# Patient Record
Sex: MALE | Race: WHITE | HISPANIC OR LATINO | Employment: FULL TIME | URBAN - METROPOLITAN AREA
[De-identification: names, ages, dates, MRNs, and addresses within clinical notes are randomized per-mention and may not be internally consistent; named-entity substitution may affect disease eponyms.]

---

## 2017-01-05 ENCOUNTER — GENERIC CONVERSION - ENCOUNTER (OUTPATIENT)
Dept: OTHER | Facility: OTHER | Age: 57
End: 2017-01-05

## 2017-01-05 ENCOUNTER — TRANSCRIBE ORDERS (OUTPATIENT)
Dept: LAB | Facility: CLINIC | Age: 57
End: 2017-01-05

## 2017-01-05 ENCOUNTER — APPOINTMENT (OUTPATIENT)
Dept: LAB | Facility: CLINIC | Age: 57
End: 2017-01-05
Payer: COMMERCIAL

## 2017-01-05 DIAGNOSIS — Z94.4 LIVER REPLACED BY TRANSPLANT (HCC): ICD-10-CM

## 2017-01-05 DIAGNOSIS — B18.2 CHRONIC HEPATITIS C WITH HEPATIC COMA (HCC): ICD-10-CM

## 2017-01-05 DIAGNOSIS — R79.1 ABNORMAL COAGULATION PROFILE: ICD-10-CM

## 2017-01-05 DIAGNOSIS — E11.65 TYPE 2 DIABETES MELLITUS WITH HYPERGLYCEMIA (HCC): ICD-10-CM

## 2017-01-05 DIAGNOSIS — D68.9 BLOOD CLOTTING DISORDER (HCC): ICD-10-CM

## 2017-01-05 DIAGNOSIS — N18.2 CHRONIC KIDNEY DISEASE, STAGE II (MILD): ICD-10-CM

## 2017-01-05 LAB
ALBUMIN SERPL BCP-MCNC: 3.9 G/DL (ref 3.5–5)
ALP SERPL-CCNC: 127 U/L (ref 46–116)
ALT SERPL W P-5'-P-CCNC: 39 U/L (ref 12–78)
ANION GAP SERPL CALCULATED.3IONS-SCNC: 10 MMOL/L (ref 4–13)
AST SERPL W P-5'-P-CCNC: 13 U/L (ref 5–45)
BASOPHILS # BLD AUTO: 0.01 THOUSANDS/ΜL (ref 0–0.1)
BASOPHILS NFR BLD AUTO: 0 % (ref 0–1)
BILIRUB SERPL-MCNC: 0.3 MG/DL (ref 0.2–1)
BUN SERPL-MCNC: 46 MG/DL (ref 5–25)
CALCIUM SERPL-MCNC: 8.6 MG/DL (ref 8.3–10.1)
CHLORIDE SERPL-SCNC: 106 MMOL/L (ref 100–108)
CHOLEST SERPL-MCNC: 221 MG/DL (ref 50–200)
CO2 SERPL-SCNC: 25 MMOL/L (ref 21–32)
CREAT SERPL-MCNC: 1.75 MG/DL (ref 0.6–1.3)
CREAT UR-MCNC: 289 MG/DL
EOSINOPHIL # BLD AUTO: 0.09 THOUSAND/ΜL (ref 0–0.61)
EOSINOPHIL NFR BLD AUTO: 2 % (ref 0–6)
ERYTHROCYTE [DISTWIDTH] IN BLOOD BY AUTOMATED COUNT: 13.8 % (ref 11.6–15.1)
EST. AVERAGE GLUCOSE BLD GHB EST-MCNC: 194 MG/DL
GFR SERPL CREATININE-BSD FRML MDRD: 40.2 ML/MIN/1.73SQ M
GGT SERPL-CCNC: 132 U/L (ref 5–85)
GLUCOSE SERPL-MCNC: 132 MG/DL (ref 65–140)
HBA1C MFR BLD: 8.4 % (ref 4.2–6.3)
HCT VFR BLD AUTO: 33.7 % (ref 36.5–49.3)
HDLC SERPL-MCNC: 34 MG/DL (ref 40–60)
HGB BLD-MCNC: 11 G/DL (ref 12–17)
INR PPP: 1.17 (ref 0.86–1.16)
LDLC SERPL CALC-MCNC: 119 MG/DL (ref 0–100)
LYMPHOCYTES # BLD AUTO: 0.53 THOUSANDS/ΜL (ref 0.6–4.47)
LYMPHOCYTES NFR BLD AUTO: 10 % (ref 14–44)
MAGNESIUM SERPL-MCNC: 1.3 MG/DL (ref 1.6–2.6)
MCH RBC QN AUTO: 28 PG (ref 26.8–34.3)
MCHC RBC AUTO-ENTMCNC: 32.6 G/DL (ref 31.4–37.4)
MCV RBC AUTO: 86 FL (ref 82–98)
MONOCYTES # BLD AUTO: 0.42 THOUSAND/ΜL (ref 0.17–1.22)
MONOCYTES NFR BLD AUTO: 8 % (ref 4–12)
NEUTROPHILS # BLD AUTO: 4.47 THOUSANDS/ΜL (ref 1.85–7.62)
NEUTS SEG NFR BLD AUTO: 80 % (ref 43–75)
PLATELET # BLD AUTO: 159 THOUSANDS/UL (ref 149–390)
PMV BLD AUTO: 11.9 FL (ref 8.9–12.7)
POTASSIUM SERPL-SCNC: 4.8 MMOL/L (ref 3.5–5.3)
PROT SERPL-MCNC: 7.5 G/DL (ref 6.4–8.2)
PROT UR-MCNC: 183 MG/DL
PROT/CREAT UR: 0.63 MG/G{CREAT} (ref 0–0.1)
PROTHROMBIN TIME: 14.6 SECONDS (ref 12–14.3)
RBC # BLD AUTO: 3.93 MILLION/UL (ref 3.88–5.62)
SODIUM SERPL-SCNC: 141 MMOL/L (ref 136–145)
TRIGL SERPL-MCNC: 338 MG/DL
WBC # BLD AUTO: 5.52 THOUSAND/UL (ref 4.31–10.16)

## 2017-01-05 PROCEDURE — 85025 COMPLETE CBC W/AUTO DIFF WBC: CPT

## 2017-01-05 PROCEDURE — 80053 COMPREHEN METABOLIC PANEL: CPT

## 2017-01-05 PROCEDURE — 83036 HEMOGLOBIN GLYCOSYLATED A1C: CPT

## 2017-01-05 PROCEDURE — 80197 ASSAY OF TACROLIMUS: CPT

## 2017-01-05 PROCEDURE — 82570 ASSAY OF URINE CREATININE: CPT

## 2017-01-05 PROCEDURE — 83735 ASSAY OF MAGNESIUM: CPT

## 2017-01-05 PROCEDURE — 85610 PROTHROMBIN TIME: CPT

## 2017-01-05 PROCEDURE — 36415 COLL VENOUS BLD VENIPUNCTURE: CPT

## 2017-01-05 PROCEDURE — 82977 ASSAY OF GGT: CPT

## 2017-01-05 PROCEDURE — 80061 LIPID PANEL: CPT

## 2017-01-05 PROCEDURE — 84156 ASSAY OF PROTEIN URINE: CPT

## 2017-01-09 LAB — TACROLIMUS BLD LC/MS/MS-MCNC: 4.9 NG/ML (ref 2–20)

## 2017-01-11 ENCOUNTER — ALLSCRIPTS OFFICE VISIT (OUTPATIENT)
Dept: OTHER | Facility: OTHER | Age: 57
End: 2017-01-11

## 2017-02-08 ENCOUNTER — ALLSCRIPTS OFFICE VISIT (OUTPATIENT)
Dept: OTHER | Facility: OTHER | Age: 57
End: 2017-02-08

## 2017-02-13 ENCOUNTER — TRANSCRIBE ORDERS (OUTPATIENT)
Dept: LAB | Facility: CLINIC | Age: 57
End: 2017-02-13

## 2017-02-13 ENCOUNTER — APPOINTMENT (OUTPATIENT)
Dept: LAB | Facility: CLINIC | Age: 57
End: 2017-02-13
Payer: COMMERCIAL

## 2017-02-13 DIAGNOSIS — R79.1 ABNORMAL COAGULATION PROFILE: ICD-10-CM

## 2017-02-13 DIAGNOSIS — Z94.4 LIVER REPLACED BY TRANSPLANT (HCC): ICD-10-CM

## 2017-02-13 DIAGNOSIS — Z94.4 LIVER REPLACED BY TRANSPLANT (HCC): Primary | ICD-10-CM

## 2017-02-13 DIAGNOSIS — D68.9 OTHER AND UNSPECIFIED COAGULATION DEFECTS: ICD-10-CM

## 2017-02-13 LAB
ALBUMIN SERPL BCP-MCNC: 4.2 G/DL (ref 3.5–5)
ALP SERPL-CCNC: 104 U/L (ref 46–116)
ALT SERPL W P-5'-P-CCNC: 42 U/L (ref 12–78)
ANION GAP SERPL CALCULATED.3IONS-SCNC: 10 MMOL/L (ref 4–13)
AST SERPL W P-5'-P-CCNC: 13 U/L (ref 5–45)
BASOPHILS # BLD AUTO: 0.02 THOUSANDS/ΜL (ref 0–0.1)
BASOPHILS NFR BLD AUTO: 0 % (ref 0–1)
BILIRUB SERPL-MCNC: 0.3 MG/DL (ref 0.2–1)
BUN SERPL-MCNC: 40 MG/DL (ref 5–25)
CALCIUM SERPL-MCNC: 8.6 MG/DL (ref 8.3–10.1)
CHLORIDE SERPL-SCNC: 106 MMOL/L (ref 100–108)
CO2 SERPL-SCNC: 25 MMOL/L (ref 21–32)
CREAT SERPL-MCNC: 1.92 MG/DL (ref 0.6–1.3)
EOSINOPHIL # BLD AUTO: 0.09 THOUSAND/ΜL (ref 0–0.61)
EOSINOPHIL NFR BLD AUTO: 2 % (ref 0–6)
ERYTHROCYTE [DISTWIDTH] IN BLOOD BY AUTOMATED COUNT: 14.7 % (ref 11.6–15.1)
GFR SERPL CREATININE-BSD FRML MDRD: 36.2 ML/MIN/1.73SQ M
GGT SERPL-CCNC: 69 U/L (ref 5–85)
GLUCOSE SERPL-MCNC: 153 MG/DL (ref 65–140)
HCT VFR BLD AUTO: 35.8 % (ref 36.5–49.3)
HGB BLD-MCNC: 11.4 G/DL (ref 12–17)
INR PPP: 1.2 (ref 0.86–1.16)
LYMPHOCYTES # BLD AUTO: 0.46 THOUSANDS/ΜL (ref 0.6–4.47)
LYMPHOCYTES NFR BLD AUTO: 10 % (ref 14–44)
MAGNESIUM SERPL-MCNC: 1.6 MG/DL (ref 1.6–2.6)
MCH RBC QN AUTO: 28.1 PG (ref 26.8–34.3)
MCHC RBC AUTO-ENTMCNC: 31.8 G/DL (ref 31.4–37.4)
MCV RBC AUTO: 88 FL (ref 82–98)
MONOCYTES # BLD AUTO: 0.46 THOUSAND/ΜL (ref 0.17–1.22)
MONOCYTES NFR BLD AUTO: 10 % (ref 4–12)
NEUTROPHILS # BLD AUTO: 3.44 THOUSANDS/ΜL (ref 1.85–7.62)
NEUTS SEG NFR BLD AUTO: 78 % (ref 43–75)
PLATELET # BLD AUTO: 117 THOUSANDS/UL (ref 149–390)
PMV BLD AUTO: 12 FL (ref 8.9–12.7)
POTASSIUM SERPL-SCNC: 5 MMOL/L (ref 3.5–5.3)
PROT SERPL-MCNC: 7.4 G/DL (ref 6.4–8.2)
PROTHROMBIN TIME: 14.9 SECONDS (ref 12–14.3)
RBC # BLD AUTO: 4.05 MILLION/UL (ref 3.88–5.62)
SODIUM SERPL-SCNC: 141 MMOL/L (ref 136–145)
WBC # BLD AUTO: 4.47 THOUSAND/UL (ref 4.31–10.16)

## 2017-02-13 PROCEDURE — 85610 PROTHROMBIN TIME: CPT

## 2017-02-13 PROCEDURE — 82977 ASSAY OF GGT: CPT

## 2017-02-13 PROCEDURE — 36415 COLL VENOUS BLD VENIPUNCTURE: CPT

## 2017-02-13 PROCEDURE — 80053 COMPREHEN METABOLIC PANEL: CPT

## 2017-02-13 PROCEDURE — 80197 ASSAY OF TACROLIMUS: CPT

## 2017-02-13 PROCEDURE — 85025 COMPLETE CBC W/AUTO DIFF WBC: CPT

## 2017-02-13 PROCEDURE — 83735 ASSAY OF MAGNESIUM: CPT

## 2017-02-15 LAB — TACROLIMUS BLD LC/MS/MS-MCNC: 6.2 NG/ML (ref 2–20)

## 2017-02-16 ENCOUNTER — ANESTHESIA (OUTPATIENT)
Dept: GASTROENTEROLOGY | Facility: HOSPITAL | Age: 57
End: 2017-02-16
Payer: COMMERCIAL

## 2017-02-16 ENCOUNTER — ANESTHESIA EVENT (OUTPATIENT)
Dept: GASTROENTEROLOGY | Facility: HOSPITAL | Age: 57
End: 2017-02-16
Payer: COMMERCIAL

## 2017-02-16 ENCOUNTER — HOSPITAL ENCOUNTER (OUTPATIENT)
Facility: HOSPITAL | Age: 57
Setting detail: OUTPATIENT SURGERY
Discharge: HOME | End: 2017-02-16
Attending: INTERNAL MEDICINE | Admitting: INTERNAL MEDICINE
Payer: COMMERCIAL

## 2017-02-16 VITALS
DIASTOLIC BLOOD PRESSURE: 60 MMHG | HEIGHT: 64 IN | BODY MASS INDEX: 33.46 KG/M2 | HEART RATE: 82 BPM | RESPIRATION RATE: 16 BRPM | WEIGHT: 196 LBS | SYSTOLIC BLOOD PRESSURE: 107 MMHG | TEMPERATURE: 97.3 F | OXYGEN SATURATION: 95 %

## 2017-02-16 DIAGNOSIS — Z12.11 ENCOUNTER FOR SCREENING FOR MALIGNANT NEOPLASM OF COLON: ICD-10-CM

## 2017-02-16 LAB — GLUCOSE SERPL-MCNC: 121 MG/DL (ref 65–140)

## 2017-02-16 PROCEDURE — 82948 REAGENT STRIP/BLOOD GLUCOSE: CPT

## 2017-02-16 PROCEDURE — 88305 TISSUE EXAM BY PATHOLOGIST: CPT | Performed by: INTERNAL MEDICINE

## 2017-02-16 RX ORDER — PROPOFOL 10 MG/ML
INJECTION, EMULSION INTRAVENOUS AS NEEDED
Status: DISCONTINUED | OUTPATIENT
Start: 2017-02-16 | End: 2017-02-16 | Stop reason: SURG

## 2017-02-16 RX ORDER — SODIUM CHLORIDE 9 MG/ML
125 INJECTION, SOLUTION INTRAVENOUS CONTINUOUS
Status: DISCONTINUED | OUTPATIENT
Start: 2017-02-16 | End: 2017-02-16 | Stop reason: HOSPADM

## 2017-02-16 RX ORDER — PROPOFOL 10 MG/ML
INJECTION, EMULSION INTRAVENOUS CONTINUOUS PRN
Status: DISCONTINUED | OUTPATIENT
Start: 2017-02-16 | End: 2017-02-16 | Stop reason: SURG

## 2017-02-16 RX ADMIN — PROPOFOL 120 MCG/KG/MIN: 10 INJECTION, EMULSION INTRAVENOUS at 08:47

## 2017-02-16 RX ADMIN — PROPOFOL 60 MG: 10 INJECTION, EMULSION INTRAVENOUS at 08:46

## 2017-02-16 RX ADMIN — SODIUM CHLORIDE: 0.9 INJECTION, SOLUTION INTRAVENOUS at 08:45

## 2017-02-16 RX ADMIN — SODIUM CHLORIDE 125 ML/HR: 0.9 INJECTION, SOLUTION INTRAVENOUS at 07:44

## 2017-02-27 ENCOUNTER — ALLSCRIPTS OFFICE VISIT (OUTPATIENT)
Dept: OTHER | Facility: OTHER | Age: 57
End: 2017-02-27

## 2017-02-27 DIAGNOSIS — E11.9 TYPE 2 DIABETES MELLITUS WITHOUT COMPLICATIONS (HCC): ICD-10-CM

## 2017-02-27 DIAGNOSIS — M11.9 CRYSTAL ARTHROPATHY: ICD-10-CM

## 2017-04-24 ENCOUNTER — ALLSCRIPTS OFFICE VISIT (OUTPATIENT)
Dept: OTHER | Facility: OTHER | Age: 57
End: 2017-04-24

## 2017-05-02 ENCOUNTER — TRANSCRIBE ORDERS (OUTPATIENT)
Dept: LAB | Facility: CLINIC | Age: 57
End: 2017-05-02

## 2017-05-02 ENCOUNTER — APPOINTMENT (OUTPATIENT)
Dept: LAB | Facility: CLINIC | Age: 57
End: 2017-05-02
Payer: COMMERCIAL

## 2017-05-02 DIAGNOSIS — M11.9 CRYSTAL ARTHROPATHY: ICD-10-CM

## 2017-05-02 DIAGNOSIS — E11.9 DIABETES MELLITUS WITHOUT COMPLICATION (HCC): ICD-10-CM

## 2017-05-02 DIAGNOSIS — M11.9: Primary | ICD-10-CM

## 2017-05-02 DIAGNOSIS — R79.1 ABNORMAL COAGULATION PROFILE: ICD-10-CM

## 2017-05-02 DIAGNOSIS — D68.9 OTHER AND UNSPECIFIED COAGULATION DEFECTS: ICD-10-CM

## 2017-05-02 DIAGNOSIS — Z94.4 LIVER REPLACED BY TRANSPLANT (HCC): ICD-10-CM

## 2017-05-02 LAB
ALBUMIN SERPL BCP-MCNC: 4.2 G/DL (ref 3.5–5)
ALP SERPL-CCNC: 88 U/L (ref 46–116)
ALT SERPL W P-5'-P-CCNC: 33 U/L (ref 12–78)
ANION GAP SERPL CALCULATED.3IONS-SCNC: 11 MMOL/L (ref 4–13)
AST SERPL W P-5'-P-CCNC: 21 U/L (ref 5–45)
BASOPHILS # BLD AUTO: 0.01 THOUSANDS/ΜL (ref 0–0.1)
BASOPHILS NFR BLD AUTO: 0 % (ref 0–1)
BILIRUB SERPL-MCNC: 0.3 MG/DL (ref 0.2–1)
BUN SERPL-MCNC: 53 MG/DL (ref 5–25)
CALCIUM SERPL-MCNC: 9 MG/DL (ref 8.3–10.1)
CHLORIDE SERPL-SCNC: 106 MMOL/L (ref 100–108)
CO2 SERPL-SCNC: 24 MMOL/L (ref 21–32)
CREAT SERPL-MCNC: 1.94 MG/DL (ref 0.6–1.3)
EOSINOPHIL # BLD AUTO: 0.08 THOUSAND/ΜL (ref 0–0.61)
EOSINOPHIL NFR BLD AUTO: 2 % (ref 0–6)
ERYTHROCYTE [DISTWIDTH] IN BLOOD BY AUTOMATED COUNT: 14.4 % (ref 11.6–15.1)
GFR SERPL CREATININE-BSD FRML MDRD: 35.7 ML/MIN/1.73SQ M
GGT SERPL-CCNC: 51 U/L (ref 5–85)
GLUCOSE P FAST SERPL-MCNC: 118 MG/DL (ref 65–99)
HCT VFR BLD AUTO: 34.7 % (ref 36.5–49.3)
HGB BLD-MCNC: 11.5 G/DL (ref 12–17)
INR PPP: 1.07 (ref 0.86–1.16)
LYMPHOCYTES # BLD AUTO: 0.53 THOUSANDS/ΜL (ref 0.6–4.47)
LYMPHOCYTES NFR BLD AUTO: 10 % (ref 14–44)
MAGNESIUM SERPL-MCNC: 1.4 MG/DL (ref 1.6–2.6)
MCH RBC QN AUTO: 28.8 PG (ref 26.8–34.3)
MCHC RBC AUTO-ENTMCNC: 33.1 G/DL (ref 31.4–37.4)
MCV RBC AUTO: 87 FL (ref 82–98)
MONOCYTES # BLD AUTO: 0.42 THOUSAND/ΜL (ref 0.17–1.22)
MONOCYTES NFR BLD AUTO: 8 % (ref 4–12)
NEUTROPHILS # BLD AUTO: 4.05 THOUSANDS/ΜL (ref 1.85–7.62)
NEUTS SEG NFR BLD AUTO: 80 % (ref 43–75)
PLATELET # BLD AUTO: 131 THOUSANDS/UL (ref 149–390)
PMV BLD AUTO: 11.6 FL (ref 8.9–12.7)
POTASSIUM SERPL-SCNC: 5.7 MMOL/L (ref 3.5–5.3)
PROT SERPL-MCNC: 7.4 G/DL (ref 6.4–8.2)
PROTHROMBIN TIME: 14.2 SECONDS (ref 12.1–14.4)
RBC # BLD AUTO: 4 MILLION/UL (ref 3.88–5.62)
SODIUM SERPL-SCNC: 141 MMOL/L (ref 136–145)
TSH SERPL DL<=0.05 MIU/L-ACNC: 1.6 UIU/ML (ref 0.36–3.74)
URATE SERPL-MCNC: 9.4 MG/DL (ref 4.2–8)
WBC # BLD AUTO: 5.09 THOUSAND/UL (ref 4.31–10.16)

## 2017-05-02 PROCEDURE — 80197 ASSAY OF TACROLIMUS: CPT

## 2017-05-02 PROCEDURE — 85025 COMPLETE CBC W/AUTO DIFF WBC: CPT

## 2017-05-02 PROCEDURE — 84443 ASSAY THYROID STIM HORMONE: CPT | Performed by: SURGERY

## 2017-05-02 PROCEDURE — 80053 COMPREHEN METABOLIC PANEL: CPT

## 2017-05-02 PROCEDURE — 83735 ASSAY OF MAGNESIUM: CPT

## 2017-05-02 PROCEDURE — 82977 ASSAY OF GGT: CPT

## 2017-05-02 PROCEDURE — 85610 PROTHROMBIN TIME: CPT

## 2017-05-02 PROCEDURE — 36415 COLL VENOUS BLD VENIPUNCTURE: CPT

## 2017-05-02 PROCEDURE — 84550 ASSAY OF BLOOD/URIC ACID: CPT

## 2017-05-04 LAB — TACROLIMUS BLD LC/MS/MS-MCNC: 6.2 NG/ML (ref 2–20)

## 2017-05-22 ENCOUNTER — APPOINTMENT (OUTPATIENT)
Dept: LAB | Facility: CLINIC | Age: 57
End: 2017-05-22
Payer: COMMERCIAL

## 2017-05-22 DIAGNOSIS — Z94.4 LIVER REPLACED BY TRANSPLANT (HCC): ICD-10-CM

## 2017-05-22 DIAGNOSIS — D68.9 OTHER AND UNSPECIFIED COAGULATION DEFECTS: ICD-10-CM

## 2017-05-22 DIAGNOSIS — R79.1 ABNORMAL COAGULATION PROFILE: ICD-10-CM

## 2017-05-22 LAB
ALBUMIN SERPL BCP-MCNC: 4.1 G/DL (ref 3.5–5)
ALP SERPL-CCNC: 80 U/L (ref 46–116)
ALT SERPL W P-5'-P-CCNC: 35 U/L (ref 12–78)
ANION GAP SERPL CALCULATED.3IONS-SCNC: 11 MMOL/L (ref 4–13)
AST SERPL W P-5'-P-CCNC: 17 U/L (ref 5–45)
BASOPHILS # BLD AUTO: 0.03 THOUSANDS/ΜL (ref 0–0.1)
BASOPHILS NFR BLD AUTO: 1 % (ref 0–1)
BILIRUB SERPL-MCNC: 0.3 MG/DL (ref 0.2–1)
BUN SERPL-MCNC: 40 MG/DL (ref 5–25)
CALCIUM SERPL-MCNC: 8.9 MG/DL (ref 8.3–10.1)
CHLORIDE SERPL-SCNC: 106 MMOL/L (ref 100–108)
CO2 SERPL-SCNC: 25 MMOL/L (ref 21–32)
CREAT SERPL-MCNC: 1.82 MG/DL (ref 0.6–1.3)
EOSINOPHIL # BLD AUTO: 0.11 THOUSAND/ΜL (ref 0–0.61)
EOSINOPHIL NFR BLD AUTO: 2 % (ref 0–6)
ERYTHROCYTE [DISTWIDTH] IN BLOOD BY AUTOMATED COUNT: 14.6 % (ref 11.6–15.1)
GFR SERPL CREATININE-BSD FRML MDRD: 38.5 ML/MIN/1.73SQ M
GGT SERPL-CCNC: 30 U/L (ref 5–85)
GLUCOSE P FAST SERPL-MCNC: 134 MG/DL (ref 65–99)
HCT VFR BLD AUTO: 35.7 % (ref 36.5–49.3)
HGB BLD-MCNC: 11.8 G/DL (ref 12–17)
INR PPP: 1.04 (ref 0.86–1.16)
LYMPHOCYTES # BLD AUTO: 0.53 THOUSANDS/ΜL (ref 0.6–4.47)
LYMPHOCYTES NFR BLD AUTO: 10 % (ref 14–44)
MAGNESIUM SERPL-MCNC: 1.4 MG/DL (ref 1.6–2.6)
MCH RBC QN AUTO: 28.7 PG (ref 26.8–34.3)
MCHC RBC AUTO-ENTMCNC: 33.1 G/DL (ref 31.4–37.4)
MCV RBC AUTO: 87 FL (ref 82–98)
MONOCYTES # BLD AUTO: 0.44 THOUSAND/ΜL (ref 0.17–1.22)
MONOCYTES NFR BLD AUTO: 8 % (ref 4–12)
NEUTROPHILS # BLD AUTO: 4.26 THOUSANDS/ΜL (ref 1.85–7.62)
NEUTS SEG NFR BLD AUTO: 79 % (ref 43–75)
PLATELET # BLD AUTO: 116 THOUSANDS/UL (ref 149–390)
PMV BLD AUTO: 11.9 FL (ref 8.9–12.7)
POTASSIUM SERPL-SCNC: 4.5 MMOL/L (ref 3.5–5.3)
PROT SERPL-MCNC: 7.2 G/DL (ref 6.4–8.2)
PROTHROMBIN TIME: 13.9 SECONDS (ref 12.1–14.4)
RBC # BLD AUTO: 4.11 MILLION/UL (ref 3.88–5.62)
SODIUM SERPL-SCNC: 142 MMOL/L (ref 136–145)
WBC # BLD AUTO: 5.37 THOUSAND/UL (ref 4.31–10.16)

## 2017-05-22 PROCEDURE — 82977 ASSAY OF GGT: CPT

## 2017-05-22 PROCEDURE — 83735 ASSAY OF MAGNESIUM: CPT

## 2017-05-22 PROCEDURE — 85025 COMPLETE CBC W/AUTO DIFF WBC: CPT

## 2017-05-22 PROCEDURE — 80197 ASSAY OF TACROLIMUS: CPT

## 2017-05-22 PROCEDURE — 85610 PROTHROMBIN TIME: CPT

## 2017-05-22 PROCEDURE — 36415 COLL VENOUS BLD VENIPUNCTURE: CPT

## 2017-05-22 PROCEDURE — 80053 COMPREHEN METABOLIC PANEL: CPT

## 2017-05-24 LAB — TACROLIMUS BLD LC/MS/MS-MCNC: 6.6 NG/ML (ref 2–20)

## 2017-05-30 ENCOUNTER — ALLSCRIPTS OFFICE VISIT (OUTPATIENT)
Dept: OTHER | Facility: OTHER | Age: 57
End: 2017-05-30

## 2017-07-03 DIAGNOSIS — N18.2 CHRONIC KIDNEY DISEASE, STAGE II (MILD): ICD-10-CM

## 2017-07-11 ENCOUNTER — ALLSCRIPTS OFFICE VISIT (OUTPATIENT)
Dept: OTHER | Facility: OTHER | Age: 57
End: 2017-07-11

## 2017-08-03 ENCOUNTER — TRANSCRIBE ORDERS (OUTPATIENT)
Dept: LAB | Facility: CLINIC | Age: 57
End: 2017-08-03

## 2017-08-03 ENCOUNTER — APPOINTMENT (OUTPATIENT)
Dept: LAB | Facility: CLINIC | Age: 57
End: 2017-08-03
Payer: COMMERCIAL

## 2017-08-03 DIAGNOSIS — E11.65 UNCONTROLLED TYPE 2 DIABETES MELLITUS WITH COMPLICATION, UNSPECIFIED LONG TERM INSULIN USE STATUS: ICD-10-CM

## 2017-08-03 DIAGNOSIS — E11.8 UNCONTROLLED TYPE 2 DIABETES MELLITUS WITH COMPLICATION, UNSPECIFIED LONG TERM INSULIN USE STATUS: ICD-10-CM

## 2017-08-03 DIAGNOSIS — E11.65 UNCONTROLLED TYPE 2 DIABETES MELLITUS WITH COMPLICATION, UNSPECIFIED LONG TERM INSULIN USE STATUS: Primary | ICD-10-CM

## 2017-08-03 DIAGNOSIS — N18.2 CHRONIC KIDNEY DISEASE, STAGE II (MILD): ICD-10-CM

## 2017-08-03 DIAGNOSIS — R79.1 ABNORMAL COAGULATION PROFILE: ICD-10-CM

## 2017-08-03 DIAGNOSIS — Z94.4 LIVER REPLACED BY TRANSPLANT (HCC): ICD-10-CM

## 2017-08-03 DIAGNOSIS — D68.9 OTHER AND UNSPECIFIED COAGULATION DEFECTS: ICD-10-CM

## 2017-08-03 DIAGNOSIS — E11.8 UNCONTROLLED TYPE 2 DIABETES MELLITUS WITH COMPLICATION, UNSPECIFIED LONG TERM INSULIN USE STATUS: Primary | ICD-10-CM

## 2017-08-03 LAB
ALBUMIN SERPL BCP-MCNC: 4.1 G/DL (ref 3.5–5)
ALP SERPL-CCNC: 91 U/L (ref 46–116)
ALT SERPL W P-5'-P-CCNC: 40 U/L (ref 12–78)
ANION GAP SERPL CALCULATED.3IONS-SCNC: 8 MMOL/L (ref 4–13)
AST SERPL W P-5'-P-CCNC: 19 U/L (ref 5–45)
BASOPHILS # BLD AUTO: 0.02 THOUSANDS/ΜL (ref 0–0.1)
BASOPHILS NFR BLD AUTO: 0 % (ref 0–1)
BILIRUB SERPL-MCNC: 0.3 MG/DL (ref 0.2–1)
BUN SERPL-MCNC: 52 MG/DL (ref 5–25)
CALCIUM SERPL-MCNC: 9.3 MG/DL (ref 8.3–10.1)
CHLORIDE SERPL-SCNC: 106 MMOL/L (ref 100–108)
CO2 SERPL-SCNC: 24 MMOL/L (ref 21–32)
CREAT SERPL-MCNC: 1.99 MG/DL (ref 0.6–1.3)
CREAT UR-MCNC: 279 MG/DL
CREAT UR-MCNC: 289 MG/DL
EOSINOPHIL # BLD AUTO: 0.09 THOUSAND/ΜL (ref 0–0.61)
EOSINOPHIL NFR BLD AUTO: 2 % (ref 0–6)
ERYTHROCYTE [DISTWIDTH] IN BLOOD BY AUTOMATED COUNT: 14.3 % (ref 11.6–15.1)
EST. AVERAGE GLUCOSE BLD GHB EST-MCNC: 154 MG/DL
GFR SERPL CREATININE-BSD FRML MDRD: 36 ML/MIN/1.73SQ M
GGT SERPL-CCNC: 47 U/L (ref 5–85)
GLUCOSE SERPL-MCNC: 150 MG/DL (ref 65–140)
HBA1C MFR BLD: 7 % (ref 4.2–6.3)
HCT VFR BLD AUTO: 35.4 % (ref 36.5–49.3)
HGB BLD-MCNC: 11.7 G/DL (ref 12–17)
INR PPP: 0.97 (ref 0.86–1.16)
LYMPHOCYTES # BLD AUTO: 0.55 THOUSANDS/ΜL (ref 0.6–4.47)
LYMPHOCYTES NFR BLD AUTO: 11 % (ref 14–44)
MAGNESIUM SERPL-MCNC: 1.5 MG/DL (ref 1.6–2.6)
MCH RBC QN AUTO: 28.9 PG (ref 26.8–34.3)
MCHC RBC AUTO-ENTMCNC: 33.1 G/DL (ref 31.4–37.4)
MCV RBC AUTO: 87 FL (ref 82–98)
MICROALBUMIN UR-MCNC: 1330 MG/L (ref 0–20)
MICROALBUMIN/CREAT 24H UR: 460 MG/G CREATININE (ref 0–30)
MONOCYTES # BLD AUTO: 0.4 THOUSAND/ΜL (ref 0.17–1.22)
MONOCYTES NFR BLD AUTO: 8 % (ref 4–12)
NEUTROPHILS # BLD AUTO: 3.92 THOUSANDS/ΜL (ref 1.85–7.62)
NEUTS SEG NFR BLD AUTO: 79 % (ref 43–75)
PLATELET # BLD AUTO: 128 THOUSANDS/UL (ref 149–390)
PMV BLD AUTO: 11.7 FL (ref 8.9–12.7)
POTASSIUM SERPL-SCNC: 5.7 MMOL/L (ref 3.5–5.3)
PROT SERPL-MCNC: 7.5 G/DL (ref 6.4–8.2)
PROT UR-MCNC: 174 MG/DL
PROT/CREAT UR: 0.62 MG/G{CREAT} (ref 0–0.1)
PROTHROMBIN TIME: 13.2 SECONDS (ref 12.1–14.4)
RBC # BLD AUTO: 4.05 MILLION/UL (ref 3.88–5.62)
SODIUM SERPL-SCNC: 138 MMOL/L (ref 136–145)
WBC # BLD AUTO: 4.98 THOUSAND/UL (ref 4.31–10.16)

## 2017-08-03 PROCEDURE — 85610 PROTHROMBIN TIME: CPT

## 2017-08-03 PROCEDURE — 85025 COMPLETE CBC W/AUTO DIFF WBC: CPT

## 2017-08-03 PROCEDURE — 82977 ASSAY OF GGT: CPT

## 2017-08-03 PROCEDURE — 80053 COMPREHEN METABOLIC PANEL: CPT

## 2017-08-03 PROCEDURE — 83735 ASSAY OF MAGNESIUM: CPT

## 2017-08-03 PROCEDURE — 82570 ASSAY OF URINE CREATININE: CPT

## 2017-08-03 PROCEDURE — 82043 UR ALBUMIN QUANTITATIVE: CPT | Performed by: FAMILY MEDICINE

## 2017-08-03 PROCEDURE — 36415 COLL VENOUS BLD VENIPUNCTURE: CPT

## 2017-08-03 PROCEDURE — 82570 ASSAY OF URINE CREATININE: CPT | Performed by: FAMILY MEDICINE

## 2017-08-03 PROCEDURE — 84156 ASSAY OF PROTEIN URINE: CPT

## 2017-08-03 PROCEDURE — 80197 ASSAY OF TACROLIMUS: CPT

## 2017-08-03 PROCEDURE — 83036 HEMOGLOBIN GLYCOSYLATED A1C: CPT

## 2017-08-04 LAB — TACROLIMUS BLD LC/MS/MS-MCNC: 6.3 NG/ML (ref 2–20)

## 2017-08-14 ENCOUNTER — APPOINTMENT (OUTPATIENT)
Dept: LAB | Facility: CLINIC | Age: 57
End: 2017-08-14
Payer: COMMERCIAL

## 2017-08-14 DIAGNOSIS — Z94.4 LIVER REPLACED BY TRANSPLANT (HCC): ICD-10-CM

## 2017-08-14 DIAGNOSIS — R79.1 ABNORMAL COAGULATION PROFILE: ICD-10-CM

## 2017-08-14 DIAGNOSIS — D68.9 OTHER AND UNSPECIFIED COAGULATION DEFECTS: ICD-10-CM

## 2017-08-14 LAB
ALBUMIN SERPL BCP-MCNC: 4.1 G/DL (ref 3.5–5)
ALP SERPL-CCNC: 90 U/L (ref 46–116)
ALT SERPL W P-5'-P-CCNC: 42 U/L (ref 12–78)
ANION GAP SERPL CALCULATED.3IONS-SCNC: 11 MMOL/L (ref 4–13)
AST SERPL W P-5'-P-CCNC: 19 U/L (ref 5–45)
BASOPHILS # BLD AUTO: 0.02 THOUSANDS/ΜL (ref 0–0.1)
BASOPHILS NFR BLD AUTO: 0 % (ref 0–1)
BILIRUB SERPL-MCNC: 0.4 MG/DL (ref 0.2–1)
BUN SERPL-MCNC: 38 MG/DL (ref 5–25)
CALCIUM SERPL-MCNC: 8.7 MG/DL (ref 8.3–10.1)
CHLORIDE SERPL-SCNC: 106 MMOL/L (ref 100–108)
CO2 SERPL-SCNC: 25 MMOL/L (ref 21–32)
CREAT SERPL-MCNC: 1.78 MG/DL (ref 0.6–1.3)
EOSINOPHIL # BLD AUTO: 0.13 THOUSAND/ΜL (ref 0–0.61)
EOSINOPHIL NFR BLD AUTO: 3 % (ref 0–6)
ERYTHROCYTE [DISTWIDTH] IN BLOOD BY AUTOMATED COUNT: 14.5 % (ref 11.6–15.1)
GFR SERPL CREATININE-BSD FRML MDRD: 41 ML/MIN/1.73SQ M
GGT SERPL-CCNC: 77 U/L (ref 5–85)
GLUCOSE SERPL-MCNC: 144 MG/DL (ref 65–140)
HCT VFR BLD AUTO: 35.2 % (ref 36.5–49.3)
HGB BLD-MCNC: 11.5 G/DL (ref 12–17)
INR PPP: 1.01 (ref 0.86–1.16)
LYMPHOCYTES # BLD AUTO: 0.55 THOUSANDS/ΜL (ref 0.6–4.47)
LYMPHOCYTES NFR BLD AUTO: 12 % (ref 14–44)
MAGNESIUM SERPL-MCNC: 1.4 MG/DL (ref 1.6–2.6)
MCH RBC QN AUTO: 28.6 PG (ref 26.8–34.3)
MCHC RBC AUTO-ENTMCNC: 32.7 G/DL (ref 31.4–37.4)
MCV RBC AUTO: 88 FL (ref 82–98)
MONOCYTES # BLD AUTO: 0.44 THOUSAND/ΜL (ref 0.17–1.22)
MONOCYTES NFR BLD AUTO: 9 % (ref 4–12)
NEUTROPHILS # BLD AUTO: 3.52 THOUSANDS/ΜL (ref 1.85–7.62)
NEUTS SEG NFR BLD AUTO: 76 % (ref 43–75)
PLATELET # BLD AUTO: 118 THOUSANDS/UL (ref 149–390)
PMV BLD AUTO: 11 FL (ref 8.9–12.7)
POTASSIUM SERPL-SCNC: 4.6 MMOL/L (ref 3.5–5.3)
PROT SERPL-MCNC: 7.4 G/DL (ref 6.4–8.2)
PROTHROMBIN TIME: 13.6 SECONDS (ref 12.1–14.4)
RBC # BLD AUTO: 4.02 MILLION/UL (ref 3.88–5.62)
SODIUM SERPL-SCNC: 142 MMOL/L (ref 136–145)
WBC # BLD AUTO: 4.66 THOUSAND/UL (ref 4.31–10.16)

## 2017-08-14 PROCEDURE — 83735 ASSAY OF MAGNESIUM: CPT

## 2017-08-14 PROCEDURE — 80197 ASSAY OF TACROLIMUS: CPT

## 2017-08-14 PROCEDURE — 85610 PROTHROMBIN TIME: CPT

## 2017-08-14 PROCEDURE — 36415 COLL VENOUS BLD VENIPUNCTURE: CPT

## 2017-08-14 PROCEDURE — 85025 COMPLETE CBC W/AUTO DIFF WBC: CPT

## 2017-08-14 PROCEDURE — 80053 COMPREHEN METABOLIC PANEL: CPT

## 2017-08-14 PROCEDURE — 82977 ASSAY OF GGT: CPT

## 2017-08-16 LAB — TACROLIMUS BLD LC/MS/MS-MCNC: 4.8 NG/ML (ref 2–20)

## 2017-08-25 ENCOUNTER — GENERIC CONVERSION - ENCOUNTER (OUTPATIENT)
Dept: OTHER | Facility: OTHER | Age: 57
End: 2017-08-25

## 2017-09-01 DIAGNOSIS — N18.2 CHRONIC KIDNEY DISEASE, STAGE II (MILD): ICD-10-CM

## 2017-09-28 ENCOUNTER — ALLSCRIPTS OFFICE VISIT (OUTPATIENT)
Dept: OTHER | Facility: OTHER | Age: 57
End: 2017-09-28

## 2017-10-03 ENCOUNTER — TRANSCRIBE ORDERS (OUTPATIENT)
Dept: LAB | Facility: CLINIC | Age: 57
End: 2017-10-03

## 2017-10-03 ENCOUNTER — APPOINTMENT (OUTPATIENT)
Dept: LAB | Facility: CLINIC | Age: 57
End: 2017-10-03
Payer: COMMERCIAL

## 2017-10-03 ENCOUNTER — ALLSCRIPTS OFFICE VISIT (OUTPATIENT)
Dept: OTHER | Facility: OTHER | Age: 57
End: 2017-10-03

## 2017-10-03 DIAGNOSIS — Z94.4 LIVER REPLACED BY TRANSPLANT (HCC): ICD-10-CM

## 2017-10-03 DIAGNOSIS — C22.1 MALIGNANT NEOPLASM OF INTRAHEPATIC BILE DUCTS (HCC): ICD-10-CM

## 2017-10-03 DIAGNOSIS — R79.1 ABNORMAL COAGULATION PROFILE: ICD-10-CM

## 2017-10-03 DIAGNOSIS — Z94.4 LIVER REPLACED BY TRANSPLANT (HCC): Primary | ICD-10-CM

## 2017-10-03 DIAGNOSIS — D68.9 BLOOD CLOTTING DISORDER (HCC): ICD-10-CM

## 2017-10-03 DIAGNOSIS — N18.2 CHRONIC KIDNEY DISEASE, STAGE II (MILD): ICD-10-CM

## 2017-10-03 LAB
ALBUMIN SERPL BCP-MCNC: 4.1 G/DL (ref 3.5–5)
ALP SERPL-CCNC: 91 U/L (ref 46–116)
ALT SERPL W P-5'-P-CCNC: 34 U/L (ref 12–78)
ANION GAP SERPL CALCULATED.3IONS-SCNC: 9 MMOL/L (ref 4–13)
AST SERPL W P-5'-P-CCNC: 16 U/L (ref 5–45)
BASOPHILS # BLD AUTO: 0.01 THOUSANDS/ΜL (ref 0–0.1)
BASOPHILS NFR BLD AUTO: 0 % (ref 0–1)
BILIRUB SERPL-MCNC: 0.3 MG/DL (ref 0.2–1)
BUN SERPL-MCNC: 43 MG/DL (ref 5–25)
CALCIUM SERPL-MCNC: 9.2 MG/DL (ref 8.3–10.1)
CHLORIDE SERPL-SCNC: 105 MMOL/L (ref 100–108)
CLARITY UR: NORMAL
CO2 SERPL-SCNC: 24 MMOL/L (ref 21–32)
COLOR UR: YELLOW
CREAT SERPL-MCNC: 1.79 MG/DL (ref 0.6–1.3)
EOSINOPHIL # BLD AUTO: 0.13 THOUSAND/ΜL (ref 0–0.61)
EOSINOPHIL NFR BLD AUTO: 2 % (ref 0–6)
ERYTHROCYTE [DISTWIDTH] IN BLOOD BY AUTOMATED COUNT: 14.2 % (ref 11.6–15.1)
GFR SERPL CREATININE-BSD FRML MDRD: 41 ML/MIN/1.73SQ M
GGT SERPL-CCNC: 44 U/L (ref 5–85)
GLUCOSE (HISTORICAL): NORMAL
GLUCOSE P FAST SERPL-MCNC: 144 MG/DL (ref 65–99)
HCT VFR BLD AUTO: 36.6 % (ref 36.5–49.3)
HGB BLD-MCNC: 12.1 G/DL (ref 12–17)
HGB UR QL STRIP.AUTO: NORMAL
INR PPP: 1.01 (ref 0.86–1.16)
KETONES UR STRIP-MCNC: NORMAL MG/DL
LYMPHOCYTES # BLD AUTO: 0.51 THOUSANDS/ΜL (ref 0.6–4.47)
LYMPHOCYTES NFR BLD AUTO: 9 % (ref 14–44)
MAGNESIUM SERPL-MCNC: 1.6 MG/DL (ref 1.6–2.6)
MCH RBC QN AUTO: 28.7 PG (ref 26.8–34.3)
MCHC RBC AUTO-ENTMCNC: 33.1 G/DL (ref 31.4–37.4)
MCV RBC AUTO: 87 FL (ref 82–98)
MONOCYTES # BLD AUTO: 0.37 THOUSAND/ΜL (ref 0.17–1.22)
MONOCYTES NFR BLD AUTO: 7 % (ref 4–12)
NEUTROPHILS # BLD AUTO: 4.48 THOUSANDS/ΜL (ref 1.85–7.62)
NEUTS SEG NFR BLD AUTO: 82 % (ref 43–75)
PH UR STRIP.AUTO: 5 [PH]
PLATELET # BLD AUTO: 126 THOUSANDS/UL (ref 149–390)
PMV BLD AUTO: 11.7 FL (ref 8.9–12.7)
POTASSIUM SERPL-SCNC: 4.8 MMOL/L (ref 3.5–5.3)
PROT SERPL-MCNC: 7.4 G/DL (ref 6.4–8.2)
PROT UR STRIP-MCNC: 2000 MG/DL
PROTHROMBIN TIME: 13.6 SECONDS (ref 12.1–14.4)
RBC # BLD AUTO: 4.21 MILLION/UL (ref 3.88–5.62)
SODIUM SERPL-SCNC: 138 MMOL/L (ref 136–145)
SP GR UR STRIP.AUTO: 1.02
WBC # BLD AUTO: 5.5 THOUSAND/UL (ref 4.31–10.16)

## 2017-10-03 PROCEDURE — 36415 COLL VENOUS BLD VENIPUNCTURE: CPT

## 2017-10-03 PROCEDURE — 85025 COMPLETE CBC W/AUTO DIFF WBC: CPT

## 2017-10-03 PROCEDURE — 80053 COMPREHEN METABOLIC PANEL: CPT

## 2017-10-03 PROCEDURE — 83735 ASSAY OF MAGNESIUM: CPT

## 2017-10-03 PROCEDURE — 82977 ASSAY OF GGT: CPT

## 2017-10-03 PROCEDURE — 80197 ASSAY OF TACROLIMUS: CPT

## 2017-10-03 PROCEDURE — 85610 PROTHROMBIN TIME: CPT

## 2017-10-04 NOTE — CONSULTS
Assessment  1  Erectile dysfunction (958 22) (N52 9)    Discussion/Summary  Discussion Summary:   57-year-old male with erectile dysfunction  had a lengthy discussion with the patient regarding treatment options  He has tried numerous oral medications without any help  We discussed that we could try Tri Mix injections at a higher concentration and see if this works  We also discussed inflatable penile prosthesis placement  We discussed the risks and benefits of this procedure  We discussed that he would be at a higher risk for infection given his previous liver transplant  After lengthy discussion the patient wishes to proceed with higher concentration Tri Mix injection  He will follow up for Tri Mix teaching  Chief Complaint  Chief Complaint Free Text Note Form: Patient presents for Erectile Dysfuntion      History of Present Illness  HPI: 57-year-old male presents for evaluation of erectile dysfunction  The patient states that he has been having difficulties with erections ever since his liver transplant 3 years ago  The patient also has a history of diabetes as well as hypertension for which he is on numerous blood pressure medications  The patient has tried numerous treatment options including Viagra, Cialis, and Levitra  He also did injections at the [facility] and these did not work either  He does not get any morning erections  He has difficulty with both obtaining and maintaining erections  He denies any trauma  He has no other complaints  Review of Systems  Complete-Male Urology:   Constitutional: No fever or chills, feels well, no tiredness, no recent weight gain or weight loss  Respiratory: No complaints of shortness of breath, no wheezing, no cough, no SOB on exertion, no orthopnea or PND  Cardiovascular: No complaints of slow heart rate, no fast heart rate, no chest pain, no palpitations, no leg claudication, no lower extremity     Gastrointestinal: No complaints of abdominal pain, no constipation, no nausea or vomiting, no diarrhea or bloody stools  Genitourinary: Empty sensation,-feelings of urinary urgency-and-stream quality good, but-no dysuria,-no urinary hesitancy,-no hematuria-and-no incontinence-   The patient presents with complaints of nocturia (2-3x)  Musculoskeletal: No complaints of arthralgia, no myalgias, no joint swelling or stiffness, no limb pain or swelling  Integumentary: No complaints of skin rash or skin lesions, no itching, no skin wound, no dry skin  Hematologic/Lymphatic: No complaints of swollen glands, no swollen glands in the neck, does not bleed easily, no easy bruising  Neurological: No compliants of headache, no confusion, no convulsions, no numbness or tingling, no dizziness or fainting, no limb weakness, no difficulty walking  ROS Reviewed:   ROS reviewed  Active Problems  1  Chronic gout due to renal impairment of multiple sites without tophus (274 02)   (M1A 39X0)   2  Chronic kidney disease (CKD), stage II (mild) (585 2) (N18 2)   3  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)   4  Diabetes type 2, uncontrolled (250 02) (E11 65)   5  Diabetes With Mild Nonproliferative Diabetic Retinopathy (250 50)   6  Diabetic retinopathy (250 50,362 01) (E11 319)   7  Encounter for 's license history and physical (V70 3) (Z02 4)   8  Erectile dysfunction (607 84) (N52 9)   9  Hepatitis, C Virus (070 70)   10  Hyperkalemia (276 7) (E87 5)   11  Hyperlipidemia (272 4) (E78 5)   12  Hyperphosphatemia (275 3) (E83 39)   13  Hypertension (401 9) (I10)   14  Immunosuppression (279 9) (D89 9)   15  Malnutrition (263 9) (E46)   16  Nocturia (788 43) (R35 1)   17  Osteoporosis (733 00) (M81 0)   18  Other cirrhosis of liver (571 5) (K74 69)   19  Pancytopenia (284 19) (D61 818)   20  Proteinuria (791 0) (R80 9)   21  Scar (709 2) (L90 5)   22  Tenosynovitis of thumb (727 05) (M65 9)   23  Transplanted liver (V42 7) (Z94 4)   24   Trigger finger of left thumb (727 03) (M65 312)   25  Type 2 diabetes mellitus (250 00) (E11 9)   26  Umbilical hernia (675 0) (K42 9)   27  Vitamin D deficiency (268 9) (E55 9)    Past Medical History  1  History of Acute upper respiratory infection (465 9) (J06 9)   2  History of Cirrhosis, hepatitis C (070 54,571 5) (B18 2)   3  History of CKD (chronic kidney disease), stage III (585 3) (N18 3)   4  History of Diabetic retinopathy screening (V80 2) (Z13 5)   5  History of Hand pain, left (729 5) (M79 642)   6  History of Hepatic Encephalopathy (572 2)   7  History of acute renal failure (V13 09) (Z87 448)   8  History of impacted cerumen (V12 49) (Z86 69)   9  History of sinusitis (V12 69) (Z87 09)   10  History of Hospital discharge follow-up (V67 59) (Z09)   11  History of Impacted cerumen of right ear (380 4) (H61 21)   12  History of Need for hepatitis A immunization (V05 3) (Z23)   13  History of Wrist pain, left (719 43) (M25 532)   14  History of Wrist swelling, left (719 03) (M25 432)  Active Problems And Past Medical History Reviewed: The active problems and past medical history were reviewed and updated today  Surgical History  1  History of Ankle Surgery   2  History of Cholecystectomy   3  History of Liver Transplant   4  History of Tonsillectomy  Surgical History Reviewed: The surgical history was reviewed and updated today  Family History  Mother    1  Family history of Diabetes (250 00) (E11 9)   2  Family history of Hypertension (401 9) (I10)  Brother    3  Family history of Hepatitis-C   4  Family history of Liver cirrhosis  Family History    5  Family history of Cancer (199 1) (C80 1)   6  Denied: Family history of Crohn's disease   7  Denied: Family history of gout   8  Denied: Family history of psoriasis   9  Denied: Family history of rheumatoid arthritis   10  Denied: Family history of systemic lupus erythematosus   11  Denied: Family history of ulcerative colitis   12   Family history of Stroke Syndrome (V17 1)   13  Family history of Type 2 Diabetes Mellitus  Family History Reviewed: The family history was reviewed and updated today  Social History   · Denied: Alcohol   · Employed   · Never A Smoker   · No alcohol use   · No drug use   · Uses Safety Equipment - Seatbelts  Social History Reviewed: The social history was reviewed and updated today  Current Meds   1  AmLODIPine Besylate 10 MG Oral Tablet; Take 1 tablet daily; Therapy: 82WAK3100 to (Evaluate:77Otq8528)  Requested for: 55BUR8611; Last   Rx:16Mar2017 Ordered   2  AzaTHIOprine 50 MG Oral Tablet; Take 1 tablet daily; Therapy: (Recorded:34Poi0389) to Recorded   3  Fludrocortisone Acetate 0 1 MG Oral Tablet; Take 1 tablet daily; Therapy: 65WMW9469 to (Evaluate:11Dec2017)  Requested for: 20WOZ0540; Last   Rx:16Mar2017 Ordered   4  Glimepiride 1 MG Oral Tablet; TAKE 1 TABLET DAILY WITH SUPPER *needs apppt*;   Therapy: 42Yka5323 to (Last Rx:21Jun2017)  Requested for: 20Sep2017; Status:   ACTIVE - Renewal Denied Ordered   5  HydroCHLOROthiazide 12 5 MG Oral Capsule; take 1 capsule daily; Therapy: 24Joc4957 to (Evaluate:11Dec2017)  Requested for: 05YOM6141; Last   Rx:16Mar2017 Ordered   6  Labetalol HCl - 100 MG Oral Tablet; Take 1 tablet every 12 hours; Therapy: 43LDN8057 to (Last RD:95XXR9822)  Requested for: 27Jun2017 Ordered   7  Multivitamins TABS; TAKE 1 TABLET DAILY; Therapy: (2470 9230) to Recorded   8  OneTouch Verio In [employer]; Check once a day; Therapy: 56Vuh4156 to (Evaluate:21Mar2017)  Requested for: 59Xta3418; Last   Rx:86Jvg4869 Ordered   9  PriLOSEC 20 MG CPDR; take 1 capsule by mouth daily; Therapy: (1926 6547) to Recorded   10  Tacrolimus 1 MG Oral Capsule; takes 4 pills in the pm;    Therapy: 31DWU1149 to Recorded   11  Tacrolimus 5 MG Oral Capsule; takes one tablet in the a m  daily; Therapy: 54EUL1980 to Recorded   12  Tradjenta 5 MG Oral Tablet; Take 1 tablet daily;     Therapy: 66HWP3273 to (Last Rx:47Pat2184)  Requested for: 78Jcp5489 Ordered   13  Vitamin D3 1000 UNIT Oral Capsule; take 1 capsule daily; Therapy: (Recorded:81Vhl2774) to Recorded  Medication List Reviewed: The medication list was reviewed and updated today  Allergies  1  No Known Drug Allergies  2  No Known Food Allergies    Vitals  Vital Signs    Recorded: 46GYG4834 01:47PM   Heart Rate 64   Systolic 695   Diastolic 68   Height 5 ft 3 in   Weight 201 lb 8 oz   BMI Calculated 35 69   BSA Calculated 1 94     Physical Exam    Constitutional   General appearance: No acute distress, well appearing and well nourished  Pulmonary   Respiratory effort: No increased work of breathing or signs of respiratory distress  Cardiovascular   Examination of extremities for edema and/or varicosities: Normal     Abdomen   Abdomen: Non-tender, no masses  Liver and spleen: No hepatomegaly or splenomegaly  Genitourinary   Scrotum contents: Normal size, no masses  Epididymis: Normal, no masses  Testes: Normal testes, no masses  Urethral meatus: Normal, no lesions  Penis: Normal, no lesions  Musculoskeletal   Gait and station: Normal     Skin   Skin and subcutaneous tissue: Normal without rashes or lesions      Lymphatic   Palpation of lymph nodes in groin: Normal     Additional Exam:  Neuro exam nonfocal       Results/Data  Urine Dip Non-Automated- POC 71HJI2512 01:48PM [facility]     Test Name Result Flag Reference   Color Yellow     Clarity Transparent     Blood hemolyzed     Protein 2000     Ph 5 0     Specific Gravity 1 025     Ketone -     Glucose -         Future Appointments    Date/Time Provider Specialty Site   10/31/2017 10:00 AM [facility]  [facility] FOR UROLOG[facility]   11/30/2017 01:30 PM Dante Covarrubias [facility] Rheumatology [address] [facility]     Signatures   Electronically signed by : JORDY Agustin ; Oct  3 2017  4:58PM EST                       (Author)

## 2017-10-06 LAB — TACROLIMUS BLD LC/MS/MS-MCNC: 6 NG/ML (ref 2–20)

## 2017-10-31 ENCOUNTER — ALLSCRIPTS OFFICE VISIT (OUTPATIENT)
Dept: OTHER | Facility: OTHER | Age: 57
End: 2017-10-31

## 2017-11-02 NOTE — PROGRESS NOTES
Assessment  1  Erectile dysfunction (607 84) (N52 9)   2  Encounter for prostate cancer screening (V76 44) (Z12 5)    Plan   Encounter for prostate cancer screening    · (1) PSA (SCREEN) (Dx V76 44 Screen for Prostate Cancer); Status:Active; Requested  for:31Oct2018; Perform:[facility]; Due:31Oct2019;Ordered;For:Encounter for prostate cancer screening; Ordered By:Kade Merritt; Follow-up visit in 1 year Evaluation and Treatment  Follow-up  Status: Hold For - Scheduling  Requested for: 08TAZ3789  Ordered; For: Erectile dysfunction;  Ordered By: Ortiz Robledo  Performed:   Due: 49FUL5523     Discussion/Summary  Discussion Summary:   ED, prostate cancer screening  is a 59 y/o male being managed by Dr Lucas Trevizo  He was provided verbal and physical demonstration of Trimix use  He was instructed on storage, disposal, and titration of the medication  He was instructed on hospital precautions for a priapism  He was able to demonstrate understanding of injection use  All questions were answered  He will return in 1 year for follow up with a PSA  He was instructed to call with any issues  Chief Complaint  Chief Complaint Free Text Note Form: Patient presents for TriMix teaching; ED      History of Present Illness  HPI: 59 y/o male with history of diabetes and liver transplant with ED presents today for intracavernosal injection teaching  Review of Systems  Complete-Male Urology:   Constitutional: No fever or chills, feels well, no tiredness, no recent weight gain or weight loss  Respiratory: No complaints of shortness of breath, no wheezing, no cough, no SOB on exertion, no orthopnea or PND  Cardiovascular: No complaints of slow heart rate, no fast heart rate, no chest pain, no palpitations, no leg claudication, no lower extremity  Gastrointestinal: No complaints of abdominal pain, no constipation, no nausea or vomiting, no diarrhea or bloody stools  Genitourinary: as noted in HPI  Musculoskeletal: No complaints of arthralgia, no myalgias, no joint swelling or stiffness, no limb pain or swelling  Integumentary: No complaints of skin rash or skin lesions, no itching, no skin wound, no dry skin  Hematologic/Lymphatic: No complaints of swollen glands, no swollen glands in the neck, does not bleed easily, no easy bruising  Neurological: No compliants of headache, no confusion, no convulsions, no numbness or tingling, no dizziness or fainting, no limb weakness, no difficulty walking  Active Problems  1  Chronic gout due to renal impairment of multiple sites without tophus (274 02)   (M1A 39X0)   2  Chronic kidney disease (CKD), stage II (mild) (585 2) (N18 2)   3  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)   4  Diabetes type 2, uncontrolled (250 02) (E11 65)   5  Diabetes With Mild Nonproliferative Diabetic Retinopathy (250 50)   6  Diabetic retinopathy (250 50,362 01) (E11 319)   7  Encounter for 's license history and physical (V70 3) (Z02 4)   8  Erectile dysfunction (607 84) (N52 9)   9  Hepatitis, C Virus (070 70)   10  Hyperkalemia (276 7) (E87 5)   11  Hyperlipidemia (272 4) (E78 5)   12  Hyperphosphatemia (275 3) (E83 39)   13  Hypertension (401 9) (I10)   14  Immunosuppression (279 9) (D89 9)   15  Malnutrition (263 9) (E46)   16  Nocturia (788 43) (R35 1)   17  Osteoporosis (733 00) (M81 0)   18  Other cirrhosis of liver (571 5) (K74 69)   19  Pancytopenia (284 19) (D61 818)   20  Proteinuria (791 0) (R80 9)   21  Scar (709 2) (L90 5)   22  Tenosynovitis of thumb (727 05) (M65 9)   23  Transplanted liver (V42 7) (Z94 4)   24  Trigger finger of left thumb (727 03) (M65 312)   25  Type 2 diabetes mellitus (250 00) (E11 9)   26  Umbilical hernia (299 8) (K42 9)   27  Vitamin D deficiency (268 9) (E55 9)    Past Medical History  1  History of Acute upper respiratory infection (465 9) (J06 9)   2  History of Cirrhosis, hepatitis C (070 54,571 5) (B18 2)   3   History of CKD (chronic kidney disease), stage III (585 3) (N18 3)   4  History of Diabetic retinopathy screening (V80 2) (Z13 5)   5  History of Hand pain, left (729 5) (M79 642)   6  History of Hepatic Encephalopathy (572 2)   7  History of acute renal failure (V13 09) (Z87 448)   8  History of impacted cerumen (V12 49) (Z86 69)   9  History of sinusitis (V12 69) (Z87 09)   10  History of Hospital discharge follow-up (V67 59) (Z09)   11  History of Impacted cerumen of right ear (380 4) (H61 21)   12  History of Need for hepatitis A immunization (V05 3) (Z23)   13  History of Wrist pain, left (719 43) (M25 532)   14  History of Wrist swelling, left (719 03) (M25 432)  Active Problems And Past Medical History Reviewed: The active problems and past medical history were reviewed and updated today  Surgical History  1  History of Ankle Surgery   2  History of Cholecystectomy   3  History of Liver Transplant   4  History of Tonsillectomy  Surgical History Reviewed: The surgical history was reviewed and updated today  Family History  Mother    1  Family history of Diabetes (250 00) (E11 9)   2  Family history of Hypertension (401 9) (I10)  Brother    3  Family history of Hepatitis-C   4  Family history of Liver cirrhosis  Family History    5  Family history of Cancer (199 1) (C80 1)   6  Denied: Family history of Crohn's disease   7  Denied: Family history of gout   8  Denied: Family history of psoriasis   9  Denied: Family history of rheumatoid arthritis   10  Denied: Family history of systemic lupus erythematosus   11  Denied: Family history of ulcerative colitis   12  Family history of Stroke Syndrome (V17 1)   13  Family history of Type 2 Diabetes Mellitus  Family History Reviewed: The family history was reviewed and updated today  Social History   · Denied: Alcohol   · Employed   · Never A Smoker   · No alcohol use   · No drug use   · Uses Safety Equipment - Seatbelts  Social History Reviewed:  The social history was reviewed and updated today  The social history was reviewed and is unchanged  Current Meds   1  AmLODIPine Besylate 10 MG Oral Tablet; Take 1 tablet daily; Therapy: 37XZR9947 to (Evaluate:78Com9688)  Requested for: 45ZEZ2230; Last   Rx:16Mar2017 Ordered   2  AzaTHIOprine 50 MG Oral Tablet; Take 1 tablet daily; Therapy: (Recorded:06Zyd6345) to Recorded   3  Fludrocortisone Acetate 0 1 MG Oral Tablet; Take 1 tablet daily; Therapy: 65CET7866 to (Evaluate:11Dec2017)  Requested for: 83JKT0939; Last   Rx:16Mar2017 Ordered   4  Glimepiride 1 MG Oral Tablet; TAKE 1 TABLET DAILY WITH SUPPER *needs apppt*;   Therapy: 89Vdf2828 to (Last Rx:21Jun2017)  Requested for: 20Sep2017; Status:   ACTIVE - Renewal Denied Ordered   5  HydroCHLOROthiazide 12 5 MG Oral Capsule; take 1 capsule daily; Therapy: 93Ply2692 to (Evaluate:11Dec2017)  Requested for: 51EMW5952; Last   Rx:16Mar2017 Ordered   6  Labetalol HCl - 100 MG Oral Tablet; Take 1 tablet every 12 hours; Therapy: 57UPU9833 to (Last EB:81KYX2970)  Requested for: 27Jun2017 Ordered   7  Multivitamins TABS; TAKE 1 TABLET DAILY; Therapy: (99 502134) to Recorded   8  OneTouch Verio In [employer]; Check once a day; Therapy: 73Uwu5002 to (Evaluate:21Mar2017)  Requested for: 19Ycs0517; Last   Rx:81Rrq5439 Ordered   9  PriLOSEC 20 MG CPDR; take 1 capsule by mouth daily; Therapy: (99 086235) to Recorded   10  Tacrolimus 1 MG Oral Capsule; takes 4 pills in the pm;    Therapy: 34CEQ2849 to Recorded   11  Tacrolimus 5 MG Oral Capsule; takes one tablet in the a m  daily; Therapy: 98POL7342 to Recorded   12  Tradjenta 5 MG Oral Tablet; Take 1 tablet daily; Therapy: 89Mts4373 to (Last Rx:03Sep2017)  Requested for: 79Ucu2812 Ordered   13  Vitamin D3 1000 UNIT Oral Capsule; take 1 capsule daily; Therapy: (Recorded:39Euv1454) to Recorded  Medication List Reviewed: The medication list was reviewed and updated today  Allergies  1   No Known Drug Allergies  2  No Known Food Allergies    Vitals  Vital Signs    Recorded: 44ZQI1994 10:48AM   Heart Rate 66   Systolic 426   Diastolic 84   Height 5 ft 4 in   Weight 203 lb 8 oz   BMI Calculated 34 93   BSA Calculated 1 97     Physical Exam    Constitutional   General appearance: No acute distress, well appearing and well nourished  Pulmonary   Respiratory effort: No increased work of breathing or signs of respiratory distress  Cardiovascular   Palpation of heart: Normal PMI, no thrills  Examination of extremities for edema and/or varicosities: Normal     Abdomen   Abdomen: Non-tender, no masses  Musculoskeletal   Gait and station: Normal     Skin   Skin and subcutaneous tissue: Normal without rashes or lesions         Future Appointments    Date/Time Provider Specialty Site   10/31/2018 11:00 AM Alison Kayr, 10 [place]  Urology [facility] FOR UROLOGY [facility]   11/30/2017 01:30 PM Aquiles Guerin, [facility] Rheumatology ST [address] [facility]     Signatures   Electronically signed by : Farzad Dinh, 10 José Rivas; Oct 31 2017 12:07PM EST                       (Author)    Electronically signed by : Reymundo Clements MD; Nov 1 2017  7:21AM EST

## 2017-11-09 DIAGNOSIS — E78.5 HYPERLIPIDEMIA: ICD-10-CM

## 2017-11-09 DIAGNOSIS — I10 ESSENTIAL (PRIMARY) HYPERTENSION: ICD-10-CM

## 2017-11-09 DIAGNOSIS — M1A.39X0 CHRONIC GOUT DUE TO RENAL IMPAIRMENT OF MULTIPLE SITES WITHOUT TOPHUS: ICD-10-CM

## 2017-11-09 DIAGNOSIS — E11.9 TYPE 2 DIABETES MELLITUS WITHOUT COMPLICATIONS (HCC): ICD-10-CM

## 2017-11-10 ENCOUNTER — ALLSCRIPTS OFFICE VISIT (OUTPATIENT)
Dept: OTHER | Facility: OTHER | Age: 57
End: 2017-11-10

## 2017-11-14 ENCOUNTER — GENERIC CONVERSION - ENCOUNTER (OUTPATIENT)
Dept: OTHER | Facility: OTHER | Age: 57
End: 2017-11-14

## 2017-11-14 LAB
LEFT EYE DIABETIC RETINOPATHY: NORMAL
RIGHT EYE DIABETIC RETINOPATHY: NORMAL

## 2017-11-30 ENCOUNTER — ALLSCRIPTS OFFICE VISIT (OUTPATIENT)
Dept: OTHER | Facility: OTHER | Age: 57
End: 2017-11-30

## 2018-01-06 ENCOUNTER — APPOINTMENT (OUTPATIENT)
Dept: LAB | Facility: CLINIC | Age: 58
End: 2018-01-06
Payer: COMMERCIAL

## 2018-01-06 ENCOUNTER — TRANSCRIBE ORDERS (OUTPATIENT)
Dept: LAB | Facility: CLINIC | Age: 58
End: 2018-01-06

## 2018-01-06 DIAGNOSIS — E78.5 HYPERLIPIDEMIA: ICD-10-CM

## 2018-01-06 DIAGNOSIS — Z94.4 LIVER REPLACED BY TRANSPLANT (HCC): Primary | ICD-10-CM

## 2018-01-06 DIAGNOSIS — R79.1 ABNORMAL COAGULATION PROFILE: ICD-10-CM

## 2018-01-06 DIAGNOSIS — N18.2 CHRONIC KIDNEY DISEASE, STAGE II (MILD): ICD-10-CM

## 2018-01-06 DIAGNOSIS — C22.1 MALIGNANT NEOPLASM OF INTRAHEPATIC BILE DUCTS (HCC): ICD-10-CM

## 2018-01-06 DIAGNOSIS — D68.9 BLOOD CLOTTING DISORDER (HCC): ICD-10-CM

## 2018-01-06 DIAGNOSIS — I10 ESSENTIAL (PRIMARY) HYPERTENSION: ICD-10-CM

## 2018-01-06 DIAGNOSIS — Z94.4 LIVER REPLACED BY TRANSPLANT (HCC): ICD-10-CM

## 2018-01-06 DIAGNOSIS — E11.9 TYPE 2 DIABETES MELLITUS WITHOUT COMPLICATIONS (HCC): ICD-10-CM

## 2018-01-06 DIAGNOSIS — M1A.39X0 CHRONIC GOUT DUE TO RENAL IMPAIRMENT OF MULTIPLE SITES WITHOUT TOPHUS: ICD-10-CM

## 2018-01-06 LAB
ALBUMIN SERPL BCP-MCNC: 3.9 G/DL (ref 3.5–5)
ALP SERPL-CCNC: 107 U/L (ref 46–116)
ALT SERPL W P-5'-P-CCNC: 30 U/L (ref 12–78)
ANION GAP SERPL CALCULATED.3IONS-SCNC: 8 MMOL/L (ref 4–13)
AST SERPL W P-5'-P-CCNC: 7 U/L (ref 5–45)
BASOPHILS # BLD AUTO: 0.01 THOUSANDS/ΜL (ref 0–0.1)
BASOPHILS NFR BLD AUTO: 0 % (ref 0–1)
BILIRUB SERPL-MCNC: 0.2 MG/DL (ref 0.2–1)
BUN SERPL-MCNC: 56 MG/DL (ref 5–25)
CALCIUM SERPL-MCNC: 9 MG/DL (ref 8.3–10.1)
CHLORIDE SERPL-SCNC: 106 MMOL/L (ref 100–108)
CHOLEST SERPL-MCNC: 239 MG/DL (ref 50–200)
CO2 SERPL-SCNC: 25 MMOL/L (ref 21–32)
CREAT SERPL-MCNC: 1.76 MG/DL (ref 0.6–1.3)
CREAT UR-MCNC: 125 MG/DL
EOSINOPHIL # BLD AUTO: 0.11 THOUSAND/ΜL (ref 0–0.61)
EOSINOPHIL NFR BLD AUTO: 1 % (ref 0–6)
ERYTHROCYTE [DISTWIDTH] IN BLOOD BY AUTOMATED COUNT: 14.5 % (ref 11.6–15.1)
EST. AVERAGE GLUCOSE BLD GHB EST-MCNC: 154 MG/DL
GFR SERPL CREATININE-BSD FRML MDRD: 41 ML/MIN/1.73SQ M
GGT SERPL-CCNC: 73 U/L (ref 5–85)
GLUCOSE P FAST SERPL-MCNC: 194 MG/DL (ref 65–99)
HBA1C MFR BLD: 7 % (ref 4.2–6.3)
HCT VFR BLD AUTO: 37.9 % (ref 36.5–49.3)
HDLC SERPL-MCNC: 37 MG/DL (ref 40–60)
HGB BLD-MCNC: 12.4 G/DL (ref 12–17)
INR PPP: 0.99 (ref 0.86–1.16)
LDLC SERPL CALC-MCNC: 137 MG/DL (ref 0–100)
LYMPHOCYTES # BLD AUTO: 0.77 THOUSANDS/ΜL (ref 0.6–4.47)
LYMPHOCYTES NFR BLD AUTO: 9 % (ref 14–44)
MAGNESIUM SERPL-MCNC: 1.5 MG/DL (ref 1.6–2.6)
MCH RBC QN AUTO: 28.2 PG (ref 26.8–34.3)
MCHC RBC AUTO-ENTMCNC: 32.7 G/DL (ref 31.4–37.4)
MCV RBC AUTO: 86 FL (ref 82–98)
MONOCYTES # BLD AUTO: 0.58 THOUSAND/ΜL (ref 0.17–1.22)
MONOCYTES NFR BLD AUTO: 7 % (ref 4–12)
NEUTROPHILS # BLD AUTO: 7.03 THOUSANDS/ΜL (ref 1.85–7.62)
NEUTS SEG NFR BLD AUTO: 83 % (ref 43–75)
PLATELET # BLD AUTO: 170 THOUSANDS/UL (ref 149–390)
PMV BLD AUTO: 11.3 FL (ref 8.9–12.7)
POTASSIUM SERPL-SCNC: 4.5 MMOL/L (ref 3.5–5.3)
PROT SERPL-MCNC: 7.3 G/DL (ref 6.4–8.2)
PROT UR-MCNC: 194 MG/DL
PROT/CREAT UR: 1.55 MG/G{CREAT} (ref 0–0.1)
PROTHROMBIN TIME: 13.4 SECONDS (ref 12.1–14.4)
RBC # BLD AUTO: 4.4 MILLION/UL (ref 3.88–5.62)
SODIUM SERPL-SCNC: 139 MMOL/L (ref 136–145)
TRIGL SERPL-MCNC: 325 MG/DL
URATE SERPL-MCNC: 7.6 MG/DL (ref 4.2–8)
WBC # BLD AUTO: 8.5 THOUSAND/UL (ref 4.31–10.16)

## 2018-01-06 PROCEDURE — 80197 ASSAY OF TACROLIMUS: CPT

## 2018-01-06 PROCEDURE — 82570 ASSAY OF URINE CREATININE: CPT

## 2018-01-06 PROCEDURE — 83735 ASSAY OF MAGNESIUM: CPT

## 2018-01-06 PROCEDURE — 36415 COLL VENOUS BLD VENIPUNCTURE: CPT

## 2018-01-06 PROCEDURE — 85610 PROTHROMBIN TIME: CPT

## 2018-01-06 PROCEDURE — 82977 ASSAY OF GGT: CPT

## 2018-01-06 PROCEDURE — 85025 COMPLETE CBC W/AUTO DIFF WBC: CPT

## 2018-01-06 PROCEDURE — 83036 HEMOGLOBIN GLYCOSYLATED A1C: CPT

## 2018-01-06 PROCEDURE — 84550 ASSAY OF BLOOD/URIC ACID: CPT

## 2018-01-06 PROCEDURE — 80053 COMPREHEN METABOLIC PANEL: CPT

## 2018-01-06 PROCEDURE — 84156 ASSAY OF PROTEIN URINE: CPT

## 2018-01-06 PROCEDURE — 80061 LIPID PANEL: CPT

## 2018-01-07 LAB — TACROLIMUS BLD-MCNC: 4.6 NG/ML (ref 2–20)

## 2018-01-08 ENCOUNTER — GENERIC CONVERSION - ENCOUNTER (OUTPATIENT)
Dept: OTHER | Facility: OTHER | Age: 58
End: 2018-01-08

## 2018-01-10 NOTE — RESULT NOTES
Verified Results  Hemoglobin A1c- POC 62Cxm6114 11:20AM Chuyita Menendez     Test Name Result Flag Reference   HEMOGLOBIN A1C 8 0 A    EST  AVG  GLUCOSE 183 A    HEMOGLOBIN A1C 8 0 A    EST  AVG   GLUCOSE 183 A

## 2018-01-10 NOTE — PROCEDURES
Chief Complaint  Clogged R ear  Current Meds   1  Afrin Sinus 0 05 % Nasal Solution; USE 2 SPRAYS IN EACH NOSTRIL TWICE DAILY; Therapy: 47Tmk6837 to (Last Rx:36Mdy8258)  Requested for: 99Hzf6912 Ordered   2  AmLODIPine Besylate 10 MG Oral Tablet; Take 1 tablet daily; Therapy: 12PSD8932 to (Evaluate:31Mar2017)  Requested for: 36YIO3268; Last   Rx:42Nre7350 Ordered   3  AzaTHIOprine 50 MG Oral Tablet; TAKE 2 TABLETS ORALLY EVERY DAY; Therapy: (99 502134) to Recorded   4  Debrox 6 5 % Otic Solution; INSTILL 2 DROP Daily; Therapy: 46Qoc2364 to (Evaluate:26Jan2017)  Requested for: 48Svg8794; Last   Rx:47Emg2312 Ordered   5  Fludrocortisone Acetate 0 1 MG Oral Tablet; Take 1 tablet daily; Therapy: 33KBT2285 to (Rachna Finch)  Requested for: 35KMQ6705; Last   Rx:05Apr2016 Ordered   6  Glimepiride 1 MG Oral Tablet; 1 tab daily with supper; Therapy: 66Wpf9038 to (Evaluate:92Hkm9146)  Requested for: 76TKS5741; Last   Rx:10Jan2017 Ordered   7  HydroCHLOROthiazide 12 5 MG Oral Capsule; take 1 capsule daily; Therapy: 41Tjt0743 to (Evaluate:31Mar2017)  Requested for: 05OMM5294; Last   Rx:05Apr2016 Ordered   8  Labetalol HCl - 100 MG Oral Tablet; Take 1 tablet every 12 hours; Therapy: 69QFX1482 to (Last Rx:06Jan2017)  Requested for: 71HCV2371 Ordered   9  Multivitamins TABS; TAKE 1 TABLET DAILY; Therapy: (99 502134) to Recorded   10  OneTouch Verio In [employer]; Check once a day; Therapy: 58Wtk5992 to (Evaluate:21Mar2017)  Requested for: 12Uad4908; Last    Rx:10Qxa8182 Ordered   11  PriLOSEC 20 MG CPDR; take 1 capsule by mouth daily; Therapy: (99 714402) to Recorded   12  Tacrolimus 1 MG Oral Capsule; takes 4 pills in the pm;    Therapy: 23IKJ3861 to Recorded   13  Tacrolimus 5 MG Oral Capsule; takes one tablet in the a m  daily; Therapy: 66PDX0528 to Recorded   14  Tradjenta 5 MG Oral Tablet; TAKE 1 TABLET DAILY;     Therapy: 08Hzb7405 to (Evaluate:25Mar2017) Requested for: 13XMM0379; Last    Rx:29Tqu1788 Ordered   15  Vitamin D3 1000 UNIT Oral Capsule; Therapy: (Recorded:20Apr2016) to Recorded    Allergies    1  No Known Drug Allergies    2  No Known Food Allergies    Vitals  Signs    Temperature: 98 3 F  Heart Rate: 80  Respiration: 16  Systolic: 156  Diastolic: 60  Height: 5 ft 3 in  Weight: 203 lb 2 oz  BMI Calculated: 35 98  BSA Calculated: 1 95  Pain Scale: 0    Procedure    Procedure: cerumen removal    Indication: tympanic membrane(s) could not be visualized and cerumen impaction in the right ear  Prep: hydrogen peroxide was placed in the canal prior to the procedure  Procedure Note: The procedure was performed by the Provider   The procedure required significant time and effort to remove the cerumen  A otoscope was placed in the ear canal(s) to visualize the ear canal debris  The ear was cleaned by using warm water irrigation and a curette  The procedure was successful  Post-Procedure:   Patient Status: the patient tolerated the procedure well  Complications: there were no complications  Patient instructions: dry ear precautions  Follow-up in 1 month(s)  Assessment    1  Impacted cerumen of right ear (380 4) (H61 21)    Discussion/Summary    61 yo M s/p R ear cerumen removal via H2O2 flush with curette under direct visualization with otoscope  Large cerumen plug removed  Pt noted immediate improvement in hearing  Has debrox at home which he should use q daily x 1 week as ear canal tissue appeared moderately friable  May RTC in 1 month to see PCP  Patient is able to Self-Care  Self Referrals: No      Future Appointments    Date/Time Provider Specialty Site   02/10/2017 03:40 PM Sera Dewitt, [address] [place] [address]   03/29/2017 09:20 AM JORDY Johnson   Endocrinology [facility] ENDOCRINOLOGY   02/08/2017 08:30 AM Estefani Wolfe RD Diabetes Educator [facility] ENDOCRINOLOGY [facility]   02/20/2017 08:00 AM JORDY Guardado  Nephrology [facility] NEPHROLOGY [facility] Jodi Gibson   02/27/2017 01:40 PM Francis Renee DO [facility] [address] [facility]     Attending Note  Level of Participation: I was present in clinic, but did not examine the patient  Diagnosis and Plan: Cerumen disimpaction as above  I agree with the Resident's note  Signatures   Electronically signed by : JORDY Herzog ; Jan 11 2017  4:26PM EST                       (Author)    Electronically signed by :  True Nicholas MD; Jan 11 2017  4:36PM EST                       (Author)

## 2018-01-11 NOTE — PROGRESS NOTES
Plan    1  DSMT/MNT Time Record; Status:Complete;   Done: [id number] 09:43AM    Discussion/Summary    PATIENT EDUCATION RECORD   Indication for Services: hypertension, type 2 Diabetes Mellitus, Kidney problems, hyperlipidemia and obesity  He is ready to learn  He has no barriers to learning  Diabetes Disease Process:   He understands the pathophysiology of diabetes: Method: Instruction  Response: Verbalizes Understanding   Discussed benefits of control: Method: Instruction  Response: Verbalizes Understanding   Discussed treatment options: Method: Instruction  Response: Verbalizes Understanding   Healthy Eating:   Discussed importance of meal timing/consistency: Method: Instruction and Handout  Response: Verbalizes Understanding   Discussed nutrient types ( Cho/Fat/Protein): Method: Instruction and Handout  Response: Verbalizes Understanding   Discussed portion sizes: Method: Instruction, Handout and Demonstration  Response: Verbalizes Understanding   Discussed Eating Out: Method: Instruction  Response: Verbalizes Understanding   Discussed food label reading: Method: Instruction, Handout and Demonstration  Response: Verbalizes Understanding  Provided food diary and instructions on use: Method: InstructionResponse: Verbalizes Understanding   Provided meal planning: Method: Instruction, Handout and Demonstration  Response: His current weight is 202 4  His keal needs are 1462  His CHO's per meal are 110 g/day (30% carb) 2,2,3  He/She was provided a meal plan for: weight loss  Discussed weight management/weight loss: Method: Instruction  Response: Verbalizes Understanding   His current BMI 36  Discussed fat intake and choices: Method: Instruction  Response: Verbalizes Understanding   Discussed basic carbohydrate counting: Method: Instruction, Handout and Demonstration  Response: Verbalizes Understanding   His blood glucose targets are: Pre-meal target <110, Post-meal target <140 and HS target    Monitoring: Discussed target blood glucose ranges: Method: Instruction and Handout  Response: Verbalizes Understanding  Discussed testing times: Method: Instruction and Handout  Response: Verbalizes Understanding  Discussed options for record keeping: Method: Instruction and Handout  Response: Verbalizes Understanding  Discussed reporting of readings to M D : Method: Instruction  Response: Verbalizes Understanding  Taking Medication:   Discussed medication safety  Method: Instruction  Response: [facility]  He is taking  DP-4 Inhibitors and sulfonylureas  Reducing Risk:   Discussed long term complications- prevention, assessment, and monitoring  Method: Instruction  Response: [facility]  Healthy Coping Class:   Identified lifestyle behaviors that need to change: Method: Instruction  Response: Verbalizes Understanding   Discussed motivation to change: Method: Instruction  Response: Verbalizes Understanding   Identified goals for behavior change: Method: Instruction  Response: Verbalizes Understanding   Discussed strategies for change: Method: Instruction  Response: Verbalizes Understanding   He participated in goal setting  Will be available for follow up as needed He was given the following educational materials: portion book, Personal Meal Plan 1462 calories, Planning Healthy Meals and Calorie and Carbohydrate Tracking Books/Websites/Phone Apps   Chief Complaint  Patient is here today for Medical Nutrition Therapy for T2DM      History of Present Illness  Patient is a 61 y/o male with uncontrolled T2DM, HLP, HTN, CKD III, Liver transplant, Obesity  A1c 8 0, BMI 36  He is not consistent with SMBG  Report last fasting BG in a m  140's  We discussed using paired testing to find patterns of hyperglycemia to better facilitate treatment changes  He was given targets and testing times  He exercises 1-2 hours at the gym on his days off   He works 12 hour days and often is not hungry in the morning  His food history shows medium carb intake, limited vegetable, fruit and calcium intake  He is willing to eat salad every day and eat fruits like apples  He drink 3-4 regular juice drinks per day  He was advised to drink non-carb drinks which he stated he can do  He was given a low carb personal meal plan (30%) and educated on carb counting meal planning  This is his initial assessment    Present at session: patient    He has no special learning needs  His caloric needs are 1462  Recent weight change: -2    Patient  and spouse  shops for food  Patient  and spouse  cooks the food  Exercise routine:  1-2 hours at gym on days off  He eats breakfast at  9 AM Ham and cheese sandwich, Maryuri Sun    He eats lunch at  12 PM Ham and cheese sandwich, Maryuri Sun   He snacks at 2:30-3 PM Ham and cheese sandwich, Maryuri Sun   He eats dinner at  6-8 PM Chicken, french fries, Grape or orange juice      [facility] and nutrition related knowledge deficit related to  lack of prior exposure to accurate nutrition related information  As evidenced by  no prior knowledge of need for food and nutrition related recommendations  Medical Nutrition Therapy Intervention: Carbohydrate counting, Meal planning, Individualized meal plan, Strategies to increase the intake of plant based foods, Strategies to monitor portion control, Label reading, Meal timing and Behavior modification strategies  His comprehension was very good    His motivation was good   His compliance was good   Goals:  1  Follow meal plan/count carbs; eliminate high carb drinks  2  SMBG using paired testing to facilitate treatment changes  3  Exercise 150 minutes per week      Active Problems    1  Acute renal failure (584 9) (N17 9)   2  Chronic kidney disease (CKD), stage II (mild) (585 2) (N18 2)   3  CKD (chronic kidney disease), stage III (585 3) (N18 3)   4   Diabetes type 2, uncontrolled (250 02) (E11 65)   5  Diabetes With Mild Nonproliferative Diabetic Retinopathy (250 50)   6  Diabetic retinopathy (250 50,362 01) (E11 319)   7  Diabetic retinopathy screening (V80 2) (Z13 5)   8  Hand pain, left (729 5) (M79 642)   9  Hepatitis, C Virus (070 70)   10  Hospital discharge follow-up (V67 59) (Z09)   11  Hyperkalemia (276 7) (E87 5)   12  Hyperlipidemia (272 4) (E78 5)   13  Hyperphosphatemia (275 3) (E83 39)   14  Hypertension (401 9) (I10)   15  Immunosuppression (279 9) (D89 9)   16  Impacted cerumen of right ear (380 4) (H61 21)   17  Impacted ear wax (380 4) (H61 20)   18  Malnutrition (263 9) (E46)   19  Need for hepatitis A immunization (V05 3) (Z23)   20  Nocturia (788 43) (R35 1)   21  Osteoporosis (733 00) (M81 0)   22  Other cirrhosis of liver (571 5) (K74 69)   23  Pancytopenia (284 19) (D61 818)   24  Scar (709 2) (L90 5)   25  Sinus infection (473 9) (J32 9)   26  Tenosynovitis of thumb (727 05) (M65 9)   27  Transplanted liver (V42 7) (Z94 4)   28  Trigger finger of left thumb (727 03) (M65 312)   29  Type 2 diabetes mellitus (250 00) (E11 9)   30  Umbilical hernia (323 6) (K42 9)   31  Vitamin D deficiency (268 9) (E55 9)   32  Wrist pain, left (719 43) (M25 532)   33  Wrist swelling, left (719 03) (M25 432)    Past Medical History    1  History of Acute upper respiratory infection (465 9) (J06 9)   2  History of Cirrhosis, hepatitis C (070 54,571 5) (B18 2)   3  History of Hepatic Encephalopathy (572 2)    Surgical History    1  History of Ankle Surgery   2  History of Cholecystectomy   3  History of Liver Transplant    Family History  Mother    1  Family history of Diabetes (250 00) (E11 9)   2  Family history of Hypertension (401 9) (I10)  Brother    3  Family history of Hepatitis-C   4  Family history of Liver cirrhosis  Family History    5  Family history of Cancer (199 1) (C80 1)   6  Family history of Stroke Syndrome (V17 1)   7   Family history of Type 2 Diabetes Mellitus    Social History    · Denied: Alcohol   · Never A Smoker   · No drug use   · Uses Safety Equipment - Seatbelts    Current Meds   1  Afrin Sinus 0 05 % Nasal Solution; USE 2 SPRAYS IN EACH NOSTRIL TWICE DAILY; Therapy: 41Vca8119 to (Last Rx:67Ojs9369)  Requested for: 95Atw6500 Ordered   2  Alendronate Sodium 70 MG Oral Tablet; TAKE 1 TABLET ONCE A WEEK ON THE SAME   DAY  DO NOT TAKE OTHER MEDICINES WITHOUT CHECKING WITH YOUR DOCTOR   OR PHARMACIST; Therapy: 85KIR8979 to [phone])  Requested for: 84XSL3514; Last   Rx:17Jan2017 Ordered   3  AmLODIPine Besylate 10 MG Oral Tablet; Take 1 tablet daily; Therapy: 75VQG7292 to (Evaluate:31Mar2017)  Requested for: 17ZCC7548; Last   Rx:05Apr2016 Ordered   4  AzaTHIOprine 50 MG Oral Tablet; TAKE 2 TABLETS ORALLY EVERY DAY; Therapy: [phone]) to Recorded   5  Debrox 6 5 % Otic Solution; INSTILL 2 DROP Daily; Therapy: 44Gvk3710 to (Evaluate:26Jan2017)  Requested for: 75Grm2934; Last   Rx:97Xoi9494 Ordered   6  Fludrocortisone Acetate 0 1 MG Oral Tablet; Take 1 tablet daily; Therapy: 05RHH3420 to (John Diaz)  Requested for: 76OMF2161; Last   Rx:05Apr2016 Ordered   7  Glimepiride 1 MG Oral Tablet; 1 tab daily with supper; Therapy: 67Jin4669 to (Evaluate:79Qlw1126)  Requested for: 34LCH8198; Last   Rx:10Jan2017 Ordered   8  HydroCHLOROthiazide 12 5 MG Oral Capsule; take 1 capsule daily; Therapy: 92Dwi8397 to (Evaluate:31Mar2017)  Requested for: 53QNN8934; Last   Rx:05Apr2016 Ordered   9  Labetalol HCl - 100 MG Oral Tablet; Take 1 tablet every 12 hours; Therapy: 21ICW4564 to (Last Rx:06Jan2017)  Requested for: 46HXZ5062 Ordered   10  Multivitamins TABS; TAKE 1 TABLET DAILY; Therapy: [phone]) to Recorded   11  OneTouch Verio In [employer]; Check once a day; Therapy: 22Zvo1950 to (Evaluate:21Mar2017)  Requested for: 23Avs2792; Last    Rx:94Ebr6331 Ordered   12   PriLOSEC 20 MG CPDR (Omeprazole); take 1 capsule by mouth daily; Therapy: ([phone]) to Recorded   13  Tacrolimus 1 MG Oral Capsule; takes 4 pills in the pm;    Therapy: 53BWG7677 to Recorded   14  Tacrolimus 5 MG Oral Capsule; takes one tablet in the a m  daily; Therapy: 41NRU9923 to Recorded   15  Tradjenta 5 MG Oral Tablet; TAKE 1 TABLET DAILY; Therapy: 96Qgr0294 to (Evaluate:25Mar2017)  Requested for: 46AWR4901; Last    Rx:38Fkb1099 Ordered   16  Vitamin D3 1000 UNIT Oral Capsule; Therapy: (Recorded:20Apr2016) to Recorded    Allergies    1  No Known Drug Allergies    2  No Known Food Allergies    Vitals  Signs   Recorded: 85XHF4790 09:44AM   Weight: 202 lb 4 oz  BMI Calculated: 35 83  BSA Calculated: 1 94    Future Appointments    Date/Time Provider Specialty Site   03/29/2017 09:20 JORDY Singh  Endocrinology [facility] ENDOCRINOLOGY   02/20/2017 08:00 AM JORDY Turner   Nephrology [address]   02/27/2017 01:40 PM Antonia Lazar DO Rheumatology  [address] [facility]     Signatures   Electronically signed by : Carolynn Shelton RD; Feb 8 2017  9:59AM EST                       (Author)    Electronically signed by : JORDY Montiel ; Feb 10 2017  9:24AM EST

## 2018-01-12 VITALS
DIASTOLIC BLOOD PRESSURE: 68 MMHG | WEIGHT: 201.5 LBS | HEIGHT: 63 IN | SYSTOLIC BLOOD PRESSURE: 130 MMHG | BODY MASS INDEX: 35.7 KG/M2 | HEART RATE: 64 BPM

## 2018-01-12 VITALS
SYSTOLIC BLOOD PRESSURE: 112 MMHG | TEMPERATURE: 98.4 F | WEIGHT: 199.5 LBS | BODY MASS INDEX: 34.24 KG/M2 | HEART RATE: 88 BPM | DIASTOLIC BLOOD PRESSURE: 70 MMHG | RESPIRATION RATE: 16 BRPM

## 2018-01-12 NOTE — PROGRESS NOTES
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)   2  Chronic kidney disease (CKD), stage II (mild) (585 2) (N18 2)   3  Transplanted liver (V42 7) (Z94 4)   4  Type 2 diabetes mellitus (250 00) (E11 9)   5  Immunosuppression (279 9) (D89 9)   6  Hypertension (401 9) (I10)    Plan  Crystal arthropathy    · (1) CBC/PLT/DIFF; Status:Active; Requested for:26Itr0215;    · (1) COMPREHENSIVE METABOLIC PANEL; Status:Active; Requested for:84Jbk5409;    · (1) URIC ACID; Status:Active; Requested for:68Ieq1528;    · Call [phone] if: The pain seems worse ; Status:Complete;   Done: 70TFT6903   · Call [phone] if: The symptoms seem worse ; Status:Complete;   Done:  05AQL2585   · Call [phone] if: You have questions or concerns about your problem ;  Status:Complete;   Done: 73SSQ3546   · Follow-up visit in 3 months Evaluation and Treatment  Follow-up  Status: Complete   Done: 05RVQ8374  Crystal arthropathy, Type 2 diabetes mellitus    · (1) TSH WITH FT4 REFLEX; Status:Active; Requested for:69Xaw4522;     Discussion/Summary    Mr Mary Shoemaker is a 68-year-old  male who presents the office with a history of several arthralgias which developed in October 2016  He states that he initially developed right foot pain which was quite severe  He was seen at an urgent care facility and was told that he had tendinitis  He did treat himself with several over-the-counter pain relievers, but despite this his pain persisted  It finally resolved on its own  He then had a similar episode in the left foot several weeks later  This again resolved spontaneously  Approximately one month after that, he had severe left hand pain and wrist pain  He was initially admitted to [address] [place] [address] for 3 days for his symptoms, but no clear etiology was determined  He was ultimately treated with steroids, which resulted in complete resolution of his symptoms   He does state that he had a liver transplant 3 years ago for hepatitis C, and he was admitted because of concerns for a possible septic arthritis which was ruled out  He has never had any prior episodes of similar symptoms  He does report that he had some aching in his left hand several weeks ago which has since resolved  He denies any significant joint pain or swelling at this time  He does report some mild morning stiffness mainly in his lower back which lasts several minutes before improvement  He denies any difficulty sleeping due to pain  He also denies nonrestorative sleep and fatigue  On exam, there is no active synovitis  He does have mild crepitus of the bilateral knees  He has full range of motion of essentially all of his joints  Review of laboratory studies from October 2016 revealed a negative Lyme antibody, rheumatoid factor, and GINNA  An ESR that time was also mildly elevated  At this time, his history and exam are not fully consistent with an underlying connective tissue disease or autoimmune arthropathy, and this will be exceptionally rare given his immunosuppression from his liver transplantation  I do suspect that his symptoms were due to a crystal arthropathy, and I am unclear if this is gout or pseudogout, as no crystals were obtained for analysis, as there was not enough synovial fluid available  He had a normal serum uric acid at that time, but it may be falsely normal during gout flares  Since he has not had any further recurrence of his symptoms, I will not make any changes to his medication at this time  I would like to obtain an updated CBC, CMP, and uric acid  If he does have hyperuricemia and recurrent episodes of similar complaints, we may consider urate lowering therapy with Krystexxa  I advised him to contact my office immediately if he develops a similar episode so we may evaluate him promptly and treat him accordingly  I will reevaluate him in 3 months  He will call in the interim if there are any questions or concerns  Patient is able to Self-Care  Counseling  Rheumatology Counseling Documentation: The patient was counseled regarding diagnostic results, instructions for management and impressions  Chief Complaint  Hand pain and swelling      History of Present Illness  Pt  is a 59 yo  male who presents with history of R foot pain several months ago  Tried OTC meds, but it persisted then finally resolved on its own  Seen in Urgent Care initially and told it was tendinitis  Then had similar episode in L foot several weeks later  About 1 month later, he had several L hand and wrist pain  Admitted to T x 3 days  Treated with steroids  Had a liver transplant 3 years ago  Septic arthritis was ruled  No prior episodes  Had some aching in L hand several weeks ago which has resolved  No clear diagnosis was made  No significant joint pain or swelling at this time  + mild AM stiffness in lower back x several minutes  No difficulty sleeping due to pain  No non-restorative sleep  No fatigue  RAPID3: not completed      Review of Systems    Constitutional: no fever, no recent weight gain, fatigue, no chills, no recent weight loss and no anorexia  HEENT: no blurred vision, no double vision, no amaurosis fugax, no dryness of the eyes, no eye pain, no erythema eye(s), no dryness mouth, no mouth sores, not feeling congested and no sore throat  Cardiovascular: no chest pain or pressure, no dyspnea on exertion and no swelling in the arms or legs  Respiratory: no unusual or persistent cough, no shortness of breath and no pleurisy  Gastrointestinal: no abdominal pain, no nausea, no vomiting, no heartburn, no diarrhea, no constipation, no melena and no BRBPR  Genitourinary: + bubbly urine, but no dysuria and no hematuria  Musculoskeletal: as noted in HPI  Integumentary no rash, no Raynaud's, no alopecia, no nail changes and no photosensitivity  Endocrine no polyuria and no polydipsia     Hematologic/Lymphatic: no unusual bleeding    The patient presents with complaints of a tendency for easy bruising (2/2 medications)  Neurological: no headache, no vertigo or dizziness, no tingling and no weakness  Psychiatric: no insomnia, no non-restorative sleep, no depression and no anxiety  Active Problems    1  Chronic kidney disease (CKD), stage II (mild) (585 2) (N18 2)   2  Diabetes type 2, uncontrolled (250 02) (E11 65)   3  Diabetes With Mild Nonproliferative Diabetic Retinopathy (250 50)   4  Diabetic retinopathy (250 50,362 01) (E11 319)   5  Hepatitis, C Virus (070 70)   6  Hyperkalemia (276 7) (E87 5)   7  Hyperlipidemia (272 4) (E78 5)   8  Hyperphosphatemia (275 3) (E83 39)   9  Hypertension (401 9) (I10)   10  Immunosuppression (279 9) (D89 9)   11  Malnutrition (263 9) (E46)   12  Nocturia (788 43) (R35 1)   13  Osteoporosis (733 00) (M81 0)   14  Other cirrhosis of liver (571 5) (K74 69)   15  Pancytopenia (284 19) (D61 818)   16  Scar (709 2) (L90 5)   17  Tenosynovitis of thumb (727 05) (M65 9)   18  Transplanted liver (V42 7) (Z94 4)   19  Trigger finger of left thumb (727 03) (M65 312)   20  Type 2 diabetes mellitus (250 00) (E11 9)   21  Umbilical hernia (076 5) (K42 9)   22  Vitamin D deficiency (268 9) (E55 9)    Past Medical History    1  History of Acute upper respiratory infection (465 9) (J06 9)   2  History of Cirrhosis, hepatitis C (070 54,571 5) (B18 2)   3  History of CKD (chronic kidney disease), stage III (585 3) (N18 3)   4  History of Diabetic retinopathy screening (V80 2) (Z13 5)   5  History of Hand pain, left (729 5) (M79 642)   6  History of Hepatic Encephalopathy (572 2)   7  History of acute renal failure (V13 09) (Z87 448)   8  History of impacted cerumen (V12 49) (Z86 69)   9  History of sinusitis (V12 69) (Z87 09)   10  History of Hospital discharge follow-up (V67 59) (Z09)   11  History of Impacted cerumen of right ear (380 4) (H61 21)   12  History of Need for hepatitis A immunization (V05 3) (Z23)   13   History of Wrist pain, left (719 43) (M25 532)   14  History of Wrist swelling, left (719 03) (M25 432)    Surgical History    1  History of Ankle Surgery   2  History of Cholecystectomy   3  History of Liver Transplant   4  History of Tonsillectomy    Family History  Mother    1  Family history of Diabetes (250 00) (E11 9)   2  Family history of Hypertension (401 9) (I10)  Brother    3  Family history of Hepatitis-C   4  Family history of Liver cirrhosis  Family History    5  Family history of Cancer (199 1) (C80 1)   6  Denied: Family history of Crohn's disease   7  Denied: Family history of gout   8  Denied: Family history of psoriasis   9  Denied: Family history of rheumatoid arthritis   10  Denied: Family history of systemic lupus erythematosus   11  Denied: Family history of ulcerative colitis   12  Family history of Stroke Syndrome (V17 1)   13  Family history of Type 2 Diabetes Mellitus    Social History    · Denied: Alcohol   · Employed   · Never A Smoker   · No alcohol use   · No drug use   · Uses Safety Equipment - Seatbelts    Current Meds   1  Afrin Sinus 0 05 % Nasal Solution; USE 2 SPRAYS IN EACH NOSTRIL TWICE DAILY; Therapy: 65Gbt2618 to (Last Rx:78Psc8344)  Requested for: 72Dhj8304 Ordered   2  AmLODIPine Besylate 10 MG Oral Tablet; Take 1 tablet daily; Therapy: 96UVG0123 to (Evaluate:31Mar2017)  Requested for: 52KHI5619; Last   Rx:05Apr2016 Ordered   3  AzaTHIOprine 50 MG Oral Tablet; Take 1 tablet daily; Therapy: (Recorded:99Vkq4857) to Recorded   4  Fludrocortisone Acetate 0 1 MG Oral Tablet; Take 1 tablet daily; Therapy: 99RQR8363 to (Opal Brown)  Requested for: 36OOM7888; Last   Rx:05Apr2016 Ordered   5  Glimepiride 1 MG Oral Tablet; 1 tab daily with supper; Therapy: 86Zsh9601 to (Evaluate:59Hzt7071)  Requested for: 75EDF2519; Last   Rx:10Jan2017 Ordered   6  HydroCHLOROthiazide 12 5 MG Oral Capsule; take 1 capsule daily;    Therapy: 27Vge9958 to (Evaluate:31Mar2017)  Requested for: 15CXW1176; Last   Rx:05Apr2016 Ordered   7  Labetalol HCl - 100 MG Oral Tablet; Take 1 tablet every 12 hours; Therapy: 40LFP4184 to (Last Rx:06Jan2017)  Requested for: 64DFJ2876 Ordered   8  Multivitamins TABS; TAKE 1 TABLET DAILY; Therapy: (99 502134) to Recorded   9  OneTouch Verio In [employer]; Check once a day; Therapy: 18Upj5242 to (Evaluate:21Mar2017)  Requested for: 92Rnj6133; Last   Rx:24Gad5547 Ordered   10  PriLOSEC 20 MG CPDR; take 1 capsule by mouth daily; Therapy: (99 502134) to Recorded   11  Tacrolimus 1 MG Oral Capsule; takes 4 pills in the pm;    Therapy: 27MIA4928 to Recorded   12  Tacrolimus 5 MG Oral Capsule; takes one tablet in the a m  daily; Therapy: 56VOF9208 to Recorded   13  Tradjenta 5 MG Oral Tablet; TAKE 1 TABLET DAILY; Therapy: 53Grt4037 to (Evaluate:25Mar2017)  Requested for: 47BXV5489; Last    Rx:91Jsy8719 Ordered   14  Vitamin D3 1000 UNIT Oral Capsule; take 1 capsule daily; Therapy: (Recorded:27Feb2017) to Recorded    Allergies    1  No Known Drug Allergies    2  No Known Food Allergies    Vitals  Signs   Recorded: 27Feb2017 01:16PM   Heart Rate: 82  Systolic: 150  Diastolic: 86  Height: 5 ft 4 in  Weight: 198 lb   BMI Calculated: 33 99  BSA Calculated: 1 95    Physical Exam    Constitutional   General appearance: Abnormal   obese  Eyes   Conjunctiva and lids: No swelling, erythema or discharge  Pupils and irises: Equal, round and reactive to light  Ears, Nose, Mouth, and Throat   External inspection of ears and nose: Normal     Oropharynx: Normal with no erythema, edema, exudate lesions, or ulcers  Pulmonary   Respiratory effort: No increased work of breathing or signs of respiratory distress  Auscultation of lungs: Clear to auscultation  Cardiovascular   Auscultation of heart: Normal rate and rhythm, normal S1 and S2, without murmurs      Examination of extremities for edema and/or varicosities: Normal     Lymphatic   Palpation of lymph nodes in neck: No lymphadenopathy  Psychiatric   Orientation to person, place, and time: Normal     Mood and affect: Normal         Right Upper Extremity: All normal    Left Upper Extremity: All normal    Musculoskeletal - Joints, bones, and muscles: Abnormal  Palpation - bilateral knee crepitus  Muscle strength/tone: Normal  Motor Strength Findings: right hand dominance, but normal upper extremity strength and normal lower extremity strength  Right upper extremity: shoulder flexion 5/5, shoulder extension 5/5, biceps 5/5, triceps 5/5, but normal hand   Left upper extremity shoulder flexion 5/5, shoulder extension 5/5, biceps 5/5, triceps 5/5, but normal hand   Right lower extremity strength: hip flexion 5/5, hip abduction 5/5, hip adduction 5/5, knee flexion 5/5, knee extension 5/5, ankle dorsiflexion 5/5, ankle plantar flexion 5/5  Left lower extremity strength: hip flexion 5/5, hip abduction 5/5, hip adduction 5/5, knee flexion 5/5, knee extension 5/5, ankle dorsiflexion 5/5, ankle plantar flexion 5/5  Skin - Skin and subcutaneous tissue: Normal    Neurologic - Reflexes: Normal  Deep tendon reflexes: 1+ right biceps, 1+ left biceps, 1+ right triceps, 1+ left triceps, 1+ right brachioradialis, 1+ left brachioradialis, 1+ right patella, 1+ left patella, 1+ right ankle jerk and 1+ left ankle jerk   Sensation: Normal       Results/Data  (1) CBC/PLT/DIFF 58ZIC1821 09:18AM [facility], Provider   Test ordered by: [address] [place] [address] Street Name Result Flag Reference   WBC COUNT 4 47 Thousand/uL  4 31-10 16   RBC COUNT 4 05 Million/uL  3 88-5 62   HEMOGLOBIN 11 4 g/dL L 12 0-17 0   HEMATOCRIT 35 8 % L 36 5-49 3   MCV 88 fL  82-98   MCH 28 1 pg  26 8-34 3   MCHC 31 8 g/dL  31 4-37 4   RDW 14 7 %  11 6-15 1   MPV 12 0 fL  8 9-12 7   PLATELET COUNT 367 Thousands/uL L 149-390   NEUTROPHILS RELATIVE PERCENT 78 % H 43-75   LYMPHOCYTES RELATIVE PERCENT 10 % L 14-44   MONOCYTES RELATIVE PERCENT 10 %  4-12 EOSINOPHILS RELATIVE PERCENT 2 %  0-6   BASOPHILS RELATIVE PERCENT 0 %  0-1   NEUTROPHILS ABSOLUTE COUNT 3 44 Thousands/?L  1 85-7 62   LYMPHOCYTES ABSOLUTE COUNT 0 46 Thousands/?L L 0 60-4 47   MONOCYTES ABSOLUTE COUNT 0 46 Thousand/?L  0 17-1 22   EOSINOPHILS ABSOLUTE COUNT 0 09 Thousand/?L  0 00-0 61   BASOPHILS ABSOLUTE COUNT 0 02 Thousands/?L  0 00-0 10     (1) COMPREHENSIVE METABOLIC PANEL 51LAE6534 86:32HM [facility], Provider   Test ordered by: Burton Read     Test Name Result Flag Reference   GLUCOSE,RANDM 153 mg/dL H    If the patient is fasting, the ADA then defines impaired fasting glucose as > 100 mg/dL and diabetes as > or equal to 123 mg/dL  SODIUM 141 mmol/L  136-145   POTASSIUM 5 0 mmol/L  3 5-5 3   CHLORIDE 106 mmol/L  100-108   CARBON DIOXIDE 25 mmol/L  21-32   ANION GAP (CALC) 10 mmol/L  4-13   BLOOD UREA NITROGEN 40 mg/dL H 5-25   CREATININE 1 92 mg/dL H 0 60-1 30   Standardized to IDMS reference method   CALCIUM 8 6 mg/dL  8 3-10 1   BILI, TOTAL 0 30 mg/dL  0 20-1 00   ALK PHOSPHATAS 104 U/L     ALT (SGPT) 42 U/L  12-78   AST(SGOT) 13 U/L  5-45   ALBUMIN 4 2 g/dL  3 5-5 0   TOTAL PROTEIN 7 4 g/dL  6 4-8 2   eGFR Non-African American 36 2 ml/min/1 73sq m     [facility] Disease Education Program recommendations are as follows:  GFR calculation is accurate only with a steady state creatinine  Chronic Kidney disease less than 60 ml/min/1 73 sq  meters  Kidney failure less than 15 ml/min/1 73 sq  meters  (1) SED RATE 14Oct2016 12:17PM Reesa Showers     Test Name Result Flag Reference   SED RATE 35 mm/hour H 0-10     (1) RHEUMATOID FACTOR SCREEN 14Oct2016 12:17PM Reesa Showers     Test Name Result Flag Reference   RHEUMATOID FACTOR Negative  Negative     (1) GINNA SCREEN W/REFLEX TO TITER/PATTERN 11QYD8320 12:17PM Reesa Showers     Test Name Result Flag Reference   GINNA SCREEN   Negative  Negative     (1) LYME ANTIBODY PROFILE W/REFLEX TO WESTERN BLOT 14Oct2016 12:17PM Jay Cope     Test Name Result Flag Reference   LYME IGG 0 13  0 00-0 79   NEGATIVE(0 00-0 79)-Absence of detectable Borrelia IgG Antibodies  A negative result does not exclude the possibility of Borrelia infection  If early Lyme disease is suspected,a second sample should be collected & tested 4 weeks after initial testing  LYME IGM 0 60  0 00-0 79   NEGATIVE (0 00-0 79)-Absence of detectable Borrelia IgM antibodies  A negative result does not exclude the possibility of Borrelia infection  If early lyme disease is suspected, a second sample should be collected & tested 4 weeks after initial testing  * XR WRIST 3+ VIEW LEFT 00TQT2863 10:40AM Shannan Tucker    Order Number: [id number]     Test Name Result Flag Reference   XR WRIST 3+ VW LEFT (Report)     LEFT WRIST     INDICATION: Pain throughout the left wrist  Numbness in the fingers for 3 days  COMPARISON: Hand x-ray from July 29, 2015     VIEWS: 3; 3 images     FINDINGS:     There is no acute fracture or dislocation  No degenerative changes  No lytic or blastic lesions are seen  There is prominent atherosclerotic calcification of the radial and ulnar arteries as well as smaller arteries in the hand  There is minimal dystrophic calcification along the medial aspect of the wrist projecting near the triquetrum  IMPRESSION:     Atherosclerosis  No acute findings       Workstation performed: [id number]     Signed by:   Jael Morin MD   10/1/16       Future Appointments    Date/Time Provider Specialty Site   03/29/2017 09:20 JORYD Islas  Endocrinology [facility] ENDOCRINOLOGY   04/24/2017 09:00 AM JORDY Rign   Nephrology [address]   05/30/2017 01:00 PM Astrid Schofield DO Rheumatology [address]     Signatures   Electronically signed by : Chioma Cantu DO; Feb 27 2017  2:37PM EST                       (Author)

## 2018-01-13 VITALS
WEIGHT: 203.5 LBS | DIASTOLIC BLOOD PRESSURE: 84 MMHG | BODY MASS INDEX: 34.74 KG/M2 | HEART RATE: 66 BPM | HEIGHT: 64 IN | SYSTOLIC BLOOD PRESSURE: 132 MMHG

## 2018-01-13 VITALS
BODY MASS INDEX: 35.99 KG/M2 | HEIGHT: 63 IN | HEART RATE: 80 BPM | WEIGHT: 203.13 LBS | SYSTOLIC BLOOD PRESSURE: 102 MMHG | RESPIRATION RATE: 16 BRPM | TEMPERATURE: 98.3 F | DIASTOLIC BLOOD PRESSURE: 60 MMHG

## 2018-01-13 VITALS
SYSTOLIC BLOOD PRESSURE: 160 MMHG | DIASTOLIC BLOOD PRESSURE: 86 MMHG | BODY MASS INDEX: 33.8 KG/M2 | WEIGHT: 198 LBS | HEART RATE: 82 BPM | HEIGHT: 64 IN

## 2018-01-13 NOTE — RESULT NOTES
Message   a1c 7 5 , higher , watch diet , cut back on carbohydrates , does he want to see nutritionist ,   vitamin slightly low - how much is he on ? Verified Results  (1) VITAMIN D 25-HYDROXY 14Apr2016 08:46AM Jose Rocheco Order Number: [id number]     Order Number: [id number]     Test Name Result Flag Reference   VIT D 25-HYDROX 27 0 ng/mL L 30 0-100 0     (1) HEMOGLOBIN A1C 14Apr2016 08:46AM Jose Devlin Order Number: [id number]      5 7-6 4% impaired fasting glucose  >=6 5% diagnosis of diabetes    Falsely low levels are seen in conditions linked to short RBC life span-  hemolytic anemia, and splenomegaly  Falsely elevated levels are seen in situations where there is an increased production of RBC- receipt of erythropoietin or blood transfusions  Adopted from ADA-Clinical Practice Recommendations     Test Name Result Flag Reference   HEMOGLOBIN A1C 7 5 % H 4 0-5 6   EST  AVG   GLUCOSE 169 mg/dl

## 2018-01-14 VITALS
HEIGHT: 64 IN | SYSTOLIC BLOOD PRESSURE: 142 MMHG | RESPIRATION RATE: 18 BRPM | HEART RATE: 72 BPM | DIASTOLIC BLOOD PRESSURE: 80 MMHG | BODY MASS INDEX: 34.66 KG/M2 | WEIGHT: 203 LBS

## 2018-01-14 VITALS
SYSTOLIC BLOOD PRESSURE: 130 MMHG | DIASTOLIC BLOOD PRESSURE: 72 MMHG | RESPIRATION RATE: 16 BRPM | HEIGHT: 64 IN | WEIGHT: 203 LBS | BODY MASS INDEX: 34.66 KG/M2 | HEART RATE: 72 BPM

## 2018-01-14 VITALS
SYSTOLIC BLOOD PRESSURE: 142 MMHG | DIASTOLIC BLOOD PRESSURE: 62 MMHG | HEART RATE: 82 BPM | WEIGHT: 204.5 LBS | BODY MASS INDEX: 34.91 KG/M2 | HEIGHT: 64 IN

## 2018-01-14 VITALS — WEIGHT: 202.25 LBS | BODY MASS INDEX: 35.83 KG/M2

## 2018-01-15 NOTE — MISCELLANEOUS
Provider Comments  Provider Comments:   PATIENT NO SHOWED FOR 11- APPT  Signatures   Electronically signed by :  Tommy Daily; Nov 29 2016 11:22AM EST                       (Author)

## 2018-01-15 NOTE — MISCELLANEOUS
Assessment    1  Wrist swelling, left (719 03) (M26 432)   2  Hospital discharge follow-up (V67 59) (Z09)   3  CKD (chronic kidney disease), stage III (585 3) (N18 3)    Plan  Wrist swelling, left    · (1) GINNA SCREEN W/REFLEX TO TITER/PATTERN; Status:Active; Requested  for:14Oct2016;    Perform:[facility]; Due:14Oct2017; Last Updated Jennifer Guard; 10/14/2016 11:17:27 AM;Ordered; For:Wrist swelling, left; Ordered By:Marly Garza;   · (1) LYME ANTIBODY PROFILE W/REFLEX TO WESTERN BLOT; Status:Active; Requested  for:14Oct2016;    Perform:[facility]; Due:14Oct2017; Last Updated [place] Guard; 10/14/2016 11:17:27 AM;Ordered; For:Wrist swelling, left; Ordered By:Marly Garza;   · (1) RHEUMATOID FACTOR SCREEN; Status:Active; Requested for:14Oct2016;    Perform:[facility]; Due:14Oct2017; Last Updated Jennifer Guard; 10/14/2016 11:17:27 AM;Ordered; For:Wrist swelling, left; Ordered By:Marly Garza;   · (1) SED RATE; Status:Active; Requested for:14Oct2016;    Perform:[facility]; Due:14Oct2017; Last Updated Jennifer Guard; 10/14/2016 11:17:27 AM;Ordered; For:Wrist swelling, left; Ordered By:Marly Garza;    Discussion/Summary  Discussion Summary:   1  Hospital follow up for left wrist swelling  -resolved  -ddx  includes inflammatory arthropathy, pseudogout, Lyme disease  -GINNA, RF, sed rate, Lyme panel ordered to today for further evaluation  -follow up with Rheumatology 11/22/16, appreciate their recs  -follow up in 6 months or sooner if needed    2  Chronic kidney disease  -stable, Cr returned to baseline 0 69 s/p BERTIN 2/2 steroid and NSAID use  -follows with Nephrologist Dr Luis Grimaldo, continue to appreciate their recs  -avoid NSAID use     Counseling Documentation With Imm: The patient was counseled regarding instructions for management, risk factor reductions, patient and family education, impressions, risks and benefits of treatment options, importance of compliance with treatment  Medication SE Review and Pt Understands Tx: Possible side effects of new medications were reviewed with the patient/guardian today  The treatment plan was reviewed with the patient/guardian  The patient/guardian understands and agrees with the treatment plan      Chief Complaint  Chief Complaint Free Text Note Form: hospital follow up      History of Present Illness  TCM Communication [facility]: The patient is being contacted for follow-up after hospitalization  He was hospitalized at [facility]  The date of admission: 10/01/2016, date of discharge: 10/04/2016  Diagnosis: Left wrist pain likely inflammatory athropath  He was discharged to home  Medications reviewed and updated today  The patient is currently asymptomatic  Counseling was provided to the patient  PT TO HAVE A BMP WILL GO ON SATURDAY CONFIRMED THAT THE LAB HAS THE ORDER  Topics counseled included instructions for management and importance of compliance with treatment  Communication performed and completed by [facility] CHAMP Healy RN   HPI: 10/6/16- Tried calling patient, left message to call back  10/6/630p called no answer   10/14/16:  Patient is a pleasant 59 yo M presenting for hospital follow up  Patient was admitted 10/1/16 to 10/4/16 for left wrist pain  His wrist was tapped in the ED - negative gram stain and cultures  Patient was seen by orthopedics, who felt it was likely inflammatory arthropathy  Uric acid level was normal  Started on steroid and NSAIDs  Patient had a bump in Cr to 2 03 that trended down on d/c after NSAID use  Outpt repeat BMP 10/8/16 Cr 1 69 which is at patient's baseline  Off steroids, not sent home on antibiotics  Rheumatology appt scheduled 11/22/16  Swelling completely resolved  Hasn't happened before  Overall, has joint pain, finger tips locking up  Currently denies fevers, chills, SOB, CP, n/v/c/d        Review of Systems  Complete-Male: Constitutional: no fever and no chills  Cardiovascular: no chest pain and no palpitations  Respiratory: no shortness of breath, no cough, no wheezing and no shortness of breath during exertion  Gastrointestinal: no abdominal pain, no nausea, no constipation and no diarrhea  Musculoskeletal: arthralgias and joint swelling, but as noted in HPI  Neurological: no headache, no numbness and no fainting  ROS Reviewed:   ROS reviewed  Active Problems    1  Acute renal failure (584 9) (N17 9)   2  Chronic kidney disease (CKD), stage II (mild) (585 2) (N18 2)   3  CKD (chronic kidney disease), stage III (585 3) (N18 3)   4  Diabetes type 2, uncontrolled (250 02) (E11 65)   5  Diabetes With Mild Nonproliferative Diabetic Retinopathy (250 50)   6  Diabetic retinopathy (250 50,362 01) (E11 319)   7  Diabetic retinopathy screening (V80 2) (Z13 5)   8  Hand pain, left (729 5) (M79 642)   9  Hepatitis, C Virus (070 70)   10  Hyperkalemia (276 7) (E87 5)   11  Hyperlipidemia (272 4) (E78 5)   12  Hyperphosphatemia (275 3) (E83 39)   13  Hypertension (401 9) (I10)   14  Immunosuppression (279 9) (D89 9)   15  Malnutrition (263 9) (E46)   16  Need for hepatitis A immunization (V05 3) (Z23)   17  Nocturia (788 43) (R35 1)   18  Osteoporosis (733 00) (M81 0)   19  Other cirrhosis of liver (571 5) (K74 69)   20  Pancytopenia (284 19) (D61 818)   21  Scar (709 2) (L90 5)   22  Tenosynovitis of thumb (727 05) (M65 9)   23  Transplanted liver (V42 7) (Z94 4)   24  Trigger finger of left thumb (727 03) (M65 312)   25  Type 2 diabetes mellitus (250 00) (E11 9)   26  Umbilical hernia (363 6) (K42 9)   27  Vitamin D deficiency (268 9) (E55 9)   28  Wrist pain, left (719 43) (M25 532)    Past Medical History    1  History of Acute upper respiratory infection (465 9) (J06 9)   2  History of Cirrhosis, hepatitis C (070 54,571 5) (B18 2)   3  History of Hepatic Encephalopathy (572 2)    Surgical History    1   History of Ankle Surgery   2  History of Cholecystectomy   3  History of Liver Transplant  Surgical History Reviewed: The surgical history was reviewed and updated today  Family History  Mother    1  Family history of Diabetes (250 00) (E11 9)   2  Family history of Hypertension (401 9) (I10)  Brother    3  Family history of Hepatitis-C   4  Family history of Liver cirrhosis  Family History    5  Family history of Cancer (199 1) (C80 1)   6  Family history of Stroke Syndrome (V17 1)   7  Family history of Type 2 Diabetes Mellitus  Family History Reviewed: The family history was reviewed and updated today  Social History    · Denied: Alcohol   · Never A Smoker   · No drug use   · Uses Safety Equipment - Seatbelts  Social History Reviewed: The social history was reviewed and updated today  Current Meds   1  Alendronate Sodium 70 MG Oral Tablet; TAKE 1 TABLET BY MOUTH ONCE WEEKLY ON   THE SAME DAY -DO NOT TAKE OTHERMEDICINES WITHOUT CHECKING WITH YOUR   DOCTOR OR PHARMACIST; Therapy: 59VSO8640 to (Lupe Ortiz)  Requested for: 24BSS5946; Last   Rx:10Mar2016 Ordered   2  AmLODIPine Besylate 10 MG Oral Tablet; Take 1 tablet daily; Therapy: 73ESM4692 to (Evaluate:31Mar2017)  Requested for: 76TIQ5527; Last   Rx:05Apr2016 Ordered   3  AzaTHIOprine 50 MG Oral Tablet; TAKE 2 TABLETS ORALLY EVERY DAY; Therapy: (99 463804) to Recorded   4  Fludrocortisone Acetate 0 1 MG Oral Tablet; Take 1 tablet daily; Therapy: 17TOA4457 to (95 931848)  Requested for: 77GSG2152; Last   Rx:05Apr2016 Ordered   5  HydroCHLOROthiazide 12 5 MG Oral Capsule; take 1 capsule daily; Therapy: 48Psb6769 to (Evaluate:31Mar2017)  Requested for: 29IRA0422; Last   Rx:05Apr2016 Ordered   6  Labetalol HCl - 100 MG Oral Tablet; TAKE ONE TABLET BY MOUTH EVERY 12 HOURS; Therapy: 44CWO8602 to (Evaluate:89Jum4672)  Requested for: 04Apr2016; Last   Rx:04Apr2016 Ordered   7  Multivitamins TABS; TAKE 1 TABLET DAILY;    Therapy: (99 661781) to Recorded   8  OneTouch Verio In [employer]; Check once a day; Therapy: 13Tbm4180 to (Evaluate:21Mar2017)  Requested for: 42Tpc4291; Last   Rx:44Orn8722 Ordered   9  PriLOSEC 20 MG Oral Capsule Delayed Release; take 1 capsule by mouth daily; Therapy: (99 502134) to Recorded   10  Tacrolimus 1 MG Oral Capsule; takes 4 pills in the pm;    Therapy: 63KBH0431 to Recorded   11  Tacrolimus 5 MG Oral Capsule; takes one tablet in the a m  daily; Therapy: 17KGU1604 to Recorded   12  Tamsulosin HCl - 0 4 MG Oral Capsule; Therapy: (Recorded:01Oct2016) to Recorded   13  Tradjenta 5 MG Oral Tablet; TAKE 1 TABLET DAILY; Therapy: 79Cew3271 to (Evaluate:25Mar2017)  Requested for: 90AMC5209; Last    Rx:63Ofm1286 Ordered   14  Vitamin D3 1000 UNIT Oral Capsule; Therapy: (Recorded:20Apr2016) to Recorded  Medication List Reviewed: The medication list was reviewed and updated today  Allergies    1  No Known Drug Allergies    2  No Known Food Allergies    Physical Exam    Constitutional   General appearance: No acute distress, well appearing and well nourished  Eyes   Conjunctiva and lids: No swelling, erythema, or discharge  Ears, Nose, Mouth, and Throat   External inspection of ears and nose: Normal     Pulmonary   Respiratory effort: No increased work of breathing or signs of respiratory distress  Auscultation of lungs: Clear to auscultation, equal breath sounds bilaterally, no wheezes, no rales, no rhonci  Cardiovascular   Auscultation of heart: Normal rate and rhythm, normal S1 and S2, without murmurs  Examination of extremities for edema and/or varicosities: Normal     Abdomen   Abdomen: Non-tender, no masses  Musculoskeletal   Gait and station: Normal     Inspection/palpation of joints, bones, and muscles: Normal   no wrist swelling b/l  Skin   Skin and subcutaneous tissue: Normal without rashes or lesions      Psychiatric   Orientation to person, place and time: Normal     Mood and affect: Normal          Health Management  Diabetic retinopathy screening   *VB - Eye Exam; every 2 years; Last 20Aug2015; Next Due: 37DKV8592; Active    Attending Note  Attending Note: Level of Participation: I was present in clinic, but did not examine the patient  Diagnosis and Plan: NURYS as above  I agree with the Resident's note  Message   Recorded as Task   Date: 10/04/2016 03:22 PM, Created By: System   Task Name: [facility] NURYS   Assigned To: [facility] triage,Team   Regarding Patient: NEREIDA WILKERSON, Status: In Progress   Comment:    System - 04 Oct 2016 3:22 PM     Patient discharged from hospital   Patient Name: Colton Mora  Patient YOB: 1960  Discharge Date: 10/4/2016  Facility: [address] [place] [address] Oct 2016 7:43 AM     Mehnaz Jacques - 05 Oct 2016 7:43 AM     TASK REASSIGNED: Previously Assigned To Samantha Manrique - 05 Oct 2016 8:59 AM     TASK REASSIGNED: Previously Assigned To [facility] triage,Team  An appt was already set for MAHOGANY LUTZ 10/14/16 at 10:30am w/Dr David Cruz  Patient will expect a call back from nurse to [phone]  [facility] - 06 Oct 2016 4:00 PM     TASK IN PROGRESS     Future Appointments    Date/Time Provider Specialty Site   11/22/2016 03:00 PM Juan David Dockery DO Rheumatology [facility]   11/29/2016 11:00 AM Bruce Gonzalez, [facility] Endocrinology [facility]     Signatures   Electronically signed by : Ashtyn Crawford DO; Oct 14 2016 12:53PM EST                       (Author)    Electronically signed by :  Tyron Fernandez MD; Oct 17 2016  2:29PM EST                       (Author)

## 2018-01-15 NOTE — MISCELLANEOUS
Provider Comments  Provider Comments:   Patient no showed for his rheumatology appointment today        Signatures   Electronically signed by : Dirk Cruz, [facility]; Nov 30 2017  1:43PM EST                       (Author)

## 2018-01-16 ENCOUNTER — GENERIC CONVERSION - ENCOUNTER (OUTPATIENT)
Dept: OTHER | Facility: OTHER | Age: 58
End: 2018-01-16

## 2018-01-16 DIAGNOSIS — E55.9 VITAMIN D DEFICIENCY: ICD-10-CM

## 2018-01-16 DIAGNOSIS — M81.0 AGE-RELATED OSTEOPOROSIS WITHOUT CURRENT PATHOLOGICAL FRACTURE: ICD-10-CM

## 2018-01-16 NOTE — PROGRESS NOTES
Assessment    1  Chronic gout due to renal impairment of multiple sites without tophus (274 02)   (M1A 39X0)   2  Chronic kidney disease (CKD), stage II (mild) (585 2) (N18 2)   3  Hypertension (401 9) (I10)   4  Transplanted liver (V42 7) (Z94 4)   5  Type 2 diabetes mellitus (250 00) (E11 9)   6  Immunosuppression (279 9) (D89 9)    Plan    1  (1) CBC/PLT/DIFF; Status:Active; Requested for:09Nov2017;    2  (1) COMPREHENSIVE METABOLIC PANEL; Status:Active; Requested for:09Nov2017;    3  (1) URIC ACID; Status:Active; Requested NIF:92RYQ5013;    4  Call [phone] if: The pain seems worse ; Status:Complete;   Done: 69BEF4839   5  Call [phone] if: The symptoms seem worse ; Status:Complete;   Done:   90TMX7410   6  Call [phone] if: You have questions or concerns about your problem ;   Status:Complete;   Done: 61DGL9645   7  Follow-up visit in 6 months Evaluation and Treatment  Follow-up  Status: Complete    Done: 09PXX2024    Discussion/Summary    Mr Marion Parker has been feeling well since his last evaluation  He did have an episode of pain in his left foot several days ago which resolved overnight after taking a single dose of aspirin  He denies any swelling associated with the foot pain  He denies any other significant joint pain or any other obvious joint swelling  He denies any morning stiffness or difficulty sleeping due to pain  He does report occasional nonrestorative sleep, but he denies any fatigue  He has not had any other gout flares since his last evaluation  On exam, there is no active synovitis  He does have mild crepitus of the bilateral knees  There is full passive range of motion of all the joints  Review of laboratory studies from May 2017 revealed a CMP with a decreased GFR, but LFTs were within normal limits  CBC was essentially normal  Uric acid was elevated at 9 4 mg/dL   At this time, his history, exam, and laboratory studies do appear most consistent with chronic gout without tophi  He does have a markedly elevated uric acid, which will be difficult to control, as allopurinol and Uloric are contraindicated because of his use of azathioprine for his liver transplant  Therefore, the only urate lowering therapy available to us would be Krystexxa infusions  He is not interested in pursuing these at this time  I also offered a prophylactic dose of colchicine, but he declined this as well  I would like to recheck a CBC, CMP, and uric acid before his follow-up  I will reevaluate him in 6 months  He will call in the interim if there are any questions or concerns  Patient is able to Self-Care  Counseling  Rheumatology Counseling Documentation: The patient was counseled regarding diagnostic results, instructions for management, impressions and risks and benefits of treatment options  The following educational handouts were provided: Mod[place] Dias  Chief Complaint  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   Patient is here today for follow up of chronic conditions described in HPI  History of Present Illness  Pt  returns for F/U for 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  Feeling well since last visit  Had some pain in L foot several days ago  Resolved overnight after taking 1 dose of ASA  No swelling in foot  No other significant joint  No obvious joint swelling  No significant AM stiffness  No difficulty sleeping due to pain  + occasional non-restorative sleep  No fatigue  RAPID3: 0 5/30      Review of Systems    Constitutional: no fever, no recent weight gain, fatigue, no chills, no recent weight loss and no anorexia  HEENT: no blurred vision, no double vision, no amaurosis fugax, not feeling congested and no sore throat  Cardiovascular: no chest pain or pressure, no dyspnea on exertion and no swelling in the arms or legs  Respiratory: no unusual or persistent cough, no shortness of breath and no pleurisy     Gastrointestinal: no abdominal pain, no heartburn, no diarrhea, no constipation, no melena and no BRBPR    The patient presents with complaints of occasional episodes of nausea (2/2 meds)  The patient presents with complaints of occasional episodes of vomiting (2/2 meds)   Genitourinary: + foamy urine, but no dysuria and no hematuria  Musculoskeletal: as noted in HPI  Integumentary no rash  Endocrine no polyuria and no polydipsia  Hematologic/Lymphatic: a tendency for easy bruising, but no unusual bleeding  Neurological: headache, but no vertigo or dizziness, no tingling and no weakness  Psychiatric: non-restorative sleep, but no insomnia  Active Problems    1  Chronic gout due to renal impairment of multiple sites without tophus (274 02)   (M1A 39X0)   2  Chronic kidney disease (CKD), stage II (mild) (585 2) (N18 2)   3  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)   4  Diabetes type 2, uncontrolled (250 02) (E11 65)   5  Diabetes With Mild Nonproliferative Diabetic Retinopathy (250 50)   6  Diabetic retinopathy (250 50,362 01) (E11 319)   7  Hepatitis, C Virus (070 70)   8  Hyperkalemia (276 7) (E87 5)   9  Hyperlipidemia (272 4) (E78 5)   10  Hyperphosphatemia (275 3) (E83 39)   11  Hypertension (401 9) (I10)   12  Immunosuppression (279 9) (D89 9)   13  Malnutrition (263 9) (E46)   14  Nocturia (788 43) (R35 1)   15  Osteoporosis (733 00) (M81 0)   16  Other cirrhosis of liver (571 5) (K74 69)   17  Pancytopenia (284 19) (D61 818)   18  Proteinuria (791 0) (R80 9)   19  Scar (709 2) (L90 5)   20  Tenosynovitis of thumb (727 05) (M65 9)   21  Transplanted liver (V42 7) (Z94 4)   22  Trigger finger of left thumb (727 03) (M65 312)   23  Type 2 diabetes mellitus (250 00) (E11 9)   24  Umbilical hernia (230 6) (K42 9)   25  Vitamin D deficiency (268 9) (E55 9)    Past Medical History    1  History of Acute upper respiratory infection (465 9) (J06 9)   2  History of Cirrhosis, hepatitis C (070 54,571 5) (B18 2)   3   History of CKD (chronic kidney disease), stage III (585 3) (N18 3) 4  History of Diabetic retinopathy screening (V80 2) (Z13 5)   5  History of Hand pain, left (729 5) (M79 642)   6  History of Hepatic Encephalopathy (572 2)   7  History of acute renal failure (V13 09) (Z87 448)   8  History of impacted cerumen (V12 49) (Z86 69)   9  History of sinusitis (V12 69) (Z87 09)   10  History of Hospital discharge follow-up (V67 59) (Z09)   11  History of Impacted cerumen of right ear (380 4) (H61 21)   12  History of Need for hepatitis A immunization (V05 3) (Z23)   13  History of Wrist pain, left (719 43) (M25 532)   14  History of Wrist swelling, left (719 03) (M25 432)    The active problems and past medical history were reviewed and updated today  Surgical History    1  History of Ankle Surgery   2  History of Cholecystectomy   3  History of Liver Transplant   4  History of Tonsillectomy    The surgical history was reviewed and updated today  Family History  Mother    1  Family history of Diabetes (250 00) (E11 9)   2  Family history of Hypertension (401 9) (I10)  Brother    3  Family history of Hepatitis-C   4  Family history of Liver cirrhosis  Family History    5  Family history of Cancer (199 1) (C80 1)   6  Denied: Family history of Crohn's disease   7  Denied: Family history of gout   8  Denied: Family history of psoriasis   9  Denied: Family history of rheumatoid arthritis   10  Denied: Family history of systemic lupus erythematosus   11  Denied: Family history of ulcerative colitis   12  Family history of Stroke Syndrome (V17 1)   13  Family history of Type 2 Diabetes Mellitus    The family history was reviewed and updated today  Social History    · Denied: Alcohol   · Employed   · Never A Smoker   · No alcohol use   · No drug use   · Uses Safety Equipment - Seatbelts  The social history was reviewed and updated today  The social history was reviewed and is unchanged  Current Meds   1   Afrin Sinus 0 05 % Nasal Solution; USE 2 SPRAYS IN EACH NOSTRIL TWICE DAILY; Therapy: 30Mhi3822 to (Last Rx:27Dec2016)  Requested for: 07Yhc0653 Ordered   2  AmLODIPine Besylate 10 MG Oral Tablet; Take 1 tablet daily; Therapy: 11UYM2404 to (Evaluate:11Dec2017)  Requested for: 55QZT6435; Last   Rx:16Mar2017 Ordered   3  AzaTHIOprine 50 MG Oral Tablet; Take 1 tablet daily; Therapy: (Recorded:51Cuz5500) to Recorded   4  Fludrocortisone Acetate 0 1 MG Oral Tablet; Take 1 tablet daily; Therapy: 49IWM3919 to (Evaluate:11Dec2017)  Requested for: 57OPU9400; Last   Rx:16Mar2017 Ordered   5  Glimepiride 1 MG Oral Tablet; 1 tab daily with supper; Therapy: 68Lmo7100 to (Evaluate:41Fxv1539)  Requested for: 10BJQ4765; Last   Rx:10Jan2017 Ordered   6  HydroCHLOROthiazide 12 5 MG Oral Capsule; take 1 capsule daily; Therapy: 94Fun5008 to (Evaluate:11Dec2017)  Requested for: 40NWJ6568; Last   Rx:16Mar2017 Ordered   7  Labetalol HCl - 100 MG Oral Tablet; Take 1 tablet every 12 hours; Therapy: 66FMW0066 to (Last Rx:06Jan2017)  Requested for: 63AKW3873 Ordered   8  Multivitamins TABS; TAKE 1 TABLET DAILY; Therapy: (99 502134) to Recorded   9  OneTouch Verio In [employer]; Check once a day; Therapy: 49Ahg8819 to (Evaluate:21Mar2017)  Requested for: 56Dlh2100; Last   Rx:65Bwf7874 Ordered   10  PriLOSEC 20 MG CPDR; take 1 capsule by mouth daily; Therapy: (99 502134) to Recorded   11  Tacrolimus 1 MG Oral Capsule; takes 4 pills in the pm;    Therapy: 93IAD3721 to Recorded   12  Tacrolimus 5 MG Oral Capsule; takes one tablet in the a m  daily; Therapy: 22ECG2895 to Recorded   13  Tradjenta 5 MG Oral Tablet; Take 1 tablet daily; Therapy: 05Bxn9523 to (Last Rx:07Mar2017)  Requested for: 61XKJ8115 Ordered   14  Vitamin D3 1000 UNIT Oral Capsule; take 1 capsule daily; Therapy: (Recorded:83Rvb8673) to Recorded    The medication list was reviewed and updated today  Immunizations  Hepatitis A --- Series1: 17-Jun-2014     Allergies    1   No Known Drug Allergies 2  No Known Food Allergies    Vitals  Signs   Recorded: 38CZJ3368 01:10PM   Heart Rate: 92  Systolic: 535  Diastolic: 84  Weight: 943 lb   BMI Calculated: 34 33  BSA Calculated: 1 96    Physical Exam    Constitutional   General appearance: Abnormal   obese  Eyes   Conjunctiva and lids: No swelling, erythema or discharge  Pupils and irises: Equal, round and reactive to light  Ears, Nose, Mouth, and Throat   External inspection of ears and nose: Normal     Oropharynx: Normal with no erythema, edema, exudate lesions, or ulcers  Pulmonary   Respiratory effort: No increased work of breathing or signs of respiratory distress  Auscultation of lungs: Clear to auscultation  Cardiovascular   Auscultation of heart: Normal rate and rhythm, normal S1 and S2, without murmurs  Examination of extremities for edema and/or varicosities: Normal     Lymphatic   Palpation of lymph nodes in neck: No lymphadenopathy  Psychiatric   Orientation to person, place, and time: Normal     Mood and affect: Normal         Right Upper Extremity: All normal    Left Upper Extremity: All normal    Right Lower Extremity: Right Foot:   Great toe deformities include a bunion  Right Ankle: Right Knee: Right Hip:   Musculoskeletal - Joints, bones, and muscles: Abnormal  Palpation - bilateral knee crepitus     Skin - Skin and subcutaneous tissue: Normal    Neurologic - Sensation: Normal       Results/Data  (1) CBC/PLT/DIFF 48FKG8881 09:13AM [facility], Provider   Test ordered by: [address] [place] [address] Name Result Flag Reference   WBC COUNT 5 37 Thousand/uL  4 31-10 16   RBC COUNT 4 11 Million/uL  3 88-5 62   HEMOGLOBIN 11 8 g/dL L 12 0-17 0   HEMATOCRIT 35 7 % L 36 5-49 3   MCV 87 fL  82-98   MCH 28 7 pg  26 8-34 3   MCHC 33 1 g/dL  31 4-37 4   RDW 14 6 %  11 6-15 1   MPV 11 9 fL  8 9-12 7   PLATELET COUNT 544 Thousands/uL L 149-390   NEUTROPHILS RELATIVE PERCENT 79 % H 43-75   LYMPHOCYTES RELATIVE PERCENT 10 % L 14-44   MONOCYTES RELATIVE PERCENT 8 %  4-12   EOSINOPHILS RELATIVE PERCENT 2 %  0-6   BASOPHILS RELATIVE PERCENT 1 %  0-1   NEUTROPHILS ABSOLUTE COUNT 4 26 Thousands/? ??L  1 85-7 62   LYMPHOCYTES ABSOLUTE COUNT 0 53 Thousands/? ??L L 0 60-4 47   MONOCYTES ABSOLUTE COUNT 0 44 Thousand/? ??L  0 17-1 22   EOSINOPHILS ABSOLUTE COUNT 0 11 Thousand/? ??L  0 00-0 61   BASOPHILS ABSOLUTE COUNT 0 03 Thousands/? ??L  0 00-0 10     (1) COMPREHENSIVE METABOLIC PANEL 15FBB6745 90:89BH [facility], Provider   Test ordered by: Faheem Addis     Test Name Result Flag Reference   SODIUM 142 mmol/L  136-145   POTASSIUM 4 5 mmol/L  3 5-5 3   CHLORIDE 106 mmol/L  100-108   CARBON DIOXIDE 25 mmol/L  21-32   ANION GAP (CALC) 11 mmol/L  4-13   BLOOD UREA NITROGEN 40 mg/dL H 5-25   CREATININE 1 82 mg/dL H 0 60-1 30   Standardized to IDMS reference method   CALCIUM 8 9 mg/dL  8 3-10 1   BILI, TOTAL 0 30 mg/dL  0 20-1 00   ALK PHOSPHATAS 80 U/L     ALT (SGPT) 35 U/L  12-78   AST(SGOT) 17 U/L  5-45   ALBUMIN 4 1 g/dL  3 5-5 0   TOTAL PROTEIN 7 2 g/dL  6 4-8 2   eGFR Non-African American 38 5 ml/min/1 73sq [facility] Disease Education Program recommendations are as follows:  GFR calculation is accurate only with a steady state creatinine  Chronic Kidney disease less than 60 ml/min/1 73 sq  meters  Kidney failure less than 15 ml/min/1 73 sq  meters  GLUCOSE FASTING 134 mg/dL H 65-99     (1) URIC ACID 85DNT4443 09:56AM Marni Kingsley     Test Name Result Flag Reference   URIC ACID 9 4 mg/dL H 4 2-8 0   Specimen collection should occur prior to Metamizole administration due to the potential for falsely depressed results  Health Management  History of Diabetic retinopathy screening   *VB - Eye Exam; every 2 years; Last 20Aug2015; Next Due: 94NXM0671;  Active    Future Appointments    Date/Time Provider Specialty Site   11/30/2017 01:00 PM Marni Kingsley,  Rheumatology [address] [facility]     Signatures   Electronically signed by : Donn Justice DO; May 30 2017  4:41PM EST                       (Author)

## 2018-01-17 NOTE — RESULT NOTES
Message   A1C 8 4-- Diabetes poorly controlled  Send BG log afte med change at recent visit  Cholesterol and Triglycerides are high-- Encouage him to schedule with dietiician  He should check with his transplant physician at next appt if cholesterol meds can be used  Verified Results  (1) HEMOGLOBIN A1C 59AAA1578 11:22AM Tayla Holloway Order Number: [id number]     Test Name Result Flag Reference   HEMOGLOBIN A1C 8 4 % H 4 2-6 3   EST  AVG  GLUCOSE 194 mg/dl       (1) LIPID PANEL, FASTING 39AHI4914 11:22AM Tayla Holloway Order Number: [id number]     Test Name Result Flag Reference   CHOLESTEROL 221 mg/dL H    HDL,DIRECT 34 mg/dL L 40-60   Specimen collection should occur prior to Metamizole administration due to the potential for falsely depressed results  LDL CHOLESTEROL CALCULATED 119 mg/dL H 0-100   - Patient Instructions: This is a fasting blood test  Water,black tea or black  coffee only after 9:00pm the night before test   Drink 2 glasses of water the morning of test     - Patient Instructions: This is a fasting blood test  Water,black tea or black  coffee only after 9:00pm the night before test Drink 2 glasses of water the morning of test   Triglyceride:         Normal              <150 mg/dl       Borderline High    150-199 mg/dl       High               200-499 mg/dl       Very High          >499 mg/dl  Cholesterol:         Desirable        <200 mg/dl      Borderline High  200-239 mg/dl      High             >239 mg/dl  HDL Cholesterol:        High    >59 mg/dL      Low     <41 mg/dL  LDL CALCULATED:    This screening LDL is a calculated result  It does not have the accuracy of the Direct Measured LDL in the monitoring of patients with hyperlipidemia and/or statin therapy  Direct Measure LDL (QXQ344) must be ordered separately in these patients     TRIGLYCERIDES 338 mg/dL H <=150   Specimen collection should occur prior to N-Acetylcysteine or Metamizole administration due to the potential for falsely depressed results

## 2018-01-22 VITALS
SYSTOLIC BLOOD PRESSURE: 150 MMHG | DIASTOLIC BLOOD PRESSURE: 84 MMHG | WEIGHT: 200 LBS | BODY MASS INDEX: 34.33 KG/M2 | HEART RATE: 92 BPM

## 2018-01-23 NOTE — RESULT NOTES
Discussion/Summary   Hemoglobin A1c is stable at 7 0  Lipid panel remains elevated but stable  Follow up scheduled with Dr Zach Allen in 2/2018     Verified Results  (1) HEMOGLOBIN A1C 74EYV0182 09:25AM Simone Armendariz     Test Name Result Flag Reference   HEMOGLOBIN A1C 7 0 % H 4 2-6 3   EST  AVG  GLUCOSE 154 mg/dl       (1) LIPID PANEL, FASTING 62VXW8541 09:25AM Simone Armendariz     Test Name Result Flag Reference   CHOLESTEROL 239 mg/dL H    HDL,DIRECT 37 mg/dL L 40-60   Specimen collection should occur prior to Metamizole administration due to the potential for falsley depressed results  LDL CHOLESTEROL CALCULATED 137 mg/dL H 0-100   Triglyceride:        Normal <150 mg/dl   Borderline High 150-199 mg/dl   High 200-499 mg/dl   Very High >499 mg/dl      Cholesterol:       Desirable <200 mg/dl    Borderline High 200-239 mg/dl    High >239 mg/dl      HDL Cholesterol:       High>59 mg/dL    Low <41 mg/dL      This screening LDL is a calculated result  It does not have the accuracy of the Direct Measured LDL in the monitoring of patients with hyperlipidemia and/or statin therapy  Direct Measure LDL (PMY864) must be ordered separately in these patients  TRIGLYCERIDES 325 mg/dL H <=150   Specimen collection should occur prior to N-Acetylcysteine or Metamizole administration due to the potential for falsely depressed results

## 2018-01-24 VITALS
HEART RATE: 80 BPM | SYSTOLIC BLOOD PRESSURE: 126 MMHG | TEMPERATURE: 98.8 F | HEIGHT: 64 IN | RESPIRATION RATE: 16 BRPM | DIASTOLIC BLOOD PRESSURE: 80 MMHG | WEIGHT: 198.5 LBS | BODY MASS INDEX: 33.89 KG/M2

## 2018-02-22 ENCOUNTER — HOSPITAL ENCOUNTER (OUTPATIENT)
Dept: RADIOLOGY | Age: 58
Discharge: HOME | End: 2018-02-22
Payer: COMMERCIAL

## 2018-02-22 DIAGNOSIS — M81.0 AGE-RELATED OSTEOPOROSIS WITHOUT CURRENT PATHOLOGICAL FRACTURE: ICD-10-CM

## 2018-02-22 PROCEDURE — 77080 DXA BONE DENSITY AXIAL: CPT

## 2018-03-01 DIAGNOSIS — N18.2 CHRONIC KIDNEY DISEASE, STAGE II (MILD): ICD-10-CM

## 2018-04-06 ENCOUNTER — TRANSCRIBE ORDERS (OUTPATIENT)
Dept: LAB | Facility: CLINIC | Age: 58
End: 2018-04-06

## 2018-04-06 ENCOUNTER — APPOINTMENT (OUTPATIENT)
Dept: LAB | Facility: CLINIC | Age: 58
End: 2018-04-06
Payer: COMMERCIAL

## 2018-04-06 DIAGNOSIS — Z94.4 LIVER REPLACED BY TRANSPLANT (HCC): Primary | ICD-10-CM

## 2018-04-06 DIAGNOSIS — C22.1 MALIGNANT NEOPLASM OF INTRAHEPATIC BILE DUCTS (HCC): ICD-10-CM

## 2018-04-06 DIAGNOSIS — Z94.4 LIVER REPLACED BY TRANSPLANT (HCC): ICD-10-CM

## 2018-04-06 DIAGNOSIS — E55.9 VITAMIN D DEFICIENCY: ICD-10-CM

## 2018-04-06 DIAGNOSIS — R79.1 ABNORMAL COAGULATION PROFILE: ICD-10-CM

## 2018-04-06 DIAGNOSIS — D68.9 BLOOD CLOTTING DISORDER (HCC): ICD-10-CM

## 2018-04-06 DIAGNOSIS — N18.2 CHRONIC KIDNEY DISEASE, STAGE II (MILD): ICD-10-CM

## 2018-04-06 LAB
25(OH)D3 SERPL-MCNC: 31.7 NG/ML (ref 30–100)
ALBUMIN SERPL BCP-MCNC: 4.3 G/DL (ref 3.5–5)
ALP SERPL-CCNC: 98 U/L (ref 46–116)
ALT SERPL W P-5'-P-CCNC: 35 U/L (ref 12–78)
ANION GAP SERPL CALCULATED.3IONS-SCNC: 10 MMOL/L (ref 4–13)
AST SERPL W P-5'-P-CCNC: 13 U/L (ref 5–45)
BASOPHILS # BLD AUTO: 0.02 THOUSANDS/ΜL (ref 0–0.1)
BASOPHILS NFR BLD AUTO: 0 % (ref 0–1)
BILIRUB SERPL-MCNC: 0.3 MG/DL (ref 0.2–1)
BUN SERPL-MCNC: 43 MG/DL (ref 5–25)
CALCIUM SERPL-MCNC: 9.2 MG/DL (ref 8.3–10.1)
CHLORIDE SERPL-SCNC: 109 MMOL/L (ref 100–108)
CO2 SERPL-SCNC: 24 MMOL/L (ref 21–32)
CREAT SERPL-MCNC: 1.69 MG/DL (ref 0.6–1.3)
CREAT UR-MCNC: 228 MG/DL
EOSINOPHIL # BLD AUTO: 0.12 THOUSAND/ΜL (ref 0–0.61)
EOSINOPHIL NFR BLD AUTO: 3 % (ref 0–6)
ERYTHROCYTE [DISTWIDTH] IN BLOOD BY AUTOMATED COUNT: 14.6 % (ref 11.6–15.1)
GFR SERPL CREATININE-BSD FRML MDRD: 43 ML/MIN/1.73SQ M
GGT SERPL-CCNC: 30 U/L (ref 5–85)
GLUCOSE P FAST SERPL-MCNC: 152 MG/DL (ref 65–99)
HCT VFR BLD AUTO: 35 % (ref 36.5–49.3)
HGB BLD-MCNC: 11.5 G/DL (ref 12–17)
INR PPP: 1.02 (ref 0.86–1.16)
LYMPHOCYTES # BLD AUTO: 0.58 THOUSANDS/ΜL (ref 0.6–4.47)
LYMPHOCYTES NFR BLD AUTO: 12 % (ref 14–44)
MAGNESIUM SERPL-MCNC: 1.4 MG/DL (ref 1.6–2.6)
MCH RBC QN AUTO: 28.5 PG (ref 26.8–34.3)
MCHC RBC AUTO-ENTMCNC: 32.9 G/DL (ref 31.4–37.4)
MCV RBC AUTO: 87 FL (ref 82–98)
MONOCYTES # BLD AUTO: 0.41 THOUSAND/ΜL (ref 0.17–1.22)
MONOCYTES NFR BLD AUTO: 9 % (ref 4–12)
NEUTROPHILS # BLD AUTO: 3.59 THOUSANDS/ΜL (ref 1.85–7.62)
NEUTS SEG NFR BLD AUTO: 76 % (ref 43–75)
PLATELET # BLD AUTO: 134 THOUSANDS/UL (ref 149–390)
PMV BLD AUTO: 11.7 FL (ref 8.9–12.7)
POTASSIUM SERPL-SCNC: 5.2 MMOL/L (ref 3.5–5.3)
PROT SERPL-MCNC: 7.5 G/DL (ref 6.4–8.2)
PROT UR-MCNC: 172 MG/DL
PROT/CREAT UR: 0.75 MG/G{CREAT} (ref 0–0.1)
PROTHROMBIN TIME: 13.7 SECONDS (ref 12.1–14.4)
RBC # BLD AUTO: 4.03 MILLION/UL (ref 3.88–5.62)
SODIUM SERPL-SCNC: 143 MMOL/L (ref 136–145)
TACROLIMUS BLD-MCNC: 7.5 NG/ML (ref 2–20)
WBC # BLD AUTO: 4.72 THOUSAND/UL (ref 4.31–10.16)

## 2018-04-06 PROCEDURE — 36415 COLL VENOUS BLD VENIPUNCTURE: CPT

## 2018-04-06 PROCEDURE — 82977 ASSAY OF GGT: CPT

## 2018-04-06 PROCEDURE — 80053 COMPREHEN METABOLIC PANEL: CPT

## 2018-04-06 PROCEDURE — 85025 COMPLETE CBC W/AUTO DIFF WBC: CPT

## 2018-04-06 PROCEDURE — 84156 ASSAY OF PROTEIN URINE: CPT

## 2018-04-06 PROCEDURE — 80197 ASSAY OF TACROLIMUS: CPT

## 2018-04-06 PROCEDURE — 85610 PROTHROMBIN TIME: CPT

## 2018-04-06 PROCEDURE — 82570 ASSAY OF URINE CREATININE: CPT

## 2018-04-06 PROCEDURE — 82306 VITAMIN D 25 HYDROXY: CPT

## 2018-04-06 PROCEDURE — 83735 ASSAY OF MAGNESIUM: CPT

## 2018-04-17 ENCOUNTER — OFFICE VISIT (OUTPATIENT)
Dept: ENDOCRINOLOGY | Facility: CLINIC | Age: 58
End: 2018-04-17
Payer: COMMERCIAL

## 2018-04-17 VITALS
BODY MASS INDEX: 33.53 KG/M2 | WEIGHT: 196.4 LBS | HEART RATE: 82 BPM | HEIGHT: 64 IN | SYSTOLIC BLOOD PRESSURE: 140 MMHG | DIASTOLIC BLOOD PRESSURE: 80 MMHG

## 2018-04-17 DIAGNOSIS — E78.5 HYPERLIPIDEMIA, UNSPECIFIED HYPERLIPIDEMIA TYPE: ICD-10-CM

## 2018-04-17 DIAGNOSIS — Z94.4 STATUS POST LIVER TRANSPLANT (HCC): ICD-10-CM

## 2018-04-17 DIAGNOSIS — N18.30 CHRONIC KIDNEY DISEASE, STAGE III (MODERATE) (HCC): ICD-10-CM

## 2018-04-17 DIAGNOSIS — I10 HYPERTENSION, UNSPECIFIED TYPE: ICD-10-CM

## 2018-04-17 DIAGNOSIS — E11.65 TYPE 2 DIABETES MELLITUS WITH HYPERGLYCEMIA, WITHOUT LONG-TERM CURRENT USE OF INSULIN (HCC): Primary | ICD-10-CM

## 2018-04-17 PROCEDURE — 99214 OFFICE O/P EST MOD 30 MIN: CPT | Performed by: INTERNAL MEDICINE

## 2018-04-17 NOTE — ASSESSMENT & PLAN NOTE
Who A1c almost at goal, continue current regimen  Advised to check blood sugars a few times a week and send over log in 2 weeks    Focus on dietary and lifestyle modifications and weight loss

## 2018-04-17 NOTE — PROGRESS NOTES
Vickie Jasso 59 y o  male MRN: [id number]    Encounter: [id number]      Assessment/Plan     Problem List Items Addressed This Visit     Hypertension     Blood pressure almost at goal, continue current regimen         Type 2 diabetes mellitus with hyperglycemia, without long-term current use of insulin (HCC) - Primary     Who A1c almost at goal, continue current regimen  Advised to check blood sugars a few times a week and send over log in 2 weeks  Focus on dietary and lifestyle modifications and weight loss         Relevant Orders    HEMOGLOBIN A1C W/ EAG ESTIMATION Lab Collect    Chronic kidney disease, stage III (moderate)     Continue follow-up with nephrologist         Hyperlipidemia     On statins         Status post liver transplant     Follows up with transplant team in [place]             CC: Diabetes    History of Present Illness     HPI:  76-year-old gentleman with history of type 2 diabetes on oral hypoglycemics, history of liver transplant, chronic kidney disease, hypertension and hyperlipidemia here for follow-up  Haven't been checking f s   No symptoms of hypoglycemia , no polyuria , polydypsia, no blurry vision   No numbness and tingling in feet /  Weight fairly stable      Review of Systems   Constitutional: Negative for fatigue and unexpected weight change  Eyes: Negative for visual disturbance  Respiratory: Negative for cough and shortness of breath  Cardiovascular: Negative for chest pain, palpitations and leg swelling  Gastrointestinal: Negative for constipation, diarrhea, nausea and vomiting  Endocrine: Negative for polydipsia and polyuria  Psychiatric/Behavioral: Positive for sleep disturbance  All other systems reviewed and are negative        Historical Information   Past Medical History:   Diagnosis Date    Chronic kidney disease, stage III (moderate)     RESOLVED: [id number]    [facility]     Diabetes [facility]     Hepatic encephalopathy [facility]     Hepatitis C     Hyperkalemia     Hyperlipidemia     Hypertension     [facility]     Status post [facility] transplant [facility]     Umbilical hernia      Past Surgical History:   Procedure Laterality Date    ANKLE SURGERY      CHOLECYSTECTOMY      GALLBLADDER SURGERY      LIVER TRANSPLANTATION      LAST ASSESSED: [id number]    MI COLONOSCOPY FLX DX W/COLLJ SPEC WHEN PFRMD N/A 2/16/2017    Procedure: COLONOSCOPY;  Surgeon: Romi Strauss MD;  Location: [facility];   Service: Gastroenterology    TONSILLECTOMY       Social History   History   Alcohol Use No     History   Drug Use No     History   Smoking Status    Never Smoker   Smokeless Tobacco    Never Used     Family History:   Family History   Problem Relation Age of Onset    Diabetes Mother      TYPE 2    Hypertension Mother     Stroke Mother 68     SYNDROME     Hepatitis Brother      C    Cirrhosis Brother      LIVER     Cancer Family        Meds/Allergies   Current Outpatient Prescriptions   Medication Sig Dispense Refill    alendronate (FOSAMAX) 70 mg tablet Take 70 mg by mouth every 7 days Indications: on Wednesday      amLODIPine (NORVASC) 5 mg tablet Take 5 mg by mouth daily      atorvastatin (LIPITOR) 40 mg tablet Take 1 tablet by mouth      azaTHIOprine (IMURAN) 50 mg tablet Take 50 mg by mouth daily      Cholecalciferol (VITAMIN D3) 1000 units CAPS Take 1 capsule by mouth daily      fludrocortisone (FLORINEF) 0 1 mg tablet Take 0 1 mg by mouth daily      glimepiride (AMARYL) 1 mg tablet Take by mouth      glucose blood (ONETOUCH VERIO) test strip by In Vitro route      hydrochlorothiazide (MICROZIDE) 12 5 mg capsule Take 1 capsule by mouth daily      labetalol (NORMODYNE) 100 mg tablet Take 100 mg by mouth 2 (two) times a day      Linagliptin (TRADJENTA) 5 MG TABS Take 5 mg by mouth daily      Multiple Vitamin (MULTIVITAMINS PO) Take 1 tablet by mouth daily      tacrolimus (PROGRAF) 1 mg capsule Take by mouth      tacrolimus (PROGRAF) 5 mg capsule Take by mouth      tamsulosin (FLOMAX) 0 4 mg Take 0 4 mg by mouth daily at bedtime      Multiple Vitamins-Minerals (MULTIVITAMIN WITH MINERALS) tablet Take 1 tablet by mouth daily      OMEPRAZOLE PO Take by mouth       No current facility-administered medications for this visit  No Known Allergies    Objective   Vitals: Blood pressure 140/80, pulse 82, height 5' 4" (1 626 m), weight 89 1 kg (196 lb 6 4 oz)  Physical Exam   Constitutional: He is oriented to person, place, and time  He appears well-developed and well-nourished  No distress  HENT:   Head: Normocephalic and atraumatic  Mouth/Throat: No oropharyngeal exudate  Eyes: Conjunctivae and EOM are normal  No scleral icterus  Neck: Normal range of motion  Neck supple  No thyromegaly present  Cardiovascular: Normal rate, regular rhythm and normal heart sounds  No murmur heard  Pulses:       Dorsalis pedis pulses are 2+ on the right side, and 2+ on the left side  Pulmonary/Chest: Effort normal and breath sounds normal  No respiratory distress  He has no wheezes  He has no rales  Abdominal: Soft  Bowel sounds are normal  He exhibits no distension  There is no tenderness  There is no rebound  Musculoskeletal: Normal range of motion  He exhibits no edema or deformity  Feet:   Right Foot:   Skin Integrity: Positive for callus  Negative for ulcer, skin breakdown, erythema, warmth or dry skin  Left Foot:   Skin Integrity: Positive for callus  Negative for ulcer, skin breakdown, erythema, warmth or dry skin  Lymphadenopathy:     He has no cervical adenopathy  Neurological: He is alert and oriented to person, place, and time  Skin: Skin is warm and dry  No rash noted  No erythema  No pallor  Psychiatric: He has a normal mood and affect  His behavior is normal  Judgment and thought content normal      Patient's shoes and socks removed  Right Foot/Ankle   Right Foot Inspection  Skin Exam: skin intact, callus and callus no dry skin, no warmth, no erythema, no maceration, no abnormal color, no pre-ulcer and no ulcer                          Toe Exam: ROM and strength within normal limits  Sensory   Vibration: diminished  Proprioception: intact   Monofilament testing: intact  Vascular    The right DP pulse is 2+  Left Foot/Ankle  Left Foot Inspection  Skin Exam: skin intact and callusno dry skin, no warmth, no erythema, no maceration, normal color, no pre-ulcer and no ulcer                         Toe Exam: ROM and strength within normal limits                   Sensory   Vibration: diminished  Proprioception: intact  Monofilament: intact  Vascular    The left DP pulse is 2+  Assign Risk Category:  Deformity present; ;            The history was obtained from the review of the chart, patient      Lab Results:   Lab Results   Component Value Date/Time    Hemoglobin A1C 7 0 (H) 01/06/2018 09:25 AM    Hemoglobin A1C 7 0 (H) 08/03/2017 09:19 AM    WBC 4 72 04/06/2018 10:03 AM    WBC 8 50 01/06/2018 09:25 AM    WBC 5 50 10/03/2017 10:08 AM    Hemoglobin 11 5 (L) 04/06/2018 10:03 AM    Hemoglobin 12 4 01/06/2018 09:25 AM    Hemoglobin 12 1 10/03/2017 10:08 AM    Hematocrit 35 0 (L) 04/06/2018 10:03 AM    Hematocrit 37 9 01/06/2018 09:25 AM    Hematocrit 36 6 10/03/2017 10:08 AM    MCV 87 04/06/2018 10:03 AM    MCV 86 01/06/2018 09:25 AM    MCV 87 10/03/2017 10:08 AM    Platelets 660 (L) 70/01/6398 10:03 AM    Platelets 956 10/16/6902 09:25 AM    Platelets 223 (L) 94/69/8299 10:08 AM    BUN 43 (H) 04/06/2018 10:03 AM    BUN 56 (H) 01/06/2018 09:25 AM    BUN 43 (H) 10/03/2017 10:08 AM    Sodium 143 04/06/2018 10:03 AM    Sodium 139 01/06/2018 09:25 AM    Sodium 138 10/03/2017 10:08 AM    Potassium 5 2 04/06/2018 10:03 AM    Potassium 4 5 01/06/2018 09:25 AM    Potassium 4 8 10/03/2017 10:08 AM    Chloride 109 (H) 04/06/2018 10:03 AM    Chloride 106 01/06/2018 09:25 AM    Chloride 105 10/03/2017 10:08 AM    CO2 24 04/06/2018 10:03 AM    CO2 25 01/06/2018 09:25 AM    CO2 24 10/03/2017 10:08 AM    Creatinine 1 69 (H) 04/06/2018 10:03 AM    Creatinine 1 76 (H) 01/06/2018 09:25 AM    Creatinine 1 79 (H) 10/03/2017 10:08 AM    AST 13 04/06/2018 10:03 AM    AST 7 01/06/2018 09:25 AM    AST 16 10/03/2017 10:08 AM    ALT 35 04/06/2018 10:03 AM    ALT 30 01/06/2018 09:25 AM    ALT 34 10/03/2017 10:08 AM    Albumin 4 3 04/06/2018 10:03 AM    Albumin 3 9 01/06/2018 09:25 AM    Albumin 4 1 10/03/2017 10:08 AM    Cholesterol 239 (H) 01/06/2018 09:25 AM    HDL, Direct 37 (L) 01/06/2018 09:25 AM    Triglycerides 325 (H) 01/06/2018 09:25 AM             Portions of the record may have been created with voice recognition software  Occasional wrong word or "sound a like" substitutions may have occurred due to the inherent limitations of voice recognition software  Read the chart carefully and recognize, using context, where substitutions have occurred

## 2018-05-02 ENCOUNTER — LAB (OUTPATIENT)
Dept: LAB | Facility: CLINIC | Age: 58
End: 2018-05-02
Payer: COMMERCIAL

## 2018-05-02 ENCOUNTER — TRANSCRIBE ORDERS (OUTPATIENT)
Dept: LAB | Facility: CLINIC | Age: 58
End: 2018-05-02

## 2018-05-02 DIAGNOSIS — E11.65 TYPE 2 DIABETES MELLITUS WITH HYPERGLYCEMIA, WITHOUT LONG-TERM CURRENT USE OF INSULIN (HCC): ICD-10-CM

## 2018-05-02 LAB
EST. AVERAGE GLUCOSE BLD GHB EST-MCNC: 137 MG/DL
HBA1C MFR BLD: 6.4 % (ref 4.2–6.3)

## 2018-05-02 PROCEDURE — 83036 HEMOGLOBIN GLYCOSYLATED A1C: CPT

## 2018-05-09 DIAGNOSIS — E11.65 TYPE 2 DIABETES MELLITUS WITH HYPERGLYCEMIA, WITHOUT LONG-TERM CURRENT USE OF INSULIN (HCC): Primary | ICD-10-CM

## 2018-05-09 RX ORDER — GLIMEPIRIDE 1 MG/1
TABLET ORAL
Qty: 90 TABLET | Refills: 1 | Status: SHIPPED | OUTPATIENT
Start: 2018-05-09 | End: 2018-09-18 | Stop reason: SDUPTHER

## 2018-05-09 RX ORDER — LINAGLIPTIN 5 MG/1
TABLET, FILM COATED ORAL
Qty: 90 TABLET | Refills: 1 | Status: SHIPPED | OUTPATIENT
Start: 2018-05-09 | End: 2018-09-18 | Stop reason: SDUPTHER

## 2018-05-10 ENCOUNTER — TELEPHONE (OUTPATIENT)
Dept: ENDOCRINOLOGY | Facility: CLINIC | Age: 58
End: 2018-05-10

## 2018-05-10 NOTE — TELEPHONE ENCOUNTER
----- Message from Salina Montano MD sent at 5/5/2018  4:19 PM EDT -----  Please call the patient regarding his  result   A1C 6 4 at goal

## 2018-05-17 DIAGNOSIS — E27.40 HYPOALDOSTERONISM (HCC): Primary | ICD-10-CM

## 2018-05-17 RX ORDER — FLUDROCORTISONE ACETATE 0.1 MG/1
TABLET ORAL
Qty: 90 TABLET | Refills: 2 | Status: SHIPPED | OUTPATIENT
Start: 2018-05-17 | End: 2019-02-11 | Stop reason: SDUPTHER

## 2018-05-18 ENCOUNTER — APPOINTMENT (OUTPATIENT)
Dept: LAB | Facility: CLINIC | Age: 58
End: 2018-05-18
Payer: COMMERCIAL

## 2018-05-18 ENCOUNTER — TRANSCRIBE ORDERS (OUTPATIENT)
Dept: LAB | Facility: CLINIC | Age: 58
End: 2018-05-18

## 2018-05-18 DIAGNOSIS — D68.9 BLOOD CLOTTING DISORDER (HCC): ICD-10-CM

## 2018-05-18 DIAGNOSIS — R79.1 ABNORMAL COAGULATION PROFILE: ICD-10-CM

## 2018-05-18 DIAGNOSIS — Z94.4 LIVER REPLACED BY TRANSPLANT (HCC): ICD-10-CM

## 2018-05-18 DIAGNOSIS — C22.1 MALIGNANT NEOPLASM OF INTRAHEPATIC BILE DUCTS (HCC): ICD-10-CM

## 2018-05-18 LAB
ALBUMIN SERPL BCP-MCNC: 4.2 G/DL (ref 3.5–5)
ALP SERPL-CCNC: 103 U/L (ref 46–116)
ALT SERPL W P-5'-P-CCNC: 36 U/L (ref 12–78)
ANION GAP SERPL CALCULATED.3IONS-SCNC: 10 MMOL/L (ref 4–13)
AST SERPL W P-5'-P-CCNC: 19 U/L (ref 5–45)
BASOPHILS # BLD AUTO: 0.02 THOUSANDS/ΜL (ref 0–0.1)
BASOPHILS NFR BLD AUTO: 0 % (ref 0–1)
BILIRUB SERPL-MCNC: 0.3 MG/DL (ref 0.2–1)
BUN SERPL-MCNC: 36 MG/DL (ref 5–25)
CALCIUM SERPL-MCNC: 9.4 MG/DL (ref 8.3–10.1)
CHLORIDE SERPL-SCNC: 107 MMOL/L (ref 100–108)
CO2 SERPL-SCNC: 27 MMOL/L (ref 21–32)
CREAT SERPL-MCNC: 1.6 MG/DL (ref 0.6–1.3)
EOSINOPHIL # BLD AUTO: 0.12 THOUSAND/ΜL (ref 0–0.61)
EOSINOPHIL NFR BLD AUTO: 3 % (ref 0–6)
ERYTHROCYTE [DISTWIDTH] IN BLOOD BY AUTOMATED COUNT: 13.9 % (ref 11.6–15.1)
GFR SERPL CREATININE-BSD FRML MDRD: 46 ML/MIN/1.73SQ M
GGT SERPL-CCNC: 32 U/L (ref 5–85)
GLUCOSE P FAST SERPL-MCNC: 113 MG/DL (ref 65–99)
HCT VFR BLD AUTO: 34.8 % (ref 36.5–49.3)
HGB BLD-MCNC: 11.6 G/DL (ref 12–17)
INR PPP: 1 (ref 0.86–1.17)
LYMPHOCYTES # BLD AUTO: 0.63 THOUSANDS/ΜL (ref 0.6–4.47)
LYMPHOCYTES NFR BLD AUTO: 13 % (ref 14–44)
MAGNESIUM SERPL-MCNC: 1.4 MG/DL (ref 1.6–2.6)
MCH RBC QN AUTO: 29.2 PG (ref 26.8–34.3)
MCHC RBC AUTO-ENTMCNC: 33.3 G/DL (ref 31.4–37.4)
MCV RBC AUTO: 88 FL (ref 82–98)
MONOCYTES # BLD AUTO: 0.38 THOUSAND/ΜL (ref 0.17–1.22)
MONOCYTES NFR BLD AUTO: 8 % (ref 4–12)
NEUTROPHILS # BLD AUTO: 3.74 THOUSANDS/ΜL (ref 1.85–7.62)
NEUTS SEG NFR BLD AUTO: 76 % (ref 43–75)
PLATELET # BLD AUTO: 134 THOUSANDS/UL (ref 149–390)
PMV BLD AUTO: 11.6 FL (ref 8.9–12.7)
POTASSIUM SERPL-SCNC: 4.6 MMOL/L (ref 3.5–5.3)
PROT SERPL-MCNC: 7.5 G/DL (ref 6.4–8.2)
PROTHROMBIN TIME: 12.9 SECONDS (ref 11.8–14.2)
RBC # BLD AUTO: 3.97 MILLION/UL (ref 3.88–5.62)
SODIUM SERPL-SCNC: 144 MMOL/L (ref 136–145)
WBC # BLD AUTO: 4.89 THOUSAND/UL (ref 4.31–10.16)

## 2018-05-18 PROCEDURE — 80197 ASSAY OF TACROLIMUS: CPT

## 2018-05-18 PROCEDURE — 85025 COMPLETE CBC W/AUTO DIFF WBC: CPT

## 2018-05-18 PROCEDURE — 83735 ASSAY OF MAGNESIUM: CPT

## 2018-05-18 PROCEDURE — 85610 PROTHROMBIN TIME: CPT

## 2018-05-18 PROCEDURE — 36415 COLL VENOUS BLD VENIPUNCTURE: CPT

## 2018-05-18 PROCEDURE — 80053 COMPREHEN METABOLIC PANEL: CPT

## 2018-05-18 PROCEDURE — 82977 ASSAY OF GGT: CPT

## 2018-05-19 LAB — TACROLIMUS BLD-MCNC: 9.9 NG/ML (ref 2–20)

## 2018-06-16 ENCOUNTER — APPOINTMENT (OUTPATIENT)
Dept: LAB | Facility: CLINIC | Age: 58
End: 2018-06-16
Payer: COMMERCIAL

## 2018-06-16 DIAGNOSIS — Z94.4 LIVER REPLACED BY TRANSPLANT (HCC): ICD-10-CM

## 2018-06-16 DIAGNOSIS — R79.1 ABNORMAL COAGULATION PROFILE: ICD-10-CM

## 2018-06-16 DIAGNOSIS — C22.1 MALIGNANT NEOPLASM OF INTRAHEPATIC BILE DUCTS (HCC): ICD-10-CM

## 2018-06-16 DIAGNOSIS — D68.9 BLOOD CLOTTING DISORDER (HCC): ICD-10-CM

## 2018-06-16 LAB
ALBUMIN SERPL BCP-MCNC: 4.3 G/DL (ref 3.5–5)
ALP SERPL-CCNC: 100 U/L (ref 46–116)
ALT SERPL W P-5'-P-CCNC: 39 U/L (ref 12–78)
ANION GAP SERPL CALCULATED.3IONS-SCNC: 9 MMOL/L (ref 4–13)
AST SERPL W P-5'-P-CCNC: 19 U/L (ref 5–45)
BASOPHILS # BLD AUTO: 0.01 THOUSANDS/ΜL (ref 0–0.1)
BASOPHILS NFR BLD AUTO: 0 % (ref 0–1)
BILIRUB SERPL-MCNC: 0.3 MG/DL (ref 0.2–1)
BUN SERPL-MCNC: 34 MG/DL (ref 5–25)
CALCIUM SERPL-MCNC: 9.2 MG/DL (ref 8.3–10.1)
CHLORIDE SERPL-SCNC: 105 MMOL/L (ref 100–108)
CO2 SERPL-SCNC: 28 MMOL/L (ref 21–32)
CREAT SERPL-MCNC: 1.7 MG/DL (ref 0.6–1.3)
EOSINOPHIL # BLD AUTO: 0.13 THOUSAND/ΜL (ref 0–0.61)
EOSINOPHIL NFR BLD AUTO: 3 % (ref 0–6)
ERYTHROCYTE [DISTWIDTH] IN BLOOD BY AUTOMATED COUNT: 14.1 % (ref 11.6–15.1)
GFR SERPL CREATININE-BSD FRML MDRD: 43 ML/MIN/1.73SQ M
GGT SERPL-CCNC: 29 U/L (ref 5–85)
GLUCOSE P FAST SERPL-MCNC: 126 MG/DL (ref 65–99)
HCT VFR BLD AUTO: 35.7 % (ref 36.5–49.3)
HGB BLD-MCNC: 11.6 G/DL (ref 12–17)
INR PPP: 1.02 (ref 0.86–1.17)
LYMPHOCYTES # BLD AUTO: 0.59 THOUSANDS/ΜL (ref 0.6–4.47)
LYMPHOCYTES NFR BLD AUTO: 12 % (ref 14–44)
MAGNESIUM SERPL-MCNC: 1.3 MG/DL (ref 1.6–2.6)
MCH RBC QN AUTO: 28.7 PG (ref 26.8–34.3)
MCHC RBC AUTO-ENTMCNC: 32.5 G/DL (ref 31.4–37.4)
MCV RBC AUTO: 88 FL (ref 82–98)
MONOCYTES # BLD AUTO: 0.32 THOUSAND/ΜL (ref 0.17–1.22)
MONOCYTES NFR BLD AUTO: 7 % (ref 4–12)
NEUTROPHILS # BLD AUTO: 3.72 THOUSANDS/ΜL (ref 1.85–7.62)
NEUTS SEG NFR BLD AUTO: 78 % (ref 43–75)
PLATELET # BLD AUTO: 135 THOUSANDS/UL (ref 149–390)
PMV BLD AUTO: 11.4 FL (ref 8.9–12.7)
POTASSIUM SERPL-SCNC: 4.8 MMOL/L (ref 3.5–5.3)
PROT SERPL-MCNC: 7.5 G/DL (ref 6.4–8.2)
PROTHROMBIN TIME: 13.1 SECONDS (ref 11.8–14.2)
RBC # BLD AUTO: 4.04 MILLION/UL (ref 3.88–5.62)
SODIUM SERPL-SCNC: 142 MMOL/L (ref 136–145)
WBC # BLD AUTO: 4.77 THOUSAND/UL (ref 4.31–10.16)

## 2018-06-16 PROCEDURE — 80197 ASSAY OF TACROLIMUS: CPT

## 2018-06-16 PROCEDURE — 85610 PROTHROMBIN TIME: CPT

## 2018-06-16 PROCEDURE — 80053 COMPREHEN METABOLIC PANEL: CPT

## 2018-06-16 PROCEDURE — 85025 COMPLETE CBC W/AUTO DIFF WBC: CPT

## 2018-06-16 PROCEDURE — 82977 ASSAY OF GGT: CPT

## 2018-06-16 PROCEDURE — 36415 COLL VENOUS BLD VENIPUNCTURE: CPT

## 2018-06-16 PROCEDURE — 83735 ASSAY OF MAGNESIUM: CPT

## 2018-06-18 LAB — TACROLIMUS BLD-MCNC: 4.5 NG/ML (ref 2–20)

## 2018-06-28 ENCOUNTER — OFFICE VISIT (OUTPATIENT)
Dept: FAMILY MEDICINE CLINIC | Facility: CLINIC | Age: 58
End: 2018-06-28
Payer: COMMERCIAL

## 2018-06-28 VITALS
SYSTOLIC BLOOD PRESSURE: 130 MMHG | BODY MASS INDEX: 33.63 KG/M2 | HEIGHT: 64 IN | RESPIRATION RATE: 16 BRPM | DIASTOLIC BLOOD PRESSURE: 84 MMHG | WEIGHT: 197 LBS | TEMPERATURE: 96.9 F | HEART RATE: 80 BPM

## 2018-06-28 DIAGNOSIS — N18.30 CHRONIC KIDNEY DISEASE, STAGE III (MODERATE) (HCC): ICD-10-CM

## 2018-06-28 DIAGNOSIS — M85.80 OSTEOPENIA, UNSPECIFIED LOCATION: ICD-10-CM

## 2018-06-28 DIAGNOSIS — Z94.4 STATUS POST LIVER TRANSPLANT (HCC): ICD-10-CM

## 2018-06-28 DIAGNOSIS — E11.65 TYPE 2 DIABETES MELLITUS WITH HYPERGLYCEMIA, WITHOUT LONG-TERM CURRENT USE OF INSULIN (HCC): ICD-10-CM

## 2018-06-28 DIAGNOSIS — I10 ESSENTIAL HYPERTENSION: ICD-10-CM

## 2018-06-28 DIAGNOSIS — Z00.00 HEALTH MAINTENANCE EXAMINATION: ICD-10-CM

## 2018-06-28 DIAGNOSIS — Z23 NEED FOR TDAP VACCINATION: ICD-10-CM

## 2018-06-28 DIAGNOSIS — E78.5 HYPERLIPIDEMIA, UNSPECIFIED HYPERLIPIDEMIA TYPE: ICD-10-CM

## 2018-06-28 PROCEDURE — 99396 PREV VISIT EST AGE 40-64: CPT | Performed by: NURSE PRACTITIONER

## 2018-06-28 PROCEDURE — 90471 IMMUNIZATION ADMIN: CPT

## 2018-06-28 PROCEDURE — 90715 TDAP VACCINE 7 YRS/> IM: CPT

## 2018-06-28 NOTE — ASSESSMENT & PLAN NOTE
Reviewed results of DXA  Recheck in 2 years  TdaP today  Declines HIV testing as it has been done in past and low risk    -Nutrition: Stressed importance of moderation in sodium/caffeine intake, saturated fat and cholesterol, caloric balance, sufficient intake of fresh fruits, vegetables, fiber, calcium, iron  --Exercise: Stressed the importance of regular exercise  --Substance Abuse: Discussed primary prevention of tobacco, alcohol, or other drug use; driving or other dangerous activities under the influence  --Injury prevention: Discussed safety belts, safety helmets, smoke detecto  --Dental health: Discussed importance of regular tooth brushing, flossing, and dental visits  --Immunizations reviewed    --Discussed benefits of screening colonoscopy UTD

## 2018-06-28 NOTE — ASSESSMENT & PLAN NOTE
Stable, weaning tacrolimus  Followed by Hepatology [facility]  Followed by Transplant- [facility]- annually and monitored frequently with labs and phone calls

## 2018-06-28 NOTE — ASSESSMENT & PLAN NOTE
Recent DXA with improved bone mineral density, continue fosamax, weight bearing activity and calcium/vit d supplementation     Currently no fall risk    Recheck in 2 years

## 2018-06-28 NOTE — PATIENT INSTRUCTIONS

## 2018-06-28 NOTE — PROGRESS NOTES
Assessment/Plan   Problem List Items Addressed This Visit     Hypertension     Continue current regimen         Type 2 diabetes mellitus with hyperglycemia, without long-term current use of insulin (HCC)     Lab Results   Component Value Date    HGBA1C 6 4 (H) 05/02/2018       No results for input(s): POCGLU in the last 72 hours  Blood Sugar Average: Last 72 hrs  Followed by endocrine at goal!:           Chronic kidney disease, stage III (moderate)     Stable and followed by nephrololgy         Hyperlipidemia     Continue statin therapy         Status post liver transplant     Stable, weaning tacrolimus  Followed by Hepatology [facility]  Followed by Transplant- [facility]- annually and monitored frequently with labs and phone calls         Relevant Orders    DXA bone density spine hip and pelvis    Health maintenance examination     Reviewed results of DXA  Recheck in 2 years  TdaP today  Declines HIV testing as it has been done in past and low risk    -Nutrition: Stressed importance of moderation in sodium/caffeine intake, saturated fat and cholesterol, caloric balance, sufficient intake of fresh fruits, vegetables, fiber, calcium, iron  --Exercise: Stressed the importance of regular exercise  --Substance Abuse: Discussed primary prevention of tobacco, alcohol, or other drug use; driving or other dangerous activities under the influence  --Injury prevention: Discussed safety belts, safety helmets, smoke detecto  --Dental health: Discussed importance of regular tooth brushing, flossing, and dental visits  --Immunizations reviewed    --Discussed benefits of screening colonoscopy UTD           Osteopenia     Recent DXA with improved bone mineral density, continue fosamax, weight bearing activity and calcium/vit d supplementation     Currently no fall risk    Recheck in 2 years           Other Visit Diagnoses     Need for Tdap vaccination        Relevant Orders    Tdap vaccine greater than or equal to 6yo IM (Completed) Ricky Treadwell is a 59 y o  male and is here for a comprehensive physical exam  The patient reports no problems  Significant problem list and PMH/PSH-- see list below  Do you take any herbs or supplements that were not prescribed by a doctor? no  Are you taking calcium supplements?  Takes a multivitamin with calcium  Are you taking aspirin daily? not on med list, info not obtained at this visit      The following portions of the patient's history were reviewed and updated as appropriate: allergies, current medications, past family history, past medical history, past social history, past surgical history and problem list    Patient Active Problem List   Diagnosis    Hypertension    Type 2 diabetes mellitus with hyperglycemia, without long-term current use of insulin ([facility] )    Cirrhosis    Chronic kidney disease, stage III (moderate)    Hyperlipidemia    Status post liver transplant    Hyperglycemia    Health maintenance examination    Osteopenia     Past Medical History:   Diagnosis Date    Chronic kidney disease, stage III (moderate)     RESOLVED: [id number]    [facility]     Diabetes mellitus ([place] [address] )     Hepatic encephalopathy ([place] [address] )     Hepatitis C     Hyperkalemia     Hyperlipidemia     Hypertension     Pancytopenia ([place] [address] )     Status post liver transplant ([place] [address] )     Umbilical hernia        Current Outpatient Prescriptions:     alendronate (FOSAMAX) 70 mg tablet, Take 70 mg by mouth every 7 days Indications: on Wednesday, Disp: , Rfl:     amLODIPine (NORVASC) 5 mg tablet, Take 5 mg by mouth daily, Disp: , Rfl:     atorvastatin (LIPITOR) 40 mg tablet, Take 1 tablet by mouth, Disp: , Rfl:     azaTHIOprine (IMURAN) 50 mg tablet, Take 50 mg by mouth daily, Disp: , Rfl:     Cholecalciferol (VITAMIN D3) 1000 units CAPS, Take 1 capsule by mouth daily, Disp: , Rfl:     fludrocortisone (FLORINEF) 0 1 mg tablet, TAKE 1 TABLET DAILY, Disp: 90 tablet, Rfl: 2    glimepiride (AMARYL) 1 mg tablet, TAKE 1 TABLET DAILY WITH SUPPER (NEED APPOINTMENT), Disp: 90 tablet, Rfl: 1    glucose blood (ONETOUCH VERIO) test strip, by In Vitro route, Disp: , Rfl:     hydrochlorothiazide (MICROZIDE) 12 5 mg capsule, Take 1 capsule by mouth daily, Disp: , Rfl:     labetalol (NORMODYNE) 100 mg tablet, Take 100 mg by mouth 2 (two) times a day, Disp: , Rfl:     Multiple Vitamins-Minerals (MULTIVITAMIN WITH MINERALS) tablet, Take 1 tablet by mouth daily, Disp: , Rfl:     OMEPRAZOLE PO, Take by mouth, Disp: , Rfl:     tacrolimus (PROGRAF) 1 mg capsule, Take by mouth Takes 4 tabs in am, 3 tabs in pm , Disp: , Rfl:     tamsulosin (FLOMAX) 0 4 mg, Take 0 4 mg by mouth daily at bedtime, Disp: , Rfl:     TRADJENTA 5 MG TABS, TAKE 1 TABLET DAILY, Disp: 90 tablet, Rfl: 1  Past Surgical History:   Procedure Laterality Date    ANKLE SURGERY      CHOLECYSTECTOMY      GALLBLADDER SURGERY      LIVER TRANSPLANTATION      LAST ASSESSED: [id number]    GA COLONOSCOPY FLX DX W/COLLJ SPEC WHEN PFRMD N/A 2/16/2017    Procedure: COLONOSCOPY;  Surgeon: Sha Merino MD;  Location: [facility]; Service: Gastroenterology    TONSILLECTOMY       Social History   Family History   Problem Relation Age of Onset    Diabetes Mother         TYPE 2    Hypertension Mother     Stroke Mother 68        SYNDROME     Hepatitis Brother         C    Cirrhosis Brother         LIVER     Cancer Family        Review of Systems  Do you have pain that bothers you in your daily life? no  A comprehensive review of systems was negative except for: Musculoskeletal: positive for  intermittent hand/finger stiffness, relates to overuse/job repetitive motion, not impacting QOL, function       Objective     /84   Pulse 80   Temp (!) 96 9 °F (36 1 °C)   Resp 16   Ht 5' 3 5" (1 613 m)   Wt 89 4 kg (197 lb)   BMI 34 35 kg/m²   General appearance: alert and oriented, in no acute distress  Head: Normocephalic, without obvious abnormality, atraumatic  Eyes: conjunctivae/corneas clear  PERRL, EOM's intact  Fundi benign  Ears: normal TM's and external ear canals both ears  Nose: Nares normal  Septum midline  Mucosa normal  No drainage or sinus tenderness  Throat: lips, mucosa, and tongue normal; teeth and gums normal  Neck: no adenopathy, no carotid bruit, no JVD, supple, symmetrical, trachea midline and thyroid not enlarged, symmetric, no tenderness/mass/nodules  Back: symmetric, no curvature  ROM normal  No CVA tenderness  Lungs: clear to auscultation bilaterally  Heart: regular rate and rhythm, S1, S2 normal, no murmur, click, rub or gallop  Abdomen: soft, non-tender; bowel sounds normal; no masses,  no organomegaly and surgical scar- classic liver transpant   Extremities: extremities normal, warm and well-perfused; no cyanosis, clubbing, or edema  Pulses: 2+ and symmetric

## 2018-06-28 NOTE — ASSESSMENT & PLAN NOTE
Lab Results   Component Value Date    HGBA1C 6 4 (H) 05/02/2018       No results for input(s): POCGLU in the last 72 hours      Blood Sugar Average: Last 72 hrs  Followed by endocrine at goal!:

## 2018-06-30 ENCOUNTER — APPOINTMENT (OUTPATIENT)
Dept: LAB | Facility: CLINIC | Age: 58
End: 2018-06-30
Payer: COMMERCIAL

## 2018-06-30 ENCOUNTER — TRANSCRIBE ORDERS (OUTPATIENT)
Dept: LAB | Facility: CLINIC | Age: 58
End: 2018-06-30

## 2018-06-30 DIAGNOSIS — D68.9 BLOOD CLOTTING DISORDER (HCC): ICD-10-CM

## 2018-06-30 DIAGNOSIS — C22.1 MALIGNANT NEOPLASM OF INTRAHEPATIC BILE DUCTS (HCC): ICD-10-CM

## 2018-06-30 DIAGNOSIS — R79.1 ABNORMAL COAGULATION PROFILE: ICD-10-CM

## 2018-06-30 DIAGNOSIS — Z94.4 LIVER REPLACED BY TRANSPLANT (HCC): Primary | ICD-10-CM

## 2018-06-30 DIAGNOSIS — Z94.4 LIVER REPLACED BY TRANSPLANT (HCC): ICD-10-CM

## 2018-06-30 LAB
ALBUMIN SERPL BCP-MCNC: 4.2 G/DL (ref 3.5–5)
ALP SERPL-CCNC: 106 U/L (ref 46–116)
ALT SERPL W P-5'-P-CCNC: 36 U/L (ref 12–78)
ANION GAP SERPL CALCULATED.3IONS-SCNC: 10 MMOL/L (ref 4–13)
AST SERPL W P-5'-P-CCNC: 20 U/L (ref 5–45)
BASOPHILS # BLD AUTO: 0.01 THOUSANDS/ΜL (ref 0–0.1)
BASOPHILS NFR BLD AUTO: 0 % (ref 0–1)
BILIRUB SERPL-MCNC: 0.4 MG/DL (ref 0.2–1)
BUN SERPL-MCNC: 32 MG/DL (ref 5–25)
CALCIUM SERPL-MCNC: 9.2 MG/DL (ref 8.3–10.1)
CHLORIDE SERPL-SCNC: 104 MMOL/L (ref 100–108)
CO2 SERPL-SCNC: 26 MMOL/L (ref 21–32)
CREAT SERPL-MCNC: 1.54 MG/DL (ref 0.6–1.3)
EOSINOPHIL # BLD AUTO: 0.09 THOUSAND/ΜL (ref 0–0.61)
EOSINOPHIL NFR BLD AUTO: 2 % (ref 0–6)
ERYTHROCYTE [DISTWIDTH] IN BLOOD BY AUTOMATED COUNT: 14 % (ref 11.6–15.1)
GFR SERPL CREATININE-BSD FRML MDRD: 49 ML/MIN/1.73SQ M
GGT SERPL-CCNC: 27 U/L (ref 5–85)
GLUCOSE P FAST SERPL-MCNC: 134 MG/DL (ref 65–99)
HCT VFR BLD AUTO: 34.9 % (ref 36.5–49.3)
HGB BLD-MCNC: 11.5 G/DL (ref 12–17)
INR PPP: 1.03 (ref 0.86–1.17)
LYMPHOCYTES # BLD AUTO: 0.49 THOUSANDS/ΜL (ref 0.6–4.47)
LYMPHOCYTES NFR BLD AUTO: 11 % (ref 14–44)
MAGNESIUM SERPL-MCNC: 1.4 MG/DL (ref 1.6–2.6)
MCH RBC QN AUTO: 29 PG (ref 26.8–34.3)
MCHC RBC AUTO-ENTMCNC: 33 G/DL (ref 31.4–37.4)
MCV RBC AUTO: 88 FL (ref 82–98)
MONOCYTES # BLD AUTO: 0.37 THOUSAND/ΜL (ref 0.17–1.22)
MONOCYTES NFR BLD AUTO: 8 % (ref 4–12)
NEUTROPHILS # BLD AUTO: 3.55 THOUSANDS/ΜL (ref 1.85–7.62)
NEUTS SEG NFR BLD AUTO: 79 % (ref 43–75)
PLATELET # BLD AUTO: 127 THOUSANDS/UL (ref 149–390)
PMV BLD AUTO: 11.9 FL (ref 8.9–12.7)
POTASSIUM SERPL-SCNC: 4.3 MMOL/L (ref 3.5–5.3)
PROT SERPL-MCNC: 7.7 G/DL (ref 6.4–8.2)
PROTHROMBIN TIME: 13.2 SECONDS (ref 11.8–14.2)
RBC # BLD AUTO: 3.96 MILLION/UL (ref 3.88–5.62)
SODIUM SERPL-SCNC: 140 MMOL/L (ref 136–145)
TACROLIMUS BLD-MCNC: 4.6 NG/ML (ref 2–20)
WBC # BLD AUTO: 4.51 THOUSAND/UL (ref 4.31–10.16)

## 2018-06-30 PROCEDURE — 85025 COMPLETE CBC W/AUTO DIFF WBC: CPT

## 2018-06-30 PROCEDURE — 36415 COLL VENOUS BLD VENIPUNCTURE: CPT

## 2018-06-30 PROCEDURE — 85610 PROTHROMBIN TIME: CPT

## 2018-06-30 PROCEDURE — 80197 ASSAY OF TACROLIMUS: CPT

## 2018-06-30 PROCEDURE — 83735 ASSAY OF MAGNESIUM: CPT

## 2018-06-30 PROCEDURE — 82977 ASSAY OF GGT: CPT

## 2018-06-30 PROCEDURE — 80053 COMPREHEN METABOLIC PANEL: CPT

## 2018-07-27 ENCOUNTER — APPOINTMENT (OUTPATIENT)
Dept: LAB | Facility: CLINIC | Age: 58
End: 2018-07-27
Payer: COMMERCIAL

## 2018-08-18 ENCOUNTER — APPOINTMENT (OUTPATIENT)
Dept: LAB | Facility: CLINIC | Age: 58
End: 2018-08-18
Payer: COMMERCIAL

## 2018-09-18 DIAGNOSIS — E11.65 TYPE 2 DIABETES MELLITUS WITH HYPERGLYCEMIA, WITHOUT LONG-TERM CURRENT USE OF INSULIN (HCC): ICD-10-CM

## 2018-09-18 DIAGNOSIS — Z94.4 LIVER TRANSPLANT STATUS (HCC): Primary | ICD-10-CM

## 2018-09-18 RX ORDER — AZATHIOPRINE 50 MG/1
50 TABLET ORAL DAILY
Qty: 90 TABLET | Refills: 3 | Status: SHIPPED | OUTPATIENT
Start: 2018-09-18

## 2018-09-18 RX ORDER — GLIMEPIRIDE 1 MG/1
1 TABLET ORAL
Qty: 90 TABLET | Refills: 0 | Status: SHIPPED | OUTPATIENT
Start: 2018-09-18 | End: 2018-10-23 | Stop reason: SDUPTHER

## 2018-09-25 DIAGNOSIS — I10 HYPERTENSION, UNSPECIFIED TYPE: Primary | ICD-10-CM

## 2018-09-25 DIAGNOSIS — I10 ESSENTIAL HYPERTENSION: Primary | ICD-10-CM

## 2018-09-25 RX ORDER — HYDROCHLOROTHIAZIDE 12.5 MG/1
CAPSULE, GELATIN COATED ORAL
Qty: 90 CAPSULE | Refills: 2 | Status: SHIPPED | OUTPATIENT
Start: 2018-09-25 | End: 2019-06-24 | Stop reason: SDUPTHER

## 2018-09-25 RX ORDER — AMLODIPINE BESYLATE 10 MG/1
TABLET ORAL
Qty: 90 TABLET | Refills: 2 | Status: SHIPPED | OUTPATIENT
Start: 2018-09-25 | End: 2018-12-14 | Stop reason: SDUPTHER

## 2018-09-25 RX ORDER — LABETALOL 100 MG/1
TABLET, FILM COATED ORAL
Qty: 180 TABLET | Refills: 2 | Status: SHIPPED | OUTPATIENT
Start: 2018-09-25 | End: 2019-06-24 | Stop reason: SDUPTHER

## 2018-10-06 ENCOUNTER — APPOINTMENT (OUTPATIENT)
Dept: LAB | Facility: CLINIC | Age: 58
End: 2018-10-06
Payer: COMMERCIAL

## 2018-10-06 ENCOUNTER — TRANSCRIBE ORDERS (OUTPATIENT)
Dept: LAB | Facility: CLINIC | Age: 58
End: 2018-10-06

## 2018-10-06 DIAGNOSIS — D68.9 BLOOD CLOTTING DISORDER (HCC): ICD-10-CM

## 2018-10-06 DIAGNOSIS — C22.1 MALIGNANT NEOPLASM OF INTRAHEPATIC BILE DUCTS (HCC): ICD-10-CM

## 2018-10-06 DIAGNOSIS — R79.1 ABNORMAL COAGULATION PROFILE: ICD-10-CM

## 2018-10-06 DIAGNOSIS — Z94.4 LIVER REPLACED BY TRANSPLANT (HCC): Primary | ICD-10-CM

## 2018-10-06 DIAGNOSIS — Z94.4 LIVER REPLACED BY TRANSPLANT (HCC): ICD-10-CM

## 2018-10-06 LAB
ALBUMIN SERPL BCP-MCNC: 3.9 G/DL (ref 3.5–5)
ALP SERPL-CCNC: 115 U/L (ref 46–116)
ALT SERPL W P-5'-P-CCNC: 40 U/L (ref 12–78)
ANION GAP SERPL CALCULATED.3IONS-SCNC: 9 MMOL/L (ref 4–13)
AST SERPL W P-5'-P-CCNC: 17 U/L (ref 5–45)
BASOPHILS # BLD AUTO: 0.01 THOUSANDS/ΜL (ref 0–0.1)
BASOPHILS NFR BLD AUTO: 0 % (ref 0–1)
BILIRUB SERPL-MCNC: 0.4 MG/DL (ref 0.2–1)
BUN SERPL-MCNC: 33 MG/DL (ref 5–25)
CALCIUM SERPL-MCNC: 8.9 MG/DL (ref 8.3–10.1)
CHLORIDE SERPL-SCNC: 104 MMOL/L (ref 100–108)
CO2 SERPL-SCNC: 28 MMOL/L (ref 21–32)
CREAT SERPL-MCNC: 1.69 MG/DL (ref 0.6–1.3)
EOSINOPHIL # BLD AUTO: 0.17 THOUSAND/ΜL (ref 0–0.61)
EOSINOPHIL NFR BLD AUTO: 4 % (ref 0–6)
ERYTHROCYTE [DISTWIDTH] IN BLOOD BY AUTOMATED COUNT: 14.6 % (ref 11.6–15.1)
GFR SERPL CREATININE-BSD FRML MDRD: 43 ML/MIN/1.73SQ M
GGT SERPL-CCNC: 40 U/L (ref 5–85)
GLUCOSE P FAST SERPL-MCNC: 146 MG/DL (ref 65–99)
HCT VFR BLD AUTO: 34.8 % (ref 36.5–49.3)
HGB BLD-MCNC: 11.4 G/DL (ref 12–17)
IMM GRANULOCYTES # BLD AUTO: 0.03 THOUSAND/UL (ref 0–0.2)
IMM GRANULOCYTES NFR BLD AUTO: 1 % (ref 0–2)
INR PPP: 1.02 (ref 0.86–1.17)
LYMPHOCYTES # BLD AUTO: 0.51 THOUSANDS/ΜL (ref 0.6–4.47)
LYMPHOCYTES NFR BLD AUTO: 11 % (ref 14–44)
MAGNESIUM SERPL-MCNC: 1.2 MG/DL (ref 1.6–2.6)
MCH RBC QN AUTO: 28.9 PG (ref 26.8–34.3)
MCHC RBC AUTO-ENTMCNC: 32.8 G/DL (ref 31.4–37.4)
MCV RBC AUTO: 88 FL (ref 82–98)
MONOCYTES # BLD AUTO: 0.44 THOUSAND/ΜL (ref 0.17–1.22)
MONOCYTES NFR BLD AUTO: 9 % (ref 4–12)
NEUTROPHILS # BLD AUTO: 3.62 THOUSANDS/ΜL (ref 1.85–7.62)
NEUTS SEG NFR BLD AUTO: 75 % (ref 43–75)
NRBC BLD AUTO-RTO: 0 /100 WBCS
PLATELET # BLD AUTO: 118 THOUSANDS/UL (ref 149–390)
PMV BLD AUTO: 11.3 FL (ref 8.9–12.7)
POTASSIUM SERPL-SCNC: 4.9 MMOL/L (ref 3.5–5.3)
PROT SERPL-MCNC: 7.2 G/DL (ref 6.4–8.2)
PROTHROMBIN TIME: 13.1 SECONDS (ref 11.8–14.2)
RBC # BLD AUTO: 3.94 MILLION/UL (ref 3.88–5.62)
SODIUM SERPL-SCNC: 141 MMOL/L (ref 136–145)
TACROLIMUS BLD-MCNC: 6.4 NG/ML (ref 2–20)
WBC # BLD AUTO: 4.78 THOUSAND/UL (ref 4.31–10.16)

## 2018-10-06 PROCEDURE — 85025 COMPLETE CBC W/AUTO DIFF WBC: CPT

## 2018-10-06 PROCEDURE — 80053 COMPREHEN METABOLIC PANEL: CPT

## 2018-10-06 PROCEDURE — 83735 ASSAY OF MAGNESIUM: CPT

## 2018-10-06 PROCEDURE — 82977 ASSAY OF GGT: CPT

## 2018-10-06 PROCEDURE — 85610 PROTHROMBIN TIME: CPT

## 2018-10-06 PROCEDURE — 36415 COLL VENOUS BLD VENIPUNCTURE: CPT

## 2018-10-06 PROCEDURE — 80197 ASSAY OF TACROLIMUS: CPT

## 2018-10-18 ENCOUNTER — OFFICE VISIT (OUTPATIENT)
Dept: NEPHROLOGY | Facility: CLINIC | Age: 58
End: 2018-10-18
Payer: COMMERCIAL

## 2018-10-18 VITALS
HEART RATE: 80 BPM | DIASTOLIC BLOOD PRESSURE: 78 MMHG | WEIGHT: 207 LBS | HEIGHT: 63 IN | SYSTOLIC BLOOD PRESSURE: 136 MMHG | BODY MASS INDEX: 36.68 KG/M2

## 2018-10-18 DIAGNOSIS — I10 ESSENTIAL HYPERTENSION: Primary | ICD-10-CM

## 2018-10-18 DIAGNOSIS — N18.2 CKD (CHRONIC KIDNEY DISEASE) STAGE 2, GFR 60-89 ML/MIN: ICD-10-CM

## 2018-10-18 PROCEDURE — 99213 OFFICE O/P EST LOW 20 MIN: CPT | Performed by: INTERNAL MEDICINE

## 2018-10-18 NOTE — LETTER
October 18, 2018     Heather Glover MD  [facility]    Patient: Pushpa Kaur   YOB: 1960   Date of Visit: 10/18/2018       Dear Dr Maribel Oliva: Thank you for referring Pushpa Kaur to me for evaluation  Below are my notes for this consultation  If you have questions, please do not hesitate to call me  I look forward to following your patient along with you  Sincerely,        Ramon Hallman MD        CC: No Recipients  Ramon Hallman MD  10/18/2018  8:07 AM  Sign at close encounter  [address] 59 y o  male MRN: [id number]  Unit/Bed#:  Encounter: [id number]  Reason for Consult:   Chronic kidney disease    The patient is here for his yearly follow-up  He has been doing very well as had no intercurrent illnesses no changes in medications no hospitalizations  He is enjoying himself today is watching his granddaughter  No complaints for me  ASSESSMENT/PLAN:  1  Renal  Patient is chronic kidney disease and it is either related to chronic interstitial process related to his use of calcium urine inhibitor for his liver transplant verses mild diabetic nephropathy given low levels of proteinuria in the past and long history of diabetes %period% creatinine is 1 6 which is better than it was last year so there has been no progression  Blood pressure is under good control  There was no protein quantitation but we will check that next visit  Continue current medications with glycemic control, blood pressure control and monitor protein excretion concentrations  The patient actually does labs every month or 2 with his liver transplant team so would he is monitoring kidney function so will continue to follow up as scheduled  SUBJECTIVE:  Review of Systems   Constitution: Negative for chills and fever  HENT: Negative  Eyes: Negative  Cardiovascular: Negative  Negative for chest pain, dyspnea on exertion, leg swelling and orthopnea  Respiratory: Negative  Negative for cough and shortness of breath  Gastrointestinal: Negative  Negative for bloating, abdominal pain, diarrhea, nausea and vomiting  Genitourinary: Negative  Neurological: Negative  OBJECTIVE:  Current Weight: Weight - Scale: 93 9 kg (207 lb)  [email or web address]:     Blood pressure 136/78, pulse 80, height 5' 3" (1 6 m), weight 93 9 kg (207 lb)  , Body mass index is 36 67 kg/m²  [unfilled]    Physical Exam: /78 (BP Location: Right arm, Patient Position: Sitting, Cuff Size: Standard)   Pulse 80   Ht 5' 3" (1 6 m)   Wt 93 9 kg (207 lb)   BMI 36 67 kg/m²    Physical Exam   Constitutional: He is oriented to person, place, and time  He appears well-nourished  No distress  HENT:   Head: Atraumatic  Mouth/Throat: No oropharyngeal exudate  Eyes: Conjunctivae are normal  No scleral icterus  Neck: Neck supple  No JVD present  Cardiovascular: Normal rate and regular rhythm  Exam reveals no friction rub  No edema  Pulmonary/Chest: Breath sounds normal  No respiratory distress  He has no wheezes  He has no rales  Abdominal: Soft  Bowel sounds are normal  He exhibits no distension  There is no tenderness  There is no rebound  Neurological: He is alert and oriented to person, place, and time         Medications:    Current Outpatient Prescriptions:     alendronate (FOSAMAX) 70 mg tablet, Take 70 mg by mouth every 7 days Indications: on Wednesday, Disp: , Rfl:     amLODIPine (NORVASC) 10 mg tablet, TAKE 1 TABLET DAILY, Disp: 90 tablet, Rfl: 2    atorvastatin (LIPITOR) 40 mg tablet, Take 1 tablet by mouth, Disp: , Rfl:     azaTHIOprine (IMURAN) 50 mg tablet, Take 1 tablet (50 mg total) by mouth daily, Disp: 90 tablet, Rfl: 3    Cholecalciferol (VITAMIN D3) 1000 units CAPS, Take 1 capsule by mouth daily, Disp: , Rfl:     fludrocortisone (FLORINEF) 0 1 mg tablet, TAKE 1 TABLET DAILY, Disp: 90 tablet, Rfl: 2    glimepiride (AMARYL) 1 mg tablet, Take 1 tablet (1 mg total) by mouth daily with dinner for 90 days, Disp: 90 tablet, Rfl: 0    glucose blood (ONETOUCH VERIO) test strip, 1 each by Other route 2 (two) times a day for 90 days, Disp: 180 each, Rfl: 0    hydrochlorothiazide (MICROZIDE) 12 5 mg capsule, TAKE 1 CAPSULE DAILY, Disp: 90 capsule, Rfl: 2    labetalol (NORMODYNE) 100 mg tablet, TAKE 1 TABLET EVERY 12 HOURS, Disp: 180 tablet, Rfl: 2    Linagliptin (TRADJENTA) 5 MG TABS, Take 5 mg by mouth daily for 90 days, Disp: 90 tablet, Rfl: 0    Multiple Vitamins-Minerals (MULTIVITAMIN WITH MINERALS) tablet, Take 1 tablet by mouth daily, Disp: , Rfl:     OMEPRAZOLE PO, Take by mouth, Disp: , Rfl:     tacrolimus (PROGRAF) 1 mg capsule, Take by mouth Takes 4 tabs in am, 3 tabs in pm , Disp: , Rfl:     tamsulosin (FLOMAX) 0 4 mg, Take 0 4 mg by mouth daily at bedtime, Disp: , Rfl:     amLODIPine (NORVASC) 5 mg tablet, Take 5 mg by mouth daily, Disp: , Rfl:     Laboratory Results:  Lab Results   Component Value Date    WBC 4 78 10/06/2018    HGB 11 4 (L) 10/06/2018    HCT 34 8 (L) 10/06/2018    MCV 88 10/06/2018     (L) 10/06/2018     Lab Results   Component Value Date    GLUCOSE 146 (H) 01/08/2016    CALCIUM 8 9 10/06/2018     10/06/2018    K 4 9 10/06/2018    CO2 28 10/06/2018     10/06/2018    BUN 33 (H) 10/06/2018    CREATININE 1 69 (H) 10/06/2018     Lab Results   Component Value Date    CALCIUM 8 9 10/06/2018    PHOS 4 0 10/24/2014     No results found for: LABPROT

## 2018-10-18 NOTE — PROGRESS NOTES
NEPHROLOGY PROGRESS NOTE    Stephon Willoughby 59 y o  male MRN: [id number]  Unit/Bed#:  Encounter: [id number]  Reason for Consult:   Chronic kidney disease    The patient is here for his yearly follow-up  He has been doing very well as had no intercurrent illnesses no changes in medications no hospitalizations  He is enjoying himself today is watching his granddaughter  No complaints for me  ASSESSMENT/PLAN:  1  Renal  Patient is chronic kidney disease and it is either related to chronic interstitial process related to his use of calcium urine inhibitor for his liver transplant verses mild diabetic nephropathy given low levels of proteinuria in the past and long history of diabetes %period% creatinine is 1 6 which is better than it was last year so there has been no progression  Blood pressure is under good control  There was no protein quantitation but we will check that next visit  Continue current medications with glycemic control, blood pressure control and monitor protein excretion concentrations  The patient actually does labs every month or 2 with his liver transplant team so would he is monitoring kidney function so will continue to follow up as scheduled  SUBJECTIVE:  Review of Systems   Constitution: Negative for chills and fever  HENT: Negative  Eyes: Negative  Cardiovascular: Negative  Negative for chest pain, dyspnea on exertion, leg swelling and orthopnea  Respiratory: Negative  Negative for cough and shortness of breath  Gastrointestinal: Negative  Negative for bloating, abdominal pain, diarrhea, nausea and vomiting  Genitourinary: Negative  Neurological: Negative  OBJECTIVE:  Current Weight: Weight - Scale: 93 9 kg (207 lb)  [email or web address]     Blood pressure 136/78, pulse 80, height 5' 3" (1 6 m), weight 93 9 kg (207 lb)  , Body mass index is 36 67 kg/m²      [unfilled]    Physical Exam: /78 (BP Location: Right arm, Patient Position: Sitting, Cuff Size: Standard)   Pulse 80   Ht 5' 3" (1 6 m)   Wt 93 9 kg (207 lb)   BMI 36 67 kg/m²   Physical Exam   Constitutional: He is oriented to person, place, and time  He appears well-nourished  No distress  HENT:   Head: Atraumatic  Mouth/Throat: No oropharyngeal exudate  Eyes: Conjunctivae are normal  No scleral icterus  Neck: Neck supple  No JVD present  Cardiovascular: Normal rate and regular rhythm  Exam reveals no friction rub  No edema  Pulmonary/Chest: Breath sounds normal  No respiratory distress  He has no wheezes  He has no rales  Abdominal: Soft  Bowel sounds are normal  He exhibits no distension  There is no tenderness  There is no rebound  Neurological: He is alert and oriented to person, place, and time         Medications:    Current Outpatient Prescriptions:     alendronate (FOSAMAX) 70 mg tablet, Take 70 mg by mouth every 7 days Indications: on Wednesday, Disp: , Rfl:     amLODIPine (NORVASC) 10 mg tablet, TAKE 1 TABLET DAILY, Disp: 90 tablet, Rfl: 2    atorvastatin (LIPITOR) 40 mg tablet, Take 1 tablet by mouth, Disp: , Rfl:     azaTHIOprine (IMURAN) 50 mg tablet, Take 1 tablet (50 mg total) by mouth daily, Disp: 90 tablet, Rfl: 3    Cholecalciferol (VITAMIN D3) 1000 units CAPS, Take 1 capsule by mouth daily, Disp: , Rfl:     fludrocortisone (FLORINEF) 0 1 mg tablet, TAKE 1 TABLET DAILY, Disp: 90 tablet, Rfl: 2    glimepiride (AMARYL) 1 mg tablet, Take 1 tablet (1 mg total) by mouth daily with dinner for 90 days, Disp: 90 tablet, Rfl: 0    glucose blood (ONETOUCH VERIO) test strip, 1 each by Other route 2 (two) times a day for 90 days, Disp: 180 each, Rfl: 0    hydrochlorothiazide (MICROZIDE) 12 5 mg capsule, TAKE 1 CAPSULE DAILY, Disp: 90 capsule, Rfl: 2    labetalol (NORMODYNE) 100 mg tablet, TAKE 1 TABLET EVERY 12 HOURS, Disp: 180 tablet, Rfl: 2    Linagliptin (TRADJENTA) 5 MG TABS, Take 5 mg by mouth daily for 90 days, Disp: 90 tablet, Rfl: 0    Multiple Vitamins-Minerals (MULTIVITAMIN WITH MINERALS) tablet, Take 1 tablet by mouth daily, Disp: , Rfl:     OMEPRAZOLE PO, Take by mouth, Disp: , Rfl:     tacrolimus (PROGRAF) 1 mg capsule, Take by mouth Takes 4 tabs in am, 3 tabs in pm , Disp: , Rfl:     tamsulosin (FLOMAX) 0 4 mg, Take 0 4 mg by mouth daily at bedtime, Disp: , Rfl:     amLODIPine (NORVASC) 5 mg tablet, Take 5 mg by mouth daily, Disp: , Rfl:     Laboratory Results:  Lab Results   Component Value Date    WBC 4 78 10/06/2018    HGB 11 4 (L) 10/06/2018    HCT 34 8 (L) 10/06/2018    MCV 88 10/06/2018     (L) 10/06/2018     Lab Results   Component Value Date    GLUCOSE 146 (H) 01/08/2016    CALCIUM 8 9 10/06/2018     10/06/2018    K 4 9 10/06/2018    CO2 28 10/06/2018     10/06/2018    BUN 33 (H) 10/06/2018    CREATININE 1 69 (H) 10/06/2018     Lab Results   Component Value Date    CALCIUM 8 9 10/06/2018    PHOS 4 0 10/24/2014     No results found for: LABPROT

## 2018-10-18 NOTE — PATIENT INSTRUCTIONS
You are here for follow-up to monitor your kidney function  Your creatinine is 1 6 which is the measure of the kidney function it is a little better than last year so there has been no worsening  We will continue to monitor with blood pressure control and sugar control  In my opinion it is more likely related to the medication effect but there is always a possibility of diabetes affecting the kidney a little so again focus on sugar control in those measures  We will continue to follow but overall your doing fabulous

## 2018-10-23 ENCOUNTER — OFFICE VISIT (OUTPATIENT)
Dept: ENDOCRINOLOGY | Facility: CLINIC | Age: 58
End: 2018-10-23
Payer: COMMERCIAL

## 2018-10-23 VITALS
SYSTOLIC BLOOD PRESSURE: 144 MMHG | HEIGHT: 63 IN | HEART RATE: 82 BPM | BODY MASS INDEX: 36.64 KG/M2 | DIASTOLIC BLOOD PRESSURE: 84 MMHG | WEIGHT: 206.8 LBS

## 2018-10-23 DIAGNOSIS — E78.5 HYPERLIPIDEMIA, UNSPECIFIED HYPERLIPIDEMIA TYPE: ICD-10-CM

## 2018-10-23 DIAGNOSIS — I10 ESSENTIAL HYPERTENSION: ICD-10-CM

## 2018-10-23 DIAGNOSIS — E11.65 TYPE 2 DIABETES MELLITUS WITH HYPERGLYCEMIA, WITHOUT LONG-TERM CURRENT USE OF INSULIN (HCC): Primary | ICD-10-CM

## 2018-10-23 LAB — SL AMB POCT HEMOGLOBIN AIC: 6.4

## 2018-10-23 PROCEDURE — 99214 OFFICE O/P EST MOD 30 MIN: CPT | Performed by: PHYSICIAN ASSISTANT

## 2018-10-23 PROCEDURE — 83036 HEMOGLOBIN GLYCOSYLATED A1C: CPT | Performed by: PHYSICIAN ASSISTANT

## 2018-10-23 RX ORDER — GLIMEPIRIDE 1 MG/1
1 TABLET ORAL
Qty: 90 TABLET | Refills: 0 | Status: SHIPPED | OUTPATIENT
Start: 2018-10-23 | End: 2018-12-21 | Stop reason: DRUGHIGH

## 2018-10-23 NOTE — ASSESSMENT & PLAN NOTE
BP slightly high today though he has not taken meds yet today  BP was lower at nephrology visit last week  Continue current regimen

## 2018-10-23 NOTE — PROGRESS NOTES
Established Patient Progress Note      Chief Complaint   Patient presents with    Diabetes Type 2          History of Present Illness:   Armando Funk is a 59 y o  male with a history of type 2 diabetes without long term use of insulin for about 15-20 years  He was previously treated with lantus  Denies recent illness or hospitalizations  Denies recent severe hypoglycemic or severe hyperglycemic episodes  Denies any issues with his current regimen  home glucose monitoring: are performed sporadically  This morning 145  When checks, usually  Usually less than 140s  Current regimen: Tradjenta 5mg, Glimepiride 1mg with dinner    Last Eye Exam: UTD with dr Cindy Goezt 11/2017- Mild NPDR  Last Foot Exam: today at visit 10/23/2018    Has hypertension: Taking multiple meds-- Labetalol, HCTZ, amlodipine  Has hyperlipidemia: Taking atorvastatin      He has liver transplant and CKD and follow with transplant center and nephrology  Nephrology note reports CKD more likely due to transplant meds than diabetes  Osteopenia treated with fosamax through PCP office       Patient Active Problem List   Diagnosis    Hypertension    Type 2 diabetes mellitus with hyperglycemia, without long-term current use of insulin (HCC)    Cirrhosis    Chronic kidney disease, stage III (moderate)    Hyperlipidemia    Status post liver transplant    Hyperglycemia    Health maintenance examination    Osteopenia    CKD (chronic kidney disease) stage 2, GFR 60-89 ml/min      Past Medical History:   Diagnosis Date    Chronic kidney disease, stage III (moderate) (HCC)     RESOLVED: [id number]    [facility] [address] [facility]     Diabetes mellitus [facility] [address] [facility]     Hepatic encephalopathy (HCC)     Hepatitis C     Hyperkalemia     Hyperlipidemia     Hypertension     Pancytopenia ([facility] [address] [facility]     Status post liver transplant ([facility] [address] [facility]     Umbilical hernia       Past Surgical History:   Procedure Laterality Date    ANKLE SURGERY      CHOLECYSTECTOMY      GALLBLADDER SURGERY      LIVER TRANSPLANTATION      LAST ASSESSED: [id number]    SC COLONOSCOPY FLX DX W/COLLJ SPEC WHEN PFRMD N/A 2/16/2017    Procedure: COLONOSCOPY;  Surgeon: Shanae Olsen MD;  Location: [facility];   Service: Gastroenterology    TONSILLECTOMY        Family History   Problem Relation Age of Onset    Diabetes Mother         TYPE 2    Hypertension Mother     Stroke Mother 68        SYNDROME     Hepatitis Brother         C    Cirrhosis Brother         LIVER     Cancer Family      Social History   Substance Use Topics    Smoking status: Never Smoker    Smokeless tobacco: Never Used    Alcohol use Yes      Comment: occassionally     No Known Allergies      Current Outpatient Prescriptions:     alendronate (FOSAMAX) 70 mg tablet, Take 70 mg by mouth every 7 days Indications: on Wednesday, Disp: , Rfl:     atorvastatin (LIPITOR) 40 mg tablet, Take 1 tablet by mouth, Disp: , Rfl:     azaTHIOprine (IMURAN) 50 mg tablet, Take 1 tablet (50 mg total) by mouth daily, Disp: 90 tablet, Rfl: 3    Cholecalciferol (VITAMIN D3) 1000 units CAPS, Take 1 capsule by mouth daily, Disp: , Rfl:     fludrocortisone (FLORINEF) 0 1 mg tablet, TAKE 1 TABLET DAILY, Disp: 90 tablet, Rfl: 2    glimepiride (AMARYL) 1 mg tablet, Take 1 tablet (1 mg total) by mouth daily with dinner for 90 days, Disp: 90 tablet, Rfl: 0    glucose blood (ONETOUCH VERIO) test strip, 1 each by Other route 2 (two) times a day for 90 days, Disp: 180 each, Rfl: 0    hydrochlorothiazide (MICROZIDE) 12 5 mg capsule, TAKE 1 CAPSULE DAILY, Disp: 90 capsule, Rfl: 2    labetalol (NORMODYNE) 100 mg tablet, TAKE 1 TABLET EVERY 12 HOURS, Disp: 180 tablet, Rfl: 2    Linagliptin (TRADJENTA) 5 MG TABS, Take 5 mg by mouth daily for 90 days, Disp: 90 tablet, Rfl: 1    Multiple Vitamins-Minerals (MULTIVITAMIN WITH MINERALS) tablet, Take 1 tablet by mouth daily, Disp: , Rfl:     OMEPRAZOLE PO, Take by mouth, Disp: , Rfl:     tacrolimus (PROGRAF) 1 mg capsule, Take by mouth Takes 4 tabs in am, 3 tabs in pm , Disp: , Rfl:     tamsulosin (FLOMAX) 0 4 mg, Take 0 4 mg by mouth daily at bedtime, Disp: , Rfl:     amLODIPine (NORVASC) 10 mg tablet, TAKE 1 TABLET DAILY, Disp: 90 tablet, Rfl: 2    amLODIPine (NORVASC) 5 mg tablet, Take 5 mg by mouth daily, Disp: , Rfl:     Review of Systems   Constitutional: Negative for activity change, appetite change and fatigue  HENT: Negative for sore throat, trouble swallowing and voice change  Eyes: Negative for visual disturbance  Respiratory: Negative for choking, chest tightness and shortness of breath  Cardiovascular: Negative for chest pain, palpitations and leg swelling  Gastrointestinal: Negative for abdominal pain, constipation and diarrhea  Endocrine: Negative for cold intolerance, heat intolerance, polydipsia, polyphagia and polyuria  Genitourinary: Negative for frequency  Musculoskeletal: Negative for arthralgias and myalgias  Skin: Negative for rash  Neurological: Negative for dizziness and syncope  Hematological: Negative for adenopathy  Psychiatric/Behavioral: Negative for sleep disturbance  All other systems reviewed and are negative  Physical Exam:  Body mass index is 36 63 kg/m²  /84   Pulse 82   Ht 5' 3" (1 6 m)   Wt 93 8 kg (206 lb 12 8 oz)   BMI 36 63 kg/m²    Wt Readings from Last 3 Encounters:   10/23/18 93 8 kg (206 lb 12 8 oz)   10/18/18 93 9 kg (207 lb)   06/28/18 89 4 kg (197 lb)       Physical Exam   Constitutional: He is oriented to person, place, and time  He appears well-developed and well-nourished  No distress  HENT:   Head: Normocephalic and atraumatic  Mouth/Throat: Oropharynx is clear and moist    Eyes: Pupils are equal, round, and reactive to light  Conjunctivae and EOM are normal    Neck: Normal range of motion  Neck supple  No thyromegaly present  Cardiovascular: Normal rate, regular rhythm and normal heart sounds    Pulses are no weak pulses  No murmur heard  Pulses:       Dorsalis pedis pulses are 2+ on the right side, and 2+ on the left side  Posterior tibial pulses are 2+ on the right side, and 2+ on the left side  Pulmonary/Chest: Effort normal and breath sounds normal  No respiratory distress  He has no wheezes  He has no rales  Abdominal: Soft  Bowel sounds are normal  He exhibits no distension  There is no tenderness  Musculoskeletal: Normal range of motion  He exhibits no edema  Feet:   Right Foot:   Skin Integrity: Negative for ulcer, skin breakdown, erythema, warmth, callus or dry skin  Left Foot:   Skin Integrity: Negative for ulcer, skin breakdown, erythema, warmth, callus or dry skin  Lymphadenopathy:     He has no cervical adenopathy  Neurological: He is alert and oriented to person, place, and time  Skin: Skin is warm and dry  Psychiatric: He has a normal mood and affect  Vitals reviewed  Patient's shoes and socks removed  Right Foot/Ankle   Right Foot Inspection  Skin Exam: skin normal and skin intact no dry skin, no warmth, no callus, no erythema, no maceration, no abnormal color, no pre-ulcer, no ulcer and no callus                          Toe Exam: ROM and strength within normal limits and right toe deformity (bunion)no swelling, no tenderness and erythema  Sensory       Monofilament testing: intact  Vascular  Capillary refills: < 3 seconds  The right DP pulse is 2+  The right PT pulse is 2+  Left Foot/Ankle  Left Foot Inspection  Skin Exam: skin normal and skin intactno dry skin, no warmth, no erythema, no maceration, normal color, no pre-ulcer, no ulcer and no callus                         Toe Exam: ROM and strength within normal limits and left toe deformity (bunion)no swelling, no tenderness and no erythema                   Sensory       Monofilament: intact  Vascular  Capillary refills: < 3 seconds  The left DP pulse is 2+  The left PT pulse is 2+     Assign Risk Category:  Deformity present; No loss of protective sensation;  No weak pulses       Risk: 1      Labs:   Component      Latest Ref Rng & Units 5/2/2017 8/3/2017 1/6/2018 4/6/2018   Sodium      136 - 145 mmol/L       Potassium      3 5 - 5 3 mmol/L       Chloride      100 - 108 mmol/L       CO2      21 - 32 mmol/L       Anion Gap      4 - 13 mmol/L       BUN      5 - 25 mg/dL       Creatinine      0 60 - 1 30 mg/dL       GLUCOSE FASTING      65 - 99 mg/dL       Calcium      8 3 - 10 1 mg/dL       AST (SGOT)      5 - 45 U/L       ALT      12 - 78 U/L       ALK PHOS      46 - 116 U/L       Total Protein      6 4 - 8 2 g/dL       Albumin      3 5 - 5 0 g/dL       TOTAL BILIRUBIN      0 20 - 1 00 mg/dL       eGFR      ml/min/1 73sq m       Cholesterol      50 - 200 mg/dL   239 (H)    Triglycerides      <=150 mg/dL   325 (H)    HDL      40 - 60 mg/dL   37 (L)    LDL Direct      0 - 100 mg/dL   137 (H)    EXT Creatinine Urine      mg/dL  289 0 125 0    MICROALBUM ,U,RANDOM      0 0 - 20 0 mg/L  1,330 0 (H)     MICROALBUMIN/CREATININE RATIO      0 - 30 mg/g creatinine  460 (H)     Protein Urine Random      mg/dL   194    Prot/Creat Ratio, Ur      0 00 - 0 10   1 55 (H)    Hemoglobin A1C      4 2 - 6 3 %   7 0 (H)    EAG      mg/dl   154    TSH 3RD GENERATON      0 358 - 3 740 uIU/mL 1 603      Vit D, 25-Hydroxy      30 0 - 100 0 ng/mL    31 7     Component      Latest Ref Rng & Units 5/2/2018 10/6/2018   Sodium      136 - 145 mmol/L  141   Potassium      3 5 - 5 3 mmol/L  4 9   Chloride      100 - 108 mmol/L  104   CO2      21 - 32 mmol/L  28   Anion Gap      4 - 13 mmol/L  9   BUN      5 - 25 mg/dL  33 (H)   Creatinine      0 60 - 1 30 mg/dL  1 69 (H)   GLUCOSE FASTING      65 - 99 mg/dL  146 (H)   Calcium      8 3 - 10 1 mg/dL  8 9   AST (SGOT)      5 - 45 U/L  17   ALT      12 - 78 U/L  40   ALK PHOS      46 - 116 U/L  115   Total Protein      6 4 - 8 2 g/dL  7 2   Albumin      3 5 - 5 0 g/dL  3 9   TOTAL BILIRUBIN 0 20 - 1 00 mg/dL  0 40   eGFR      ml/min/1 73sq m  43   Cholesterol      50 - 200 mg/dL     Triglycerides      <=150 mg/dL     HDL      40 - 60 mg/dL     LDL Direct      0 - 100 mg/dL     EXT Creatinine Urine      mg/dL     MICROALBUM ,U,RANDOM      0 0 - 20 0 mg/L     MICROALBUMIN/CREATININE RATIO      0 - 30 mg/g creatinine     Protein Urine Random      mg/dL     Prot/Creat Ratio, Ur      0 00 - 0 10     Hemoglobin A1C      4 2 - 6 3 % 6 4 (H)    EAG      mg/dl 137    TSH 3RD GENERATON      0 358 - 3 740 uIU/mL     Vit D, 25-Hydroxy      30 0 - 100 0 ng/mL         Impression & Plan:    Problem List Items Addressed This Visit     Hypertension     BP slightly high today though he has not taken meds yet today  BP was lower at nephrology visit last week  Continue current regimen  Type 2 diabetes mellitus with hyperglycemia, without long-term current use of insulin ([facility] [address] ) - Primary     Well controlled based on Last A1C of 6 4 from May 2018  Check A1C now  Check          Relevant Medications    glimepiride (AMARYL) 1 mg tablet    Linagliptin (TRADJENTA) 5 MG TABS    Other Relevant Orders    POCT hemoglobin A1c (Completed)    Hemoglobin A1C    Hyperlipidemia     Continue Statin  Check Fasting Lipid Panel with next A1C  Relevant Orders    Lipid Panel with Direct LDL reflex          Orders Placed This Encounter   Procedures    Hemoglobin A1C     Standing Status:   Future     Standing Expiration Date:   10/23/2019    Lipid Panel with Direct LDL reflex     This is a patient instruction: This test requires patient fasting for 10-12 hours or longer  Drinking of black coffee or black tea is acceptable  Standing Status:   Future     Standing Expiration Date:   10/23/2019    POCT hemoglobin A1c       Patient Instructions   Check Blood sugars 1x per day at various times per day and send log in two weeks for review and med adjustment if needed    You have gained weight since last visit- try to work on weight loss at home  If you change your mind about seeing dietician, let us know  Please review medication list   If your medication list is not correct, please let us know ASAP so we can update your medication list       We will call you with  results of any testing with 1 week  If you have an appointment coming up after your tests, results will be reviewed at your next appointment  If you have any testing done at a [place] [address] facility and do not hear from our office, please give us a call so we can obtain results since they do not always get to us in a timely manner  Discussed with the patient and all questioned fully answered  He will call me if any problems arise  Follow-up appointment in 6 months       Counseled patient on diagnostic results, prognosis, risk and benefit of treatment options, instruction for management, importance of treatment compliance, Risk  factor reduction and impressions      Nancy Zepeda PA-C

## 2018-10-23 NOTE — PATIENT INSTRUCTIONS
Check Blood sugars 1x per day at various times per day and send log in two weeks for review and med adjustment if needed  You have gained weight since last visit- try to work on weight loss at home  If you change your mind about seeing dietician, let us know  Please review medication list   If your medication list is not correct, please let us know ASAP so we can update your medication list       We will call you with  results of any testing with 1 week  If you have an appointment coming up after your tests, results will be reviewed at your next appointment  If you have any testing done at a [place] [address] facility and do not hear from our office, please give us a call so we can obtain results since they do not always get to us in a timely manner

## 2018-10-29 PROBLEM — N52.9 MALE ERECTILE DISORDER: Status: ACTIVE | Noted: 2018-10-29

## 2018-10-29 PROBLEM — Z12.5 PROSTATE CANCER SCREENING: Status: ACTIVE | Noted: 2018-06-28

## 2018-10-29 NOTE — PROGRESS NOTES
10/31/2018      Chief Complaint   Patient presents with    Erectile Dysfunction    Elevated PSA       Assessment and Plan    59 y o  male managed by Dr Jm Quispe    1  ED  - increase Trimix dose to 10mcg/mL alprostadil/1 0mg/mL phentolamine/20mg/mL papaverine  - patient educated to start titration over  - if he reaches 70 mL again with no success can increase the dosage again versus consider vacuum assist device, discussed with the patient today    2  BPH  - continue Flomax 0 4mg nightly      3  Prostate cancer screening  - PSA not obtained  - VENUS deferred as patient has recent colonoscopy (gets every 3 years)  - obtain PSA now, if within normal limits will obtain yearly     FU 1 year with PSA prior and VENUS at visit      History of Present Illness  Marjan Reynolds is a 59 y o  male with diabetes and a liver transplant here for follow up evaluation of erectile dysfunction and prostate cancer screening  He continues on Tri Mix  Unfortunately, he had no success with the lowest dosage and he titrated up to 0 75 mL  He is on Flomax from his primary care physician  His lower urinary tract symptoms are listed as below  He is happy with his current urinary pattern  He did not obtain a PSA prior to his visit as recommended  He states that he had a colonoscopy within the year and no abnormalities of his prostate were seen  He gets he is every 3 years  Review of Systems   Constitutional: Negative for activity change, chills and fever  Gastrointestinal: Negative for abdominal distention and abdominal pain  Musculoskeletal: Negative for back pain and gait problem  Psychiatric/Behavioral: Negative for behavioral problems and confusion  Urinary Incontinence Screening      Most Recent Value   Urinary Incontinence   Urinary Incontinence? No   Incomplete emptying? No   Urinary frequency? No   Urinary urgency? No   Urinary hesitancy? No   Dysuria (painful difficult urination)?   No   Nocturia (waking up to use the bathroom)? Yes [1 time]   Straining (having to push to go)? No   Weak stream?  No   Intermittent stream?  No   Post void dribbling? No        AUA SYMPTOM SCORE      Most Recent Value   AUA SYMPTOM SCORE   How often have you had a sensation of not emptying your bladder completely after you finished urinating? 0   How often have you had to urinate again less than two hours after you finished urinating? 0   How often have you found you stopped and started again several times when you urinate?  0   How often have you found it difficult to postpone urination? 0   How often have you had a weak urinary stream?  0   How often have you had to push or strain to begin urination? 0   How many times did you most typically get up to urinate from the time you went to bed at night until the time you got up in the morning? 1   Quality of Life: If you were to spend the rest of your life with your urinary condition just the way it is now, how would you feel about that?  0   AUA SYMPTOM SCORE  1          Past Medical History  Past Medical History:   Diagnosis Date    [facility]     RESOLVED: [id number]    Cirrhosis [facility]     Diabetes mellitus ([facility] )     Hepatic encephalopathy ([facility] )     Hepatitis C     Hyperkalemia     Hyperlipidemia     Hypertension     Pancytopenia ([facility] )     Status post liver transplant ([place] [address] )     Umbilical hernia        Past Social History  Past Surgical History:   Procedure Laterality Date    ANKLE SURGERY      CHOLECYSTECTOMY      GALLBLADDER SURGERY      LIVER TRANSPLANTATION      LAST ASSESSED: [id number]    FL COLONOSCOPY FLX DX W/COLLJ SPEC WHEN PFRMD N/A 2/16/2017    Procedure: COLONOSCOPY;  Surgeon: Vincent Salcedo MD;  Location: [facility];   Service: Gastroenterology    TONSILLECTOMY       History   Smoking Status    Never Smoker   Smokeless Tobacco    Never Used       Past Family History  Family History   Problem Relation Age of Onset    Diabetes Mother         TYPE 2    Hypertension Mother     Stroke Mother 68        SYNDROME     Hepatitis Brother         C    Cirrhosis Brother         LIVER     Cancer Family        Past Social history  Social History     Social History    Marital status: /Civil Union     Spouse name: N/A    Number of children: N/A    Years of education: N/A     Occupational History    WORKS AT A [employer] DEPARTMENT      Social History Main Topics    Smoking status: Never Smoker    Smokeless tobacco: Never Used    Alcohol use Yes      Comment: occassionally    Drug use: No    Sexual activity: Not on file     Other Topics Concern    Not on file     Social History Narrative    Uses safety equipment- seatbelts        Current Medications  Current Outpatient Prescriptions   Medication Sig Dispense Refill    alendronate (FOSAMAX) 70 mg tablet Take 70 mg by mouth every 7 days Indications: on Wednesday      amLODIPine (NORVASC) 10 mg tablet TAKE 1 TABLET DAILY 90 tablet 2    amLODIPine (NORVASC) 5 mg tablet Take 5 mg by mouth daily      atorvastatin (LIPITOR) 40 mg tablet Take 1 tablet by mouth      azaTHIOprine (IMURAN) 50 mg tablet Take 1 tablet (50 mg total) by mouth daily 90 tablet 3    Cholecalciferol (VITAMIN D3) 1000 units CAPS Take 1 capsule by mouth daily      fludrocortisone (FLORINEF) 0 1 mg tablet TAKE 1 TABLET DAILY 90 tablet 2    glimepiride (AMARYL) 1 mg tablet Take 1 tablet (1 mg total) by mouth daily with dinner for 90 days 90 tablet 0    glucose blood (ONETOUCH VERIO) test strip 1 each by Other route 2 (two) times a day for 90 days 180 each 0    hydrochlorothiazide (MICROZIDE) 12 5 mg capsule TAKE 1 CAPSULE DAILY 90 capsule 2    labetalol (NORMODYNE) 100 mg tablet TAKE 1 TABLET EVERY 12 HOURS 180 tablet 2    Linagliptin (TRADJENTA) 5 MG TABS Take 5 mg by mouth daily for 90 days 90 tablet 1    Multiple Vitamins-Minerals (MULTIVITAMIN WITH MINERALS) tablet Take 1 tablet by mouth daily      OMEPRAZOLE PO Take by mouth      tacrolimus (PROGRAF) 1 mg capsule Take by mouth Takes 4 tabs in am, 3 tabs in pm       tamsulosin (FLOMAX) 0 4 mg Take 0 4 mg by mouth daily at bedtime       No current facility-administered medications for this visit  Allergies  No Known Allergies      The following portions of the patient's history were reviewed and updated as appropriate: allergies, current medications, past medical history, past social history, past surgical history and problem list       Vitals  Vitals:    10/31/18 1100   Weight: 91 8 kg (202 lb 6 4 oz)   Height: 5' 4" (1 626 m)       Physical Exam  Constitutional   General appearance: Patient is seated and in no acute distress, well appearing and well nourished  Head and Face   Head and face: Normal     Eyes   Conjunctiva and lids: No erythema, swelling or discharge  Ears, Nose, Mouth, and Throat   Hearing: Normal     Pulmonary   Respiratory effort: No increased work of breathing or signs of respiratory distress  Cardiovascular   Examination of extremities for edema and/or varicosities: Normal     Abdomen   Abdomen: Non-tender, no masses  Genitourinary   Digital rectal exam of prostate: Deferred  Musculoskeletal   Gait and station: Normal      Skin   Skin and subcutaneous tissue: Warm, dry, and intact  No visible lesions or rashes  Psychiatric   Judgment and insight: Normal  Recent and remote memory:  Normal  Mood and affect: Normal      Results  No results found for this or any previous visit (from the past 1 hour(s))  ]  No results found for: PSA  Lab Results   Component Value Date    GLUCOSE 146 (H) 01/08/2016    CALCIUM 8 9 10/06/2018     10/06/2018    K 4 9 10/06/2018    CO2 28 10/06/2018     10/06/2018    BUN 33 (H) 10/06/2018    CREATININE 1 69 (H) 10/06/2018     Lab Results   Component Value Date    WBC 4 78 10/06/2018    HGB 11 4 (L) 10/06/2018    HCT 34 8 (L) 10/06/2018    MCV 88 10/06/2018     (L) 10/06/2018       Orders  Orders Placed This Encounter   Procedures    PSA, Total Screen     This is a patient instruction: This test is non-fasting  Please drink two glasses of water morning of bloodwork  Standing Status:   Future     Standing Expiration Date:   4/30/2020    PSA, Total Screen     This is a patient instruction: This test is non-fasting  Please drink two glasses of water morning of bloodwork          Standing Status:   Future     Standing Expiration Date:   10/31/2019

## 2018-10-31 ENCOUNTER — OFFICE VISIT (OUTPATIENT)
Dept: UROLOGY | Facility: AMBULATORY SURGERY CENTER | Age: 58
End: 2018-10-31
Payer: COMMERCIAL

## 2018-10-31 VITALS — WEIGHT: 202.4 LBS | BODY MASS INDEX: 34.56 KG/M2 | HEIGHT: 64 IN

## 2018-10-31 DIAGNOSIS — Z12.5 PROSTATE CANCER SCREENING: ICD-10-CM

## 2018-10-31 DIAGNOSIS — Z12.5 ENCOUNTER FOR SCREENING FOR MALIGNANT NEOPLASM OF PROSTATE: ICD-10-CM

## 2018-10-31 DIAGNOSIS — N52.9 MALE ERECTILE DISORDER: Primary | ICD-10-CM

## 2018-10-31 PROCEDURE — 99213 OFFICE O/P EST LOW 20 MIN: CPT | Performed by: PHYSICIAN ASSISTANT

## 2018-11-21 RX ORDER — ALENDRONATE SODIUM 70 MG/1
TABLET ORAL
Qty: 12 TABLET | Refills: 3 | OUTPATIENT
Start: 2018-11-21

## 2018-11-30 DIAGNOSIS — E11.65 TYPE 2 DIABETES MELLITUS WITH HYPERGLYCEMIA, WITHOUT LONG-TERM CURRENT USE OF INSULIN (HCC): ICD-10-CM

## 2018-11-30 RX ORDER — BLOOD SUGAR DIAGNOSTIC
STRIP MISCELLANEOUS
Qty: 200 EACH | Refills: 0 | Status: SHIPPED | OUTPATIENT
Start: 2018-11-30 | End: 2020-01-30 | Stop reason: CLARIF

## 2018-12-03 DIAGNOSIS — M81.0 OSTEOPOROSIS, UNSPECIFIED OSTEOPOROSIS TYPE, UNSPECIFIED PATHOLOGICAL FRACTURE PRESENCE: Primary | ICD-10-CM

## 2018-12-03 RX ORDER — ALENDRONATE SODIUM 70 MG/1
70 TABLET ORAL
Qty: 8 TABLET | Refills: 2 | Status: SHIPPED | OUTPATIENT
Start: 2018-12-03 | End: 2018-12-04 | Stop reason: SDUPTHER

## 2018-12-03 NOTE — TELEPHONE ENCOUNTER
Called pt scheduled him with Dr Andrew Love for 12/14th, pt was last seen for  appt  On 6/28 with Swapna Mejias

## 2018-12-04 DIAGNOSIS — M81.0 OSTEOPOROSIS, UNSPECIFIED OSTEOPOROSIS TYPE, UNSPECIFIED PATHOLOGICAL FRACTURE PRESENCE: ICD-10-CM

## 2018-12-04 RX ORDER — ALENDRONATE SODIUM 70 MG/1
70 TABLET ORAL
Qty: 12 TABLET | Refills: 1 | Status: SHIPPED | OUTPATIENT
Start: 2018-12-04 | End: 2019-04-04 | Stop reason: SDUPTHER

## 2018-12-10 ENCOUNTER — TRANSCRIBE ORDERS (OUTPATIENT)
Dept: LAB | Facility: CLINIC | Age: 58
End: 2018-12-10

## 2018-12-10 ENCOUNTER — APPOINTMENT (OUTPATIENT)
Dept: LAB | Facility: CLINIC | Age: 58
End: 2018-12-10
Payer: COMMERCIAL

## 2018-12-10 DIAGNOSIS — Z12.5 ENCOUNTER FOR SCREENING FOR MALIGNANT NEOPLASM OF PROSTATE: ICD-10-CM

## 2018-12-10 DIAGNOSIS — R79.1 ABNORMAL COAGULATION PROFILE: ICD-10-CM

## 2018-12-10 DIAGNOSIS — Z94.4 LIVER REPLACED BY TRANSPLANT (HCC): Primary | ICD-10-CM

## 2018-12-10 DIAGNOSIS — Z94.4 LIVER REPLACED BY TRANSPLANT (HCC): ICD-10-CM

## 2018-12-10 DIAGNOSIS — Z12.5 PROSTATE CANCER SCREENING: ICD-10-CM

## 2018-12-10 DIAGNOSIS — E78.5 HYPERLIPIDEMIA, UNSPECIFIED HYPERLIPIDEMIA TYPE: ICD-10-CM

## 2018-12-10 DIAGNOSIS — D68.9 BLOOD CLOTTING DISORDER (HCC): ICD-10-CM

## 2018-12-10 DIAGNOSIS — E11.65 TYPE 2 DIABETES MELLITUS WITH HYPERGLYCEMIA, WITHOUT LONG-TERM CURRENT USE OF INSULIN (HCC): ICD-10-CM

## 2018-12-10 LAB
ALBUMIN SERPL BCP-MCNC: 4.2 G/DL (ref 3.5–5)
ALP SERPL-CCNC: 114 U/L (ref 46–116)
ALT SERPL W P-5'-P-CCNC: 49 U/L (ref 12–78)
ANION GAP SERPL CALCULATED.3IONS-SCNC: 8 MMOL/L (ref 4–13)
AST SERPL W P-5'-P-CCNC: 16 U/L (ref 5–45)
BASOPHILS # BLD AUTO: 0.03 THOUSANDS/ΜL (ref 0–0.1)
BASOPHILS NFR BLD AUTO: 1 % (ref 0–1)
BILIRUB SERPL-MCNC: 0.4 MG/DL (ref 0.2–1)
BUN SERPL-MCNC: 38 MG/DL (ref 5–25)
CALCIUM SERPL-MCNC: 9 MG/DL (ref 8.3–10.1)
CHLORIDE SERPL-SCNC: 105 MMOL/L (ref 100–108)
CHOLEST SERPL-MCNC: 146 MG/DL (ref 50–200)
CO2 SERPL-SCNC: 28 MMOL/L (ref 21–32)
CREAT SERPL-MCNC: 1.61 MG/DL (ref 0.6–1.3)
EOSINOPHIL # BLD AUTO: 0.13 THOUSAND/ΜL (ref 0–0.61)
EOSINOPHIL NFR BLD AUTO: 3 % (ref 0–6)
ERYTHROCYTE [DISTWIDTH] IN BLOOD BY AUTOMATED COUNT: 14.3 % (ref 11.6–15.1)
EST. AVERAGE GLUCOSE BLD GHB EST-MCNC: 169 MG/DL
GFR SERPL CREATININE-BSD FRML MDRD: 46 ML/MIN/1.73SQ M
GGT SERPL-CCNC: 48 U/L (ref 5–85)
GLUCOSE P FAST SERPL-MCNC: 155 MG/DL (ref 65–99)
HBA1C MFR BLD: 7.5 % (ref 4.2–6.3)
HCT VFR BLD AUTO: 37.5 % (ref 36.5–49.3)
HDLC SERPL-MCNC: 36 MG/DL (ref 40–60)
HGB BLD-MCNC: 12.5 G/DL (ref 12–17)
IMM GRANULOCYTES # BLD AUTO: 0.01 THOUSAND/UL (ref 0–0.2)
IMM GRANULOCYTES NFR BLD AUTO: 0 % (ref 0–2)
INR PPP: 1.08 (ref 0.86–1.17)
LDLC SERPL CALC-MCNC: 47 MG/DL (ref 0–100)
LYMPHOCYTES # BLD AUTO: 0.53 THOUSANDS/ΜL (ref 0.6–4.47)
LYMPHOCYTES NFR BLD AUTO: 11 % (ref 14–44)
MAGNESIUM SERPL-MCNC: 1.4 MG/DL (ref 1.6–2.6)
MCH RBC QN AUTO: 29.6 PG (ref 26.8–34.3)
MCHC RBC AUTO-ENTMCNC: 33.3 G/DL (ref 31.4–37.4)
MCV RBC AUTO: 89 FL (ref 82–98)
MONOCYTES # BLD AUTO: 0.47 THOUSAND/ΜL (ref 0.17–1.22)
MONOCYTES NFR BLD AUTO: 10 % (ref 4–12)
NEUTROPHILS # BLD AUTO: 3.54 THOUSANDS/ΜL (ref 1.85–7.62)
NEUTS SEG NFR BLD AUTO: 75 % (ref 43–75)
NRBC BLD AUTO-RTO: 0 /100 WBCS
PLATELET # BLD AUTO: 122 THOUSANDS/UL (ref 149–390)
PMV BLD AUTO: 11.7 FL (ref 8.9–12.7)
POTASSIUM SERPL-SCNC: 5 MMOL/L (ref 3.5–5.3)
PROT SERPL-MCNC: 7.4 G/DL (ref 6.4–8.2)
PROTHROMBIN TIME: 13.7 SECONDS (ref 11.8–14.2)
PSA SERPL-MCNC: 0.4 NG/ML (ref 0–4)
RBC # BLD AUTO: 4.22 MILLION/UL (ref 3.88–5.62)
SODIUM SERPL-SCNC: 141 MMOL/L (ref 136–145)
TACROLIMUS BLD-MCNC: 2.7 NG/ML (ref 2–20)
TRIGL SERPL-MCNC: 314 MG/DL
WBC # BLD AUTO: 4.71 THOUSAND/UL (ref 4.31–10.16)

## 2018-12-10 PROCEDURE — 83735 ASSAY OF MAGNESIUM: CPT

## 2018-12-10 PROCEDURE — 85025 COMPLETE CBC W/AUTO DIFF WBC: CPT

## 2018-12-10 PROCEDURE — 80061 LIPID PANEL: CPT

## 2018-12-10 PROCEDURE — 80053 COMPREHEN METABOLIC PANEL: CPT

## 2018-12-10 PROCEDURE — 83036 HEMOGLOBIN GLYCOSYLATED A1C: CPT

## 2018-12-10 PROCEDURE — G0103 PSA SCREENING: HCPCS

## 2018-12-10 PROCEDURE — 36415 COLL VENOUS BLD VENIPUNCTURE: CPT

## 2018-12-10 PROCEDURE — 82977 ASSAY OF GGT: CPT

## 2018-12-10 PROCEDURE — 80197 ASSAY OF TACROLIMUS: CPT

## 2018-12-10 PROCEDURE — 85610 PROTHROMBIN TIME: CPT

## 2018-12-14 ENCOUNTER — TELEPHONE (OUTPATIENT)
Dept: ENDOCRINOLOGY | Facility: CLINIC | Age: 58
End: 2018-12-14

## 2018-12-14 ENCOUNTER — OFFICE VISIT (OUTPATIENT)
Dept: FAMILY MEDICINE CLINIC | Facility: CLINIC | Age: 58
End: 2018-12-14
Payer: COMMERCIAL

## 2018-12-14 VITALS
SYSTOLIC BLOOD PRESSURE: 148 MMHG | WEIGHT: 207.4 LBS | DIASTOLIC BLOOD PRESSURE: 80 MMHG | RESPIRATION RATE: 14 BRPM | BODY MASS INDEX: 35.41 KG/M2 | HEIGHT: 64 IN | TEMPERATURE: 97.3 F | HEART RATE: 78 BPM

## 2018-12-14 DIAGNOSIS — I10 HYPERTENSION, UNSPECIFIED TYPE: ICD-10-CM

## 2018-12-14 DIAGNOSIS — E11.65 TYPE 2 DIABETES MELLITUS WITH HYPERGLYCEMIA, WITHOUT LONG-TERM CURRENT USE OF INSULIN (HCC): Primary | ICD-10-CM

## 2018-12-14 DIAGNOSIS — I10 ESSENTIAL HYPERTENSION: ICD-10-CM

## 2018-12-14 DIAGNOSIS — Z23 ENCOUNTER FOR IMMUNIZATION: ICD-10-CM

## 2018-12-14 PROCEDURE — 99213 OFFICE O/P EST LOW 20 MIN: CPT | Performed by: FAMILY MEDICINE

## 2018-12-14 PROCEDURE — 90686 IIV4 VACC NO PRSV 0.5 ML IM: CPT | Performed by: FAMILY MEDICINE

## 2018-12-14 PROCEDURE — 1036F TOBACCO NON-USER: CPT | Performed by: FAMILY MEDICINE

## 2018-12-14 PROCEDURE — 90472 IMMUNIZATION ADMIN EACH ADD: CPT | Performed by: FAMILY MEDICINE

## 2018-12-14 PROCEDURE — 90471 IMMUNIZATION ADMIN: CPT | Performed by: FAMILY MEDICINE

## 2018-12-14 PROCEDURE — 90670 PCV13 VACCINE IM: CPT | Performed by: FAMILY MEDICINE

## 2018-12-14 PROCEDURE — 3008F BODY MASS INDEX DOCD: CPT | Performed by: FAMILY MEDICINE

## 2018-12-14 RX ORDER — AMLODIPINE BESYLATE 5 MG/1
10 TABLET ORAL DAILY
Start: 2018-12-14 | End: 2019-08-27 | Stop reason: SDUPTHER

## 2018-12-14 NOTE — ASSESSMENT & PLAN NOTE
Fasting lipid panel reviewed, total cholesterol 146, LDL 47, triglyceride 316  Continue atorvastatin 40 mg daily, will repeat lipid panel in 6 months given high triglyceride levels  Lifestyle modification with diet and exercise in the meantime    Discussed option of fenofibrate at this time, patient wants to wait for the next lipid panel

## 2018-12-14 NOTE — PROGRESS NOTES
Assessment/Plan     Type 2 diabetes mellitus with hyperglycemia, without long-term current use of insulin (HCC)  Lab Results   Component Value Date    HGBA1C 7 5 (H) 12/10/2018   A1c in October 6 4    No results for input(s): POCGLU in the last 72 hours  Blood Sugar Average: Last 72 hrs:  Fasting blood sugar 130-140  Currently on glimepiride 1 mg daily, Tradjenta 5 mg daily  Advised patient to continue lifestyle modification with diabetic diet and exercise, will recheck A1c in 3 months if continues to increase, might have to make adjustments to his medication  Also advised to schedule an eye exam with his ophthalmologist, last eye exam done 1 year ago  Will do foot exam in the next visit  Patient has history of liver transplant, is on immunosuppressive medication and has diabetes, after discussion of benefits and risk of vaccination against pneumococcal infection, will give Prevnar today is   and administer Pneumovax after 8 weeks  Hypertension  / 80 today, given diabetes the goal for him would be less than 140/90, will continue current medication regimen of labetalol 100 mg b i d , hydrochlorothiazide 12 5 mg daily, amlodipine 5 mg daily  Stressed on importance of the DASH diet and exercise, will have patient follow-up in 3 months    Hyperlipidemia  Fasting lipid panel reviewed, total cholesterol 146, LDL 47, triglyceride 316  Continue atorvastatin 40 mg daily, will repeat lipid panel in 6 months given high triglyceride levels  Lifestyle modification with diet and exercise in the meantime  Discussed option of fenofibrate at this time, patient wants to wait for the next lipid panel    Encounter for immunization  [id number] administered today, given history of liver transplant currently on immunosuppressive medication with diabetes mellitus  Will administer Pneumovax in 8 weeks    Risk and benefits of the vaccination discussed with    Diagnoses and all orders for this visit:    Type 2 diabetes mellitus with hyperglycemia, without long-term current use of insulin (HCC)    Essential hypertension    Hypertension, unspecified type  -     amLODIPine (NORVASC) 5 mg tablet; Take 2 tablets (10 mg total) by mouth daily    Encounter for immunization  -     SYRINGE/SINGLE-DOSE VIAL: influenza vaccine, [id number], quadrivalent, 0 5 mL, preservative-free (AFLURIA, FLUARIX, FLULAVAL, FLUZONE)  -     PNEUMOCOCCAL CONJUGATE VACCINE 13-VALENT GREATER THAN 6 MONTHS         Subjective     Chief Complaint   Patient presents with    Follow-up       77-year-old male with history of cirrhosis status post liver transplant presented to office for follow-up visit  He [facility] hepatology, nephrology, endocrinology, Urology  Patient has no acute concerns today  Had lab work done in December wants to review them  The following portions of the patient's history were reviewed and updated as appropriate: allergies, current medications, past family history, past medical history, past social history, past surgical history and problem list     Review of Systems   Constitutional: Negative for activity change, appetite change, chills, fever and unexpected weight change  HENT: Negative for congestion, facial swelling and trouble swallowing  Respiratory: Negative for cough, chest tightness, shortness of breath and wheezing  Cardiovascular: Negative for chest pain and palpitations  Gastrointestinal: Negative for abdominal distention, diarrhea, nausea and vomiting  Genitourinary: Negative for difficulty urinating  Musculoskeletal: Negative for arthralgias and back pain  Allergic/Immunologic: Negative for environmental allergies  Neurological: Negative for headaches  Objective     Vitals:Blood pressure 148/80, pulse 78, temperature (!) 97 3 °F (36 3 °C), temperature source Tympanic, resp  rate 14, height 5' 4" (1 626 m), weight 94 1 kg (207 lb 6 4 oz)    Physical Exam:  Physical Exam Constitutional: He is oriented to person, place, and time  He appears well-developed and well-nourished  No distress  HENT:   Head: Normocephalic and atraumatic  Mouth/Throat: Oropharynx is clear and moist    Eyes: Conjunctivae and EOM are normal    Neck: Normal range of motion  Neck supple  Cardiovascular: Normal rate, regular rhythm and normal heart sounds  Exam reveals no friction rub  No murmur heard  Pulmonary/Chest: Effort normal and breath sounds normal  No respiratory distress  He has no wheezes  He has no rales  Abdominal: Soft  Bowel sounds are normal  He exhibits no distension  There is no tenderness  Musculoskeletal: Normal range of motion  Neurological: He is alert and oriented to person, place, and time  Skin: Skin is warm and dry  Vitals reviewed

## 2018-12-14 NOTE — ASSESSMENT & PLAN NOTE
/ 80 today, given diabetes the goal for him would be less than 140/90, will continue current medication regimen of labetalol 100 mg b i d , hydrochlorothiazide 12 5 mg daily, amlodipine 5 mg daily    Stressed on importance of the DASH diet and exercise, will have patient follow-up in 3 months

## 2018-12-14 NOTE — ASSESSMENT & PLAN NOTE
[id number] administered today, given history of liver transplant currently on immunosuppressive medication with diabetes mellitus  Will administer Pneumovax in 8 weeks    Risk and benefits of the vaccination discussed with

## 2018-12-14 NOTE — ASSESSMENT & PLAN NOTE
Lab Results   Component Value Date    HGBA1C 7 5 (H) 12/10/2018   A1c in October 6 4    No results for input(s): POCGLU in the last 72 hours  Blood Sugar Average: Last 72 hrs:  Fasting blood sugar 130-140  Currently on glimepiride 1 mg daily, Tradjenta 5 mg daily  Advised patient to continue lifestyle modification with diabetic diet and exercise, will recheck A1c in 3 months if continues to increase, might have to make adjustments to his medication  Also advised to schedule an eye exam with his ophthalmologist, last eye exam done 1 year ago  Will do foot exam in the next visit  Patient has history of liver transplant, is on immunosuppressive medication and has diabetes, after discussion of benefits and risk of vaccination against pneumococcal infection, will give Prevnar today is   and administer Pneumovax after 8 weeks

## 2018-12-14 NOTE — TELEPHONE ENCOUNTER
----- Message from Go Dennis PA-C sent at 12/14/2018  7:34 AM EST -----  A1C higher at 7 5- send log for review  LDL cholesterol has improved but triglycerides remain high- continue atorvastatin work on diet, exercise, weight loss, and improving control of Diabetes

## 2018-12-21 ENCOUNTER — TELEPHONE (OUTPATIENT)
Dept: ENDOCRINOLOGY | Facility: CLINIC | Age: 58
End: 2018-12-21

## 2018-12-21 DIAGNOSIS — E11.65 TYPE 2 DIABETES MELLITUS WITH HYPERGLYCEMIA, WITHOUT LONG-TERM CURRENT USE OF INSULIN (HCC): ICD-10-CM

## 2018-12-21 RX ORDER — GLIMEPIRIDE 1 MG/1
TABLET ORAL
COMMUNITY
End: 2019-02-27 | Stop reason: SDUPTHER

## 2018-12-21 NOTE — TELEPHONE ENCOUNTER
Left message BS log reviewed  A little high  Increase  glimepiride to 1 mg at breakfast and 1 mg at dinner  Send BS log in 2 weeks for review    If low BS 80 or under call the office asap

## 2018-12-24 DIAGNOSIS — E78.5 DYSLIPIDEMIA: Primary | ICD-10-CM

## 2018-12-26 RX ORDER — ATORVASTATIN CALCIUM 40 MG/1
TABLET, FILM COATED ORAL
Qty: 90 TABLET | Refills: 3 | Status: SHIPPED | OUTPATIENT
Start: 2018-12-26 | End: 2019-12-15 | Stop reason: SDUPTHER

## 2019-01-21 ENCOUNTER — APPOINTMENT (OUTPATIENT)
Dept: LAB | Facility: CLINIC | Age: 59
End: 2019-01-21
Payer: COMMERCIAL

## 2019-01-21 ENCOUNTER — TRANSCRIBE ORDERS (OUTPATIENT)
Dept: LAB | Facility: CLINIC | Age: 59
End: 2019-01-21

## 2019-01-21 DIAGNOSIS — Z12.5 PROSTATE CANCER SCREENING: ICD-10-CM

## 2019-01-21 LAB — PSA SERPL-MCNC: 1 NG/ML (ref 0–4)

## 2019-01-21 PROCEDURE — G0103 PSA SCREENING: HCPCS

## 2019-02-11 DIAGNOSIS — E27.40 HYPOALDOSTERONISM (HCC): ICD-10-CM

## 2019-02-12 RX ORDER — FLUDROCORTISONE ACETATE 0.1 MG/1
TABLET ORAL
Qty: 90 TABLET | Refills: 2 | Status: SHIPPED | OUTPATIENT
Start: 2019-02-12 | End: 2019-10-22 | Stop reason: CLARIF

## 2019-02-22 DIAGNOSIS — E11.65 TYPE 2 DIABETES MELLITUS WITH HYPERGLYCEMIA, WITHOUT LONG-TERM CURRENT USE OF INSULIN (HCC): ICD-10-CM

## 2019-02-22 RX ORDER — LINAGLIPTIN 5 MG/1
TABLET, FILM COATED ORAL
Qty: 90 TABLET | Refills: 0 | Status: SHIPPED | OUTPATIENT
Start: 2019-02-22 | End: 2019-04-23 | Stop reason: SDUPTHER

## 2019-02-27 ENCOUNTER — TELEPHONE (OUTPATIENT)
Dept: DERMATOLOGY | Facility: CLINIC | Age: 59
End: 2019-02-27

## 2019-02-27 DIAGNOSIS — E11.39 TYPE 2 DIABETES MELLITUS WITH OTHER OPHTHALMIC COMPLICATION, WITHOUT LONG-TERM CURRENT USE OF INSULIN (HCC): Primary | ICD-10-CM

## 2019-02-27 RX ORDER — GLIMEPIRIDE 1 MG/1
1 TABLET ORAL DAILY
Qty: 90 TABLET | Refills: 1 | Status: SHIPPED | OUTPATIENT
Start: 2019-02-27 | End: 2019-04-23 | Stop reason: SDUPTHER

## 2019-03-02 ENCOUNTER — TRANSCRIBE ORDERS (OUTPATIENT)
Dept: LAB | Facility: CLINIC | Age: 59
End: 2019-03-02

## 2019-03-02 ENCOUNTER — APPOINTMENT (OUTPATIENT)
Dept: LAB | Facility: CLINIC | Age: 59
End: 2019-03-02
Payer: COMMERCIAL

## 2019-03-06 DIAGNOSIS — E11.65 TYPE 2 DIABETES MELLITUS WITH HYPERGLYCEMIA, WITHOUT LONG-TERM CURRENT USE OF INSULIN (HCC): ICD-10-CM

## 2019-03-06 RX ORDER — GLIMEPIRIDE 1 MG/1
TABLET ORAL
Qty: 90 TABLET | Refills: 0 | Status: SHIPPED | OUTPATIENT
Start: 2019-03-06 | End: 2019-04-23 | Stop reason: SDUPTHER

## 2019-03-15 ENCOUNTER — OFFICE VISIT (OUTPATIENT)
Dept: FAMILY MEDICINE CLINIC | Facility: CLINIC | Age: 59
End: 2019-03-15

## 2019-03-15 VITALS
HEIGHT: 64 IN | WEIGHT: 209.6 LBS | DIASTOLIC BLOOD PRESSURE: 72 MMHG | RESPIRATION RATE: 16 BRPM | TEMPERATURE: 97.1 F | HEART RATE: 64 BPM | BODY MASS INDEX: 35.78 KG/M2 | SYSTOLIC BLOOD PRESSURE: 130 MMHG

## 2019-03-15 DIAGNOSIS — E78.5 HYPERLIPIDEMIA, UNSPECIFIED HYPERLIPIDEMIA TYPE: ICD-10-CM

## 2019-03-15 DIAGNOSIS — I10 ESSENTIAL HYPERTENSION: ICD-10-CM

## 2019-03-15 DIAGNOSIS — E11.65 TYPE 2 DIABETES MELLITUS WITH HYPERGLYCEMIA, WITHOUT LONG-TERM CURRENT USE OF INSULIN (HCC): Primary | ICD-10-CM

## 2019-03-15 DIAGNOSIS — Z23 ENCOUNTER FOR IMMUNIZATION: ICD-10-CM

## 2019-03-15 DIAGNOSIS — Z94.4 STATUS POST LIVER TRANSPLANT (HCC): ICD-10-CM

## 2019-03-15 LAB — SL AMB POCT HEMOGLOBIN AIC: 7.9 (ref ?–6.5)

## 2019-03-15 PROCEDURE — 3078F DIAST BP <80 MM HG: CPT | Performed by: FAMILY MEDICINE

## 2019-03-15 PROCEDURE — 3075F SYST BP GE 130 - 139MM HG: CPT | Performed by: FAMILY MEDICINE

## 2019-03-15 PROCEDURE — 83036 HEMOGLOBIN GLYCOSYLATED A1C: CPT | Performed by: FAMILY MEDICINE

## 2019-03-15 PROCEDURE — 90471 IMMUNIZATION ADMIN: CPT | Performed by: FAMILY MEDICINE

## 2019-03-15 PROCEDURE — 99213 OFFICE O/P EST LOW 20 MIN: CPT | Performed by: FAMILY MEDICINE

## 2019-03-15 PROCEDURE — 90732 PPSV23 VACC 2 YRS+ SUBQ/IM: CPT | Performed by: FAMILY MEDICINE

## 2019-03-15 NOTE — ASSESSMENT & PLAN NOTE
Lab Results   Component Value Date    HGBA1C 7 9 (A) 03/15/2019     Blood Sugar Average: Last 72 hrs:  CMP from 3/2 shows blood sugar of 188  Currently on glimepiride 1 mg daily, Tradjenta 5 mg daily  Patient states that he has not been watching his diet lately, advised him lifestyle modification with diet and exercise  Will continue the current medication at this time and repeat A1c in 3 months    Also advised him to schedule eye exam with the ophthalmologist  Patient has history of liver transplant, is on immunosuppressive medication and has diabetes, was given Prevnar in his last visit, Pneumovax administered today

## 2019-03-15 NOTE — ASSESSMENT & PLAN NOTE
Well controlled with hydrochlorothiazide 12 5 mg daily, amlodipine 5 mg daily, labetalol 100 mg b i d    Continue DASH diet and exercise

## 2019-03-15 NOTE — ASSESSMENT & PLAN NOTE
Fasting lipid panel from December reviewed, total cholesterol 146, LDL 47, triglycerides 316  Patient currently on atorvastatin 40 mg daily, advised lifestyle modification and diet and exercise, will repeat lipid panel in 3 months, if triglyceride continues to be elevated will add fenofibrate

## 2019-03-15 NOTE — PROGRESS NOTES
Assessment/Plan     Type 2 diabetes mellitus with hyperglycemia, without long-term current use of insulin ([facility])  Lab Results   Component Value Date    HGBA1C 7 9 (A) 03/15/2019     Blood Sugar Average: Last 72 hrs:  CMP from 3/2 shows blood sugar of 188  Currently on glimepiride 1 mg daily, Tradjenta 5 mg daily  Patient states that he has not been watching his diet lately, advised him lifestyle modification with diet and exercise  Will continue the current medication at this time and repeat A1c in 3 months  Also advised him to schedule eye exam with the ophthalmologist  Patient has history of liver transplant, is on immunosuppressive medication and has diabetes, was given Prevnar in his last visit, Pneumovax administered today          Hypertension  Well controlled with hydrochlorothiazide 12 5 mg daily, amlodipine 5 mg daily, labetalol 100 mg b i d  Continue DASH diet and exercise    Hyperlipidemia  Fasting lipid panel from December reviewed, total cholesterol 146, LDL 47, triglycerides 316  Patient currently on atorvastatin 40 mg daily, advised lifestyle modification and diet and exercise, will repeat lipid panel in 3 months, if triglyceride continues to be elevated will add fenofibrate    Status post liver transplant  Stable, continue follow-up with hepatology  Continue tacrolimus azathioprine as advised    Diagnoses and all orders for this visit:    Type 2 diabetes mellitus with hyperglycemia, without long-term current use of insulin ([facility] )  -     POCT hemoglobin A1c  -     Lipid panel; Future  -     Comprehensive metabolic panel; Future  -     HEMOGLOBIN A1C W/ EAG ESTIMATION;  Future    Encounter for immunization  -     PNEUMOCOCCAL POLYSACCHARIDE VACCINE 23-VALENT =>1YO SQ IM    Essential hypertension    Hyperlipidemia, unspecified hyperlipidemia type    Status post liver transplant         Subjective     Chief Complaint   Patient presents with    Follow-up       29-year-old male with history of cirrhosis status post liver transplant presented to office for follow-up visit  He follows up with [facility] hepatology, nephrology, endocrinology, Urology  Patient has no acute concerns today Taking medications regularly  Patient states that he has not following up with his diabetic diet recently, and blood sugars fluctuate from 120-130 fasting , he has not been exercising as much either, but states that since the weather is getting better he would be exercising more        The following portions of the patient's history were reviewed and updated as appropriate: allergies, current medications, past family history, past medical history, past social history, past surgical history and problem list     Review of Systems   Constitutional: Negative for activity change, chills and fever  HENT: Negative for congestion  Respiratory: Negative for cough, shortness of breath and wheezing  Cardiovascular: Negative for chest pain  Gastrointestinal: Negative for abdominal distention, diarrhea, nausea and vomiting  Genitourinary: Negative for difficulty urinating  Musculoskeletal: Negative for arthralgias  Neurological: Negative for dizziness and headaches  Objective     Vitals:Blood pressure 130/72, pulse 64, temperature (!) 97 1 °F (36 2 °C), resp  rate 16, height 5' 4" (1 626 m), weight 95 1 kg (209 lb 9 6 oz)  Physical Exam:  Physical Exam   Constitutional: He is oriented to person, place, and time  He appears well-developed and well-nourished  HENT:   Head: Normocephalic and atraumatic  Mouth/Throat: Oropharynx is clear and moist    Eyes: Conjunctivae and EOM are normal    Neck: Normal range of motion  Neck supple  Cardiovascular: Normal rate, regular rhythm and normal heart sounds  Exam reveals no friction rub  No murmur heard  Pulmonary/Chest: Effort normal and breath sounds normal  No respiratory distress  He has no wheezes  He has no rales  Abdominal: Soft   Bowel sounds are normal  He exhibits no distension  There is no tenderness  Musculoskeletal: Normal range of motion  Neurological: He is alert and oriented to person, place, and time  Skin: Skin is warm and dry  Vitals reviewed

## 2019-04-04 DIAGNOSIS — M81.0 OSTEOPOROSIS, UNSPECIFIED OSTEOPOROSIS TYPE, UNSPECIFIED PATHOLOGICAL FRACTURE PRESENCE: ICD-10-CM

## 2019-04-04 RX ORDER — ALENDRONATE SODIUM 70 MG/1
70 TABLET ORAL
Qty: 12 TABLET | Refills: 2 | Status: SHIPPED | OUTPATIENT
Start: 2019-04-04 | End: 2019-11-27 | Stop reason: SDUPTHER

## 2019-04-23 ENCOUNTER — OFFICE VISIT (OUTPATIENT)
Dept: ENDOCRINOLOGY | Facility: CLINIC | Age: 59
End: 2019-04-23
Payer: COMMERCIAL

## 2019-04-23 VITALS
BODY MASS INDEX: 36.26 KG/M2 | SYSTOLIC BLOOD PRESSURE: 128 MMHG | WEIGHT: 212.4 LBS | HEART RATE: 89 BPM | HEIGHT: 64 IN | DIASTOLIC BLOOD PRESSURE: 82 MMHG

## 2019-04-23 DIAGNOSIS — I10 ESSENTIAL HYPERTENSION: ICD-10-CM

## 2019-04-23 DIAGNOSIS — E11.65 TYPE 2 DIABETES MELLITUS WITH HYPERGLYCEMIA, WITHOUT LONG-TERM CURRENT USE OF INSULIN (HCC): Primary | ICD-10-CM

## 2019-04-23 DIAGNOSIS — E78.5 HYPERLIPIDEMIA, UNSPECIFIED HYPERLIPIDEMIA TYPE: ICD-10-CM

## 2019-04-23 DIAGNOSIS — E11.39 TYPE 2 DIABETES MELLITUS WITH OTHER OPHTHALMIC COMPLICATION, WITHOUT LONG-TERM CURRENT USE OF INSULIN (HCC): ICD-10-CM

## 2019-04-23 PROBLEM — E11.9 DIABETES MELLITUS (HCC): Status: ACTIVE | Noted: 2019-04-23

## 2019-04-23 PROCEDURE — 99214 OFFICE O/P EST MOD 30 MIN: CPT | Performed by: INTERNAL MEDICINE

## 2019-04-23 RX ORDER — GLIMEPIRIDE 1 MG/1
1 TABLET ORAL 2 TIMES DAILY
Qty: 180 TABLET | Refills: 3 | Status: SHIPPED | OUTPATIENT
Start: 2019-04-23 | End: 2019-09-12

## 2019-05-01 LAB
LEFT EYE DIABETIC RETINOPATHY: NORMAL
RIGHT EYE DIABETIC RETINOPATHY: NORMAL

## 2019-05-01 PROCEDURE — 2022F DILAT RTA XM EVC RTNOPTHY: CPT | Performed by: FAMILY MEDICINE

## 2019-05-21 LAB
LEFT EYE DIABETIC RETINOPATHY: NORMAL
RIGHT EYE DIABETIC RETINOPATHY: NORMAL

## 2019-05-21 PROCEDURE — 2022F DILAT RTA XM EVC RTNOPTHY: CPT | Performed by: FAMILY MEDICINE

## 2019-05-24 ENCOUNTER — OFFICE VISIT (OUTPATIENT)
Dept: FAMILY MEDICINE CLINIC | Facility: CLINIC | Age: 59
End: 2019-05-24

## 2019-05-24 VITALS
BODY MASS INDEX: 35.65 KG/M2 | HEART RATE: 76 BPM | TEMPERATURE: 98.2 F | HEIGHT: 64 IN | SYSTOLIC BLOOD PRESSURE: 140 MMHG | DIASTOLIC BLOOD PRESSURE: 80 MMHG | RESPIRATION RATE: 16 BRPM | WEIGHT: 208.8 LBS

## 2019-05-24 DIAGNOSIS — E78.5 HYPERLIPIDEMIA, UNSPECIFIED HYPERLIPIDEMIA TYPE: ICD-10-CM

## 2019-05-24 DIAGNOSIS — Z94.4 STATUS POST LIVER TRANSPLANT (HCC): ICD-10-CM

## 2019-05-24 DIAGNOSIS — I10 ESSENTIAL HYPERTENSION: ICD-10-CM

## 2019-05-24 DIAGNOSIS — E11.39 TYPE 2 DIABETES MELLITUS WITH OTHER OPHTHALMIC COMPLICATION, WITHOUT LONG-TERM CURRENT USE OF INSULIN (HCC): Primary | ICD-10-CM

## 2019-05-24 PROBLEM — M81.8 OTHER OSTEOPOROSIS WITHOUT CURRENT PATHOLOGICAL FRACTURE: Status: ACTIVE | Noted: 2019-05-24

## 2019-05-24 PROCEDURE — 99213 OFFICE O/P EST LOW 20 MIN: CPT | Performed by: FAMILY MEDICINE

## 2019-05-28 ENCOUNTER — TELEPHONE (OUTPATIENT)
Dept: ENDOCRINOLOGY | Facility: CLINIC | Age: 59
End: 2019-05-28

## 2019-06-01 ENCOUNTER — APPOINTMENT (OUTPATIENT)
Dept: LAB | Facility: CLINIC | Age: 59
End: 2019-06-01
Payer: COMMERCIAL

## 2019-06-01 ENCOUNTER — TRANSCRIBE ORDERS (OUTPATIENT)
Dept: LAB | Facility: CLINIC | Age: 59
End: 2019-06-01

## 2019-06-01 DIAGNOSIS — E11.39 TYPE 2 DIABETES MELLITUS WITH OTHER OPHTHALMIC COMPLICATION, WITHOUT LONG-TERM CURRENT USE OF INSULIN (HCC): ICD-10-CM

## 2019-06-01 DIAGNOSIS — D68.9 BLOOD CLOTTING DISORDER (HCC): Primary | ICD-10-CM

## 2019-06-01 DIAGNOSIS — E11.65 TYPE 2 DIABETES MELLITUS WITH HYPERGLYCEMIA, WITHOUT LONG-TERM CURRENT USE OF INSULIN (HCC): ICD-10-CM

## 2019-06-01 DIAGNOSIS — R79.1 ABNORMAL COAGULATION PROFILE: ICD-10-CM

## 2019-06-01 DIAGNOSIS — C22.1 MALIGNANT NEOPLASM OF INTRAHEPATIC BILE DUCTS (HCC): ICD-10-CM

## 2019-06-01 DIAGNOSIS — Z94.4 LIVER REPLACED BY TRANSPLANT (HCC): ICD-10-CM

## 2019-06-01 DIAGNOSIS — I10 ESSENTIAL HYPERTENSION: ICD-10-CM

## 2019-06-01 DIAGNOSIS — E78.5 HYPERLIPIDEMIA, UNSPECIFIED HYPERLIPIDEMIA TYPE: ICD-10-CM

## 2019-06-01 DIAGNOSIS — D68.9 BLOOD CLOTTING DISORDER (HCC): ICD-10-CM

## 2019-06-01 LAB
ALBUMIN SERPL BCP-MCNC: 3.8 G/DL (ref 3.5–5)
ALP SERPL-CCNC: 104 U/L (ref 46–116)
ALT SERPL W P-5'-P-CCNC: 42 U/L (ref 12–78)
ANION GAP SERPL CALCULATED.3IONS-SCNC: 10 MMOL/L (ref 4–13)
AST SERPL W P-5'-P-CCNC: 17 U/L (ref 5–45)
BASOPHILS # BLD AUTO: 0.02 THOUSANDS/ΜL (ref 0–0.1)
BASOPHILS NFR BLD AUTO: 0 % (ref 0–1)
BILIRUB SERPL-MCNC: 0.3 MG/DL (ref 0.2–1)
BUN SERPL-MCNC: 38 MG/DL (ref 5–25)
CALCIUM SERPL-MCNC: 9.3 MG/DL (ref 8.3–10.1)
CHLORIDE SERPL-SCNC: 105 MMOL/L (ref 100–108)
CHOLEST SERPL-MCNC: 147 MG/DL (ref 50–200)
CO2 SERPL-SCNC: 26 MMOL/L (ref 21–32)
CREAT SERPL-MCNC: 1.71 MG/DL (ref 0.6–1.3)
CREAT UR-MCNC: 151 MG/DL
EOSINOPHIL # BLD AUTO: 0.11 THOUSAND/ΜL (ref 0–0.61)
EOSINOPHIL NFR BLD AUTO: 2 % (ref 0–6)
ERYTHROCYTE [DISTWIDTH] IN BLOOD BY AUTOMATED COUNT: 14 % (ref 11.6–15.1)
EST. AVERAGE GLUCOSE BLD GHB EST-MCNC: 171 MG/DL
GFR SERPL CREATININE-BSD FRML MDRD: 43 ML/MIN/1.73SQ M
GGT SERPL-CCNC: 39 U/L (ref 5–85)
GLUCOSE P FAST SERPL-MCNC: 161 MG/DL (ref 65–99)
HBA1C MFR BLD: 7.6 % (ref 4.2–6.3)
HCT VFR BLD AUTO: 36.3 % (ref 36.5–49.3)
HDLC SERPL-MCNC: 32 MG/DL (ref 40–60)
HGB BLD-MCNC: 12 G/DL (ref 12–17)
IMM GRANULOCYTES # BLD AUTO: 0.02 THOUSAND/UL (ref 0–0.2)
IMM GRANULOCYTES NFR BLD AUTO: 0 % (ref 0–2)
INR PPP: 0.98 (ref 0.86–1.17)
LDLC SERPL CALC-MCNC: 44 MG/DL (ref 0–100)
LYMPHOCYTES # BLD AUTO: 0.6 THOUSANDS/ΜL (ref 0.6–4.47)
LYMPHOCYTES NFR BLD AUTO: 13 % (ref 14–44)
MAGNESIUM SERPL-MCNC: 1.2 MG/DL (ref 1.6–2.6)
MCH RBC QN AUTO: 29.1 PG (ref 26.8–34.3)
MCHC RBC AUTO-ENTMCNC: 33.1 G/DL (ref 31.4–37.4)
MCV RBC AUTO: 88 FL (ref 82–98)
MICROALBUMIN UR-MCNC: 2000 MG/L (ref 0–20)
MICROALBUMIN/CREAT 24H UR: 1325 MG/G CREATININE (ref 0–30)
MONOCYTES # BLD AUTO: 0.43 THOUSAND/ΜL (ref 0.17–1.22)
MONOCYTES NFR BLD AUTO: 9 % (ref 4–12)
NEUTROPHILS # BLD AUTO: 3.4 THOUSANDS/ΜL (ref 1.85–7.62)
NEUTS SEG NFR BLD AUTO: 76 % (ref 43–75)
NONHDLC SERPL-MCNC: 115 MG/DL
NRBC BLD AUTO-RTO: 0 /100 WBCS
PLATELET # BLD AUTO: 128 THOUSANDS/UL (ref 149–390)
PMV BLD AUTO: 12 FL (ref 8.9–12.7)
POTASSIUM SERPL-SCNC: 4.4 MMOL/L (ref 3.5–5.3)
PROT SERPL-MCNC: 7 G/DL (ref 6.4–8.2)
PROTHROMBIN TIME: 12.7 SECONDS (ref 11.8–14.2)
RBC # BLD AUTO: 4.12 MILLION/UL (ref 3.88–5.62)
SODIUM SERPL-SCNC: 141 MMOL/L (ref 136–145)
T4 FREE SERPL-MCNC: 0.96 NG/DL (ref 0.76–1.46)
TACROLIMUS BLD-MCNC: 4.7 NG/ML (ref 2–20)
TRIGL SERPL-MCNC: 354 MG/DL
TSH SERPL DL<=0.05 MIU/L-ACNC: 1.43 UIU/ML (ref 0.36–3.74)
WBC # BLD AUTO: 4.58 THOUSAND/UL (ref 4.31–10.16)

## 2019-06-01 PROCEDURE — 36415 COLL VENOUS BLD VENIPUNCTURE: CPT

## 2019-06-01 PROCEDURE — 84439 ASSAY OF FREE THYROXINE: CPT

## 2019-06-01 PROCEDURE — 83735 ASSAY OF MAGNESIUM: CPT

## 2019-06-01 PROCEDURE — 85610 PROTHROMBIN TIME: CPT

## 2019-06-01 PROCEDURE — 3062F POS MACROALBUMINURIA REV: CPT | Performed by: FAMILY MEDICINE

## 2019-06-01 PROCEDURE — 82977 ASSAY OF GGT: CPT

## 2019-06-01 PROCEDURE — 83036 HEMOGLOBIN GLYCOSYLATED A1C: CPT

## 2019-06-01 PROCEDURE — 82043 UR ALBUMIN QUANTITATIVE: CPT

## 2019-06-01 PROCEDURE — 82570 ASSAY OF URINE CREATININE: CPT

## 2019-06-01 PROCEDURE — 80197 ASSAY OF TACROLIMUS: CPT

## 2019-06-01 PROCEDURE — 80061 LIPID PANEL: CPT

## 2019-06-01 PROCEDURE — 84443 ASSAY THYROID STIM HORMONE: CPT

## 2019-06-01 PROCEDURE — 80053 COMPREHEN METABOLIC PANEL: CPT

## 2019-06-01 PROCEDURE — 85025 COMPLETE CBC W/AUTO DIFF WBC: CPT

## 2019-06-24 DIAGNOSIS — I10 HYPERTENSION, UNSPECIFIED TYPE: ICD-10-CM

## 2019-06-24 DIAGNOSIS — I10 ESSENTIAL HYPERTENSION: ICD-10-CM

## 2019-06-24 RX ORDER — HYDROCHLOROTHIAZIDE 12.5 MG/1
CAPSULE, GELATIN COATED ORAL
Qty: 90 CAPSULE | Refills: 2 | Status: SHIPPED | OUTPATIENT
Start: 2019-06-24 | End: 2020-03-25

## 2019-06-24 RX ORDER — AMLODIPINE BESYLATE 10 MG/1
TABLET ORAL
Qty: 90 TABLET | Refills: 2 | Status: SHIPPED | OUTPATIENT
Start: 2019-06-24 | End: 2020-03-25

## 2019-06-24 RX ORDER — LABETALOL 100 MG/1
TABLET, FILM COATED ORAL
Qty: 180 TABLET | Refills: 2 | Status: SHIPPED | OUTPATIENT
Start: 2019-06-24 | End: 2020-03-03

## 2019-07-26 ENCOUNTER — TELEPHONE (OUTPATIENT)
Dept: NEPHROLOGY | Facility: CLINIC | Age: 59
End: 2019-07-26

## 2019-08-01 NOTE — TELEPHONE ENCOUNTER
L/m for pt to call the office to schedule an Oct follow up with Dr Anahi Quan  This is the 3rd phone call a letter will be mailed

## 2019-08-07 ENCOUNTER — APPOINTMENT (OUTPATIENT)
Dept: LAB | Facility: CLINIC | Age: 59
End: 2019-08-07
Payer: COMMERCIAL

## 2019-08-07 ENCOUNTER — TRANSCRIBE ORDERS (OUTPATIENT)
Dept: LAB | Facility: CLINIC | Age: 59
End: 2019-08-07

## 2019-08-07 DIAGNOSIS — Z94.4 LIVER REPLACED BY TRANSPLANT (HCC): ICD-10-CM

## 2019-08-07 DIAGNOSIS — E11.8 DIABETIC COMPLICATION (HCC): ICD-10-CM

## 2019-08-07 DIAGNOSIS — Z94.4 LIVER REPLACED BY TRANSPLANT (HCC): Primary | ICD-10-CM

## 2019-08-07 LAB
ALBUMIN SERPL BCP-MCNC: 4.1 G/DL (ref 3.5–5)
ALP SERPL-CCNC: 126 U/L (ref 46–116)
ALT SERPL W P-5'-P-CCNC: 50 U/L (ref 12–78)
ANION GAP SERPL CALCULATED.3IONS-SCNC: 9 MMOL/L (ref 4–13)
AST SERPL W P-5'-P-CCNC: 17 U/L (ref 5–45)
BASOPHILS # BLD AUTO: 0.03 THOUSANDS/ΜL (ref 0–0.1)
BASOPHILS NFR BLD AUTO: 1 % (ref 0–1)
BILIRUB SERPL-MCNC: 0.4 MG/DL (ref 0.2–1)
BUN SERPL-MCNC: 55 MG/DL (ref 5–25)
CALCIUM SERPL-MCNC: 9.5 MG/DL (ref 8.3–10.1)
CHLORIDE SERPL-SCNC: 102 MMOL/L (ref 100–108)
CO2 SERPL-SCNC: 27 MMOL/L (ref 21–32)
CREAT SERPL-MCNC: 2.08 MG/DL (ref 0.6–1.3)
EOSINOPHIL # BLD AUTO: 0.14 THOUSAND/ΜL (ref 0–0.61)
EOSINOPHIL NFR BLD AUTO: 3 % (ref 0–6)
ERYTHROCYTE [DISTWIDTH] IN BLOOD BY AUTOMATED COUNT: 14.2 % (ref 11.6–15.1)
EST. AVERAGE GLUCOSE BLD GHB EST-MCNC: 186 MG/DL
GFR SERPL CREATININE-BSD FRML MDRD: 34 ML/MIN/1.73SQ M
GLUCOSE P FAST SERPL-MCNC: 223 MG/DL (ref 65–99)
HBA1C MFR BLD: 8.1 % (ref 4.2–6.3)
HCT VFR BLD AUTO: 38.6 % (ref 36.5–49.3)
HGB BLD-MCNC: 12.3 G/DL (ref 12–17)
IMM GRANULOCYTES # BLD AUTO: 0.02 THOUSAND/UL (ref 0–0.2)
IMM GRANULOCYTES NFR BLD AUTO: 0 % (ref 0–2)
INR PPP: 1.08 (ref 0.84–1.19)
LYMPHOCYTES # BLD AUTO: 0.47 THOUSANDS/ΜL (ref 0.6–4.47)
LYMPHOCYTES NFR BLD AUTO: 10 % (ref 14–44)
MCH RBC QN AUTO: 28.7 PG (ref 26.8–34.3)
MCHC RBC AUTO-ENTMCNC: 31.9 G/DL (ref 31.4–37.4)
MCV RBC AUTO: 90 FL (ref 82–98)
MONOCYTES # BLD AUTO: 0.38 THOUSAND/ΜL (ref 0.17–1.22)
MONOCYTES NFR BLD AUTO: 8 % (ref 4–12)
NEUTROPHILS # BLD AUTO: 3.62 THOUSANDS/ΜL (ref 1.85–7.62)
NEUTS SEG NFR BLD AUTO: 78 % (ref 43–75)
NRBC BLD AUTO-RTO: 0 /100 WBCS
PLATELET # BLD AUTO: 141 THOUSANDS/UL (ref 149–390)
PMV BLD AUTO: 11.5 FL (ref 8.9–12.7)
POTASSIUM SERPL-SCNC: 5.3 MMOL/L (ref 3.5–5.3)
PROT SERPL-MCNC: 7.6 G/DL (ref 6.4–8.2)
PROTHROMBIN TIME: 13.4 SECONDS (ref 11.6–14.5)
RBC # BLD AUTO: 4.28 MILLION/UL (ref 3.88–5.62)
SODIUM SERPL-SCNC: 138 MMOL/L (ref 136–145)
WBC # BLD AUTO: 4.66 THOUSAND/UL (ref 4.31–10.16)

## 2019-08-07 PROCEDURE — 85025 COMPLETE CBC W/AUTO DIFF WBC: CPT

## 2019-08-07 PROCEDURE — 80053 COMPREHEN METABOLIC PANEL: CPT

## 2019-08-07 PROCEDURE — 80197 ASSAY OF TACROLIMUS: CPT

## 2019-08-07 PROCEDURE — 85610 PROTHROMBIN TIME: CPT

## 2019-08-07 PROCEDURE — 83036 HEMOGLOBIN GLYCOSYLATED A1C: CPT

## 2019-08-07 PROCEDURE — 36415 COLL VENOUS BLD VENIPUNCTURE: CPT

## 2019-08-08 LAB — TACROLIMUS BLD-MCNC: 4.5 NG/ML (ref 2–20)

## 2019-08-27 ENCOUNTER — OFFICE VISIT (OUTPATIENT)
Dept: FAMILY MEDICINE CLINIC | Facility: CLINIC | Age: 59
End: 2019-08-27

## 2019-08-27 ENCOUNTER — DOCUMENTATION (OUTPATIENT)
Dept: FAMILY MEDICINE CLINIC | Facility: CLINIC | Age: 59
End: 2019-08-27

## 2019-08-27 VITALS
HEIGHT: 63 IN | TEMPERATURE: 98.2 F | HEART RATE: 80 BPM | DIASTOLIC BLOOD PRESSURE: 90 MMHG | WEIGHT: 207.6 LBS | BODY MASS INDEX: 36.79 KG/M2 | RESPIRATION RATE: 16 BRPM | SYSTOLIC BLOOD PRESSURE: 140 MMHG

## 2019-08-27 DIAGNOSIS — Z94.4 STATUS POST LIVER TRANSPLANT (HCC): ICD-10-CM

## 2019-08-27 DIAGNOSIS — E78.5 HYPERLIPIDEMIA, UNSPECIFIED HYPERLIPIDEMIA TYPE: ICD-10-CM

## 2019-08-27 DIAGNOSIS — N18.2 CKD (CHRONIC KIDNEY DISEASE) STAGE 2, GFR 60-89 ML/MIN: ICD-10-CM

## 2019-08-27 DIAGNOSIS — M81.8 OTHER OSTEOPOROSIS WITHOUT CURRENT PATHOLOGICAL FRACTURE: ICD-10-CM

## 2019-08-27 DIAGNOSIS — I10 ESSENTIAL HYPERTENSION: Primary | ICD-10-CM

## 2019-08-27 DIAGNOSIS — E11.39 TYPE 2 DIABETES MELLITUS WITH OTHER OPHTHALMIC COMPLICATION, WITHOUT LONG-TERM CURRENT USE OF INSULIN (HCC): ICD-10-CM

## 2019-08-27 PROBLEM — M10.9 ACUTE GOUT OF RIGHT FOOT: Status: ACTIVE | Noted: 2019-08-27

## 2019-08-27 PROCEDURE — 99213 OFFICE O/P EST LOW 20 MIN: CPT | Performed by: FAMILY MEDICINE

## 2019-08-27 PROCEDURE — 3066F NEPHROPATHY DOC TX: CPT | Performed by: FAMILY MEDICINE

## 2019-08-27 NOTE — ASSESSMENT & PLAN NOTE
Lab Results   Component Value Date    HGBA1C 8 1 (H) 08/07/2019       No results for input(s): POCGLU in the last 72 hours  None     patient currently taking glimepiride 1 mg b i d , Tradjenta 5 mg, he also follows up with Endocrinology, who advised to continue current regimen    Will repeat A1c in 3 months  Advised lifestyle modification with diet and exercise  Found to have ophthalmological complication, follows up with Ophthalmology, will try to get records  Foot exam done today  Patient up-to-date on vaccines  Non smoker

## 2019-08-27 NOTE — ASSESSMENT & PLAN NOTE
The patient was found to have osteoporosis in 2015 and started on Fosamax  Likely secondary to steroids for immunosuppression after transplant  Repeat DEXA scan in 2018 showed much improvement    Patient will complete 5 years of therapy in March 2020, will get DEXA scan prior to that and is consistent with improvement will stop Fosamax

## 2019-08-27 NOTE — PROGRESS NOTES
This note was discussed with Dr Mariela Titus of todays visit with the clinical pharmacist: medication reconciliation, medication education,polypharmacy consult  Mr  Lauri Choudhary has an appointment with Dr Dayanara Henriquez after meeting with the pharmacist for review of medications  Patient reports taking tacrolimus 4mg in the morning and 3 mg in the evening  Does not take omeprazole and does not experience signs and symptoms of gerd  Patient also does not take tamsulosin  Patient has also recently experienced a gout attack in which he is taking a 7 day course or prednisone  Patient started nuretin for retinopathy  Patient also started magnesium oxide 400 mg PO BID for hypomagnesemia  Recent ED visit(s): none  Recent Hospital visit (s): none  Social Hx: Exercise: is active at work; Diet: breakfast- toast and eggs; dinner- rice and beans   Adherence to medication regimen: 100% adherence per patient    Objective  Labs:  19  BP:140/90; HR: 80    19  Tacrolimus: 4 5; SCr: 2 08 (high); eGFR: 34; A1C: 8 1% (high); B (high); AST: 17; ALT: 50    19  TC: 147 T (high) HDL: 32 (low) LDL: 44    Assessment/Plan  T2DM- slightly uncontrolled, goal of A1C < 8%: continue glimepiride 1mg PO BID  Continue linagliptin 5 mg PO once daily    HTN- controlled per goal of <140/90; continue Amlodipine 10 mg PO daily, HCTZ 12 5 mg PO daily, and labetalol 100 mg PO Q12H    Osteoporosis- controlled per patient; continue alendronate 70 mg PO once weekly    HLD- uncontrolled, elevated triglycerides; consider starting omega 3, 1000 mg PO daily    1  Medication reconciliation was performed utilizing hospital discharge medication list, and patients medication bottles brought in with patient  Medication list was updated and discussed with patient    The following medication related items were identified:     New medications: magnesium oxide 400mg, 7 day course of prednisone, Neuretin     Discontinued medications: omeprazole, tamsulosin 0 4 mg, amlodipine 5 mg (patient no longer taking these)     Clinically significant drug interactions identified:  (D) multivitamin-alendronate (decreased concentration of alendronate)     Side effects from medication(s) reported: none    2   Counseling was provided to the patient on the following:  Alendronate- take on an empty stomach and remain upright for 30 minutes  Hypoglycemia   Check blood sugars once weekly

## 2019-08-27 NOTE — ASSESSMENT & PLAN NOTE
Recent BMP showed creatinine 2 01 up from baseline of 1 6-1 7  Will have repeat CMP, and encouraged to follow up with Nephrology

## 2019-08-27 NOTE — ASSESSMENT & PLAN NOTE
BP on arrival 140/90, patient currently on labetalol 100 mg b i d , hydrochlorothiazide 12 5 mg daily, amlodipine 10 mg daily  Goal blood pressure for him less than 140/90  Patient recently also had a gout episode, hydrochlorothiazide is known to cause hyperuricemia  Patient follows up with Nephrology as well, will consider switching to clonidine and discontinuing hydrochlorothiazide if blood pressure continues to remain high  Recent CMP showed creatinine of 2 08 up from baseline of 1 6-1 7  Will repeat CMP, and encouraged patient to follow up with Nephrology

## 2019-08-27 NOTE — ASSESSMENT & PLAN NOTE
Patient has elevated triglyceride levels, but total cholesterol of 147  Currently on atorvastatin 40 mg daily  Advised lifestyle modification with diet and exercise, was recently started on fish oil capsules by Ophthalmology    Will repeat lipid panel in 6 months to follow-up on triglyceride level

## 2019-08-27 NOTE — PROGRESS NOTES
Assessment/Plan     Status post liver transplant  Stable, continue follow-up with hepatology, continue tacrolimus, azathioprine as advised    Type 2 diabetes mellitus with ophthalmic complication, without long-term current use of insulin (HCC)  Lab Results   Component Value Date    HGBA1C 8 1 (H) 08/07/2019       No results for input(s): POCGLU in the last 72 hours  None     patient currently taking glimepiride 1 mg b i d , Tradjenta 5 mg, he also follows up with Endocrinology, who advised to continue current regimen  Will repeat A1c in 3 months  Advised lifestyle modification with diet and exercise  Found to have ophthalmological complication, follows up with Ophthalmology, will try to get records  Foot exam done today  Patient up-to-date on vaccines  Non smoker    Hypertension  BP on arrival 140/90, patient currently on labetalol 100 mg b i d , hydrochlorothiazide 12 5 mg daily, amlodipine 10 mg daily  Goal blood pressure for him less than 140/90  Patient recently also had a gout episode, hydrochlorothiazide is known to cause hyperuricemia  Patient follows up with Nephrology as well, will consider switching to clonidine and discontinuing hydrochlorothiazide if blood pressure continues to remain high  Recent CMP showed creatinine of 2 08 up from baseline of 1 6-1 7  Will repeat CMP, and encouraged patient to follow up with Nephrology    Other osteoporosis without current pathological fracture  The patient was found to have osteoporosis in 2015 and started on Fosamax  Likely secondary to steroids for immunosuppression after transplant  Repeat DEXA scan in 2018 showed much improvement    Patient will complete 5 years of therapy in March 2020, will get DEXA scan prior to that and is consistent with improvement will stop Fosamax    CKD (chronic kidney disease) stage 2, GFR 60-89 ml/min  Recent BMP showed creatinine 2 01 up from baseline of 1 6-1 7  Will have repeat CMP, and encouraged to follow up with Nephrology    Hyperlipidemia  Patient has elevated triglyceride levels, but total cholesterol of 147  Currently on atorvastatin 40 mg daily  Advised lifestyle modification with diet and exercise, was recently started on fish oil capsules by Ophthalmology  Will repeat lipid panel in 6 months to follow-up on triglyceride level    Diagnoses and all orders for this visit:    Essential hypertension  -     Comprehensive metabolic panel; Future    Type 2 diabetes mellitus with other ophthalmic complication, without long-term current use of insulin (HCC)    Status post liver transplant    Other osteoporosis without current pathological fracture    CKD (chronic kidney disease) stage 2, GFR 60-89 ml/min    Hyperlipidemia, unspecified hyperlipidemia type    Other orders  -     MAGNESIUM OXIDE 400 PO; Take by mouth 2 (two) times a day  -     Cancel: DXA bone density spine hip and pelvis; Future         Subjective     Chief Complaint   Patient presents with    Medication Management       57-year-old male presented to office for follow-up visit, he reports today that he had an acute pain in his right great toe, and have to go to urgent care yesterday who started them on prednisone taper for concerns of gout  Patient has had gout episodes twice before  Reports no other complaints today  The following portions of the patient's history were reviewed and updated as appropriate: allergies, current medications, past family history, past medical history, past social history, past surgical history and problem list     Review of Systems   Constitutional: Negative for activity change, chills and fever  HENT: Negative for congestion  Respiratory: Negative for chest tightness and shortness of breath  Cardiovascular: Negative for chest pain  Gastrointestinal: Negative for diarrhea, nausea and vomiting  Genitourinary: Negative for difficulty urinating  Musculoskeletal: Positive for arthralgias     Allergic/Immunologic: Negative for environmental allergies  Neurological: Negative for headaches  Objective     Vitals:Blood pressure 140/90, pulse 80, temperature 98 2 °F (36 8 °C), resp  rate 16, height 5' 3 4" (1 61 m), weight 94 2 kg (207 lb 9 6 oz)  Physical Exam:  Physical Exam   Constitutional: He is oriented to person, place, and time  He appears well-developed and well-nourished  HENT:   Head: Normocephalic and atraumatic  Mouth/Throat: Oropharynx is clear and moist    Eyes: Conjunctivae and EOM are normal    Neck: Normal range of motion  Neck supple  Cardiovascular: Normal rate and regular rhythm  Exam reveals no friction rub  Pulses are no weak pulses  Murmur heard  Pulses:       Dorsalis pedis pulses are 2+ on the right side, and 2+ on the left side  Pulmonary/Chest: Effort normal and breath sounds normal  No respiratory distress  He has no wheezes  He has no rales  Abdominal: Soft  Bowel sounds are normal  He exhibits no distension  There is no tenderness  Musculoskeletal: He exhibits edema and tenderness (Swollen 1st toe and tender)  Feet:   Right Foot:   Skin Integrity: Positive for warmth and dry skin  Negative for ulcer, skin breakdown or erythema  Left Foot:   Skin Integrity: Positive for dry skin  Negative for ulcer, skin breakdown, erythema, warmth or callus  Neurological: He is alert and oriented to person, place, and time  Skin: Skin is warm and dry  Patient's shoes and socks removed  Right Foot/Ankle   Right Foot Inspection  Skin Exam: skin intact, dry skin and warmth no erythema, no maceration, no pre-ulcer and no ulcer                          Toe Exam: ROM and strength within normal limits, swelling and tenderness  Sensory       Monofilament testing: intact  Vascular  Capillary refills: < 3 seconds  The right DP pulse is 2+       Left Foot/Ankle  Left Foot Inspection  Skin Exam: skin normal, skin intact and dry skinno warmth, no erythema, no maceration, normal color, no pre-ulcer, no ulcer and no callus                         Toe Exam: ROM and strength within normal limitsno swelling, no tenderness, no erythema and no left toe deformity                   Sensory       Monofilament: intact  Vascular    The left DP pulse is 2+  Assign Risk Category:  No deformity present; No loss of protective sensation;  No weak pulses       Risk: 0

## 2019-09-03 ENCOUNTER — OFFICE VISIT (OUTPATIENT)
Dept: FAMILY MEDICINE CLINIC | Facility: CLINIC | Age: 59
End: 2019-09-03

## 2019-09-03 ENCOUNTER — TELEPHONE (OUTPATIENT)
Dept: FAMILY MEDICINE CLINIC | Facility: CLINIC | Age: 59
End: 2019-09-03

## 2019-09-03 VITALS
SYSTOLIC BLOOD PRESSURE: 140 MMHG | DIASTOLIC BLOOD PRESSURE: 70 MMHG | RESPIRATION RATE: 18 BRPM | HEART RATE: 80 BPM | BODY MASS INDEX: 36.46 KG/M2 | HEIGHT: 63 IN | WEIGHT: 205.8 LBS | TEMPERATURE: 97.2 F

## 2019-09-03 DIAGNOSIS — E11.39 TYPE 2 DIABETES MELLITUS WITH OTHER OPHTHALMIC COMPLICATION, WITHOUT LONG-TERM CURRENT USE OF INSULIN (HCC): ICD-10-CM

## 2019-09-03 DIAGNOSIS — R73.9 STEROID-INDUCED HYPERGLYCEMIA: Primary | ICD-10-CM

## 2019-09-03 DIAGNOSIS — T38.0X5A STEROID-INDUCED HYPERGLYCEMIA: Primary | ICD-10-CM

## 2019-09-03 LAB
SL AMB  POCT GLUCOSE, UA: NEGATIVE
SL AMB LEUKOCYTE ESTERASE,UA: NEGATIVE
SL AMB POCT BILIRUBIN,UA: NEGATIVE
SL AMB POCT BLOOD,UA: ABNORMAL
SL AMB POCT CLARITY,UA: CLEAR
SL AMB POCT COLOR,UA: YELLOW
SL AMB POCT GLUCOSE BLD: 257
SL AMB POCT HEMOGLOBIN AIC: 8.9 (ref ?–6.5)
SL AMB POCT KETONES,UA: NEGATIVE
SL AMB POCT NITRITE,UA: NEGATIVE
SL AMB POCT PH,UA: 5
SL AMB POCT SPECIFIC GRAVITY,UA: 1.02
SL AMB POCT URINE PROTEIN: 300
SL AMB POCT UROBILINOGEN: 0.2

## 2019-09-03 PROCEDURE — 81002 URINALYSIS NONAUTO W/O SCOPE: CPT | Performed by: FAMILY MEDICINE

## 2019-09-03 PROCEDURE — 83036 HEMOGLOBIN GLYCOSYLATED A1C: CPT | Performed by: FAMILY MEDICINE

## 2019-09-03 PROCEDURE — 99213 OFFICE O/P EST LOW 20 MIN: CPT | Performed by: FAMILY MEDICINE

## 2019-09-03 PROCEDURE — 82948 REAGENT STRIP/BLOOD GLUCOSE: CPT | Performed by: FAMILY MEDICINE

## 2019-09-03 PROCEDURE — 3052F HG A1C>EQUAL 8.0%<EQUAL 9.0%: CPT | Performed by: FAMILY MEDICINE

## 2019-09-03 NOTE — TELEPHONE ENCOUNTER
Called patient, completed Prednisone on Sunday for gout  Concerned about elevated blood sugar  Spoke with Dr Derek Gusman, she stated sugar should come down in next few days  Advised patient to keep readings of blood sugars in the next few days  Offered appoint on [address] 9/5/19, patient declined, states he will wait and call back if sugars still high

## 2019-09-03 NOTE — PROGRESS NOTES
Assessment/Plan:       Problem List Items Addressed This Visit        Endocrine    Type 2 diabetes mellitus with ophthalmic complication, without long-term current use of insulin ([facility] [address] )     Lab Results   Component Value Date    HGBA1C 8 9 (A) 09/03/2019       - Continue current regimen Tradjenta and glimepiride   - Follow-up with PCP and endocrinology               Other    Steroid-induced hyperglycemia - Primary     - Symptomatic with glucose of 330 this morning, symptoms have since resolved with glucose 257 in office   - A1c 8 9 reflects uncontrolled glucose over the past couple of weeks as expected   - Urine dipstick negative for ketones  - Advised to continue to monitor glucose closely, anticipate gradual decrease in glucose overall as last dose of prednisone was on Sunday, no sliding scale insulin indicated at this time   - Return precautions discussed          Relevant Orders    POCT blood glucose (Completed)    POCT urine dip (Completed)    POCT hemoglobin A1c (Completed)            Subjective:      Patient ID: Noah Candelario is a 59 y o  male with past medical history significant for cirrhosis s/p liver transplant, non-insulin dependent type 2 DM, hypertension, CKD stage 3, hyperlipidemia and gout presenting for evaluation of hyperglycemia  The patient presents today due to concern about elevated blood sugars, and feeling as if his eyes were bulging with a sensation of lightheadedness and feeling "off" earlier today  His blood sugar was 330 this morning during breakfast   He was recently given a 7 day course of 20 mg of prednisone for an acute gout flare, he took the last dose on Sunday  He admits that he did not check his blood sugar at all over the past week  He did have an episode similar to this previously where he was given a steroid that his blood sugars were very uncontrolled, requiring admission to the hospital  He states that this episode and symptoms felt very similar to that prior episode   He states that currently all of his symptoms resolved and he is feeling well  The following portions of the patient's history were reviewed and updated as appropriate: allergies, current medications, past family history, past medical history, past social history, past surgical history and problem list     Review of Systems   Constitutional: Negative for activity change, appetite change, chills and fever  HENT: Negative for congestion, sore throat and trouble swallowing  Eyes: Positive for visual disturbance (resolved)  Negative for discharge and redness  Respiratory: Negative for cough and shortness of breath  Cardiovascular: Negative for chest pain, palpitations and leg swelling  Gastrointestinal: Negative for abdominal pain, constipation, diarrhea, nausea and vomiting  Genitourinary: Negative for decreased urine volume and difficulty urinating  Skin: Negative for pallor and rash  Neurological: Positive for dizziness (resolved) and light-headedness (resolved)  Negative for syncope, facial asymmetry, speech difficulty, weakness, numbness and headaches  Psychiatric/Behavioral: Negative for confusion  The patient is not nervous/anxious  Objective:      /70 (BP Location: Left arm, Patient Position: Sitting, Cuff Size: Large)   Pulse 80   Temp (!) 97 2 °F (36 2 °C) (Tympanic)   Resp 18   Ht 5' 3 3" (1 608 m)   Wt 93 4 kg (205 lb 12 8 oz)   BMI 36 11 kg/m²          Physical Exam   Constitutional: He is oriented to person, place, and time  He appears well-developed and well-nourished  No distress  HENT:   Head: Normocephalic and atraumatic  Eyes: Pupils are equal, round, and reactive to light  Conjunctivae and EOM are normal  Right eye exhibits no discharge  Left eye exhibits no discharge  Neck: Normal range of motion  Neck supple  Cardiovascular: Normal rate, regular rhythm, normal heart sounds and intact distal pulses  No murmur heard    Pulmonary/Chest: Effort normal and breath sounds normal  No stridor  No respiratory distress  He has no wheezes  He has no rales  Musculoskeletal: He exhibits no edema  Neurological: He is alert and oriented to person, place, and time  He displays normal reflexes  No cranial nerve deficit  He exhibits normal muscle tone  Skin: Skin is warm  He is not diaphoretic  Psychiatric: He has a normal mood and affect  His behavior is normal    Vitals reviewed          Carolin Klein DO PGY-3   [facility] [place]

## 2019-09-03 NOTE — TELEPHONE ENCOUNTER
Pt would like a call back from nurse he's been feeling dizzy since this morning his blood sugar was 252, he does not want to go to ED wants to speak with someone first       [phone]

## 2019-09-04 PROBLEM — R73.9 STEROID-INDUCED HYPERGLYCEMIA: Status: ACTIVE | Noted: 2019-09-04

## 2019-09-04 PROBLEM — T38.0X5A STEROID-INDUCED HYPERGLYCEMIA: Status: ACTIVE | Noted: 2019-09-04

## 2019-09-05 NOTE — ASSESSMENT & PLAN NOTE
Lab Results   Component Value Date    HGBA1C 8 9 (A) 09/03/2019       - Continue current regimen Tradjenta and glimepiride   - Follow-up with PCP and endocrinology yes

## 2019-09-05 NOTE — ASSESSMENT & PLAN NOTE
- Symptomatic with glucose of 330 this morning, symptoms have since resolved with glucose 257 in office   - A1c 8 9 reflects uncontrolled glucose over the past couple of weeks as expected   - Urine dipstick negative for ketones  - Advised to continue to monitor glucose closely, anticipate gradual decrease in glucose overall as last dose of prednisone was on Sunday, no sliding scale insulin indicated at this time   - Return precautions discussed

## 2019-09-12 ENCOUNTER — OFFICE VISIT (OUTPATIENT)
Dept: ENDOCRINOLOGY | Facility: CLINIC | Age: 59
End: 2019-09-12
Payer: COMMERCIAL

## 2019-09-12 VITALS
HEART RATE: 64 BPM | DIASTOLIC BLOOD PRESSURE: 92 MMHG | BODY MASS INDEX: 36.36 KG/M2 | SYSTOLIC BLOOD PRESSURE: 170 MMHG | WEIGHT: 207.2 LBS

## 2019-09-12 DIAGNOSIS — I10 ESSENTIAL HYPERTENSION: ICD-10-CM

## 2019-09-12 DIAGNOSIS — E11.65 TYPE 2 DIABETES MELLITUS WITH HYPERGLYCEMIA, WITHOUT LONG-TERM CURRENT USE OF INSULIN (HCC): Primary | ICD-10-CM

## 2019-09-12 DIAGNOSIS — N18.30 CHRONIC KIDNEY DISEASE, STAGE III (MODERATE) (HCC): ICD-10-CM

## 2019-09-12 DIAGNOSIS — E78.5 HYPERLIPIDEMIA, UNSPECIFIED HYPERLIPIDEMIA TYPE: ICD-10-CM

## 2019-09-12 DIAGNOSIS — E11.39 TYPE 2 DIABETES MELLITUS WITH OTHER OPHTHALMIC COMPLICATION, WITHOUT LONG-TERM CURRENT USE OF INSULIN (HCC): ICD-10-CM

## 2019-09-12 PROCEDURE — 99214 OFFICE O/P EST MOD 30 MIN: CPT | Performed by: INTERNAL MEDICINE

## 2019-09-12 NOTE — PATIENT INSTRUCTIONS
Hypoglycemia in a Person with Diabetes   WHAT YOU NEED TO KNOW:   Hypoglycemia is a serious condition that happens when your blood glucose (sugar) level drops too low  The blood sugar level is usually too high in a person with diabetes, but the level can also drop too low  It is important to follow your diabetes management plan to keep your blood sugar level steady  DISCHARGE INSTRUCTIONS:   Call 911 for any of the following:   · You feel you are going to pass out  · You have a seizure or pass out  · You have trouble thinking clearly  Seek care immediately if:  · Your blood sugar is less than 50 mg/dL and does not respond to treatment  Contact your healthcare provider if:   · You have had symptoms of low blood sugar several times  · You have questions about the amount of insulin or diabetes medicine you are taking  · You have questions or concerns about your condition or care  Medicines:   · Insulin or diabetes medicine  help to keep your blood sugar under control  · Glucagon  may be needed if you have severe hypoglycemia  · Take your medicine as directed  Contact your healthcare provider if you think your medicine is not helping or if you have side effects  Tell him or her if you are allergic to any medicine  Keep a list of the medicines, vitamins, and herbs you take  Include the amounts, and when and why you take them  Bring the list or the pill bottles to follow-up visits  Carry your medicine list with you in case of an emergency  Follow up with your healthcare provider or specialist as directed: You may need dose changes to your insulin or oral diabetes medicine if you have hypoglycemia  Write down your questions so you remember to ask them during your visits  Manage hypoglycemia:   · Check your blood sugar level right away if you have symptoms of hypoglycemia  Hypoglycemia is usually 70 mg/dL or below   Ask your healthcare provider what blood sugar level is too low for you     · If your blood sugar level is too low, eat or drink 15 grams of fast-acting carbohydrate  Examples of this amount of fast-acting carbohydrate are 4 ounces (½ cup) of fruit juice or 4 ounces of regular soda  Other examples are 2 tablespoons of raisins or 3 to 4 glucose tablets  Check your blood sugar level 15 minutes later  If the level is still low (less than 100 mg/dL), have another 15 grams of carbohydrate  When the level returns to 100 mg/dL, eat a snack or meal that contains carbohydrates  This will help prevent another drop in blood sugar  Always carefully follow your healthcare provider's instructions on how to treat low blood sugar levels  · Always carry a source of fast-acting carbohydrate  If you have symptoms of hypoglycemia and you do not have a blood glucose meter, have a source of fast-acting carbohydrate anyway  Avoid carbohydrate foods that are high in fat  The fat content may make it take longer to increase your blood sugar level  Ask your healthcare provider if you should carry a glucagon kit  Glucagon is a medicine that is injected when you develop severe hypoglycemia and become unconscious  Check the expiration date every month and replace it before it expires  · Teach others how to help you if you have symptoms of hypoglycemia  Tell them about the symptoms of hypoglycemia  Ask them to give you a source of fast-acting carbohydrate if you cannot get it yourself  Ask them to give you a glucagon injection if you have symptoms of hypoglycemia and you become unconscious or have a seizure  Ask them to call 911   This is an emergency  Tell them never to try to make you swallow anything if you faint or have a seizure  · Wear medical alert jewelry  or carry a card that says you have diabetes  Ask where to get these items  Prevent hypoglycemia:   · Take diabetes medicine as directed  Take your medicine at the right time and in the right amount   Your healthcare provider may change your blood sugar goals if you get hypoglycemia often  · Eat regular meals and snacks  Talk to your dietitian or healthcare provider about a meal plan that is right for you  Do not skip meals  · Check your blood sugar level as directed  Ask your healthcare provider what your blood sugar levels should be before and after you eat  Ask when and how often to check your blood sugar level  You may need to check at least 3 times each day  Record your blood sugar level results and take the record with you when you see your healthcare provider  Your provider may use the record to make changes to your medicine, food, or exercise schedules  · Check your blood sugar level before you exercise  Exercise can decrease your blood sugar level  If your blood sugar level is less than 100 mg/dL, have a carbohydrate snack  Examples are 4 to 6 crackers, ½ banana, 8 ounces (1 cup) of nonfat or 1% milk, or 4 ounces (½ cup) of juice  If you will exercise for more than 1 hour, you may need to check your blood sugar level every 30 minutes  Your healthcare provider may also recommend that you check your blood sugar level after exercise  · Be aware of how alcohol affects your blood sugar level  Alcohol can cause your blood sugar level to drop for up to 12 hours after drinking  Ask your healthcare provider if alcohol is safe for you  If you drink alcohol, always have a snack or meal at the same time  Women should limit alcohol to 1 drink a day  Men should limit alcohol to 2 drinks a day  A drink of alcohol is 12 ounces of beer, 5 ounces of wine, or 1½ ounces of liquor  © 2017 [employer]  Information is for End User's use only and may not be sold, redistributed or otherwise used for commercial purposes  All illustrations and images included in CareNotes® are the copyrighted property of [employer]  or [employer]  The above information is an  only   It is not intended as medical advice for individual conditions or treatments  Talk to your doctor, nurse or pharmacist before following any medical regimen to see if it is safe and effective for you

## 2019-09-12 NOTE — ASSESSMENT & PLAN NOTE
Lab Results   Component Value Date    HGBA1C 8 9 (A) 09/03/2019     worsening-A1c trending up  For now will discontinue oral hypoglycemics and start him on basal bolus insulin therapy  tresiba 12 units in the morning-NovoLog 6 units before breakfast and 4 before lunch and dinner  Monitor blood sugars 3 to 4 times a day and send over log in 2 weeks  Also refer for diagnostic continuous glucose monitor to get a better understanding of his blood sugar patterns    No results for input(s): POCGLU in the last 72 hours      Blood Sugar Average: Last 72 hrs:

## 2019-09-12 NOTE — PROGRESS NOTES
Indio Lynn 59 y o  male MRN: [id number]    Encounter: [id number]      Assessment/Plan     Problem List Items Addressed This Visit        Endocrine    Type 2 diabetes mellitus with ophthalmic complication, without long-term current use of insulin ([facility] )     Lab Results   Component Value Date    HGBA1C 8 9 (A) 09/03/2019    worsening-A1c trending up  For now will discontinue oral hypoglycemics and start him on basal bolus insulin therapy  tresiba 12 units in the morning-NovoLog 6 units before breakfast and 4 before lunch and dinner  Monitor blood sugars 3 to 4 times a day and send over log in 2 weeks  Also refer for diagnostic continuous glucose monitor to get a better understanding of his blood sugar patterns    No results for input(s): POCGLU in the last 72 hours  Blood Sugar Average: Last 72 hrs:           Relevant Medications    insulin degludec (TRESIBA FLEXTOUCH) 100 units/mL injection pen    insulin aspart (NOVOLOG FLEXPEN) 100 Units/mL injection pen    Insulin Pen Needle (BD PEN NEEDLE MICHAEL U/F) 32G X 4 MM MISC    Other Relevant Orders    Ambulatory referral to Diabetic Education    Ambulatory referral to medical nutrition therapy for diabetes    Continous glucose monitoring delvin placement    Continous glucose monitoring delvin intrepretation    Type 2 diabetes mellitus with hyperglycemia ([facility] ) - Primary     Lab Results   Component Value Date    HGBA1C 8 9 (A) 09/03/2019     worsening-A1c trending up  For now will discontinue oral hypoglycemics and start him on basal bolus insulin therapy  tresiba 12 units in the morning-NovoLog 6 units before breakfast and 4 before lunch and dinner  Monitor blood sugars 3 to 4 times a day and send over log in 2 weeks  Also refer for diagnostic continuous glucose monitor to get a better understanding of his blood sugar patterns    No results for input(s): POCGLU in the last 72 hours      Blood Sugar Average: Last 72 hrs:           Relevant Medications    insulin degludec (TRESIBA FLEXTOUCH) 100 units/mL injection pen    insulin aspart (NOVOLOG FLEXPEN) 100 Units/mL injection pen    Insulin Pen Needle (BD PEN NEEDLE MICHAEL U/F) 32G X 4 MM MISC    Other Relevant Orders    Ambulatory referral to Diabetic Education    Ambulatory referral to medical nutrition therapy for diabetes    Continous glucose monitoring delvin placement    Continous glucose monitoring delvin intrepretation       Genitourinary    Chronic kidney disease, stage III (moderate)    Relevant Orders    Ambulatory referral to medical nutrition therapy for diabetes       Other    Hyperlipidemia     Continue statins         Relevant Orders    Ambulatory referral to medical nutrition therapy for diabetes        CC: Diabetes    History of Present Illness     HPI:  42-year-old male with history of type 2 diabetes, status post liver transplant, chronic kidney disease, hypertension and hyperlipidemia here for follow-up  Current regimen include tradjenta and glimepiride   Was on prednisone for gout -in august Checks f s 2-3/day  Fasting 170-200 ,  2-3 hours after breakfast   >200s , bedtime 120-160  No hypoglycemia     C/o polydipsia , dry mouth , polyuria , c/o blurry vision   No numbness and tingling in feet  Review of Systems   Constitutional: Negative for fatigue and unexpected weight change  Eyes: Positive for visual disturbance  Respiratory: Negative for shortness of breath  Cardiovascular: Negative for palpitations and leg swelling  Gastrointestinal: Negative for constipation, diarrhea, nausea and vomiting  Endocrine: Positive for polydipsia and polyuria  Musculoskeletal: Negative for gait problem  Skin: Negative for wound  Psychiatric/Behavioral: Negative for sleep disturbance  The patient is not nervous/anxious  All other systems reviewed and are negative        Historical Information   Past Medical History:   Diagnosis Date    Chronic kidney disease, stage III (moderate) ([address] ) RESOLVED: [id number]    Cirrhosis ([facility] )     Diabetes mellitus ([facility] )     Hepatic encephalopathy ([facility] )     Hepatitis C     Hyperkalemia     Hyperlipidemia     Hypertension     [facility]     Status post liver transplant ([facility] )     Umbilical hernia      Past Surgical History:   Procedure Laterality Date    ANKLE SURGERY      CHOLECYSTECTOMY      GALLBLADDER SURGERY      LIVER TRANSPLANTATION      LAST ASSESSED: [id number]    IA COLONOSCOPY FLX DX W/COLLJ SPEC WHEN PFRMD N/A 2/16/2017    Procedure: COLONOSCOPY;  Surgeon: Kade Hayden MD;  Location: [facility];   Service: Gastroenterology    TONSILLECTOMY       Social History   Social History     Substance and Sexual Activity   Alcohol Use Yes    Comment: occassionally     Social History     Substance and Sexual Activity   Drug Use No     Social History     Tobacco Use   Smoking Status Never Smoker   Smokeless Tobacco Never Used     Family History:   Family History   Problem Relation Age of Onset    Diabetes Mother         TYPE 2    Hypertension Mother     Stroke Mother 68        SYNDROME     Hepatitis Brother         C    Cirrhosis Brother         LIVER     Cancer Family        Meds/Allergies   Current Outpatient Medications   Medication Sig Dispense Refill    alendronate (FOSAMAX) 70 mg tablet Take 1 tablet (70 mg total) by mouth every 7 days 12 tablet 2    amLODIPine (NORVASC) 10 mg tablet TAKE 1 TABLET DAILY 90 tablet 2    atorvastatin (LIPITOR) 40 mg tablet TAKE 1 TABLET DAILY AT BEDTIME 90 tablet 3    azaTHIOprine (IMURAN) 50 mg tablet Take 1 tablet (50 mg total) by mouth daily 90 tablet 3    Cholecalciferol (VITAMIN D3) 1000 units CAPS Take 1 capsule by mouth daily      fludrocortisone (FLORINEF) 0 1 mg tablet TAKE 1 TABLET DAILY 90 tablet 2    hydrochlorothiazide (MICROZIDE) 12 5 mg capsule TAKE 1 CAPSULE DAILY 90 capsule 2    labetalol (NORMODYNE) 100 mg tablet TAKE 1 TABLET EVERY 12 HOURS 180 tablet 2    Multiple Vitamins-Minerals (MULTIVITAMIN WITH MINERALS) tablet Take 1 tablet by mouth daily      ONETOUCH VERIO test strip USE 1 EACH TWICE A  each 0    tacrolimus (PROGRAF) 1 mg capsule Take by mouth Takes 4 tabs in am, 3 tabs in pm       insulin aspart (NOVOLOG FLEXPEN) 100 Units/mL injection pen 6 units before breakfast and 4 units before lunch and dinner 5 pen 1    insulin degludec (TRESIBA FLEXTOUCH) 100 units/mL injection pen Inject 12 Units under the skin daily 5 pen 1    Insulin Pen Needle (BD PEN NEEDLE MICHAEL U/F) 32G X 4 MM MISC by Does not apply route 4 (four) times a day 100 each 3    MAGNESIUM OXIDE 400 PO Take by mouth 2 (two) times a day       No current facility-administered medications for this visit  No Known Allergies    Objective   Vitals: Blood pressure 170/92, pulse 64, weight 94 kg (207 lb 3 2 oz)  Physical Exam   Constitutional: He is oriented to person, place, and time  He appears well-developed and well-nourished  No distress  HENT:   Head: Normocephalic and atraumatic  Eyes: EOM are normal  No scleral icterus  Neck: Normal range of motion  Neck supple  No thyromegaly present  Cardiovascular: Normal rate, regular rhythm and normal heart sounds  No murmur heard  Pulmonary/Chest: Effort normal and breath sounds normal  No respiratory distress  He has no wheezes  He has no rales  Abdominal: Soft  Bowel sounds are normal  He exhibits no distension  There is no tenderness  There is no guarding  Musculoskeletal: Normal range of motion  He exhibits no edema or deformity  Lymphadenopathy:     He has no cervical adenopathy  Neurological: He is alert and oriented to person, place, and time  Skin: Skin is warm and dry  Psychiatric: He has a normal mood and affect  His behavior is normal  Judgment and thought content normal        The history was obtained from the review of the chart, patient      Lab Results:   Lab Results   Component Value Date/Time    Hemoglobin A1C 8 9 (A) 09/03/2019 05:15 PM    Hemoglobin A1C 8 1 (H) 08/07/2019 09:58 AM    Hemoglobin A1C 7 6 (H) 06/01/2019 09:44 AM    Hemoglobin A1C 7 9 (A) 03/15/2019 10:30 AM    Hemoglobin A1C 7 5 (H) 12/10/2018 10:15 AM    WBC 4 66 08/07/2019 09:58 AM    WBC 4 58 06/01/2019 09:44 AM    WBC 4 52 03/02/2019 09:26 AM    Hemoglobin 12 3 08/07/2019 09:58 AM    Hemoglobin 12 0 06/01/2019 09:44 AM    Hemoglobin 12 0 03/02/2019 09:26 AM    Hematocrit 38 6 08/07/2019 09:58 AM    Hematocrit 36 3 (L) 06/01/2019 09:44 AM    Hematocrit 36 0 (L) 03/02/2019 09:26 AM    MCV 90 08/07/2019 09:58 AM    MCV 88 06/01/2019 09:44 AM    MCV 89 03/02/2019 09:26 AM    Platelets 376 (L) 47/41/4436 09:58 AM    Platelets 561 (L) 53/93/6500 09:44 AM    Platelets 503 (L) 61/87/9569 09:26 AM    BUN 55 (H) 08/07/2019 09:58 AM    BUN 38 (H) 06/01/2019 09:44 AM    BUN 36 (H) 03/02/2019 09:26 AM    Potassium 5 3 08/07/2019 09:58 AM    Potassium 4 4 06/01/2019 09:44 AM    Potassium 4 7 03/02/2019 09:26 AM    Chloride 102 08/07/2019 09:58 AM    Chloride 105 06/01/2019 09:44 AM    Chloride 104 03/02/2019 09:26 AM    CO2 27 08/07/2019 09:58 AM    CO2 26 06/01/2019 09:44 AM    CO2 27 03/02/2019 09:26 AM    Creatinine 2 08 (H) 08/07/2019 09:58 AM    Creatinine 1 71 (H) 06/01/2019 09:44 AM    Creatinine 1 69 (H) 03/02/2019 09:26 AM    AST 17 08/07/2019 09:58 AM    AST 17 06/01/2019 09:44 AM    AST 16 03/02/2019 09:26 AM    ALT 50 08/07/2019 09:58 AM    ALT 42 06/01/2019 09:44 AM    ALT 37 03/02/2019 09:26 AM    Albumin 4 1 08/07/2019 09:58 AM    Albumin 3 8 06/01/2019 09:44 AM    Albumin 3 9 03/02/2019 09:26 AM    HDL, Direct 32 (L) 06/01/2019 09:44 AM    HDL, Direct 36 (L) 12/10/2018 10:15 AM    Triglycerides 354 (H) 06/01/2019 09:44 AM    Triglycerides 314 (H) 12/10/2018 10:15 AM               Portions of the record may have been created with voice recognition software   Occasional wrong word or "sound a like" substitutions may have occurred due to the inherent limitations of voice recognition software  Read the chart carefully and recognize, using context, where substitutions have occurred

## 2019-09-12 NOTE — ASSESSMENT & PLAN NOTE
Blood pressure above goal-he will check with his transplant team whether he should still be taking Florinef

## 2019-09-13 ENCOUNTER — TELEPHONE (OUTPATIENT)
Dept: ENDOCRINOLOGY | Facility: CLINIC | Age: 59
End: 2019-09-13

## 2019-09-13 DIAGNOSIS — E11.39 TYPE 2 DIABETES MELLITUS WITH OTHER OPHTHALMIC COMPLICATION, WITHOUT LONG-TERM CURRENT USE OF INSULIN (HCC): Primary | ICD-10-CM

## 2019-09-13 NOTE — TELEPHONE ENCOUNTER
Patient left a message on the answering machine asking to speak to Dr Ole Valiente  He left no other information  His number is [phone]  Thank you

## 2019-09-14 ENCOUNTER — TRANSCRIBE ORDERS (OUTPATIENT)
Dept: LAB | Facility: CLINIC | Age: 59
End: 2019-09-14

## 2019-09-14 ENCOUNTER — APPOINTMENT (OUTPATIENT)
Dept: LAB | Facility: CLINIC | Age: 59
End: 2019-09-14
Payer: COMMERCIAL

## 2019-09-14 DIAGNOSIS — C22.1 MALIGNANT NEOPLASM OF INTRAHEPATIC BILE DUCTS (HCC): ICD-10-CM

## 2019-09-14 DIAGNOSIS — Z94.4 LIVER REPLACED BY TRANSPLANT (HCC): Primary | ICD-10-CM

## 2019-09-14 DIAGNOSIS — D68.9 BLOOD CLOTTING DISORDER (HCC): ICD-10-CM

## 2019-09-14 DIAGNOSIS — R79.1 ABNORMAL COAGULATION PROFILE: ICD-10-CM

## 2019-09-14 DIAGNOSIS — I10 ESSENTIAL HYPERTENSION: ICD-10-CM

## 2019-09-14 DIAGNOSIS — Z94.4 LIVER REPLACED BY TRANSPLANT (HCC): ICD-10-CM

## 2019-09-19 ENCOUNTER — OFFICE VISIT (OUTPATIENT)
Dept: DIABETES SERVICES | Facility: CLINIC | Age: 59
End: 2019-09-19
Payer: COMMERCIAL

## 2019-09-19 VITALS — BODY MASS INDEX: 36.5 KG/M2 | HEIGHT: 63 IN | WEIGHT: 206 LBS

## 2019-09-19 DIAGNOSIS — E11.39 TYPE 2 DIABETES MELLITUS WITH OTHER OPHTHALMIC COMPLICATION, WITHOUT LONG-TERM CURRENT USE OF INSULIN (HCC): Primary | ICD-10-CM

## 2019-09-19 PROCEDURE — 97802 MEDICAL NUTRITION INDIV IN: CPT | Performed by: DIETITIAN, REGISTERED

## 2019-09-19 NOTE — PATIENT INSTRUCTIONS
1  Follow meal plan with 45 g carb per meal  2  Get light juice or diet juice, limit to 8 oz  Light juice per day  3   Start exercising at least 3 days per [place]

## 2019-09-19 NOTE — PROGRESS NOTES
Medical Nutrition Therapy    Assessment    Visit Type: Initial visit    Chief complaint Patient with type 2 diabetes started back on insulin last Saturday after seeing Dr Seferino Bianchi last week  He had used insulin in the past  Reports widely fluctuating BG readings now,   States he has forgotten to take 1-2 doses in the past 5 days  HPI: Rand diet history reveals relatively consistent intake with the same foods consumed most work days for all meals  He includes processed grains, processed and fresh meats, occasional vegetables and fruits, and very little dairy except cheese  He states his wife is conscious of healthy cooking  Home cooked meals could include up to 3 cups potatoes or rice at a sitting  Currently meals range from 30 to 100+ grams of carbohydrate  Explained basic pathophysiology of diabetes and impact of diet on blood glucose levels  Also explained benefit of exercise, including more vegetables, and whole grains  Together we discussed what foods contain CHO, reading a food label, and serving sizes  Used the portion booklet to teach Lainey Shaffer more about food groups and basic carbohydrate counting  Created an individualized meal plan for Lainey Shaffer with 3 meals and 2 snacks providing 45 g carb per meal and 20-25 g carb per snack  This plan will help promote weight loss  Put together sample meals for Toribio's reference  Lainey Shaffer demonstrated good understanding, Lainey Shaffer will call with questions or for more education      Ht Readings from Last 1 Encounters:   09/03/19 5' 3 3" (1 608 m)     Wt Readings from Last 2 Encounters:   09/12/19 94 kg (207 lb 3 2 oz)   09/03/19 93 4 kg (205 lb 12 8 oz)     Weight Change: Yes has gained 10#, is usually in 190's    Medical Diagnosis/ICD10:   E11 39 (ICD-10-CM) - Type 2 diabetes mellitus with other ophthalmic complication, without long-term current use of insulin ([facility])   E11 65 (ICD-10-CM) - Type 2 diabetes mellitus with hyperglycemia, without long-term current use of insulin ([facility] )   E78 5 (ICD-10-CM) - Hyperlipidemia, unspecified hyperlipidemia type   N18 3 (ICD-10-CM) - Chronic kidney disease, stage III (moderate) ([facility])     Barriers to Learning: no barriers    Do you follow any special diet presently?: No, has stopped drinking diet sodas  Who shops: patient  Who cooks: spouse    Food Log: Completed via the method of food recall    Breakfast:9am, 2 eggs & 2 white bread toast w/ spread, cranberry juice 16 oz  Morning Snack:might have an apple  Lunch:5:30, Occasional leftovers, usually 1 sandwich, one LS ham, one salami and cheese, may add Tastycake 2 pack of cupcakes, water  Dinner:7:45, 1 sandwich, same as above  If at home, 2 chicken thighs, no skin, 2-3 cups potatoes, rare veggies or salads  Evening Snack:may have soda crackers w/ butter OR 2 cups Honey Nut Cheerios   Beverages: Cranberry juice at breakfast only, may have water through day  Eating out/Take out:on weekends 1-2 times per weekend  Exercise goes out for walks sometimes  Is active at work   Lives in [address] [place] [address] and has access to a gym    Calorie needs 1530 kcals/day Carbs: 45 g/meal, 20-25g/snack         Nutrition Diagnosis:  Food and nutrition related knowledge deficit  related to Lack of prior exposure to accurate nutrition related information as evidenced by New medical diagnosis or change in existing diagnosis or condition    Intervention: label reading, carbohydrate counting, increased plant based foods, meal planning, individualized meal plan, monitoring portion control, exercise guidelines and food diary     Treatment Goals: Patient understands education and recommendations, Patient will monitor portion control, Patient will count carbohydrates and Patient will exercise    Monitoring and evaluation:    Term code indicator  FH 1 3 2 Food Intake Criteria: Measure portions of starches and juice  Term code indicator  FH 1 6 3 Carbohydrate Intake Criteria: 45 grams per meal  Term code indicator  CH 1 1 Personal Data Criteria: Start exercising, do some kind of intentional physical activity 3 times per week    Patients Response to Instruction:  Kaleigh Talbert  Expected Compliancefair    Thank you for coming to the [facility] for education today  Please feel free to call with any questions or concerns      Samantha Preston  [phone]  [address] [facility] [id number]

## 2019-09-25 LAB
LEFT EYE DIABETIC RETINOPATHY: NORMAL
RIGHT EYE DIABETIC RETINOPATHY: NORMAL

## 2019-09-25 PROCEDURE — 2022F DILAT RTA XM EVC RTNOPTHY: CPT | Performed by: FAMILY MEDICINE

## 2019-10-02 ENCOUNTER — TELEPHONE (OUTPATIENT)
Dept: ENDOCRINOLOGY | Facility: CLINIC | Age: 59
End: 2019-10-02

## 2019-10-04 DIAGNOSIS — E11.39 TYPE 2 DIABETES MELLITUS WITH OTHER OPHTHALMIC COMPLICATION, WITHOUT LONG-TERM CURRENT USE OF INSULIN (HCC): ICD-10-CM

## 2019-10-04 DIAGNOSIS — E11.65 TYPE 2 DIABETES MELLITUS WITH HYPERGLYCEMIA, WITHOUT LONG-TERM CURRENT USE OF INSULIN (HCC): ICD-10-CM

## 2019-10-22 ENCOUNTER — OFFICE VISIT (OUTPATIENT)
Dept: NEPHROLOGY | Facility: CLINIC | Age: 59
End: 2019-10-22
Payer: COMMERCIAL

## 2019-10-22 VITALS
BODY MASS INDEX: 36.86 KG/M2 | WEIGHT: 208 LBS | HEIGHT: 63 IN | SYSTOLIC BLOOD PRESSURE: 148 MMHG | HEART RATE: 70 BPM | DIASTOLIC BLOOD PRESSURE: 88 MMHG

## 2019-10-22 DIAGNOSIS — N18.2 CKD (CHRONIC KIDNEY DISEASE) STAGE 2, GFR 60-89 ML/MIN: ICD-10-CM

## 2019-10-22 DIAGNOSIS — R80.8 OTHER PROTEINURIA: ICD-10-CM

## 2019-10-22 DIAGNOSIS — I10 ESSENTIAL HYPERTENSION: Primary | ICD-10-CM

## 2019-10-22 PROCEDURE — 99214 OFFICE O/P EST MOD 30 MIN: CPT | Performed by: INTERNAL MEDICINE

## 2019-10-22 NOTE — PROGRESS NOTES
NEPHROLOGY PROGRESS NOTE    Raman Lowery 59 y o  male MRN: [id number]  Unit/Bed#:  Encounter: [id number]  Reason for Consult:  Chronic kidney disease    The patient is here for yearly follow-up  He says he has been doing well had a vacation last year other than that no complaints for me  He did tell me that he was placed on steroids for presumptive gout flare in his foot and that raise his blood sugars and he has now been placed on insulin for about a month  ASSESSMENT/PLAN:  1  Renal  Patient's chronic kidney disease and he is status post liver transplantation on tacrolimus  Protein estimation is about 2 g when it was checked this summer so I suspect he has underlying diabetic nephropathy as well  It is possible that that range proteinuria could be related to chronic interstitial disease also  Course he needs his tacrolimus to prevent rejection is liver transplant  His creatinine is stable over last year at 1 6 although it fluctuated at times likely due to the elevated glucose in the whole scope with things it is stable without progression  His blood pressure is not optimal so could have stop his fludrocortisone as he says he has been on that a long time he can't really recall why  He case was for hyperkalemia I am just going to check a BMP after couple weeks off the medication to make sure his electrolytes are normal   If they are normal at that point I likely will try and implement an ACE-inhibitor or ARB to help lower proteinuria but I want make sure the electrolytes are normal and then will long-term we will follow for progression with glycemic control blood pressure control lipid control and trying to reduce proteinuria as above  No other changes  SUBJECTIVE:  Review of Systems   Constitution: Negative for chills, fever and malaise/fatigue  HENT: Negative  Eyes: Negative  Cardiovascular: Negative  Negative for chest pain, dyspnea on exertion, leg swelling, orthopnea and palpitations  Respiratory: Negative  Negative for cough, shortness of breath, snoring and wheezing  Gastrointestinal: Negative  Negative for bloating, abdominal pain, diarrhea, nausea and vomiting  Genitourinary: Negative  Negative for dysuria, hematuria, hesitancy and incomplete emptying  Neurological: Negative  Psychiatric/Behavioral: Negative  OBJECTIVE:  Current Weight: Weight - Scale: 94 3 kg (208 lb)  [email or web address] com:     Blood pressure 148/88, pulse 70, height 5' 3 3" (1 608 m), weight 94 3 kg (208 lb)  , Body mass index is 36 5 kg/m²  [unfilled]    Physical Exam: /88 (BP Location: Left arm, Patient Position: Sitting, Cuff Size: Standard)   Pulse 70   Ht 5' 3 3" (1 608 m)   Wt 94 3 kg (208 lb)   BMI 36 50 kg/m²   Physical Exam   Constitutional: He is oriented to person, place, and time  He appears well-nourished  No distress  HENT:   Head: Atraumatic  Eyes: EOM are normal  No scleral icterus  Neck: Neck supple  No JVD present  Cardiovascular: Normal rate and regular rhythm  Exam reveals no gallop and no friction rub  Trace edema  Pulmonary/Chest: Effort normal and breath sounds normal  No respiratory distress  He has no wheezes  He has no rales  Abdominal: Soft  Bowel sounds are normal  He exhibits no distension  There is no tenderness  Neurological: He is alert and oriented to person, place, and time  Psychiatric: He has a normal mood and affect         Medications:    Current Outpatient Medications:     amLODIPine (NORVASC) 10 mg tablet, TAKE 1 TABLET DAILY, Disp: 90 tablet, Rfl: 2    atorvastatin (LIPITOR) 40 mg tablet, TAKE 1 TABLET DAILY AT BEDTIME, Disp: 90 tablet, Rfl: 3    azaTHIOprine (IMURAN) 50 mg tablet, Take 1 tablet (50 mg total) by mouth daily, Disp: 90 tablet, Rfl: 3    Cholecalciferol (VITAMIN D3) 1000 units CAPS, Take 1 capsule by mouth daily, Disp: , Rfl:     hydrochlorothiazide (MICROZIDE) 12 5 mg capsule, TAKE 1 CAPSULE DAILY, Disp: 90 capsule, Rfl: 2    insulin degludec (TRESIBA) 100 units/mL injection pen, Inject 16 Units under the skin daily, Disp: , Rfl:     insulin lispro (HUMALOG KWIKPEN) 100 units/mL injection pen, 6 units before breakfast and 4 units before lunch and dinner, Disp: 5 pen, Rfl: 5    Insulin Pen Needle (BD PEN NEEDLE MICHAEL U/F) 32G X 4 MM MISC, by Does not apply route 4 (four) times a day, Disp: 100 each, Rfl: 3    labetalol (NORMODYNE) 100 mg tablet, TAKE 1 TABLET EVERY 12 HOURS, Disp: 180 tablet, Rfl: 2    MAGNESIUM OXIDE 400 PO, Take by mouth 2 (two) times a day, Disp: , Rfl:     Multiple Vitamins-Minerals (MULTIVITAMIN WITH MINERALS) tablet, Take 1 tablet by mouth daily, Disp: , Rfl:     ONETOUCH VERIO test strip, USE 1 EACH TWICE A DAY, Disp: 200 each, Rfl: 0    tacrolimus (PROGRAF) 1 mg capsule, Take by mouth Takes 4 tabs in am, 3 tabs in pm , Disp: , Rfl:     alendronate (FOSAMAX) 70 mg tablet, Take 1 tablet (70 mg total) by mouth every 7 days (Patient not taking: Reported on 10/22/2019), Disp: 12 tablet, Rfl: 2    insulin aspart (NOVOLOG FLEXPEN) 100 Units/mL injection pen, 6 units before breakfast and 4 units before lunch and dinner (Patient not taking: Reported on 10/22/2019), Disp: 5 pen, Rfl: 1    Laboratory Results:  Lab Results   Component Value Date    WBC 4 58 09/14/2019    HGB 11 3 (L) 09/14/2019    HCT 35 0 (L) 09/14/2019    MCV 90 09/14/2019     (L) 09/14/2019     Lab Results   Component Value Date    SODIUM 141 09/14/2019    K 4 3 09/14/2019     09/14/2019    CO2 28 09/14/2019    BUN 31 (H) 09/14/2019    CREATININE 1 69 (H) 09/14/2019    GLUC 182 (H) 09/14/2019    CALCIUM 8 9 09/14/2019     Lab Results   Component Value Date    CALCIUM 8 9 09/14/2019    PHOS 4 0 10/24/2014     No results found for: LABPROT

## 2019-10-22 NOTE — PATIENT INSTRUCTIONS
You are here for yearly follow-up it sounds like your health has been doing well except you were placed on insulin because her blood sugars had gone up with steroid therapy  At this point your on insulin and working on your blood sugar control  Your creatinine which is the blood test for the kidney function is stable at 1 6 even going back a year ago it has not gotten worse  There have been times in between where was little higher but that is likely when her blood sugars were higher as well as that will dehydrate you  Your electrolytes are normal as well  You do have some protein in the urine so diabetes may fact the kidneys and that may be the primary issue with the kidneys even more so than the tacrolimus  We are going to try and stop your fludrocortisone to see if your blood pressure will be improved with stopping that as you for get really wire on that have been on a long time  Some people on this for elevated potassium levels so I am just going to had you do blood work a few weeks after you stop the medication to make sure that is not an issue  I will call you with the results and at that point if the electrolytes are okay we may consider adding another medicine to try and lower protein in the urine that will help delay damage from diabetic kidney disease        Discontinue fludrocortisone  Obtain lab work as ordered  I will call you with the results

## 2019-10-22 NOTE — LETTER
October 22, 2019     William Jensen MD  [address]    Patient: Geni Fang   YOB: 1960   Date of Visit: 10/22/2019       Dear Dr Pedro Sims: Thank you for referring Gnei Fang to me for evaluation  Below are my notes for this consultation  If you have questions, please do not hesitate to call me  I look forward to following your patient along with you  Sincerely,        Herve Solano MD        CC: No Recipients  Herve Solano MD  10/22/2019  9:36 AM  Sign at close encounter  [address] 59 y o  male MRN: [id number]  Unit/Bed#:  Encounter: [id number]  Reason for Consult:  Chronic kidney disease    The patient is here for yearly follow-up  He says he has been doing well had a vacation last year other than that no complaints for me  He did tell me that he was placed on steroids for presumptive gout flare in his foot and that raise his blood sugars and he has now been placed on insulin for about a month  ASSESSMENT/PLAN:  1  Renal  Patient's chronic kidney disease and he is status post liver transplantation on tacrolimus  Protein estimation is about 2 g when it was checked this summer so I suspect he has underlying diabetic nephropathy as well  It is possible that that range proteinuria could be related to chronic interstitial disease also  Course he needs his tacrolimus to prevent rejection is liver transplant  His creatinine is stable over last year at 1 6 although it fluctuated at times likely due to the elevated glucose in the whole scope with things it is stable without progression  His blood pressure is not optimal so could have stop his fludrocortisone as he says he has been on that a long time he can't really recall why    He case was for hyperkalemia I am just going to check a BMP after couple weeks off the medication to make sure his electrolytes are normal   If they are normal at that point I likely will try and implement an ACE-inhibitor or ARB to help lower proteinuria but I want make sure the electrolytes are normal and then will long-term we will follow for progression with glycemic control blood pressure control lipid control and trying to reduce proteinuria as above  No other changes  SUBJECTIVE:  Review of Systems   Constitution: Negative for chills, fever and malaise/fatigue  HENT: Negative  Eyes: Negative  Cardiovascular: Negative  Negative for chest pain, dyspnea on exertion, leg swelling, orthopnea and palpitations  Respiratory: Negative  Negative for cough, shortness of breath, snoring and wheezing  Gastrointestinal: Negative  Negative for bloating, abdominal pain, diarrhea, nausea and vomiting  Genitourinary: Negative  Negative for dysuria, hematuria, hesitancy and incomplete emptying  Neurological: Negative  Psychiatric/Behavioral: Negative  OBJECTIVE:  Current Weight: Weight - Scale: 94 3 kg (208 lb)  [email or web address]:     Blood pressure 148/88, pulse 70, height 5' 3 3" (1 608 m), weight 94 3 kg (208 lb)  , Body mass index is 36 5 kg/m²  [unfilled]    Physical Exam: /88 (BP Location: Left arm, Patient Position: Sitting, Cuff Size: Standard)   Pulse 70   Ht 5' 3 3" (1 608 m)   Wt 94 3 kg (208 lb)   BMI 36 50 kg/m²    Physical Exam   Constitutional: He is oriented to person, place, and time  He appears well-nourished  No distress  HENT:   Head: Atraumatic  Eyes: EOM are normal  No scleral icterus  Neck: Neck supple  No JVD present  Cardiovascular: Normal rate and regular rhythm  Exam reveals no gallop and no friction rub  Trace edema  Pulmonary/Chest: Effort normal and breath sounds normal  No respiratory distress  He has no wheezes  He has no rales  Abdominal: Soft  Bowel sounds are normal  He exhibits no distension  There is no tenderness  Neurological: He is alert and oriented to person, place, and time     Psychiatric: He has a normal mood and affect         Medications:    Current Outpatient Medications:     amLODIPine (NORVASC) 10 mg tablet, TAKE 1 TABLET DAILY, Disp: 90 tablet, Rfl: 2    atorvastatin (LIPITOR) 40 mg tablet, TAKE 1 TABLET DAILY AT BEDTIME, Disp: 90 tablet, Rfl: 3    azaTHIOprine (IMURAN) 50 mg tablet, Take 1 tablet (50 mg total) by mouth daily, Disp: 90 tablet, Rfl: 3    Cholecalciferol (VITAMIN D3) 1000 units CAPS, Take 1 capsule by mouth daily, Disp: , Rfl:     hydrochlorothiazide (MICROZIDE) 12 5 mg capsule, TAKE 1 CAPSULE DAILY, Disp: 90 capsule, Rfl: 2    insulin degludec (TRESIBA) 100 units/mL injection pen, Inject 16 Units under the skin daily, Disp: , Rfl:     insulin lispro (HUMALOG KWIKPEN) 100 units/mL injection pen, 6 units before breakfast and 4 units before lunch and dinner, Disp: 5 pen, Rfl: 5    Insulin Pen Needle (BD PEN NEEDLE MICHAEL U/F) 32G X 4 MM MISC, by Does not apply route 4 (four) times a day, Disp: 100 each, Rfl: 3    labetalol (NORMODYNE) 100 mg tablet, TAKE 1 TABLET EVERY 12 HOURS, Disp: 180 tablet, Rfl: 2    MAGNESIUM OXIDE 400 PO, Take by mouth 2 (two) times a day, Disp: , Rfl:     Multiple Vitamins-Minerals (MULTIVITAMIN WITH MINERALS) tablet, Take 1 tablet by mouth daily, Disp: , Rfl:     ONETOUCH VERIO test strip, USE 1 EACH TWICE A DAY, Disp: 200 each, Rfl: 0    tacrolimus (PROGRAF) 1 mg capsule, Take by mouth Takes 4 tabs in am, 3 tabs in pm , Disp: , Rfl:     alendronate (FOSAMAX) 70 mg tablet, Take 1 tablet (70 mg total) by mouth every 7 days (Patient not taking: Reported on 10/22/2019), Disp: 12 tablet, Rfl: 2    insulin aspart (NOVOLOG FLEXPEN) 100 Units/mL injection pen, 6 units before breakfast and 4 units before lunch and dinner (Patient not taking: Reported on 10/22/2019), Disp: 5 pen, Rfl: 1    Laboratory Results:  Lab Results   Component Value Date    WBC 4 58 09/14/2019    HGB 11 3 (L) 09/14/2019    HCT 35 0 (L) 09/14/2019    MCV 90 09/14/2019     (L) 09/14/2019 Lab Results   Component Value Date    SODIUM 141 09/14/2019    K 4 3 09/14/2019     09/14/2019    CO2 28 09/14/2019    BUN 31 (H) 09/14/2019    CREATININE 1 69 (H) 09/14/2019    GLUC 182 (H) 09/14/2019    CALCIUM 8 9 09/14/2019     Lab Results   Component Value Date    CALCIUM 8 9 09/14/2019    PHOS 4 0 10/24/2014     No results found for: LABPROT

## 2019-10-29 ENCOUNTER — OFFICE VISIT (OUTPATIENT)
Dept: ENDOCRINOLOGY | Facility: CLINIC | Age: 59
End: 2019-10-29
Payer: COMMERCIAL

## 2019-10-29 VITALS
WEIGHT: 209.4 LBS | HEIGHT: 63 IN | HEART RATE: 85 BPM | BODY MASS INDEX: 37.1 KG/M2 | SYSTOLIC BLOOD PRESSURE: 158 MMHG | DIASTOLIC BLOOD PRESSURE: 90 MMHG

## 2019-10-29 DIAGNOSIS — I10 ESSENTIAL HYPERTENSION: ICD-10-CM

## 2019-10-29 DIAGNOSIS — E78.5 HYPERLIPIDEMIA, UNSPECIFIED HYPERLIPIDEMIA TYPE: ICD-10-CM

## 2019-10-29 DIAGNOSIS — E11.39 TYPE 2 DIABETES MELLITUS WITH OTHER OPHTHALMIC COMPLICATION, WITHOUT LONG-TERM CURRENT USE OF INSULIN (HCC): Primary | ICD-10-CM

## 2019-10-29 PROCEDURE — 99215 OFFICE O/P EST HI 40 MIN: CPT | Performed by: PHYSICIAN ASSISTANT

## 2019-10-29 NOTE — ASSESSMENT & PLAN NOTE
Poorly controlled/not at goal with A1C of 8 9  Discussed need for better control to reduce risk/progression of complications  Morning blood sugars are consistently elevated, rest of day variable  For now increase Tresiba to 20 units daily after 1 week if mornings consistently over 150 with no hypoglycemia increase to 24 units daily  Send BG log in two weeks for review and every two weeks for continued insulin adjustments  Due to being on intensive insulin therapy,  he at high risk of severe hypoglycemia  Severe hypoglycemia can result in falls, injury, coma, seizure, or death  Discussed need for lifestyle modifications/weight loss- he has recently seen dietician but does not seem to be following plan  Encouraged him to exercise  Discussed CGM for personal use- he is not interested

## 2019-10-29 NOTE — PROGRESS NOTES
Established Patient Progress Note      Chief Complaint   Patient presents with    Diabetes Type 2        History of Present Illness:   Bibiana Trent is a 59 y o  male with a history of type 2 diabetes with long term use of insulin since abojt 15-20  Years ago  Started basal/bolus regimen last visit  Reports complications of CKD and retinopathy  Denies recent illness or hospitalizations  Denies recent severe hypoglycemic or severe hyperglycemic episodes  Denies any issues with his current regimen  home glucose monitoring: are performed regularly 2-3x per day  Started Basal/Bolus last visit  Met with dietician 9/19    Home blood glucose readings:   Before breakfast: high usually around 190-low 200s  Before lunch: varies 143-202  Before dinner: varies   Bedtime: not checking  Often doesn't snack at night  Often will skip lunch when he does, will skip lunch humalog dose  Current regimen:   Tresiba 16 units daily  Humalog 6-4-4    Last Eye Exam: UTD  Last Foot Exam: UTD    Has hypertension: Taking multiple meds, Following with nephrology-- recently stopped florinef 10/22   nephrology monitoring, after that may need ACEI or ARB     Hx liver transplant, follows with nephrology for CKD  Has hyperlipidemia: Taking atorvastatin        Patient Active Problem List   Diagnosis    Hypertension    Type 2 diabetes mellitus with ophthalmic complication, without long-term current use of insulin ([facility] [address] )    Cirrhosis    Chronic kidney disease, stage III (moderate)    Hyperlipidemia    Status post liver transplant    Hyperglycemia    Encounter for immunization    Osteopenia    CKD (chronic kidney disease) stage 2, GFR 60-89 ml/min    Male erectile disorder    Type 2 diabetes mellitus with hyperglycemia (HCC)    Other osteoporosis without current pathological fracture    Acute gout of right foot    Steroid-induced hyperglycemia    Other proteinuria      Past Medical History:   Diagnosis Date    Chronic kidney disease, stage III (moderate) (HCC)     RESOLVED: [id number]    [facility]osis [facility] [address] [facility]     [facility]     Hepatic encephalopathy [facility]     Hepatitis C     Hyperkalemia     Hyperlipidemia     Hypertension     [facility]ancytopenia [facility]     Status post liver transplant ([facility] )     Umbilical hernia       Past Surgical History:   Procedure Laterality Date    ANKLE SURGERY      CHOLECYSTECTOMY      GALLBLADDER SURGERY      LIVER TRANSPLANTATION      LAST ASSESSED: [id number]    PA COLONOSCOPY FLX DX W/COLLJ SPEC WHEN PFRMD N/A 2/16/2017    Procedure: COLONOSCOPY;  Surgeon: Ezio Melissa MD;  Location: [facility]; Service: Gastroenterology    TONSILLECTOMY        Family History   Problem Relation Age of Onset    Diabetes Mother         TYPE 2    Hypertension Mother     Stroke Mother 68        SYNDROME     Hepatitis Brother         C    Cirrhosis Brother         LIVER     Cancer Family      Social History     Tobacco Use    Smoking status: Never Smoker    Smokeless tobacco: Never Used   Substance Use Topics    Alcohol use:  Yes     Alcohol/week: 1 0 standard drinks     Types: 1 Cans of beer per week     Frequency: 2-4 times a month     Drinks per session: 1 or 2     Comment: occassionally     No Known Allergies      Current Outpatient Medications:     alendronate (FOSAMAX) 70 mg tablet, Take 1 tablet (70 mg total) by mouth every 7 days, Disp: 12 tablet, Rfl: 2    amLODIPine (NORVASC) 10 mg tablet, TAKE 1 TABLET DAILY, Disp: 90 tablet, Rfl: 2    atorvastatin (LIPITOR) 40 mg tablet, TAKE 1 TABLET DAILY AT BEDTIME, Disp: 90 tablet, Rfl: 3    azaTHIOprine (IMURAN) 50 mg tablet, Take 1 tablet (50 mg total) by mouth daily, Disp: 90 tablet, Rfl: 3    Cholecalciferol (VITAMIN D3) 1000 units CAPS, Take 1 capsule by mouth daily, Disp: , Rfl:     hydrochlorothiazide (MICROZIDE) 12 5 mg capsule, TAKE 1 CAPSULE DAILY, Disp: 90 capsule, Rfl: 2    insulin degludec (TRESIBA) 100 units/mL injection pen, Inject 20 Units under the skin daily, Disp: 6 pen, Rfl: 0    insulin lispro (HUMALOG KWIKPEN) 100 units/mL injection pen, 6 units before breakfast and 4 units before lunch and dinner, Disp: 5 pen, Rfl: 5    Insulin Pen Needle (BD PEN NEEDLE MICHAEL U/F) 32G X 4 MM MISC, by Does not apply route 4 (four) times a day, Disp: 100 each, Rfl: 3    labetalol (NORMODYNE) 100 mg tablet, TAKE 1 TABLET EVERY 12 HOURS, Disp: 180 tablet, Rfl: 2    MAGNESIUM OXIDE 400 PO, Take 1 tablet by mouth 2 (two) times a day , Disp: , Rfl:     Multiple Vitamins-Minerals (MULTIVITAMIN WITH MINERALS) tablet, Take 1 tablet by mouth daily, Disp: , Rfl:     ONETOUCH VERIO test strip, USE 1 EACH TWICE A DAY, Disp: 200 each, Rfl: 0    tacrolimus (PROGRAF) 1 mg capsule, Take by mouth Takes 4 tabs in am, 3 tabs in pm , Disp: , Rfl:     Review of Systems   Constitutional: Negative for activity change, appetite change and fatigue  HENT: Negative for sore throat, trouble swallowing and voice change  Eyes: Negative for visual disturbance  Respiratory: Negative for choking, chest tightness and shortness of breath  Cardiovascular: Negative for chest pain, palpitations and leg swelling  Gastrointestinal: Negative for abdominal pain, constipation and diarrhea  Endocrine: Negative for cold intolerance, heat intolerance, polydipsia, polyphagia and polyuria  Genitourinary: Negative for frequency  Musculoskeletal: Negative for arthralgias and myalgias  Skin: Negative for rash  Neurological: Negative for dizziness and syncope  Hematological: Negative for adenopathy  Psychiatric/Behavioral: Negative for sleep disturbance  All other systems reviewed and are negative  Physical Exam:  Body mass index is 37 09 kg/m²    /90 (BP Location: Left arm, Patient Position: Sitting, Cuff Size: Large)   Pulse 85   Ht 5' 3" (1 6 m)   Wt 95 kg (209 lb 6 4 oz)   BMI 37 09 kg/m²    Wt Readings from Last 3 Encounters:   10/29/19 95 kg (209 lb 6 4 oz)   10/22/19 94 3 kg (208 lb)   09/19/19 93 4 kg (206 lb)       Physical Exam   Constitutional: He is oriented to person, place, and time  He appears well-developed and well-nourished  No distress  HENT:   Head: Normocephalic and atraumatic  Mouth/Throat: Oropharynx is clear and moist    Eyes: Pupils are equal, round, and reactive to light  Conjunctivae and EOM are normal    Neck: Normal range of motion  Neck supple  No thyromegaly present  Cardiovascular: Normal rate, regular rhythm and normal heart sounds  No murmur heard  Pulmonary/Chest: Effort normal and breath sounds normal  No respiratory distress  He has no wheezes  He has no rales  Abdominal: Soft  Bowel sounds are normal  He exhibits no distension  There is no tenderness  Musculoskeletal: Normal range of motion  He exhibits no edema  Lymphadenopathy:     He has no cervical adenopathy  Neurological: He is alert and oriented to person, place, and time  Skin: Skin is warm and dry  Psychiatric: He has a normal mood and affect  Vitals reviewed        Labs:   Lab Results   Component Value Date    HGBA1C 8 9 (A) 09/03/2019    HGBA1C 8 1 (H) 08/07/2019    HGBA1C 7 6 (H) 06/01/2019     Lab Results   Component Value Date    CREATININE 1 69 (H) 09/14/2019    CREATININE 2 08 (H) 08/07/2019    CREATININE 1 71 (H) 06/01/2019    BUN 31 (H) 09/14/2019     01/08/2016    K 4 3 09/14/2019     09/14/2019    CO2 28 09/14/2019     eGFR   Date Value Ref Range Status   09/14/2019 43 ml/min/1 73sq m Final     Lab Results   Component Value Date    CHOL 174 03/10/2015    HDL 32 (L) 06/01/2019    TRIG 354 (H) 06/01/2019     Lab Results   Component Value Date    ALT 37 09/14/2019    AST 13 09/14/2019    GGT 36 09/14/2019    ALKPHOS 95 09/14/2019    BILITOT 0 29 01/08/2016     Lab Results   Component Value Date    MMO4EFJBEBFP 1 432 06/01/2019    NRW8TBSKUPND 1 603 05/02/2017     Lab Results   Component Value Date    FREET4 0 96 06/01/2019       Impression & Plan:    Problem List Items Addressed This Visit        Endocrine    Type 2 diabetes mellitus with ophthalmic complication, without long-term current use of insulin ([facility] [address] ) - Primary     Poorly controlled/not at goal with A1C of 8 9  Discussed need for better control to reduce risk/progression of complications  Morning blood sugars are consistently elevated, rest of day variable  For now increase Tresiba to 20 units daily after 1 week if mornings consistently over 150 with no hypoglycemia increase to 24 units daily  Send BG log in two weeks for review and every two weeks for continued insulin adjustments  Due to being on intensive insulin therapy,  he at high risk of severe hypoglycemia  Severe hypoglycemia can result in falls, injury, coma, seizure, or death  Discussed need for lifestyle modifications/weight loss- he has recently seen dietician but does not seem to be following plan  Encouraged him to exercise  Discussed CGM for personal use- he is not interested  Relevant Medications    insulin degludec (TRESIBA) 100 units/mL injection pen    Other Relevant Orders    Triglycerides Lab Collect    HEMOGLOBIN A1C W/ EAG ESTIMATION Lab Collect       Cardiovascular and Mediastinum    Hypertension     Not at goal   He was recently seen by nephrology and florinef was discontinued and he has order for repeat labs and possible starting ACEI/ARB            Other    Hyperlipidemia     Continue Atorvastatin                  Orders Placed This Encounter   Procedures    Triglycerides Lab Collect     Standing Status:   Future     Standing Expiration Date:   10/29/2020    HEMOGLOBIN A1C W/ EAG ESTIMATION Lab Collect     Standing Status:   Future     Standing Expiration Date:   10/29/2020       Patient Instructions   Romelia Owens increase to 20 units daily  After 1 week if morning sugars remain mostly over 150 with no lows less than 80, increase the tresiba to 24 units daily  Send Log in 2 weeks for review  Call if questions   Keep sending blood sugars every 2 weeks for continuous adjustments to get blood sugars at goal with most readings   Discussed with the patient and all questioned fully answered  He will call me if any problems arise  Follow-up appointment in 3 months       Counseled patient on diagnostic results, prognosis, risk and benefit of treatment options, instruction for management, importance of treatment compliance, Risk  factor reduction and impressions    Malena Grant PA-C

## 2019-10-29 NOTE — ASSESSMENT & PLAN NOTE
Not at goal   He was recently seen by nephrology and florinef was discontinued and he has order for repeat labs and possible starting ACEI/ARB

## 2019-10-29 NOTE — PATIENT INSTRUCTIONS
Tresiba increase to 20 units daily  After 1 week if morning sugars remain mostly over 150 with no lows less than 80, increase the tresiba to 24 units daily  Send Log in 2 weeks for review  Call if questions   Keep sending blood sugars every 2 weeks for continuous adjustments to get blood sugars at goal with most readings

## 2019-10-31 ENCOUNTER — OFFICE VISIT (OUTPATIENT)
Dept: DIABETES SERVICES | Facility: CLINIC | Age: 59
End: 2019-10-31

## 2019-10-31 VITALS — HEIGHT: 63 IN | BODY MASS INDEX: 36.5 KG/M2 | WEIGHT: 206 LBS

## 2019-10-31 DIAGNOSIS — E11.39 TYPE 2 DIABETES MELLITUS WITH OTHER OPHTHALMIC COMPLICATION, WITH LONG-TERM CURRENT USE OF INSULIN (HCC): Primary | ICD-10-CM

## 2019-10-31 DIAGNOSIS — Z79.4 TYPE 2 DIABETES MELLITUS WITH OTHER OPHTHALMIC COMPLICATION, WITH LONG-TERM CURRENT USE OF INSULIN (HCC): Primary | ICD-10-CM

## 2019-10-31 NOTE — PROGRESS NOTES
Medical Nutrition Therapy      Assessment    Chief complaint Reportedly left his 3 day food diary in his kitchen  Nic Lyman saw Ru 2 days ago and started using 20 units of Tresiba yesterday morning  He ate out last night, did not bring his Novolog out with him (never does) and forgot to use it last night but did eat some halloween candy  This morning FBG reading high, he did not disclose  We discussed why  He reports that he does not want to take his insulin out when he dines out  Visit Type: Follow-up visit    HPI: Nic Lyman returned for follow-up today  Toribios food recall reveals eating 2-3 meals daily, very little fresh fruit or vegetables, recent change to diet juice but still includes some added sugar in diet  Overall, Toribios meals provide 30-60 grams carbohydrate  Based on meal plan review and discussion about insulin timing provided education about ideas for taking insulin when dining out and methods to remember to take insulin at home after eating if he forgets  Explained that the fact that he "doesn't want" to take insulin with him means that he is choosing to have hyperglycemia  Discussed strategies for dining out, monitoring portions, getting sauces on the side, taking half or more of the meal home, etc  We discussed his exercise routine which got disrupted while he was furloughed for 2 weeks  He is going back to work today, so will start back with going to the gym 3 days per week  Explained benefit of exercise for BG management      Ht Readings from Last 1 Encounters:   10/31/19 5' 3" (1 6 m)     Wt Readings from Last 2 Encounters:   10/31/19 93 4 kg (206 lb)   10/29/19 95 kg (209 lb 6 4 oz)     Weight Change: No    Medical Diagnosis/reason for visit E11 [id number]    Food Log: Completed via the method of food recall     Breakfast: 8:30-10am, ham and cheese sandwich on white bread today OR 2 eggs w/ 2 toast, diet cran-grape juice  Morning Snack:none  Lunch:5:30, first break, takes insulin in rest room 1 sandwich w [employer] and cheese or leftover meat or slice of pizza  Afternoon Snack: none  Dinner:7:45, 1 sandwich when at work, 5-6 pm at home, out on weekends  Rarely salad  Evening Snack:soda crackers w/ a little butter & diet juice    Beverages: water, diet juice, occasional other diet drink, coffee w/ sugar  Eating out/Take out: frequently on the weekends    Exercise walks for 2 miles and does some weight training 3 days per week during normal work week    Calorie needs 1530 kcals/day Carbs: 45 g/meal, 20-25 g/snack       Nutrition Diagnosis:  Inappropriate intake of types of carbohydrate  related to Physiological causes requiring careful use of modified carbohydrate (i e  diabetes mellitus, hypoglycemia, metabolic syndrome, celiac disease, obesity) as evidenced by  Carbohydrate intake that is different from recommended types    Intervention: carbohydrate counting, monitoring portion control and use of sugar substitutes     Treatment Goals: Patient will monitor portion control and Patient will exercise    Monitoring and evaluation:    Term code indicator  FH 1 6 3 Carbohydrate Intake Criteria: If meal is not a sandwich, make sure starch portion is only 1 fist or 1 cup total  Term code indicator  CH 1 1 Personal Data Criteria: Get back into exercise routine    Patients Response to Instruction:  Heaven Velazquez  Expected Compliancefair    Thank you for coming to the [facility] for education today  Please feel free to call with any questions or concerns      Amanda Hoffman  [id number]  [address] [facility] [id number]

## 2019-10-31 NOTE — PATIENT INSTRUCTIONS
1  Get back into the work out routine 3 days per week  2  Find a way to always take insulin before your meals, even when dining out  3   Send in BG readings every 2 weeks

## 2019-11-01 ENCOUNTER — IMMUNIZATIONS (OUTPATIENT)
Dept: FAMILY MEDICINE CLINIC | Facility: CLINIC | Age: 59
End: 2019-11-01

## 2019-11-01 DIAGNOSIS — Z23 ENCOUNTER FOR IMMUNIZATION: ICD-10-CM

## 2019-11-01 PROCEDURE — 90471 IMMUNIZATION ADMIN: CPT

## 2019-11-01 PROCEDURE — 90682 RIV4 VACC RECOMBINANT DNA IM: CPT

## 2019-11-02 ENCOUNTER — APPOINTMENT (OUTPATIENT)
Dept: LAB | Facility: CLINIC | Age: 59
End: 2019-11-02
Payer: COMMERCIAL

## 2019-11-02 DIAGNOSIS — N18.2 CKD (CHRONIC KIDNEY DISEASE) STAGE 2, GFR 60-89 ML/MIN: ICD-10-CM

## 2019-11-02 LAB
ANION GAP SERPL CALCULATED.3IONS-SCNC: 9 MMOL/L (ref 4–13)
BUN SERPL-MCNC: 38 MG/DL (ref 5–25)
CALCIUM SERPL-MCNC: 9.2 MG/DL (ref 8.3–10.1)
CHLORIDE SERPL-SCNC: 104 MMOL/L (ref 100–108)
CO2 SERPL-SCNC: 25 MMOL/L (ref 21–32)
CREAT SERPL-MCNC: 1.57 MG/DL (ref 0.6–1.3)
GFR SERPL CREATININE-BSD FRML MDRD: 47 ML/MIN/1.73SQ M
GLUCOSE P FAST SERPL-MCNC: 181 MG/DL (ref 65–99)
POTASSIUM SERPL-SCNC: 4.5 MMOL/L (ref 3.5–5.3)
SODIUM SERPL-SCNC: 138 MMOL/L (ref 136–145)

## 2019-11-02 PROCEDURE — 36415 COLL VENOUS BLD VENIPUNCTURE: CPT

## 2019-11-02 PROCEDURE — 80048 BASIC METABOLIC PNL TOTAL CA: CPT

## 2019-11-04 ENCOUNTER — TELEPHONE (OUTPATIENT)
Dept: NEPHROLOGY | Facility: CLINIC | Age: 59
End: 2019-11-04

## 2019-11-04 NOTE — TELEPHONE ENCOUNTER
Left message for the patient to call back in regards to his recent lab test results from Dr Bryce Rose MA

## 2019-11-04 NOTE — TELEPHONE ENCOUNTER
----- Message from Michael Higgins MD sent at 11/4/2019  7:37 AM EST -----  Let him know creatinine 1 5 better than before   ----- Message -----  From: Lab, Background User  Sent: 11/2/2019   9:58 AM EST  To: Michael Higgins MD

## 2019-11-11 DIAGNOSIS — Z12.5 PROSTATE CANCER SCREENING: Primary | ICD-10-CM

## 2019-11-12 ENCOUNTER — APPOINTMENT (OUTPATIENT)
Dept: LAB | Facility: CLINIC | Age: 59
End: 2019-11-12
Payer: COMMERCIAL

## 2019-11-12 DIAGNOSIS — Z12.5 PROSTATE CANCER SCREENING: ICD-10-CM

## 2019-11-12 DIAGNOSIS — E27.40 HYPOALDOSTERONISM (HCC): Primary | ICD-10-CM

## 2019-11-12 LAB — PSA SERPL-MCNC: 0.5 NG/ML (ref 0–4)

## 2019-11-12 PROCEDURE — 36415 COLL VENOUS BLD VENIPUNCTURE: CPT

## 2019-11-12 PROCEDURE — G0103 PSA SCREENING: HCPCS

## 2019-11-12 RX ORDER — FLUDROCORTISONE ACETATE 0.1 MG/1
TABLET ORAL
Qty: 90 TABLET | Refills: 4 | Status: SHIPPED | OUTPATIENT
Start: 2019-11-12 | End: 2019-12-04 | Stop reason: CLARIF

## 2019-11-14 NOTE — PROGRESS NOTES
11/15/2019    Cherelle Line  1960  [id number]      Assessment  -BPH with lower urinary tract symptoms  -Erectile dysfunction   -Routine prostate cancer screening    Discussion/Plan  Caren Cristina is a 59 y o  male being managed by Dr Anahi Nath        -We reviewed results of recent PSA which is 0 5, previously 1 0  No significant findings noted on VENUS today  He is doing well without any urinary complaints  -I had a long discussion with patient in regards to management for ED  Unfortunately, he has had no success with PDE 5 inhibitors and Tri Mix injections  Discussed vacuum assist device verses discussed seeing penile implant with surgeon  Patient is not interested in surgical intervention, and would like to try vacuum assist device  Will send request to medical supply company  -Patient will return in 1 year for routine prostate cancer screening with PSA and VENUS  He is instructed to call with any issues  -All questions answered, patient agrees with plan      History of Present Illness  59 y o  male with a history of BPH with LUTS and ED presents today for follow up  He denies any lower urinary tract symptoms, gross hematuria, or dysuria  At last office visit, patient is Tri Mix concentration was increased  Unfortunately, he has been unable to achieve a successful erection  Patient states this started after liver transplant in 2014  His last PSA 1/21/19 was 1 0  Patient denies any strong family history of prostate cancer  He states he was recently started on insulin for management of diabetes  He is closely following under endocrinology  Review of Systems  Review of Systems   Constitutional: Negative  HENT: Negative  Respiratory: Negative  Cardiovascular: Negative  Gastrointestinal: Negative  Genitourinary: Negative for decreased urine volume, difficulty urinating, dysuria, flank pain, frequency, hematuria and urgency  Musculoskeletal: Negative  Skin: Negative      Neurological: Negative  Psychiatric/Behavioral: Negative  AUA SYMPTOM SCORE      Most Recent Value   AUA SYMPTOM SCORE   How often have you had a sensation of not emptying your bladder completely after you finished urinating? 0   How often have you had to urinate again less than two hours after you finished urinating? 0   How often have you found you stopped and started again several times when you urinate?  0   How often have you found it difficult to postpone urination? 0   How often have you had a weak urinary stream?  0   How often have you had to push or strain to begin urination? 0   How many times did you most typically get up to urinate from the time you went to bed at night until the time you got up in the morning? 1   Quality of Life: If you were to spend the rest of your life with your urinary condition just the way it is now, how would you feel about that?  0   AUA SYMPTOM SCORE  1          Past Medical History  Past Medical History:   Diagnosis Date    Chronic [facility] disease, stage III (moderate) ([facility] )     RESOLVED: [id number]    Cirrhosis ([place] [address] )     Diabetes mellitus ([place] [address] )     Hepatic encephalopathy ([facility] )     Hepatitis C     Hyperkalemia     Hyperlipidemia     Hypertension     Pancytopenia ([facility] )     Status post liver transplant ([place] [address] )     Umbilical hernia        Past Social History  Past Surgical History:   Procedure Laterality Date    ANKLE SURGERY      CHOLECYSTECTOMY      GALLBLADDER SURGERY      LIVER TRANSPLANTATION      LAST ASSESSED: [id number]    MN COLONOSCOPY FLX DX W/COLLJ SPEC WHEN PFRMD N/A 2/16/2017    Procedure: COLONOSCOPY;  Surgeon: Emi Kam MD;  Location: [facility];   Service: Gastroenterology    TONSILLECTOMY         Past Family History  Family History   Problem Relation Age of Onset    Diabetes Mother         TYPE 2    Hypertension Mother     Stroke Mother 68        SYNDROME     Hepatitis Brother         C    Cirrhosis Brother         LIVER     Cancer Family        Past Social history  Social History     Socioeconomic History    Marital status: /Civil Union     Spouse name: Not on file    Number of children: Not on file    Years of education: Not on file    Highest education level: Not on file   Occupational History    Occupation: WORKS AT [employer]     Comment: [employer]    Social Needs    Financial resource strain: Not on file    Food insecurity:     Worry: Not on file     Inability: Not on file    Transportation needs:     Medical: Not on file     Non-medical: Not on file   Tobacco Use    Smoking status: Never Smoker    Smokeless tobacco: Never Used   Substance and Sexual Activity    Alcohol use:  Yes     Alcohol/week: 1 0 standard drinks     Types: 1 Cans of beer per week     Frequency: 2-4 times a month     Drinks per session: 1 or 2     Comment: occassionally    Drug use: No    Sexual activity: Not on file   Lifestyle    Physical activity:     Days per week: Not on file     Minutes per session: Not on file    Stress: Not on file   Relationships    Social connections:     Talks on phone: Not on file     Gets together: Not on file     Attends Buddhism service: Not on file     Active member of club or organization: Not on file     Attends meetings of clubs or organizations: Not on file     Relationship status: Not on file    Intimate partner violence:     Fear of current or ex partner: Not on file     Emotionally abused: Not on file     Physically abused: Not on file     Forced sexual activity: Not on file   Other Topics Concern    Not on file   Social History Narrative    Uses safety equipment- seatbelts        Current Medications  Current Outpatient Medications   Medication Sig Dispense Refill    alendronate (FOSAMAX) 70 mg tablet Take 1 tablet (70 mg total) by mouth every 7 days 12 tablet 2    amLODIPine (NORVASC) 10 mg tablet TAKE 1 TABLET DAILY 90 tablet 2    atorvastatin (LIPITOR) 40 mg tablet TAKE 1 TABLET DAILY AT BEDTIME 90 tablet 3    azaTHIOprine (IMURAN) 50 mg tablet Take 1 tablet (50 mg total) by mouth daily 90 tablet 3    Cholecalciferol (VITAMIN D3) 1000 units CAPS Take 1 capsule by mouth daily      fludrocortisone (FLORINEF) 0 1 mg tablet TAKE 1 TABLET DAILY 90 tablet 4    hydrochlorothiazide (MICROZIDE) 12 5 mg capsule TAKE 1 CAPSULE DAILY 90 capsule 2    insulin degludec (TRESIBA) 100 units/mL injection pen Inject 20 Units under the skin daily 6 pen 0    insulin lispro (HUMALOG KWIKPEN) 100 units/mL injection pen 6 units before breakfast and 4 units before lunch and dinner 5 pen 5    Insulin Pen Needle (BD PEN NEEDLE MICHAEL U/F) 32G X 4 MM MISC by Does not apply route 4 (four) times a day 100 each 3    labetalol (NORMODYNE) 100 mg tablet TAKE 1 TABLET EVERY 12 HOURS 180 tablet 2    MAGNESIUM OXIDE 400 PO Take 1 tablet by mouth 2 (two) times a day       Multiple Vitamins-Minerals (MULTIVITAMIN WITH MINERALS) tablet Take 1 tablet by mouth daily      ONETOUCH VERIO test strip USE 1 EACH TWICE A DAY (Patient taking differently: No sig reported) 200 each 0    tacrolimus (PROGRAF) 1 mg capsule Take by mouth Takes 4 tabs in am, 3 tabs in pm        No current facility-administered medications for this visit  Allergies  No Known Allergies    Past Medical History, Social History, Family History, medications and allergies were reviewed  Vitals  There were no vitals filed for this visit  Physical Exam  Physical Exam   Constitutional: He is oriented to person, place, and time  He appears well-developed and well-nourished  HENT:   Head: Normocephalic  Eyes: Pupils are equal, round, and reactive to light  Neck: Normal range of motion  Cardiovascular: Normal rate and regular rhythm  Pulmonary/Chest: Effort normal    Abdominal: Soft  Normal appearance  There is no CVA tenderness     Genitourinary: Rectum normal and prostate normal    Genitourinary Comments: Prostate 35 g, smooth, nontender, no nodules   Musculoskeletal: Normal range of motion  Neurological: He is alert and oriented to person, place, and time  Skin: Skin is warm and dry  Psychiatric: He has a normal mood and affect  His behavior is normal  Judgment and thought content normal        Results    I have personally reviewed all pertinent lab results and reviewed with patient  Lab Results   Component Value Date    PSA 0 5 11/12/2019    PSA 1 0 01/21/2019    PSA 0 4 12/10/2018     Lab Results   Component Value Date    GLUCOSE 146 (H) 01/08/2016    CALCIUM 9 2 11/02/2019     01/08/2016    K 4 5 11/02/2019    CO2 25 11/02/2019     11/02/2019    BUN 38 (H) 11/02/2019    CREATININE 1 57 (H) 11/02/2019     Lab Results   Component Value Date    WBC 4 58 09/14/2019    HGB 11 3 (L) 09/14/2019    HCT 35 0 (L) 09/14/2019    MCV 90 09/14/2019     (L) 09/14/2019     No results found for this or any previous visit (from the past 1 hour(s))

## 2019-11-15 ENCOUNTER — OFFICE VISIT (OUTPATIENT)
Dept: UROLOGY | Facility: AMBULATORY SURGERY CENTER | Age: 59
End: 2019-11-15
Payer: COMMERCIAL

## 2019-11-15 VITALS
SYSTOLIC BLOOD PRESSURE: 148 MMHG | DIASTOLIC BLOOD PRESSURE: 90 MMHG | HEIGHT: 63 IN | BODY MASS INDEX: 36.5 KG/M2 | HEART RATE: 86 BPM | WEIGHT: 206 LBS

## 2019-11-15 DIAGNOSIS — N40.1 BENIGN PROSTATIC HYPERPLASIA WITH LOWER URINARY TRACT SYMPTOMS, SYMPTOM DETAILS UNSPECIFIED: ICD-10-CM

## 2019-11-15 DIAGNOSIS — Z12.5 SCREENING FOR PROSTATE CANCER: ICD-10-CM

## 2019-11-15 DIAGNOSIS — N52.9 MALE ERECTILE DISORDER: Primary | ICD-10-CM

## 2019-11-15 PROCEDURE — 99214 OFFICE O/P EST MOD 30 MIN: CPT | Performed by: NURSE PRACTITIONER

## 2019-11-20 ENCOUNTER — TELEPHONE (OUTPATIENT)
Dept: ENDOCRINOLOGY | Facility: CLINIC | Age: 59
End: 2019-11-20

## 2019-11-20 NOTE — TELEPHONE ENCOUNTER
L/m for patient requesting a call back to find out if he placed an order for alcohol prep pads with [employer]  He also requested a Rx for Dovonex Cream which is something we do not prescribe for the patient  We do not treat his psrosis  Called and l/m for [employer] notifying them

## 2019-11-21 NOTE — TELEPHONE ENCOUNTER
Patient returned my call and stated that he does not use this company  Patient uses [address] [place] [address] for all his diabetic supplies  [employer]  and requested that they remove this patient's account, because he uses a different DME vendor  They stated they have already removed him

## 2019-11-22 ENCOUNTER — TELEPHONE (OUTPATIENT)
Dept: ENDOCRINOLOGY | Facility: CLINIC | Age: 59
End: 2019-11-22

## 2019-11-25 NOTE — TELEPHONE ENCOUNTER
L/m for patient requesting a call back  Need to confirm Tresiba dosing  Per Ru --    If on Tresiba 20 units daily, increase to 22  [facility], increase to 26

## 2019-11-26 ENCOUNTER — TELEPHONE (OUTPATIENT)
Dept: UROLOGY | Facility: AMBULATORY SURGERY CENTER | Age: 59
End: 2019-11-26

## 2019-11-26 NOTE — TELEPHONE ENCOUNTER
Call placed to patient regarding information for vacuum device  Patient requested that we mail him information and he will call back  Information mailed to home address

## 2019-11-26 NOTE — TELEPHONE ENCOUNTER
Patient returned call  He confirmed that he has been taking 24 units units daily  Told him to up it to 26 units daily and send in new log in 2 weeks  Mailed out logs to patient  Updated med list to reflect change

## 2019-11-27 DIAGNOSIS — M81.0 OSTEOPOROSIS, UNSPECIFIED OSTEOPOROSIS TYPE, UNSPECIFIED PATHOLOGICAL FRACTURE PRESENCE: ICD-10-CM

## 2019-11-27 RX ORDER — ALENDRONATE SODIUM 70 MG/1
TABLET ORAL
Qty: 12 TABLET | Refills: 4 | Status: SHIPPED | OUTPATIENT
Start: 2019-11-27 | End: 2020-09-14 | Stop reason: ALTCHOICE

## 2019-12-04 ENCOUNTER — TELEPHONE (OUTPATIENT)
Dept: NEPHROLOGY | Facility: CLINIC | Age: 59
End: 2019-12-04

## 2019-12-04 DIAGNOSIS — N18.2 CKD (CHRONIC KIDNEY DISEASE) STAGE 2, GFR 60-89 ML/MIN: Primary | ICD-10-CM

## 2019-12-04 DIAGNOSIS — R80.9 PROTEINURIA, UNSPECIFIED TYPE: Primary | ICD-10-CM

## 2019-12-04 PROCEDURE — 4010F ACE/ARB THERAPY RXD/TAKEN: CPT | Performed by: FAMILY MEDICINE

## 2019-12-04 RX ORDER — LISINOPRIL 10 MG/1
10 TABLET ORAL DAILY
Qty: 30 TABLET | Refills: 5 | Status: SHIPPED | OUTPATIENT
Start: 2019-12-04 | End: 2020-06-04

## 2019-12-04 NOTE — PROGRESS NOTES
The patient's potassium was normal at 4 5  Given proteinuria am going to start him on lisinopril 10 mg a day we will contact him to start this and I am going to have him check a BMP 1-2 weeks after he initiated

## 2019-12-04 NOTE — TELEPHONE ENCOUNTER
Spoke with the patient and he is aware of his lab test results and to start Lisinopril 10 mg daily which has been sent into [facility] and the patient is aware to repeat a BMP after being on the medication for 1 week  He is aware that the order has been placed into the [employer] system         [facility]

## 2019-12-04 NOTE — TELEPHONE ENCOUNTER
----- Message from Иван Merida MD sent at 12/4/2019  4:05 PM EST -----  Please call patient tell him that his potassium level was normal and I would like to start her on lisinopril 10 mg a day to help lower protein in his urine to help his kidneys  Tell him I sent into the pharmacy for him already  If he started has any side effects he can stop it and call me  Have him do a BMP after he is on the medication for 1 week  Thank you

## 2019-12-05 ENCOUNTER — TELEPHONE (OUTPATIENT)
Dept: NEPHROLOGY | Facility: CLINIC | Age: 59
End: 2019-12-05

## 2019-12-05 ENCOUNTER — APPOINTMENT (OUTPATIENT)
Dept: LAB | Facility: CLINIC | Age: 59
End: 2019-12-05
Payer: COMMERCIAL

## 2019-12-05 ENCOUNTER — TRANSCRIBE ORDERS (OUTPATIENT)
Dept: LAB | Facility: CLINIC | Age: 59
End: 2019-12-05

## 2019-12-05 DIAGNOSIS — E11.39 TYPE 2 DIABETES MELLITUS WITH OTHER OPHTHALMIC COMPLICATION, WITHOUT LONG-TERM CURRENT USE OF INSULIN (HCC): ICD-10-CM

## 2019-12-05 DIAGNOSIS — R80.9 PROTEINURIA, UNSPECIFIED TYPE: ICD-10-CM

## 2019-12-05 DIAGNOSIS — N18.2 CKD (CHRONIC KIDNEY DISEASE) STAGE 2, GFR 60-89 ML/MIN: Primary | ICD-10-CM

## 2019-12-05 DIAGNOSIS — N18.2 CKD (CHRONIC KIDNEY DISEASE) STAGE 2, GFR 60-89 ML/MIN: ICD-10-CM

## 2019-12-05 DIAGNOSIS — Z94.4 STATUS POST LIVER TRANSPLANT (HCC): ICD-10-CM

## 2019-12-05 DIAGNOSIS — Z94.4 STATUS POST LIVER TRANSPLANT (HCC): Primary | ICD-10-CM

## 2019-12-05 DIAGNOSIS — N18.9 CHRONIC KIDNEY DISEASE, UNSPECIFIED CKD STAGE: Primary | ICD-10-CM

## 2019-12-05 LAB
ALBUMIN SERPL BCP-MCNC: 3.9 G/DL (ref 3.5–5)
ALP SERPL-CCNC: 112 U/L (ref 46–116)
ALT SERPL W P-5'-P-CCNC: 38 U/L (ref 12–78)
ANION GAP SERPL CALCULATED.3IONS-SCNC: 14 MMOL/L (ref 4–13)
AST SERPL W P-5'-P-CCNC: 15 U/L (ref 5–45)
BASOPHILS # BLD AUTO: 0.02 THOUSANDS/ΜL (ref 0–0.1)
BASOPHILS NFR BLD AUTO: 0 % (ref 0–1)
BILIRUB SERPL-MCNC: 0.38 MG/DL (ref 0.2–1)
BUN SERPL-MCNC: 43 MG/DL (ref 5–25)
CALCIUM SERPL-MCNC: 8.5 MG/DL (ref 8.3–10.1)
CHLORIDE SERPL-SCNC: 105 MMOL/L (ref 100–108)
CO2 SERPL-SCNC: 23 MMOL/L (ref 21–32)
CREAT SERPL-MCNC: 1.71 MG/DL (ref 0.6–1.3)
EOSINOPHIL # BLD AUTO: 0.11 THOUSAND/ΜL (ref 0–0.61)
EOSINOPHIL NFR BLD AUTO: 2 % (ref 0–6)
ERYTHROCYTE [DISTWIDTH] IN BLOOD BY AUTOMATED COUNT: 13.7 % (ref 11.6–15.1)
EST. AVERAGE GLUCOSE BLD GHB EST-MCNC: 174 MG/DL
GFR SERPL CREATININE-BSD FRML MDRD: 43 ML/MIN/1.73SQ M
GGT SERPL-CCNC: 43 U/L (ref 5–85)
GLUCOSE P FAST SERPL-MCNC: 136 MG/DL (ref 65–99)
HBA1C MFR BLD: 7.7 % (ref 4.2–6.3)
HCT VFR BLD AUTO: 36.2 % (ref 36.5–49.3)
HGB BLD-MCNC: 11.7 G/DL (ref 12–17)
IMM GRANULOCYTES # BLD AUTO: 0.03 THOUSAND/UL (ref 0–0.2)
IMM GRANULOCYTES NFR BLD AUTO: 1 % (ref 0–2)
INR PPP: 1.04 (ref 0.84–1.19)
LYMPHOCYTES # BLD AUTO: 0.55 THOUSANDS/ΜL (ref 0.6–4.47)
LYMPHOCYTES NFR BLD AUTO: 11 % (ref 14–44)
MAGNESIUM SERPL-MCNC: 1.3 MG/DL (ref 1.6–2.6)
MCH RBC QN AUTO: 29 PG (ref 26.8–34.3)
MCHC RBC AUTO-ENTMCNC: 32.3 G/DL (ref 31.4–37.4)
MCV RBC AUTO: 90 FL (ref 82–98)
MONOCYTES # BLD AUTO: 0.5 THOUSAND/ΜL (ref 0.17–1.22)
MONOCYTES NFR BLD AUTO: 10 % (ref 4–12)
NEUTROPHILS # BLD AUTO: 3.8 THOUSANDS/ΜL (ref 1.85–7.62)
NEUTS SEG NFR BLD AUTO: 76 % (ref 43–75)
NRBC BLD AUTO-RTO: 0 /100 WBCS
PLATELET # BLD AUTO: 121 THOUSANDS/UL (ref 149–390)
PMV BLD AUTO: 11.9 FL (ref 8.9–12.7)
POTASSIUM SERPL-SCNC: 4.6 MMOL/L (ref 3.5–5.3)
PROT SERPL-MCNC: 7.1 G/DL (ref 6.4–8.2)
PROTHROMBIN TIME: 13 SECONDS (ref 11.6–14.5)
RBC # BLD AUTO: 4.03 MILLION/UL (ref 3.88–5.62)
SODIUM SERPL-SCNC: 142 MMOL/L (ref 136–145)
TRIGL SERPL-MCNC: 311 MG/DL
WBC # BLD AUTO: 5.01 THOUSAND/UL (ref 4.31–10.16)

## 2019-12-05 PROCEDURE — 85025 COMPLETE CBC W/AUTO DIFF WBC: CPT

## 2019-12-05 PROCEDURE — 82977 ASSAY OF GGT: CPT

## 2019-12-05 PROCEDURE — 80197 ASSAY OF TACROLIMUS: CPT

## 2019-12-05 PROCEDURE — 85610 PROTHROMBIN TIME: CPT

## 2019-12-05 PROCEDURE — 36415 COLL VENOUS BLD VENIPUNCTURE: CPT

## 2019-12-05 PROCEDURE — 84478 ASSAY OF TRIGLYCERIDES: CPT

## 2019-12-05 PROCEDURE — 83735 ASSAY OF MAGNESIUM: CPT

## 2019-12-05 PROCEDURE — 80053 COMPREHEN METABOLIC PANEL: CPT

## 2019-12-05 PROCEDURE — 83036 HEMOGLOBIN GLYCOSYLATED A1C: CPT

## 2019-12-05 NOTE — TELEPHONE ENCOUNTER
Varinder Teran called in wanting to make you aware that he had blood work done this morning for another provider and the lab included your orders as well  He stated that he was to get blood work done a week after starting Lisinopril  Varinder Teran is asking for new orders to be put in so that he can get that taken care of next week  Labs have been re-ordered

## 2019-12-06 LAB — TACROLIMUS BLD-MCNC: 3.4 NG/ML (ref 2–20)

## 2019-12-11 ENCOUNTER — TELEPHONE (OUTPATIENT)
Dept: ENDOCRINOLOGY | Facility: CLINIC | Age: 59
End: 2019-12-11

## 2019-12-11 NOTE — TELEPHONE ENCOUNTER
----- Message from Lluvia Rascon PA-C sent at 12/10/2019  5:27 PM EST -----  A1C 7 7- high but improving    Triglycerides high but improving  Send another BG log in two weeks  Focus on dietary improvements/weight loss

## 2019-12-13 ENCOUNTER — OFFICE VISIT (OUTPATIENT)
Dept: FAMILY MEDICINE CLINIC | Facility: CLINIC | Age: 59
End: 2019-12-13

## 2019-12-13 VITALS
HEIGHT: 63 IN | SYSTOLIC BLOOD PRESSURE: 158 MMHG | BODY MASS INDEX: 37.56 KG/M2 | HEART RATE: 88 BPM | DIASTOLIC BLOOD PRESSURE: 90 MMHG | TEMPERATURE: 96.8 F | WEIGHT: 212 LBS | RESPIRATION RATE: 16 BRPM

## 2019-12-13 DIAGNOSIS — I10 ESSENTIAL HYPERTENSION: Primary | ICD-10-CM

## 2019-12-13 DIAGNOSIS — E11.39 TYPE 2 DIABETES MELLITUS WITH OTHER OPHTHALMIC COMPLICATION, WITHOUT LONG-TERM CURRENT USE OF INSULIN (HCC): ICD-10-CM

## 2019-12-13 PROCEDURE — 1036F TOBACCO NON-USER: CPT | Performed by: FAMILY MEDICINE

## 2019-12-13 PROCEDURE — 3008F BODY MASS INDEX DOCD: CPT | Performed by: FAMILY MEDICINE

## 2019-12-13 PROCEDURE — 99213 OFFICE O/P EST LOW 20 MIN: CPT | Performed by: FAMILY MEDICINE

## 2019-12-13 RX ORDER — INSULIN LISPRO 100 [IU]/ML
INJECTION, SOLUTION INTRAVENOUS; SUBCUTANEOUS
Refills: 5 | COMMUNITY
Start: 2019-11-19 | End: 2020-01-30 | Stop reason: SDUPTHER

## 2019-12-13 NOTE — LETTER
December 13, 2019     MD Kalpana Nichole [id number]  [address] [place] [address]    Patient: Alberto Jarrett   YOB: 1960   Date of Visit: 12/13/2019       Dear Dr Medina Araujo: Thank you for seeing my patient Alberto Jarrett   Below are my notes from today's visit  He continues to have elevated BP  I would appreciate your recommendations  Thank You  Sincerely,        Shaquille Jordan MD        CC: No Recipients  Shaquille Jordan MD  12/13/2019 10:59 AM  Sign at close encounter  Assessment/Plan     Hypertension    Blood pressure 160/90 in office today, on recheck again 160/90  Currently he is taking labetalol 100 mg b i d , hydrochlorothiazide 12 5 mg daily, amlodipine 10 mg daily  He is followed by Jj Dominguez by Nephrology who recently started him on lisinopril 10 mg  Fludrocortisone was discontinued  There were concerns about hyperkalemia  Patient is supposed to get BMP this week to further adjust medications  Suggested we could either go up on hydrochlorothiazide or lisinopril, patient wants to wait for Nephrology  Will continue on same medication regimen at this time, send message to Dr Medina Araujo  For further recommendations regarding today's blood pressure  Advised patient importance of lifestyle modification with diet and exercise  Advised to continue follow-up with Nephrology    Type 2 diabetes mellitus with ophthalmic complication, without long-term current use of insulin ([facility])    Lab Results   Component Value Date    HGBA1C 7 7 (H) 12/05/2019      currently taking Tresiba 26 units at night,  Humalog 6-4-4  reports blood sugars have been in the range of 120-150   denies any hypoglycemic episodes   advised to continue follow-up with Endocrinology   lifestyle modification with diet and exercise   Patient follows up with Podiatry and Ophthalmology regularly  Has an appointment coming up with Ophthalmology in March      Diagnoses and all orders for this visit:    Essential hypertension    Type 2 diabetes mellitus with other ophthalmic complication, without long-term current use of insulin ([facility])    Other orders  -     HUMALOG KWIKPEN 100 units/mL injection pen; 6 UNITS BEFORE BREAKFAST AND 4 UNITS BEFORE LUNCH AND DINNER         Subjective     Chief Complaint   Patient presents with    Hypertension       75-year-old male presented to office for follow-up visit  He has history of liver transplant for hepatic cirrhosis and is on immunosuppressant medications at this time  He follows up with Endocrinology and  Nephrology for insulin-dependent diabetes mellitus and chronic kidney disease  He has recently had uncontrolled blood pressures He was recently taken off of fludrocortisone by Dr Marielle Staley,  He was recommended to start lisinopril 10 mg daily  And get blood work done in a week  He started lisinopril 1 week ago, and still has BMP pending, which he plans to do in the later half of the week  He reports that he takes blood pressure medications regularly  He does not have any acute concerns today  The following portions of the patient's history were reviewed and updated as appropriate: allergies, current medications, past family history, past medical history, past social history, past surgical history and problem list     Review of Systems   Constitutional: Negative for activity change, chills and fever  HENT: Negative for congestion  Respiratory: Negative for cough, shortness of breath and wheezing  Cardiovascular: Negative for chest pain and palpitations  Gastrointestinal: Negative for abdominal pain, diarrhea, nausea and vomiting  Genitourinary: Negative for difficulty urinating  Neurological: Negative for dizziness and headaches  Objective     Vitals:Blood pressure 158/90, pulse 88, temperature (!) 96 8 °F (36 °C), temperature source Tympanic, resp  rate 16, height 5' 3 2" (1 605 m), weight 96 2 kg (212 lb)    Physical Exam:  Physical Exam   Constitutional: He is oriented to person, place, and time  He appears well-developed and well-nourished  HENT:   Head: Normocephalic and atraumatic  Mouth/Throat: Oropharynx is clear and moist    Eyes: Conjunctivae and EOM are normal    Neck: Normal range of motion  Neck supple  Cardiovascular: Normal rate and regular rhythm  Exam reveals no friction rub  Murmur (  Systolic murmur along left sternal border) heard  Pulmonary/Chest: Effort normal and breath sounds normal  No respiratory distress  He has no wheezes  He has no rales  Abdominal: Soft  Bowel sounds are normal  He exhibits no distension  There is no tenderness  Musculoskeletal: Normal range of motion  Neurological: He is alert and oriented to person, place, and time  Skin: Skin is warm and dry  Vitals reviewed

## 2019-12-13 NOTE — ASSESSMENT & PLAN NOTE
Lab Results   Component Value Date    HGBA1C 7 7 (H) 12/05/2019      currently taking Tresiba 26 units at night,  Humalog 6-4-4  reports blood sugars have been in the range of 120-150   denies any hypoglycemic episodes   advised to continue follow-up with Endocrinology   lifestyle modification with diet and exercise   Patient follows up with Podiatry and Ophthalmology regularly  Has an appointment coming up with Ophthalmology in March

## 2019-12-13 NOTE — PROGRESS NOTES
Assessment/Plan     Hypertension    Blood pressure 160/90 in office today, on recheck again 160/90  Currently he is taking labetalol 100 mg b i d , hydrochlorothiazide 12 5 mg daily, amlodipine 10 mg daily  He is followed by Justin Wilson by Nephrology who recently started him on lisinopril 10 mg  Fludrocortisone was discontinued  There were concerns about hyperkalemia  Patient is supposed to get BMP this week to further adjust medications  Suggested we could either go up on hydrochlorothiazide or lisinopril, patient wants to wait for Nephrology  Will continue on same medication regimen at this time, send message to Dr Walt Johns  For further recommendations regarding today's blood pressure  Advised patient importance of lifestyle modification with diet and exercise  Advised to continue follow-up with Nephrology    Type 2 diabetes mellitus with ophthalmic complication, without long-term current use of insulin ([facility])    Lab Results   Component Value Date    HGBA1C 7 7 (H) 12/05/2019      currently taking Tresiba 26 units at night,  Humalog 6-4-4  reports blood sugars have been in the range of 120-150   denies any hypoglycemic episodes   advised to continue follow-up with Endocrinology   lifestyle modification with diet and exercise   Patient follows up with Podiatry and Ophthalmology regularly  Has an appointment coming up with Ophthalmology in March Diagnoses and all orders for this visit:    Essential hypertension    Type 2 diabetes mellitus with other ophthalmic complication, without long-term current use of insulin ([facility])    Other orders  -     HUMALOG KWIKPEN 100 units/mL injection pen; 6 UNITS BEFORE BREAKFAST AND 4 UNITS BEFORE LUNCH AND DINNER         Subjective     Chief Complaint   Patient presents with    Hypertension       47-year-old male presented to office for follow-up visit  He has history of liver transplant for hepatic cirrhosis and is on immunosuppressant medications at this time  He follows up with Endocrinology and  Nephrology for insulin-dependent diabetes mellitus and chronic kidney disease  He has recently had uncontrolled blood pressures He was recently taken off of fludrocortisone by Dr Dimas Bruno,  He was recommended to start lisinopril 10 mg daily  And get blood work done in a week  He started lisinopril 1 week ago, and still has BMP pending, which he plans to do in the later half of the week  He reports that he takes blood pressure medications regularly  He does not have any acute concerns today  The following portions of the patient's history were reviewed and updated as appropriate: allergies, current medications, past family history, past medical history, past social history, past surgical history and problem list     Review of Systems   Constitutional: Negative for activity change, chills and fever  HENT: Negative for congestion  Respiratory: Negative for cough, shortness of breath and wheezing  Cardiovascular: Negative for chest pain and palpitations  Gastrointestinal: Negative for abdominal pain, diarrhea, nausea and vomiting  Genitourinary: Negative for difficulty urinating  Neurological: Negative for dizziness and headaches  Objective     Vitals:Blood pressure 158/90, pulse 88, temperature (!) 96 8 °F (36 °C), temperature source Tympanic, resp  rate 16, height 5' 3 2" (1 605 m), weight 96 2 kg (212 lb)  Physical Exam:  Physical Exam   Constitutional: He is oriented to person, place, and time  He appears well-developed and well-nourished  HENT:   Head: Normocephalic and atraumatic  Mouth/Throat: Oropharynx is clear and moist    Eyes: Conjunctivae and EOM are normal    Neck: Normal range of motion  Neck supple  Cardiovascular: Normal rate and regular rhythm  Exam reveals no friction rub  Murmur (  Systolic murmur along left sternal border) heard  Pulmonary/Chest: Effort normal and breath sounds normal  No respiratory distress   He has no wheezes  He has no rales  Abdominal: Soft  Bowel sounds are normal  He exhibits no distension  There is no tenderness  Musculoskeletal: Normal range of motion  Neurological: He is alert and oriented to person, place, and time  Skin: Skin is warm and dry  Vitals reviewed

## 2019-12-13 NOTE — PATIENT INSTRUCTIONS
Low Purine Diet   WHAT YOU NEED TO KNOW:   A low-purine diet is a meal plan based on foods that are low in purine content  Purine is a substance that is found in foods and is produced naturally by the body  Purines are broken down by the body and changed to uric acid  The kidneys normally filter the uric acid, and it leaves the body through the urine  However, people with gout sometimes have a buildup of uric acid in the blood  This buildup of uric acid can cause swelling and pain (a gout attack)  A low-purine diet may help to treat and prevent gout attacks  DISCHARGE INSTRUCTIONS:   Foods to include: The following foods are low in purine  · Eggs, nuts, and peanut butter    · Low-fat and fat-free cheese and ice cream    · Skim or 1% milk    · Soup made without meat extract or broth    · Vegetables that are not on the medium-purine list below    · All fruit and fruit juice    · Bread, pasta, rice, cakes, cornbread, and popcorn    · Water, soda, tea, coffee, and cocoa    · Sugar, sweets, and gelatin    · Fat and oil  Foods to limit:   · Medium-purine foods:     ¨ Meats:  Limit the following to 4 to 6 ounces each day  ¨ Meat and poultry     ¨ Crab, lobster, oysters, and shrimp    ¨ Vegetables:  Limit the following vegetables to ½ cup each day  ¨ Asparagus    ¨ Cauliflower    ¨ Spinach    ¨ Mushrooms    ¨ Green peas    ¨ Beans, peas, and lentils (limit to 1 cup each day)    ¨ Oats and oatmeal (limit to ? cup uncooked each day)    ¨ Wheat germ and bran (limit to ¼ cup each day)    · High-purine foods:  Limit or avoid foods high in purine  ¨ Anchovies, sardines, scallops, and mussels    ¨ Tuna, codfish, herring, and [employer], like goose and duck    [employer], such as brains, heart, kidney, liver, and sweetbreads    ¨ Gravies and sauces made with meat    ¨ Yeast extracts taken in the form of a supplement  Other guidelines to follow:   · Increase liquid intake    Drink 8 to 16 (eight-ounce) cups of liquid each day  At least half of the liquid you drink should be water  Liquid can help your body get rid of extra uric acid  · Limit or avoid alcohol  Alcohol (especially beer) increases your risk of a gout attack  Beer contains a high amount of purine  · Maintain a healthy weight  If you are overweight, you should lose weight slowly  Weight loss can help decrease the amount of stress on your joints  Regular exercise can help you lose weight if you are overweight, or maintain your weight if you are at a normal weight  Talk to your healthcare provider before you begin an exercise program   © 2017 [employer] Information is for End User's use only and may not be sold, redistributed or otherwise used for commercial purposes  All illustrations and images included in CareNotes® are the copyrighted property of A [employer] M [employer] Inc  or [employer]  The above information is an  only  It is not intended as medical advice for individual conditions or treatments  Talk to your doctor, nurse or pharmacist before following any medical regimen to see if it is safe and effective for you

## 2019-12-13 NOTE — ASSESSMENT & PLAN NOTE
Blood pressure 160/90 in office today, on recheck again 160/90  Currently he is taking labetalol 100 mg b i d , hydrochlorothiazide 12 5 mg daily, amlodipine 10 mg daily  He is followed by Sonali Candelario by Nephrology who recently started him on lisinopril 10 mg  Fludrocortisone was discontinued  There were concerns about hyperkalemia  Patient is supposed to get BMP this week to further adjust medications  Suggested we could either go up on hydrochlorothiazide or lisinopril, patient wants to wait for Nephrology  Will continue on same medication regimen at this time, send message to Dr Teresa Jones  For further recommendations regarding today's blood pressure  Advised patient importance of lifestyle modification with diet and exercise      Advised to continue follow-up with Nephrology

## 2019-12-14 ENCOUNTER — APPOINTMENT (OUTPATIENT)
Dept: LAB | Facility: CLINIC | Age: 59
End: 2019-12-14
Payer: COMMERCIAL

## 2019-12-14 DIAGNOSIS — N18.9 CHRONIC KIDNEY DISEASE, UNSPECIFIED CKD STAGE: ICD-10-CM

## 2019-12-14 LAB
ANION GAP SERPL CALCULATED.3IONS-SCNC: 11 MMOL/L (ref 4–13)
BUN SERPL-MCNC: 36 MG/DL (ref 5–25)
CALCIUM SERPL-MCNC: 9.3 MG/DL (ref 8.3–10.1)
CHLORIDE SERPL-SCNC: 104 MMOL/L (ref 100–108)
CO2 SERPL-SCNC: 27 MMOL/L (ref 21–32)
CREAT SERPL-MCNC: 1.57 MG/DL (ref 0.6–1.3)
GFR SERPL CREATININE-BSD FRML MDRD: 47 ML/MIN/1.73SQ M
GLUCOSE P FAST SERPL-MCNC: 161 MG/DL (ref 65–99)
POTASSIUM SERPL-SCNC: 4.3 MMOL/L (ref 3.5–5.3)
SODIUM SERPL-SCNC: 142 MMOL/L (ref 136–145)

## 2019-12-14 PROCEDURE — 80048 BASIC METABOLIC PNL TOTAL CA: CPT

## 2019-12-14 PROCEDURE — 36415 COLL VENOUS BLD VENIPUNCTURE: CPT

## 2019-12-15 DIAGNOSIS — E78.5 DYSLIPIDEMIA: ICD-10-CM

## 2019-12-15 RX ORDER — ATORVASTATIN CALCIUM 40 MG/1
TABLET, FILM COATED ORAL
Qty: 90 TABLET | Refills: 4 | Status: SHIPPED | OUTPATIENT
Start: 2019-12-15 | End: 2021-10-06 | Stop reason: SDUPTHER

## 2019-12-26 ENCOUNTER — TELEPHONE (OUTPATIENT)
Dept: ENDOCRINOLOGY | Facility: CLINIC | Age: 59
End: 2019-12-26

## 2019-12-26 NOTE — TELEPHONE ENCOUNTER
Left message for patient to call back with regimen   Patient called back and currently taking Tresiba 26 units daily and Novolog 6-4-4-0

## 2020-01-13 DIAGNOSIS — E11.39 TYPE 2 DIABETES MELLITUS WITH OTHER OPHTHALMIC COMPLICATION, WITHOUT LONG-TERM CURRENT USE OF INSULIN (HCC): ICD-10-CM

## 2020-01-13 DIAGNOSIS — E11.65 TYPE 2 DIABETES MELLITUS WITH HYPERGLYCEMIA, WITHOUT LONG-TERM CURRENT USE OF INSULIN (HCC): ICD-10-CM

## 2020-01-14 DIAGNOSIS — E11.39 TYPE 2 DIABETES MELLITUS WITH OTHER OPHTHALMIC COMPLICATION, WITHOUT LONG-TERM CURRENT USE OF INSULIN (HCC): ICD-10-CM

## 2020-01-16 DIAGNOSIS — E11.39 TYPE 2 DIABETES MELLITUS WITH OTHER OPHTHALMIC COMPLICATION, WITHOUT LONG-TERM CURRENT USE OF INSULIN (HCC): ICD-10-CM

## 2020-01-16 NOTE — TELEPHONE ENCOUNTER
Please authorize Rx with updated quantity  Must be 90 days supply for mail order  Thank you!     Last appt, 10/29/19    Next appt, 1/30/20

## 2020-01-22 ENCOUNTER — TELEPHONE (OUTPATIENT)
Dept: ENDOCRINOLOGY | Facility: CLINIC | Age: 60
End: 2020-01-22

## 2020-01-28 ENCOUNTER — OFFICE VISIT (OUTPATIENT)
Dept: FAMILY MEDICINE CLINIC | Facility: CLINIC | Age: 60
End: 2020-01-28

## 2020-01-28 VITALS
WEIGHT: 206 LBS | SYSTOLIC BLOOD PRESSURE: 130 MMHG | DIASTOLIC BLOOD PRESSURE: 70 MMHG | RESPIRATION RATE: 16 BRPM | HEIGHT: 63 IN | HEART RATE: 78 BPM | TEMPERATURE: 97 F | BODY MASS INDEX: 36.5 KG/M2

## 2020-01-28 DIAGNOSIS — J98.8 RESPIRATORY TRACT INFECTION: Primary | ICD-10-CM

## 2020-01-28 PROBLEM — J06.9 VIRAL URI WITH COUGH: Status: ACTIVE | Noted: 2020-01-28

## 2020-01-28 PROCEDURE — 99213 OFFICE O/P EST LOW 20 MIN: CPT | Performed by: FAMILY MEDICINE

## 2020-01-28 PROCEDURE — 3008F BODY MASS INDEX DOCD: CPT | Performed by: FAMILY MEDICINE

## 2020-01-28 RX ORDER — AZITHROMYCIN 250 MG/1
TABLET, FILM COATED ORAL
Qty: 6 TABLET | Refills: 0 | Status: SHIPPED | OUTPATIENT
Start: 2020-01-28 | End: 2020-01-30 | Stop reason: ALTCHOICE

## 2020-01-28 RX ORDER — BENZONATATE 100 MG/1
100 CAPSULE ORAL 3 TIMES DAILY PRN
Qty: 30 CAPSULE | Refills: 0 | Status: SHIPPED | OUTPATIENT
Start: 2020-01-28 | End: 2020-02-07

## 2020-01-28 NOTE — PROGRESS NOTES
Assessment/Plan:  Kinza Ruffin is a 62 y o  male  With:    Respiratory tract infection  2 weeks of intermittent cough, nasal congestion, rhinorrhea, no fever  Afebrile, ENT exam benign + transmitted upper respiratory sound due to congestion otherwise LCTA b/l  Likely etiology is viral URI, supportive symptomatic therapy and precautions reviewed with patient  Pocket Rx for Azithromycin given to patient, in case s/s worsen given duration and history of liver transplant on immunsuppressive therapy currently  RTC PRN       Diagnoses and all orders for this visit:    Respiratory tract infection  -     azithromycin (ZITHROMAX) 250 mg tablet; Take 2 tabs PO on day 1, then 1 tab PO qday x 4 days for a total of 6 pills over 5 days  -     benzonatate (TESSALON PERLES) 100 mg capsule; Take 1 capsule (100 mg total) by mouth 3 (three) times a day as needed for cough for up to 10 days          Subjective:   CC: Cough, congestion x2 weeks     Patient ID: Kinza Ruffin is a 62 y o  male  2 weeks of cough productive, nasal congestion, rhinorrhea without fevers  Taking decongestants, has not worked  Wheezing mainly at night, no history of asthma, COPD, smoking  No sinus pain, mild sinus pressure, sore throat, dysphagia  No ear pain, change in hearing, no ear pain, drainage, change in hearing  No nausea vomiting, diarrhea, rashes  No chest pain, dyspnea, heart burn  Influenza vaccination is UTD  Wife, who is a teacher, was sick with URI before him  Takes Azathioprine chronically, as he is s/p Liver transplant  Review of Systems   All other systems reviewed and are negative  For pertinent positives, negatives, see HPI above  Objective:  /70   Pulse 78   Temp (!) 97 °F (36 1 °C)   Resp 16   Ht 5' 3 2" (1 605 m)   Wt 93 4 kg (206 lb)   BMI 36 26 kg/m²      Physical Exam   Constitutional: No distress  HENT:   Head: Normocephalic and atraumatic     Right Ear: External ear normal    Left Ear: External ear normal  Nose: Nose normal    Right cerumen impaction  Oropharyngeal erythema, no exudate, lesions  Tonsils removed as a child   Eyes: Conjunctivae and EOM are normal  Right eye exhibits no discharge  Left eye exhibits no discharge  No scleral icterus  Neck: Normal range of motion  Neck supple  Cardiovascular: Normal rate, regular rhythm and normal heart sounds  Exam reveals no gallop and no friction rub  No murmur heard  Pulmonary/Chest: Effort normal and breath sounds normal  No stridor  No respiratory distress  He has no wheezes  He has no rales  Transmitted upper airway sound otherwise LCTA b/l   Abdominal: Soft  Bowel sounds are normal  He exhibits no distension  There is no tenderness  Lymphadenopathy:     He has no cervical adenopathy

## 2020-01-28 NOTE — ASSESSMENT & PLAN NOTE
2 weeks of intermittent cough, nasal congestion, rhinorrhea, no fever  Afebrile, ENT exam benign + transmitted upper respiratory sound due to congestion otherwise LCTA b/l  Likely etiology is viral URI, supportive symptomatic therapy and precautions reviewed with patient  Pocket Rx for Azithromycin given to patient, in case s/s worsen given duration and history of liver transplant on immunsuppressive therapy currently    RTC PRN

## 2020-01-30 ENCOUNTER — OFFICE VISIT (OUTPATIENT)
Dept: ENDOCRINOLOGY | Facility: CLINIC | Age: 60
End: 2020-01-30
Payer: COMMERCIAL

## 2020-01-30 VITALS
SYSTOLIC BLOOD PRESSURE: 140 MMHG | WEIGHT: 211.8 LBS | BODY MASS INDEX: 37.53 KG/M2 | HEIGHT: 63 IN | DIASTOLIC BLOOD PRESSURE: 82 MMHG | HEART RATE: 80 BPM

## 2020-01-30 DIAGNOSIS — E11.65 TYPE 2 DIABETES MELLITUS WITH HYPERGLYCEMIA, WITHOUT LONG-TERM CURRENT USE OF INSULIN (HCC): ICD-10-CM

## 2020-01-30 DIAGNOSIS — E78.5 HYPERLIPIDEMIA, UNSPECIFIED HYPERLIPIDEMIA TYPE: ICD-10-CM

## 2020-01-30 DIAGNOSIS — E11.39 TYPE 2 DIABETES MELLITUS WITH OTHER OPHTHALMIC COMPLICATION, WITHOUT LONG-TERM CURRENT USE OF INSULIN (HCC): Primary | ICD-10-CM

## 2020-01-30 DIAGNOSIS — I10 ESSENTIAL HYPERTENSION: ICD-10-CM

## 2020-01-30 PROCEDURE — 99214 OFFICE O/P EST MOD 30 MIN: CPT | Performed by: PHYSICIAN ASSISTANT

## 2020-01-30 RX ORDER — CALCIUM CITRATE/VITAMIN D3 200MG-6.25
TABLET ORAL
COMMUNITY

## 2020-01-30 NOTE — PROGRESS NOTES
Established Patient Progress Note      Chief Complaint   Patient presents with    Diabetes Type 2        History of Present Illness:   Ilene Olsen is a 62 y o  male with a history of type 2 diabetes with long term use of insulin since about 15-20 years ago-- started basal/bolus regimen 2019  Anil Campbell Reports complications of CKD  Denies recent illness or hospitalizations  Denies recent severe hypoglycemic or severe hyperglycemic episodes  Denies any issues with his current regimen  home glucose monitoring: are performed regularly 3x per day   Has met with dietician, hasn't been exercising has been sick a few weeks       Home blood glucose readings:   Before breakfast: usually 120-130  Before lunch: rarely checking 1 reading 137  Before dinner: 112-142  Bedtime:      Current regimen:   Tresiba 30 units daily  Humalog 6-4-4    Last Eye Exam: UTD  Last Foot Exam: UTD    Has hypertension: Taking amlodipine, HCTZ, Lisinopril  Following with nephrology, hx CKD related to transplant meds for liver transplant  Has hyperlipidemia: Taking atorvastatin        Patient Active Problem List   Diagnosis    Hypertension    Type 2 diabetes mellitus with ophthalmic complication, without long-term [facility]    Cirrhosis    Chronic kidney disease, stage III (moderate)    Hyperlipidemia    Status post liver transplant    Hyperglycemia    Encounter for immunization    Osteopenia    CKD (chronic kidney disease) stage 2, GFR 60-89 ml/min    Male erectile disorder    Type 2 diabetes mellitus with hyperglycemia (HCC)    Other osteoporosis without current pathological fracture    Acute gout of right foot    Steroid-induced hyperglycemia    Other proteinuria    Respiratory tract infection      Past Medical History:   Diagnosis Date    Chronic kidney disease, stage III (moderate) (HCC)     RESOLVED: [id number]    [facility]     Diabetes mellitus [facility]     Hepatic encephalopathy ([facility]     Hepatitis C     Hyperkalemia     Hyperlipidemia     Hypertension     [facility]     Status post liver transplant [facility]     Umbilical hernia       Past Surgical History:   Procedure Laterality Date    ANKLE SURGERY      CHOLECYSTECTOMY      GALLBLADDER SURGERY      LIVER TRANSPLANTATION      LAST ASSESSED: [id number]    WY COLONOSCOPY FLX DX W/COLLJ SPEC WHEN PFRMD N/A 2/16/2017    Procedure: COLONOSCOPY;  Surgeon: Chela Kumar MD;  Location: [facility]; Service: Gastroenterology    TONSILLECTOMY        Family History   Problem Relation Age of Onset    Diabetes Mother         TYPE 2    Hypertension Mother     Stroke Mother 68        SYNDROME     Hepatitis Brother         C    Cirrhosis Brother         LIVER     Cancer Family      Social History     Tobacco Use    Smoking status: Never Smoker    Smokeless tobacco: Never Used   Substance Use Topics    Alcohol use:  Yes     Alcohol/week: 1 0 standard drinks     Types: 1 Cans of beer per week     Frequency: 2-4 times a month     Drinks per session: 1 or 2     Comment: occassionally     No Known Allergies      Current Outpatient Medications:     alendronate (FOSAMAX) 70 mg tablet, TAKE 1 TABLET EVERY 7 DAYS, Disp: 12 tablet, Rfl: 4    amLODIPine (NORVASC) 10 mg tablet, TAKE 1 TABLET DAILY, Disp: 90 tablet, Rfl: 2    atorvastatin (LIPITOR) 40 mg tablet, TAKE 1 TABLET DAILY AT BEDTIME, Disp: 90 tablet, Rfl: 4    azaTHIOprine (IMURAN) 50 mg tablet, Take 1 tablet (50 mg total) by mouth daily, Disp: 90 tablet, Rfl: 3    benzonatate (TESSALON PERLES) 100 mg capsule, Take 1 capsule (100 mg total) by mouth 3 (three) times a day as needed for cough for up to 10 days, Disp: 30 capsule, Rfl: 0    Cholecalciferol (VITAMIN D3) 1000 units CAPS, Take 1 capsule by mouth daily, Disp: , Rfl:     hydrochlorothiazide (MICROZIDE) 12 5 mg capsule, TAKE 1 CAPSULE DAILY, Disp: 90 capsule, Rfl: 2    insulin degludec (TRESIBA) 100 units/mL injection pen, Inject 30 Units under the skin daily, Disp: 27 mL, Rfl: 1    insulin lispro (HUMALOG KWIKPEN) 100 units/mL injection pen, 6 units before breakfast and 4 units before lunch and dinner, Disp: 5 pen, Rfl: 5    Insulin Pen Needle (BD PEN NEEDLE MICHEAL U/F) 32G X 4 MM MISC, by Does not apply route 4 (four) times a day, Disp: 100 each, Rfl: 3    labetalol (NORMODYNE) 100 mg tablet, TAKE 1 TABLET EVERY 12 HOURS, Disp: 180 tablet, Rfl: 2    lisinopril (ZESTRIL) 10 mg tablet, Take 1 tablet (10 mg total) by mouth daily, Disp: 30 tablet, Rfl: 5    MAGNESIUM OXIDE 400 PO, Take 1 tablet by mouth 2 (two) times a day , Disp: , Rfl:     Multiple Vitamins-Minerals (MULTIVITAMIN WITH MINERALS) tablet, Take 1 tablet by mouth daily, Disp: , Rfl:     tacrolimus (PROGRAF) 1 mg capsule, Takes 4 tabs in am, 3 tabs in pm, Disp: , Rfl:     TRUE METRIX BLOOD GLUCOSE TEST test strip, Test twice daily, Disp: , Rfl:     Review of Systems   Constitutional: Negative for activity change, appetite change and fatigue  HENT: Positive for congestion  Negative for sore throat, trouble swallowing and voice change  Eyes: Negative for visual disturbance  Respiratory: Negative for choking, chest tightness and shortness of breath  Cardiovascular: Negative for chest pain, palpitations and leg swelling  Gastrointestinal: Negative for abdominal pain, constipation and diarrhea  Endocrine: Negative for cold intolerance, heat intolerance, polydipsia, polyphagia and polyuria  Genitourinary: Negative for frequency  Musculoskeletal: Negative for arthralgias and myalgias  Skin: Negative for rash  Neurological: Negative for dizziness and syncope  Hematological: Negative for adenopathy  Psychiatric/Behavioral: Negative for sleep disturbance  All other systems reviewed and are negative  Physical Exam:  Body mass index is 37 28 kg/m²    /82 (BP Location: Left arm, Patient Position: Sitting, Cuff Size: Large)   Pulse 80   Ht 5' 3 2" (1 605 m)   Wt 96 1 kg (211 lb 12 8 oz)   BMI 37 28 kg/m²    Wt Readings from Last 3 Encounters:   01/30/20 96 1 kg (211 lb 12 8 oz)   01/28/20 93 4 kg (206 lb)   12/13/19 96 2 kg (212 lb)       Physical Exam   Constitutional: He is oriented to person, place, and time  He appears well-developed and well-nourished  No distress  HENT:   Head: Normocephalic and atraumatic  Mouth/Throat: Oropharynx is clear and moist    Eyes: Pupils are equal, round, and reactive to light  Conjunctivae and EOM are normal    Neck: Normal range of motion  Neck supple  No thyromegaly present  Cardiovascular: Normal rate, regular rhythm and normal heart sounds  No murmur heard  Pulmonary/Chest: Effort normal and breath sounds normal  No respiratory distress  He has no wheezes  He has no rales  Abdominal: Soft  Bowel sounds are normal  He exhibits no distension  There is no tenderness  Musculoskeletal: Normal range of motion  He exhibits no edema  Lymphadenopathy:     He has no cervical adenopathy  Neurological: He is alert and oriented to person, place, and time  Skin: Skin is warm and dry  Psychiatric: He has a normal mood and affect  Vitals reviewed      Labs:   Lab Results   Component Value Date    HGBA1C 7 7 (H) 12/05/2019    HGBA1C 8 9 (A) 09/03/2019    HGBA1C 8 1 (H) 08/07/2019     Lab Results   Component Value Date    CREATININE 1 57 (H) 12/14/2019    CREATININE 1 71 (H) 12/05/2019    CREATININE 1 57 (H) 11/02/2019    BUN 36 (H) 12/14/2019     01/08/2016    K 4 3 12/14/2019     12/14/2019    CO2 27 12/14/2019     eGFR   Date Value Ref Range Status   12/14/2019 47 ml/min/1 73sq m Final     Lab Results   Component Value Date    CHOL 174 03/10/2015    HDL 32 (L) 06/01/2019    TRIG 311 (H) 12/05/2019     Lab Results   Component Value Date    ALT 38 12/05/2019    AST 15 12/05/2019    GGT 43 12/05/2019    ALKPHOS 112 12/05/2019    BILITOT 0 29 01/08/2016     Lab Results   Component Value Date WQC9YJHRDITY 1 432 06/01/2019    IZH8GHQNXGNN 1 603 05/02/2017     Lab Results   Component Value Date    FREET4 0 96 06/01/2019       Impression & Plan:    Problem List Items Addressed This Visit        Endocrine    Type 2 diabetes mellitus with ophthalmic complication, without long-term current use of insulin ([facility] ) - Primary     Not at goal but has improved significantly on basal/bolus regimen  Continue same medication and focus on lifestyle modification/weight loss and send BG log again in a few weeks  His goal is to get off insulin if possible  Lab Results   Component Value Date    HGBA1C 7 7 (H) 12/05/2019            Relevant Orders    Hemoglobin A1C    Type 2 diabetes mellitus with hyperglycemia ([facility] )       Cardiovascular and Mediastinum    Hypertension     Has improved, continue current regimen  Following with nephrology  Other    Hyperlipidemia     Continue atorvastatin  Orders Placed This Encounter   Procedures    Hemoglobin A1C     Standing Status:   Future     Standing Expiration Date:   1/30/2021       There are no Patient Instructions on file for this visit  Discussed with the patient and all questioned fully answered  He will call me if any problems arise  Follow-up appointment in 3 months       Counseled patient on diagnostic results, prognosis, risk and benefit of treatment options, instruction for management, importance of treatment compliance, Risk  factor reduction and impressions    Marco Wynne PA-C

## 2020-01-30 NOTE — ASSESSMENT & PLAN NOTE
Not at goal but has improved significantly on basal/bolus regimen  Continue same medication and focus on lifestyle modification/weight loss and send BG log again in a few weeks  His goal is to get off insulin if possible     Lab Results   Component Value Date    HGBA1C 7 7 (H) 12/05/2019

## 2020-02-12 ENCOUNTER — TELEPHONE (OUTPATIENT)
Dept: NEPHROLOGY | Facility: CLINIC | Age: 60
End: 2020-02-12

## 2020-02-17 ENCOUNTER — TELEPHONE (OUTPATIENT)
Dept: ENDOCRINOLOGY | Facility: CLINIC | Age: 60
End: 2020-02-17

## 2020-02-17 NOTE — TELEPHONE ENCOUNTER
Blood sugars look great continue same meds  Continue to focus on lifestyle modifications/weight loss and if blood sugars trending down, send another log, we will reduce insulin doses

## 2020-02-26 ENCOUNTER — TELEPHONE (OUTPATIENT)
Dept: ENDOCRINOLOGY | Facility: CLINIC | Age: 60
End: 2020-02-26

## 2020-02-29 ENCOUNTER — TRANSCRIBE ORDERS (OUTPATIENT)
Dept: LAB | Facility: CLINIC | Age: 60
End: 2020-02-29

## 2020-02-29 ENCOUNTER — APPOINTMENT (OUTPATIENT)
Dept: LAB | Facility: CLINIC | Age: 60
End: 2020-02-29
Payer: COMMERCIAL

## 2020-02-29 DIAGNOSIS — E11.39 TYPE 2 DIABETES MELLITUS WITH OTHER OPHTHALMIC COMPLICATION, WITHOUT LONG-TERM CURRENT USE OF INSULIN (HCC): ICD-10-CM

## 2020-02-29 DIAGNOSIS — C22.1 MALIGNANT NEOPLASM OF INTRAHEPATIC BILE DUCTS (HCC): ICD-10-CM

## 2020-02-29 DIAGNOSIS — Z94.4 LIVER REPLACED BY TRANSPLANT (HCC): ICD-10-CM

## 2020-02-29 DIAGNOSIS — Z94.4 LIVER REPLACED BY TRANSPLANT (HCC): Primary | ICD-10-CM

## 2020-02-29 DIAGNOSIS — R80.8 OTHER PROTEINURIA: ICD-10-CM

## 2020-02-29 DIAGNOSIS — R79.1 ABNORMAL COAGULATION PROFILE: ICD-10-CM

## 2020-02-29 DIAGNOSIS — D68.9 BLOOD CLOTTING DISORDER (HCC): ICD-10-CM

## 2020-02-29 DIAGNOSIS — N18.2 CKD (CHRONIC KIDNEY DISEASE) STAGE 2, GFR 60-89 ML/MIN: ICD-10-CM

## 2020-02-29 LAB
ALBUMIN SERPL BCP-MCNC: 4 G/DL (ref 3.5–5)
ALP SERPL-CCNC: 90 U/L (ref 46–116)
ALT SERPL W P-5'-P-CCNC: 43 U/L (ref 12–78)
ANION GAP SERPL CALCULATED.3IONS-SCNC: 10 MMOL/L (ref 4–13)
AST SERPL W P-5'-P-CCNC: 25 U/L (ref 5–45)
BASOPHILS # BLD AUTO: 0.02 THOUSANDS/ΜL (ref 0–0.1)
BASOPHILS NFR BLD AUTO: 1 % (ref 0–1)
BILIRUB SERPL-MCNC: 0.33 MG/DL (ref 0.2–1)
BUN SERPL-MCNC: 32 MG/DL (ref 5–25)
CALCIUM SERPL-MCNC: 8.5 MG/DL (ref 8.3–10.1)
CHLORIDE SERPL-SCNC: 108 MMOL/L (ref 100–108)
CO2 SERPL-SCNC: 26 MMOL/L (ref 21–32)
CREAT SERPL-MCNC: 1.5 MG/DL (ref 0.6–1.3)
CREAT UR-MCNC: 99 MG/DL
EOSINOPHIL # BLD AUTO: 0.13 THOUSAND/ΜL (ref 0–0.61)
EOSINOPHIL NFR BLD AUTO: 3 % (ref 0–6)
ERYTHROCYTE [DISTWIDTH] IN BLOOD BY AUTOMATED COUNT: 15.1 % (ref 11.6–15.1)
EST. AVERAGE GLUCOSE BLD GHB EST-MCNC: 154 MG/DL
GFR SERPL CREATININE-BSD FRML MDRD: 50 ML/MIN/1.73SQ M
GGT SERPL-CCNC: 39 U/L (ref 5–85)
GLUCOSE P FAST SERPL-MCNC: 73 MG/DL (ref 65–99)
HBA1C MFR BLD: 7 %
HCT VFR BLD AUTO: 35.9 % (ref 36.5–49.3)
HGB BLD-MCNC: 11.5 G/DL (ref 12–17)
IMM GRANULOCYTES # BLD AUTO: 0.02 THOUSAND/UL (ref 0–0.2)
IMM GRANULOCYTES NFR BLD AUTO: 1 % (ref 0–2)
INR PPP: 1.12 (ref 0.84–1.19)
LYMPHOCYTES # BLD AUTO: 0.72 THOUSANDS/ΜL (ref 0.6–4.47)
LYMPHOCYTES NFR BLD AUTO: 16 % (ref 14–44)
MAGNESIUM SERPL-MCNC: 1.6 MG/DL (ref 1.6–2.6)
MCH RBC QN AUTO: 28.6 PG (ref 26.8–34.3)
MCHC RBC AUTO-ENTMCNC: 32 G/DL (ref 31.4–37.4)
MCV RBC AUTO: 89 FL (ref 82–98)
MONOCYTES # BLD AUTO: 0.43 THOUSAND/ΜL (ref 0.17–1.22)
MONOCYTES NFR BLD AUTO: 10 % (ref 4–12)
NEUTROPHILS # BLD AUTO: 3.09 THOUSANDS/ΜL (ref 1.85–7.62)
NEUTS SEG NFR BLD AUTO: 69 % (ref 43–75)
NRBC BLD AUTO-RTO: 0 /100 WBCS
PLATELET # BLD AUTO: 140 THOUSANDS/UL (ref 149–390)
PMV BLD AUTO: 11.7 FL (ref 8.9–12.7)
POTASSIUM SERPL-SCNC: 4.3 MMOL/L (ref 3.5–5.3)
PROT SERPL-MCNC: 7.1 G/DL (ref 6.4–8.2)
PROT UR-MCNC: 214 MG/DL
PROT/CREAT UR: 2.16 MG/G{CREAT} (ref 0–0.1)
PROTHROMBIN TIME: 13.8 SECONDS (ref 11.6–14.5)
RBC # BLD AUTO: 4.02 MILLION/UL (ref 3.88–5.62)
SODIUM SERPL-SCNC: 144 MMOL/L (ref 136–145)
WBC # BLD AUTO: 4.41 THOUSAND/UL (ref 4.31–10.16)

## 2020-02-29 PROCEDURE — 36415 COLL VENOUS BLD VENIPUNCTURE: CPT

## 2020-02-29 PROCEDURE — 83036 HEMOGLOBIN GLYCOSYLATED A1C: CPT

## 2020-02-29 PROCEDURE — 85610 PROTHROMBIN TIME: CPT

## 2020-02-29 PROCEDURE — 82977 ASSAY OF GGT: CPT

## 2020-02-29 PROCEDURE — 3066F NEPHROPATHY DOC TX: CPT | Performed by: FAMILY MEDICINE

## 2020-02-29 PROCEDURE — 82570 ASSAY OF URINE CREATININE: CPT

## 2020-02-29 PROCEDURE — 80197 ASSAY OF TACROLIMUS: CPT

## 2020-02-29 PROCEDURE — 85025 COMPLETE CBC W/AUTO DIFF WBC: CPT

## 2020-02-29 PROCEDURE — 3061F NEG MICROALBUMINURIA REV: CPT | Performed by: PHYSICIAN ASSISTANT

## 2020-02-29 PROCEDURE — 3061F NEG MICROALBUMINURIA REV: CPT | Performed by: FAMILY MEDICINE

## 2020-02-29 PROCEDURE — 3066F NEPHROPATHY DOC TX: CPT | Performed by: PHYSICIAN ASSISTANT

## 2020-02-29 PROCEDURE — 80053 COMPREHEN METABOLIC PANEL: CPT

## 2020-02-29 PROCEDURE — 83735 ASSAY OF MAGNESIUM: CPT

## 2020-02-29 PROCEDURE — 84156 ASSAY OF PROTEIN URINE: CPT

## 2020-03-01 LAB — TACROLIMUS BLD-MCNC: 2.5 NG/ML (ref 2–20)

## 2020-03-03 DIAGNOSIS — I10 ESSENTIAL HYPERTENSION: ICD-10-CM

## 2020-03-03 RX ORDER — LABETALOL 100 MG/1
TABLET, FILM COATED ORAL
Qty: 180 TABLET | Refills: 4 | Status: SHIPPED | OUTPATIENT
Start: 2020-03-03 | End: 2021-08-19

## 2020-03-10 DIAGNOSIS — E11.39 TYPE 2 DIABETES MELLITUS WITH OTHER OPHTHALMIC COMPLICATION, WITHOUT LONG-TERM CURRENT USE OF INSULIN (HCC): ICD-10-CM

## 2020-03-10 RX ORDER — INSULIN LISPRO 100 [IU]/ML
INJECTION, SOLUTION INTRAVENOUS; SUBCUTANEOUS
Qty: 5 PEN | Refills: 1 | Status: SHIPPED | OUTPATIENT
Start: 2020-03-10 | End: 2020-10-01 | Stop reason: SDUPTHER

## 2020-03-11 ENCOUNTER — TELEPHONE (OUTPATIENT)
Dept: ENDOCRINOLOGY | Facility: CLINIC | Age: 60
End: 2020-03-11

## 2020-03-24 DIAGNOSIS — I10 HYPERTENSION, UNSPECIFIED TYPE: ICD-10-CM

## 2020-03-25 RX ORDER — AMLODIPINE BESYLATE 10 MG/1
TABLET ORAL
Qty: 90 TABLET | Refills: 3 | Status: SHIPPED | OUTPATIENT
Start: 2020-03-25 | End: 2020-11-10 | Stop reason: SDUPTHER

## 2020-03-25 RX ORDER — HYDROCHLOROTHIAZIDE 12.5 MG/1
CAPSULE, GELATIN COATED ORAL
Qty: 90 CAPSULE | Refills: 3 | Status: SHIPPED | OUTPATIENT
Start: 2020-03-25 | End: 2020-11-10 | Stop reason: SDUPTHER

## 2020-03-27 ENCOUNTER — TELEPHONE (OUTPATIENT)
Dept: ENDOCRINOLOGY | Facility: CLINIC | Age: 60
End: 2020-03-27

## 2020-04-01 ENCOUNTER — OFFICE VISIT (OUTPATIENT)
Dept: FAMILY MEDICINE CLINIC | Facility: CLINIC | Age: 60
End: 2020-04-01

## 2020-04-01 VITALS
RESPIRATION RATE: 16 BRPM | HEART RATE: 72 BPM | BODY MASS INDEX: 37 KG/M2 | DIASTOLIC BLOOD PRESSURE: 88 MMHG | SYSTOLIC BLOOD PRESSURE: 142 MMHG | WEIGHT: 210.2 LBS | TEMPERATURE: 97.6 F

## 2020-04-01 DIAGNOSIS — M94.0 COSTOCHONDRITIS: ICD-10-CM

## 2020-04-01 DIAGNOSIS — S29.012A STRAIN OF THORACIC BACK REGION: Primary | ICD-10-CM

## 2020-04-01 PROCEDURE — 3079F DIAST BP 80-89 MM HG: CPT | Performed by: FAMILY MEDICINE

## 2020-04-01 PROCEDURE — 99213 OFFICE O/P EST LOW 20 MIN: CPT | Performed by: FAMILY MEDICINE

## 2020-04-01 PROCEDURE — 3077F SYST BP >= 140 MM HG: CPT | Performed by: FAMILY MEDICINE

## 2020-04-01 PROCEDURE — 3066F NEPHROPATHY DOC TX: CPT | Performed by: FAMILY MEDICINE

## 2020-04-01 PROCEDURE — 1036F TOBACCO NON-USER: CPT | Performed by: FAMILY MEDICINE

## 2020-04-01 PROCEDURE — 3051F HG A1C>EQUAL 7.0%<8.0%: CPT | Performed by: FAMILY MEDICINE

## 2020-04-10 ENCOUNTER — TELEPHONE (OUTPATIENT)
Dept: ENDOCRINOLOGY | Facility: CLINIC | Age: 60
End: 2020-04-10

## 2020-04-20 ENCOUNTER — TELEPHONE (OUTPATIENT)
Dept: NEPHROLOGY | Facility: CLINIC | Age: 60
End: 2020-04-20

## 2020-04-22 ENCOUNTER — HOSPITAL ENCOUNTER (OUTPATIENT)
Dept: RADIOLOGY | Facility: HOSPITAL | Age: 60
Discharge: HOME | End: 2020-04-22
Payer: COMMERCIAL

## 2020-04-22 ENCOUNTER — TELEMEDICINE (OUTPATIENT)
Dept: FAMILY MEDICINE CLINIC | Facility: CLINIC | Age: 60
End: 2020-04-22

## 2020-04-22 DIAGNOSIS — M54.6 CHRONIC RIGHT-SIDED THORACIC BACK PAIN: ICD-10-CM

## 2020-04-22 DIAGNOSIS — M54.6 CHRONIC RIGHT-SIDED THORACIC BACK PAIN: Primary | ICD-10-CM

## 2020-04-22 DIAGNOSIS — R07.81 RIB PAIN ON RIGHT SIDE: ICD-10-CM

## 2020-04-22 DIAGNOSIS — G89.29 CHRONIC RIGHT-SIDED THORACIC BACK PAIN: ICD-10-CM

## 2020-04-22 DIAGNOSIS — G89.29 CHRONIC RIGHT-SIDED THORACIC BACK PAIN: Primary | ICD-10-CM

## 2020-04-22 PROCEDURE — 71101 X-RAY EXAM UNILAT RIBS/CHEST: CPT

## 2020-04-22 PROCEDURE — 99213 OFFICE O/P EST LOW 20 MIN: CPT | Performed by: FAMILY MEDICINE

## 2020-04-24 ENCOUNTER — TELEPHONE (OUTPATIENT)
Dept: ENDOCRINOLOGY | Facility: CLINIC | Age: 60
End: 2020-04-24

## 2020-04-27 DIAGNOSIS — E11.39 TYPE 2 DIABETES MELLITUS WITH OTHER OPHTHALMIC COMPLICATION, WITHOUT LONG-TERM CURRENT USE OF INSULIN (HCC): ICD-10-CM

## 2020-04-29 ENCOUNTER — TELEMEDICINE (OUTPATIENT)
Dept: NEPHROLOGY | Facility: CLINIC | Age: 60
End: 2020-04-29
Payer: COMMERCIAL

## 2020-04-29 VITALS — HEART RATE: 70 BPM | DIASTOLIC BLOOD PRESSURE: 78 MMHG | SYSTOLIC BLOOD PRESSURE: 148 MMHG

## 2020-04-29 DIAGNOSIS — I10 ESSENTIAL HYPERTENSION: ICD-10-CM

## 2020-04-29 DIAGNOSIS — R80.8 OTHER PROTEINURIA: Primary | ICD-10-CM

## 2020-04-29 DIAGNOSIS — N18.2 CKD (CHRONIC KIDNEY DISEASE) STAGE 2, GFR 60-89 ML/MIN: ICD-10-CM

## 2020-04-29 PROCEDURE — 99214 OFFICE O/P EST MOD 30 MIN: CPT | Performed by: INTERNAL MEDICINE

## 2020-05-04 ENCOUNTER — TELEMEDICINE (OUTPATIENT)
Dept: ENDOCRINOLOGY | Facility: CLINIC | Age: 60
End: 2020-05-04
Payer: COMMERCIAL

## 2020-05-04 DIAGNOSIS — Z94.4 STATUS POST LIVER TRANSPLANT (HCC): Primary | ICD-10-CM

## 2020-05-04 DIAGNOSIS — Z79.4 TYPE 2 DIABETES MELLITUS WITH HYPERGLYCEMIA, WITH LONG-TERM CURRENT USE OF INSULIN (HCC): ICD-10-CM

## 2020-05-04 DIAGNOSIS — E11.39 TYPE 2 DIABETES MELLITUS WITH OTHER OPHTHALMIC COMPLICATION, WITHOUT LONG-TERM CURRENT USE OF INSULIN (HCC): ICD-10-CM

## 2020-05-04 DIAGNOSIS — I10 ESSENTIAL HYPERTENSION: ICD-10-CM

## 2020-05-04 DIAGNOSIS — E78.5 HYPERLIPIDEMIA, UNSPECIFIED HYPERLIPIDEMIA TYPE: ICD-10-CM

## 2020-05-04 DIAGNOSIS — E11.65 TYPE 2 DIABETES MELLITUS WITH HYPERGLYCEMIA, WITH LONG-TERM CURRENT USE OF INSULIN (HCC): ICD-10-CM

## 2020-05-04 PROCEDURE — 99214 OFFICE O/P EST MOD 30 MIN: CPT | Performed by: INTERNAL MEDICINE

## 2020-05-11 ENCOUNTER — TELEPHONE (OUTPATIENT)
Dept: ENDOCRINOLOGY | Facility: CLINIC | Age: 60
End: 2020-05-11

## 2020-05-28 ENCOUNTER — TELEPHONE (OUTPATIENT)
Dept: ENDOCRINOLOGY | Facility: CLINIC | Age: 60
End: 2020-05-28

## 2020-05-29 DIAGNOSIS — E11.39 TYPE 2 DIABETES MELLITUS WITH OTHER OPHTHALMIC COMPLICATION, WITHOUT LONG-TERM CURRENT USE OF INSULIN (HCC): ICD-10-CM

## 2020-06-01 ENCOUNTER — TRANSCRIBE ORDERS (OUTPATIENT)
Dept: LAB | Facility: CLINIC | Age: 60
End: 2020-06-01

## 2020-06-01 ENCOUNTER — LAB (OUTPATIENT)
Dept: LAB | Facility: CLINIC | Age: 60
End: 2020-06-01
Payer: COMMERCIAL

## 2020-06-01 DIAGNOSIS — D68.9 BLOOD CLOTTING DISORDER (HCC): ICD-10-CM

## 2020-06-01 DIAGNOSIS — E11.39 TYPE 2 DIABETES MELLITUS WITH OTHER OPHTHALMIC COMPLICATION, WITHOUT LONG-TERM CURRENT USE OF INSULIN (HCC): ICD-10-CM

## 2020-06-01 DIAGNOSIS — E78.5 HYPERLIPIDEMIA, UNSPECIFIED HYPERLIPIDEMIA TYPE: ICD-10-CM

## 2020-06-01 DIAGNOSIS — C22.1 MALIGNANT NEOPLASM OF INTRAHEPATIC BILE DUCTS (HCC): ICD-10-CM

## 2020-06-01 DIAGNOSIS — Z94.4 LIVER REPLACED BY TRANSPLANT (HCC): Primary | ICD-10-CM

## 2020-06-01 DIAGNOSIS — E11.65 TYPE 2 DIABETES MELLITUS WITH HYPERGLYCEMIA, WITH LONG-TERM CURRENT USE OF INSULIN (HCC): ICD-10-CM

## 2020-06-01 DIAGNOSIS — R79.1 ABNORMAL COAGULATION PROFILE: ICD-10-CM

## 2020-06-01 DIAGNOSIS — I10 ESSENTIAL HYPERTENSION: ICD-10-CM

## 2020-06-01 DIAGNOSIS — Z79.4 TYPE 2 DIABETES MELLITUS WITH HYPERGLYCEMIA, WITH LONG-TERM CURRENT USE OF INSULIN (HCC): ICD-10-CM

## 2020-06-01 DIAGNOSIS — Z94.4 LIVER REPLACED BY TRANSPLANT (HCC): ICD-10-CM

## 2020-06-01 LAB
ALBUMIN SERPL BCP-MCNC: 4.1 G/DL (ref 3.5–5)
ALP SERPL-CCNC: 87 U/L (ref 46–116)
ALT SERPL W P-5'-P-CCNC: 25 U/L (ref 12–78)
ANION GAP SERPL CALCULATED.3IONS-SCNC: 10 MMOL/L (ref 4–13)
AST SERPL W P-5'-P-CCNC: 20 U/L (ref 5–45)
BASOPHILS # BLD AUTO: 0.02 THOUSANDS/ΜL (ref 0–0.1)
BASOPHILS NFR BLD AUTO: 0 % (ref 0–1)
BILIRUB SERPL-MCNC: 0.31 MG/DL (ref 0.2–1)
BUN SERPL-MCNC: 52 MG/DL (ref 5–25)
CALCIUM SERPL-MCNC: 9.4 MG/DL (ref 8.3–10.1)
CHLORIDE SERPL-SCNC: 106 MMOL/L (ref 100–108)
CHOLEST SERPL-MCNC: 157 MG/DL (ref 50–200)
CO2 SERPL-SCNC: 26 MMOL/L (ref 21–32)
CREAT SERPL-MCNC: 1.85 MG/DL (ref 0.6–1.3)
EOSINOPHIL # BLD AUTO: 0.1 THOUSAND/ΜL (ref 0–0.61)
EOSINOPHIL NFR BLD AUTO: 2 % (ref 0–6)
ERYTHROCYTE [DISTWIDTH] IN BLOOD BY AUTOMATED COUNT: 13.8 % (ref 11.6–15.1)
EST. AVERAGE GLUCOSE BLD GHB EST-MCNC: 171 MG/DL
GFR SERPL CREATININE-BSD FRML MDRD: 38 ML/MIN/1.73SQ M
GGT SERPL-CCNC: 38 U/L (ref 5–85)
GLUCOSE P FAST SERPL-MCNC: 142 MG/DL (ref 65–99)
HBA1C MFR BLD: 7.6 %
HCT VFR BLD AUTO: 37 % (ref 36.5–49.3)
HDLC SERPL-MCNC: 34 MG/DL
HGB BLD-MCNC: 11.9 G/DL (ref 12–17)
IMM GRANULOCYTES # BLD AUTO: 0.02 THOUSAND/UL (ref 0–0.2)
IMM GRANULOCYTES NFR BLD AUTO: 0 % (ref 0–2)
INR PPP: 1.08 (ref 0.84–1.19)
LDLC SERPL CALC-MCNC: 70 MG/DL (ref 0–100)
LYMPHOCYTES # BLD AUTO: 0.6 THOUSANDS/ΜL (ref 0.6–4.47)
LYMPHOCYTES NFR BLD AUTO: 13 % (ref 14–44)
MAGNESIUM SERPL-MCNC: 1.6 MG/DL (ref 1.6–2.6)
MCH RBC QN AUTO: 29 PG (ref 26.8–34.3)
MCHC RBC AUTO-ENTMCNC: 32.2 G/DL (ref 31.4–37.4)
MCV RBC AUTO: 90 FL (ref 82–98)
MONOCYTES # BLD AUTO: 0.44 THOUSAND/ΜL (ref 0.17–1.22)
MONOCYTES NFR BLD AUTO: 10 % (ref 4–12)
NEUTROPHILS # BLD AUTO: 3.3 THOUSANDS/ΜL (ref 1.85–7.62)
NEUTS SEG NFR BLD AUTO: 75 % (ref 43–75)
NRBC BLD AUTO-RTO: 0 /100 WBCS
PLATELET # BLD AUTO: 129 THOUSANDS/UL (ref 149–390)
PMV BLD AUTO: 11.7 FL (ref 8.9–12.7)
POTASSIUM SERPL-SCNC: 4.7 MMOL/L (ref 3.5–5.3)
PROT SERPL-MCNC: 7.2 G/DL (ref 6.4–8.2)
PROTHROMBIN TIME: 13.4 SECONDS (ref 11.6–14.5)
RBC # BLD AUTO: 4.1 MILLION/UL (ref 3.88–5.62)
SODIUM SERPL-SCNC: 142 MMOL/L (ref 136–145)
TACROLIMUS BLD-MCNC: 3.8 NG/ML (ref 2–20)
TRIGL SERPL-MCNC: 267 MG/DL
WBC # BLD AUTO: 4.48 THOUSAND/UL (ref 4.31–10.16)

## 2020-06-01 PROCEDURE — 80061 LIPID PANEL: CPT

## 2020-06-01 PROCEDURE — 83036 HEMOGLOBIN GLYCOSYLATED A1C: CPT

## 2020-06-01 PROCEDURE — 80197 ASSAY OF TACROLIMUS: CPT

## 2020-06-01 PROCEDURE — 80053 COMPREHEN METABOLIC PANEL: CPT

## 2020-06-01 PROCEDURE — 85610 PROTHROMBIN TIME: CPT

## 2020-06-01 PROCEDURE — 85025 COMPLETE CBC W/AUTO DIFF WBC: CPT

## 2020-06-01 PROCEDURE — 36415 COLL VENOUS BLD VENIPUNCTURE: CPT

## 2020-06-01 PROCEDURE — 82977 ASSAY OF GGT: CPT

## 2020-06-01 PROCEDURE — 3051F HG A1C>EQUAL 7.0%<8.0%: CPT | Performed by: PHYSICIAN ASSISTANT

## 2020-06-01 PROCEDURE — 83735 ASSAY OF MAGNESIUM: CPT

## 2020-06-01 PROCEDURE — 3051F HG A1C>EQUAL 7.0%<8.0%: CPT | Performed by: FAMILY MEDICINE

## 2020-06-04 DIAGNOSIS — R80.9 PROTEINURIA, UNSPECIFIED TYPE: ICD-10-CM

## 2020-06-04 PROCEDURE — 4010F ACE/ARB THERAPY RXD/TAKEN: CPT | Performed by: FAMILY MEDICINE

## 2020-06-04 PROCEDURE — 4010F ACE/ARB THERAPY RXD/TAKEN: CPT | Performed by: PHYSICIAN ASSISTANT

## 2020-06-04 RX ORDER — LISINOPRIL 10 MG/1
TABLET ORAL
Qty: 30 TABLET | Refills: 5 | Status: SHIPPED | OUTPATIENT
Start: 2020-06-04 | End: 2020-09-10 | Stop reason: SDUPTHER

## 2020-06-10 ENCOUNTER — TELEPHONE (OUTPATIENT)
Dept: ENDOCRINOLOGY | Facility: CLINIC | Age: 60
End: 2020-06-10

## 2020-06-18 DIAGNOSIS — Z01.812 PRE-PROCEDURE LAB EXAM: ICD-10-CM

## 2020-06-18 PROCEDURE — U0003 INFECTIOUS AGENT DETECTION BY NUCLEIC ACID (DNA OR RNA); SEVERE ACUTE RESPIRATORY SYNDROME CORONAVIRUS 2 (SARS-COV-2) (CORONAVIRUS DISEASE [COVID-19]), AMPLIFIED PROBE TECHNIQUE, MAKING USE OF HIGH THROUGHPUT TECHNOLOGIES AS DESCRIBED BY CMS-2020-01-R: HCPCS | Performed by: OBSTETRICS & GYNECOLOGY

## 2020-06-19 LAB — SARS-COV-2 RNA SPEC QL NAA+PROBE: NOT DETECTED

## 2020-06-23 ENCOUNTER — LAB REQUISITION (OUTPATIENT)
Dept: LAB | Facility: HOSPITAL | Age: 60
End: 2020-06-23
Payer: COMMERCIAL

## 2020-06-23 DIAGNOSIS — K63.5 POLYP OF COLON: ICD-10-CM

## 2020-06-23 DIAGNOSIS — K57.30 DIVERTICULOSIS OF LARGE INTESTINE WITHOUT PERFORATION OR ABSCESS WITHOUT BLEEDING: ICD-10-CM

## 2020-06-23 DIAGNOSIS — Z86.010 PERSONAL HISTORY OF COLONIC POLYPS: ICD-10-CM

## 2020-06-23 PROCEDURE — 88305 TISSUE EXAM BY PATHOLOGIST: CPT | Performed by: PATHOLOGY

## 2020-06-26 ENCOUNTER — TELEPHONE (OUTPATIENT)
Dept: ENDOCRINOLOGY | Facility: CLINIC | Age: 60
End: 2020-06-26

## 2020-07-17 ENCOUNTER — TELEPHONE (OUTPATIENT)
Dept: ENDOCRINOLOGY | Facility: CLINIC | Age: 60
End: 2020-07-17

## 2020-07-21 NOTE — TELEPHONE ENCOUNTER
Spoke to patient and relayed the information  He said his mornings are always a little high  Thank you

## 2020-08-06 ENCOUNTER — TELEPHONE (OUTPATIENT)
Dept: ENDOCRINOLOGY | Facility: CLINIC | Age: 60
End: 2020-08-06

## 2020-08-10 ENCOUNTER — OFFICE VISIT (OUTPATIENT)
Dept: ENDOCRINOLOGY | Facility: CLINIC | Age: 60
End: 2020-08-10
Payer: COMMERCIAL

## 2020-08-10 VITALS
SYSTOLIC BLOOD PRESSURE: 122 MMHG | DIASTOLIC BLOOD PRESSURE: 70 MMHG | HEART RATE: 78 BPM | WEIGHT: 210.4 LBS | BODY MASS INDEX: 37.28 KG/M2 | HEIGHT: 63 IN

## 2020-08-10 DIAGNOSIS — E78.5 HYPERLIPIDEMIA, UNSPECIFIED HYPERLIPIDEMIA TYPE: Primary | ICD-10-CM

## 2020-08-10 DIAGNOSIS — E11.39 TYPE 2 DIABETES MELLITUS WITH OTHER OPHTHALMIC COMPLICATION, WITHOUT LONG-TERM CURRENT USE OF INSULIN (HCC): ICD-10-CM

## 2020-08-10 DIAGNOSIS — I10 ESSENTIAL HYPERTENSION: ICD-10-CM

## 2020-08-10 PROCEDURE — 3051F HG A1C>EQUAL 7.0%<8.0%: CPT | Performed by: PHYSICIAN ASSISTANT

## 2020-08-10 PROCEDURE — 3078F DIAST BP <80 MM HG: CPT | Performed by: PHYSICIAN ASSISTANT

## 2020-08-10 PROCEDURE — 99214 OFFICE O/P EST MOD 30 MIN: CPT | Performed by: PHYSICIAN ASSISTANT

## 2020-08-10 PROCEDURE — 1036F TOBACCO NON-USER: CPT | Performed by: PHYSICIAN ASSISTANT

## 2020-08-10 PROCEDURE — 3074F SYST BP LT 130 MM HG: CPT | Performed by: PHYSICIAN ASSISTANT

## 2020-08-10 PROCEDURE — 3066F NEPHROPATHY DOC TX: CPT | Performed by: PHYSICIAN ASSISTANT

## 2020-08-10 PROCEDURE — 3008F BODY MASS INDEX DOCD: CPT | Performed by: FAMILY MEDICINE

## 2020-08-10 PROCEDURE — 3008F BODY MASS INDEX DOCD: CPT | Performed by: PHYSICIAN ASSISTANT

## 2020-08-10 NOTE — PROGRESS NOTES
Established Patient Progress Note      Chief Complaint   Patient presents with    Diabetes Type 2        History of Present Illness:   Stephon Willoughby is a 62 y o  male with a history of type 2 diabetes with long term use of insulin since many years ago, started on insulin about 1 year ago after taking prednisone for gout  Reports complications of CKD and retinopathy  Has liver transplant, following with transplant center and nephrology    Denies recent illness or hospitalizations  Denies recent severe hypoglycemic or severe hyperglycemic episodes  Denies any issues with his current regimen  home glucose monitoring: are performed regularly 3x per day  Works 3-11PM  Does snack at night after bedtime reading (Cereal or crackers)  hasnt been exercising, but is active at work  Saw dietician 2019  Often skips lunch, If so skips humalog  Home blood glucose readings:   Before breakfast: 130-199  Before dinner: low/mid 100s  Bedtime: 90s/low 100s        Current regimen:   Tresiba 34 units daily  Humalog 6-4-4 before meals    Last Eye Exam: UTD  Last Foot Exam: today at visit    Has hypertension: Taking amlodipine, hctz, labetalol, lisinopril  Follows with nephrology  Has hyperlipidemia: Taking atorvastatin 40mg      Patient Active Problem List   Diagnosis    Hypertension    Type 2 diabetes mellitus with ophthalmic complication, without long-term current use of insulin ([facility] [facility]    Cirrhosis    Hyperlipidemia    Status post liver transplant    Hyperglycemia    Encounter for immunization    Osteopenia    CKD (chronic kidney disease) stage 2, GFR 60-89 ml/min    Male erectile disorder    Type 2 diabetes mellitus with hyperglycemia, with long-term current use of insulin (HCC)    Other osteoporosis without current pathological fracture    Acute gout of right foot    Steroid-induced hyperglycemia    Other proteinuria    Respiratory tract infection      Past Medical History:   Diagnosis Date    Chronic kidney disease, stage III (moderate) (HCC)     RESOLVED: [id number]    Cir[facility]osis [facility] [address] [facility]     [facility]     Hepatic encephalopathy ([facility]     Hepatitis C     Hyperkalemia     Hyperlipidemia     Hypertension     Pancytopenia ([facility] [address] [facility]     Status post liver transplant ([facility]     Umbilical hernia       Past Surgical History:   Procedure Laterality Date    ANKLE SURGERY      CHOLECYSTECTOMY      GALLBLADDER SURGERY      LIVER TRANSPLANTATION      LAST ASSESSED: [id number]    IL COLONOSCOPY FLX DX W/COLLJ SPEC WHEN PFRMD N/A 2/16/2017    Procedure: COLONOSCOPY;  Surgeon: Jigna Stephens MD;  Location: [facility]; Service: Gastroenterology    TONSILLECTOMY        Family History   Problem Relation Age of Onset    Diabetes Mother         TYPE 2    Hypertension Mother     Stroke Mother 68        SYNDROME     Hepatitis Brother         C    Cirrhosis Brother         LIVER     Cancer Family      Social History     Tobacco Use    Smoking status: Never Smoker    Smokeless tobacco: Never Used   Substance Use Topics    Alcohol use:  Yes     Alcohol/week: 1 0 standard drinks     Types: 1 Cans of beer per week     Frequency: 2-4 times a month     Drinks per session: 1 or 2     Comment: occassionally     No Known Allergies      Current Outpatient Medications:     alendronate (FOSAMAX) 70 mg tablet, TAKE 1 TABLET EVERY 7 DAYS, Disp: 12 tablet, Rfl: 4    amLODIPine (NORVASC) 10 mg tablet, TAKE 1 TABLET DAILY, Disp: 90 tablet, Rfl: 3    atorvastatin (LIPITOR) 40 mg tablet, TAKE 1 TABLET DAILY AT BEDTIME, Disp: 90 tablet, Rfl: 4    azaTHIOprine (IMURAN) 50 mg tablet, Take 1 tablet (50 mg total) by mouth daily, Disp: 90 tablet, Rfl: 3    Cholecalciferol (VITAMIN D3) 1000 units CAPS, Take 1 capsule by mouth daily, Disp: , Rfl:     hydrochlorothiazide (MICROZIDE) 12 5 mg capsule, TAKE 1 CAPSULE DAILY, Disp: 90 capsule, Rfl: 3    insulin degludec (TRESIBA) 100 units/mL injection pen, Inject 38 Units under the skin daily, Disp: 12 pen, Rfl: 1    insulin lispro (HumaLOG) 100 units/mL injection pen, 6 units before breakfast and 4 units before lunch and dinner, Disp: 5 pen, Rfl: 1    Insulin Pen Needle 32G X 4 MM MISC, FOR USE WITH INSULIN PEN FOUR TIMES DAILY, Disp: 100 each, Rfl: 3    labetalol (NORMODYNE) 100 mg tablet, TAKE 1 TABLET EVERY 12 HOURS, Disp: 180 tablet, Rfl: 4    lisinopril (ZESTRIL) 10 mg tablet, TAKE 1 TABLET BY MOUTH EVERY DAY, Disp: 30 tablet, Rfl: 5    MAGNESIUM OXIDE 400 PO, Take 1 tablet by mouth 2 (two) times a day , Disp: , Rfl:     Multiple Vitamins-Minerals (MULTIVITAMIN WITH MINERALS) tablet, Take 1 tablet by mouth daily, Disp: , Rfl:     tacrolimus (PROGRAF) 1 mg capsule, Takes 4 tabs in am, 3 tabs in pm, Disp: , Rfl:     TRUE METRIX BLOOD GLUCOSE TEST test strip, Test twice daily, Disp: , Rfl:     Review of Systems   Constitutional: Negative for activity change, appetite change and fatigue  HENT: Negative for sore throat, trouble swallowing and voice change  Eyes: Negative for visual disturbance  Respiratory: Negative for choking, chest tightness and shortness of breath  Cardiovascular: Negative for chest pain, palpitations and leg swelling  Gastrointestinal: Negative for abdominal pain, constipation and diarrhea  Endocrine: Negative for cold intolerance, heat intolerance, polydipsia, polyphagia and polyuria  Genitourinary: Negative for frequency  Musculoskeletal: Negative for arthralgias and myalgias  Skin: Negative for rash  Neurological: Negative for dizziness and syncope  Hematological: Negative for adenopathy  Psychiatric/Behavioral: Negative for sleep disturbance  All other systems reviewed and are negative  Physical Exam:  Body mass index is 37 04 kg/m²    /70 (BP Location: Left arm, Patient Position: Sitting, Cuff Size: Large)   Pulse 78   Ht 5' 3 2" (1 605 m)   Wt 95 4 kg (210 lb 6 4 oz)   BMI 37 04 kg/m²    Wt Readings from Last 3 Encounters:   08/10/20 95 4 kg (210 lb 6 4 oz)   04/01/20 95 3 kg (210 lb 3 2 oz)   01/30/20 96 1 kg (211 lb 12 8 oz)       Physical Exam  Vitals signs reviewed  Constitutional:       General: He is not in acute distress  Appearance: He is well-developed  HENT:      Head: Normocephalic and atraumatic  Eyes:      Conjunctiva/sclera: Conjunctivae normal       Pupils: Pupils are equal, round, and reactive to light  Neck:      Musculoskeletal: Normal range of motion and neck supple  Thyroid: No thyromegaly  Cardiovascular:      Rate and Rhythm: Normal rate and regular rhythm  Pulses: no weak pulses          Dorsalis pedis pulses are 2+ on the right side and 2+ on the left side  Posterior tibial pulses are 2+ on the right side and 2+ on the left side  Heart sounds: Normal heart sounds  No murmur  Pulmonary:      Effort: Pulmonary effort is normal  No respiratory distress  Breath sounds: Normal breath sounds  No wheezing or rales  Abdominal:      General: Bowel sounds are normal  There is no distension  Palpations: Abdomen is soft  Tenderness: There is no abdominal tenderness  Musculoskeletal: Normal range of motion  Feet:      Right foot:      Skin integrity: No ulcer, skin breakdown, erythema, warmth, callus or dry skin  Left foot:      Skin integrity: No ulcer, skin breakdown, erythema, warmth, callus or dry skin  Lymphadenopathy:      Cervical: No cervical adenopathy  Skin:     General: Skin is warm and dry  Neurological:      Mental Status: He is alert and oriented to person, place, and time  Patient's shoes and socks removed  Right Foot/Ankle   Right Foot Inspection  Skin Exam: skin normal and skin intact no dry skin, no warmth, no callus, no erythema, no maceration, no abnormal color, no pre-ulcer, no ulcer and no callus                          Toe Exam: ROM and strength within normal limitsno swelling, no tenderness and erythemaRight toe deformity: bunion  Sensory       Monofilament testing: intact  Vascular  Capillary refills: < 3 seconds  The right DP pulse is 2+  The right PT pulse is 2+  Left Foot/Ankle  Left Foot Inspection  Skin Exam: skin normal and skin intactno dry skin, no warmth, no erythema, no maceration, normal color, no pre-ulcer, no ulcer and no callus                         Toe Exam: ROM and strength within normal limits and left toe deformity (bunion)no swelling, no tenderness and no erythema                   Sensory       Monofilament: intact  Vascular  Capillary refills: < 3 seconds  The left DP pulse is 2+  The left PT pulse is 2+  Assign Risk Category:  No deformity present; No loss of protective sensation;  No weak pulses       Risk: 0      Labs:   Lab Results   Component Value Date    HGBA1C 7 6 (H) 06/01/2020    HGBA1C 7 0 (H) 02/29/2020    HGBA1C 7 7 (H) 12/05/2019     Lab Results   Component Value Date    CREATININE 1 85 (H) 06/01/2020    CREATININE 1 50 (H) 02/29/2020    CREATININE 1 57 (H) 12/14/2019    BUN 52 (H) 06/01/2020     01/08/2016    K 4 7 06/01/2020     06/01/2020    CO2 26 06/01/2020     eGFR   Date Value Ref Range Status   06/01/2020 38 ml/min/1 73sq m Final     Lab Results   Component Value Date    CHOL 174 03/10/2015    HDL 34 (L) 06/01/2020    TRIG 267 (H) 06/01/2020     Lab Results   Component Value Date    ALT 25 06/01/2020    AST 20 06/01/2020    GGT 38 06/01/2020    ALKPHOS 87 06/01/2020    BILITOT 0 29 01/08/2016     Lab Results   Component Value Date    LUE9SZTBNFCE 1 432 06/01/2019    GRG8WSWOWUXQ 1 603 05/02/2017     Lab Results   Component Value Date    FREET4 0 96 06/01/2019       Impression & Plan:    Problem List Items Addressed This Visit        Endocrine    Type 2 diabetes mellitus with ophthalmic complication, without long-term current use of insulin ([address] )     Not at [address] with most recent A1C of 7 6  Reviewed BP logs-- morning blood sugars high   Increase tresiba to 38 units  Focus on lifestyle modifications/weight loss  Advised against having cereal at bedtime- choose lower carb snack, follow dieticians guidelines from 2019 visit  Send log for two weeks for review  Lab Results   Component Value Date    HGBA1C 7 6 (H) 06/01/2020            Relevant Medications    insulin degludec (TRESIBA) 100 units/mL injection pen    Other Relevant Orders    Hemoglobin A1C       Cardiovascular and Mediastinum    Hypertension     Well controlled on current regimen  Other    Hyperlipidemia - Primary     LDL at goal  Triglycerides high but improving  Continue atorvastatin and focus on lifestyle modifications  Orders Placed This Encounter   Procedures    Hemoglobin A1C     Standing Status:   Future     Standing Expiration Date:   2/10/2021       There are no Patient Instructions on file for this visit  Discussed with the patient and all questioned fully answered  He will call me if any problems arise  Follow-up appointment in 3 months       Counseled patient on diagnostic results, prognosis, risk and benefit of treatment options, instruction for management, importance of treatment compliance, Risk  factor reduction and impressions    Saroj Faye PA-C

## 2020-08-10 NOTE — ASSESSMENT & PLAN NOTE
LDL at goal  Triglycerides high but improving  Continue atorvastatin and focus on lifestyle modifications

## 2020-08-10 NOTE — ASSESSMENT & PLAN NOTE
Not at goal/worsening with most recent A1C of 7 6  Reviewed BP logs-- morning blood sugars high  Increase tresiba to 38 units  Focus on lifestyle modifications/weight loss  Advised against having cereal at bedtime- choose lower carb snack, follow dieticians guidelines from 2019 visit  Send log for two weeks for review     Lab Results   Component Value Date    HGBA1C 7 6 (H) 06/01/2020

## 2020-08-17 ENCOUNTER — HOSPITAL ENCOUNTER (OUTPATIENT)
Dept: RADIOLOGY | Age: 60
Discharge: HOME | End: 2020-08-17
Payer: COMMERCIAL

## 2020-08-17 DIAGNOSIS — Z94.4 STATUS POST LIVER TRANSPLANT (HCC): ICD-10-CM

## 2020-08-17 PROCEDURE — 77080 DXA BONE DENSITY AXIAL: CPT

## 2020-08-19 ENCOUNTER — TELEPHONE (OUTPATIENT)
Dept: ENDOCRINOLOGY | Facility: CLINIC | Age: 60
End: 2020-08-19

## 2020-09-02 ENCOUNTER — TELEPHONE (OUTPATIENT)
Dept: ENDOCRINOLOGY | Facility: CLINIC | Age: 60
End: 2020-09-02

## 2020-09-05 ENCOUNTER — TRANSCRIBE ORDERS (OUTPATIENT)
Dept: LAB | Facility: CLINIC | Age: 60
End: 2020-09-05

## 2020-09-05 ENCOUNTER — APPOINTMENT (OUTPATIENT)
Dept: LAB | Facility: CLINIC | Age: 60
End: 2020-09-05
Payer: COMMERCIAL

## 2020-09-05 DIAGNOSIS — D68.9 BLOOD CLOTTING DISORDER (HCC): ICD-10-CM

## 2020-09-05 DIAGNOSIS — C22.1 MALIGNANT NEOPLASM OF INTRAHEPATIC BILE DUCTS (HCC): Primary | ICD-10-CM

## 2020-09-05 DIAGNOSIS — R79.1 ABNORMAL COAGULATION PROFILE: ICD-10-CM

## 2020-09-05 DIAGNOSIS — E11.39 TYPE 2 DIABETES MELLITUS WITH OTHER OPHTHALMIC COMPLICATION, WITHOUT LONG-TERM CURRENT USE OF INSULIN (HCC): ICD-10-CM

## 2020-09-05 DIAGNOSIS — Z94.4 LIVER REPLACED BY TRANSPLANT (HCC): ICD-10-CM

## 2020-09-05 DIAGNOSIS — N18.2 CKD (CHRONIC KIDNEY DISEASE) STAGE 2, GFR 60-89 ML/MIN: ICD-10-CM

## 2020-09-05 DIAGNOSIS — R80.8 OTHER PROTEINURIA: ICD-10-CM

## 2020-09-05 DIAGNOSIS — C22.1 MALIGNANT NEOPLASM OF INTRAHEPATIC BILE DUCTS (HCC): ICD-10-CM

## 2020-09-05 PROCEDURE — 3066F NEPHROPATHY DOC TX: CPT | Performed by: INTERNAL MEDICINE

## 2020-09-05 PROCEDURE — 3066F NEPHROPATHY DOC TX: CPT | Performed by: FAMILY MEDICINE

## 2020-09-08 ENCOUNTER — APPOINTMENT (OUTPATIENT)
Dept: LAB | Facility: CLINIC | Age: 60
End: 2020-09-08

## 2020-09-08 ENCOUNTER — APPOINTMENT (OUTPATIENT)
Dept: LAB | Facility: CLINIC | Age: 60
End: 2020-09-08
Payer: COMMERCIAL

## 2020-09-08 DIAGNOSIS — R79.1 ABNORMAL COAGULATION PROFILE: ICD-10-CM

## 2020-09-08 DIAGNOSIS — D68.9 BLOOD CLOTTING DISORDER (HCC): ICD-10-CM

## 2020-09-08 DIAGNOSIS — C22.1 MALIGNANT NEOPLASM OF INTRAHEPATIC BILE DUCTS (HCC): ICD-10-CM

## 2020-09-08 DIAGNOSIS — Z94.4 LIVER REPLACED BY TRANSPLANT (HCC): ICD-10-CM

## 2020-09-08 LAB
CREAT UR-MCNC: 172 MG/DL
EST. AVERAGE GLUCOSE BLD GHB EST-MCNC: 148 MG/DL
HBA1C MFR BLD: 6.8 %
PROT UR-MCNC: 158 MG/DL
PROT/CREAT UR: 0.92 MG/G{CREAT} (ref 0–0.1)

## 2020-09-08 PROCEDURE — 82570 ASSAY OF URINE CREATININE: CPT

## 2020-09-08 PROCEDURE — 3061F NEG MICROALBUMINURIA REV: CPT | Performed by: INTERNAL MEDICINE

## 2020-09-08 PROCEDURE — 3044F HG A1C LEVEL LT 7.0%: CPT | Performed by: FAMILY MEDICINE

## 2020-09-08 PROCEDURE — 84156 ASSAY OF PROTEIN URINE: CPT

## 2020-09-08 PROCEDURE — 3061F NEG MICROALBUMINURIA REV: CPT | Performed by: FAMILY MEDICINE

## 2020-09-08 PROCEDURE — 3044F HG A1C LEVEL LT 7.0%: CPT | Performed by: INTERNAL MEDICINE

## 2020-09-08 PROCEDURE — 83036 HEMOGLOBIN GLYCOSYLATED A1C: CPT

## 2020-09-09 ENCOUNTER — TELEPHONE (OUTPATIENT)
Dept: ENDOCRINOLOGY | Facility: CLINIC | Age: 60
End: 2020-09-09

## 2020-09-09 NOTE — TELEPHONE ENCOUNTER
----- Message from Yan Haywood PA-C sent at 9/8/2020  8:15 PM EDT -----  A1C  Has improved to 6 8, great job

## 2020-09-10 ENCOUNTER — OFFICE VISIT (OUTPATIENT)
Dept: NEPHROLOGY | Facility: CLINIC | Age: 60
End: 2020-09-10
Payer: COMMERCIAL

## 2020-09-10 VITALS
DIASTOLIC BLOOD PRESSURE: 78 MMHG | SYSTOLIC BLOOD PRESSURE: 136 MMHG | HEIGHT: 62 IN | TEMPERATURE: 97.4 F | WEIGHT: 206 LBS | HEART RATE: 70 BPM | BODY MASS INDEX: 37.91 KG/M2

## 2020-09-10 DIAGNOSIS — R80.9 PROTEINURIA, UNSPECIFIED TYPE: ICD-10-CM

## 2020-09-10 DIAGNOSIS — R80.8 OTHER PROTEINURIA: ICD-10-CM

## 2020-09-10 DIAGNOSIS — N18.9 CHRONIC KIDNEY DISEASE, UNSPECIFIED CKD STAGE: ICD-10-CM

## 2020-09-10 DIAGNOSIS — I10 ESSENTIAL HYPERTENSION: ICD-10-CM

## 2020-09-10 DIAGNOSIS — N18.2 CKD (CHRONIC KIDNEY DISEASE) STAGE 2, GFR 60-89 ML/MIN: Primary | ICD-10-CM

## 2020-09-10 PROCEDURE — 99214 OFFICE O/P EST MOD 30 MIN: CPT | Performed by: INTERNAL MEDICINE

## 2020-09-10 PROCEDURE — 4010F ACE/ARB THERAPY RXD/TAKEN: CPT | Performed by: INTERNAL MEDICINE

## 2020-09-10 PROCEDURE — 1036F TOBACCO NON-USER: CPT | Performed by: INTERNAL MEDICINE

## 2020-09-10 PROCEDURE — 4010F ACE/ARB THERAPY RXD/TAKEN: CPT | Performed by: FAMILY MEDICINE

## 2020-09-10 RX ORDER — LISINOPRIL 10 MG/1
10 TABLET ORAL DAILY
Qty: 30 TABLET | Refills: 5 | Status: SHIPPED | OUTPATIENT
Start: 2020-09-10 | End: 2020-11-10 | Stop reason: SDUPTHER

## 2020-09-10 NOTE — PATIENT INSTRUCTIONS
You are here for follow-up and it sounds like her health has been doing well  Your creatinine which is the blood test for the kidney function is 1 7 in although there has been slight fluctuations around that level over the last year it is no worse as a year ago was the same level  The good news on top of this is that the protein estimation the urine is lower than it was in the past and also that is good because that will also help delay progression from the diabetes in the kidney  I explained to that we treat this to help slow damage with blood pressure control sugar control cholesterol control and lisinopril  Your blood sugars are better controlled your A1c is improved and her blood pressure is at target so no changes  Labs and follow-up as scheduled

## 2020-09-10 NOTE — LETTER
September 10, 2020     Sloan Laughlin, [address] [facility] [place]    Patient: Isa Hamlin   YOB: 1960   Date of Visit: 9/10/2020       Dear Dr Medeiros Brazil: Thank you for referring Isa Hamlin to me for evaluation  Below are my notes for this consultation  If you have questions, please do not hesitate to call me  I look forward to following your patient along with you  Sincerely,        Cony Joe MD        CC: No Recipients  Cony Joe MD  9/10/2020  9:30 AM  Sign when Signing Visit  NEPHROLOGY PROGRESS NOTE    Isa Hamlin 62 y o  male MRN: [id number]  Unit/Bed#:  Encounter: [id number]  Reason for Consult:  Chronic kidney disease    The patient is here for routine follow-up he has been doing well continuing to work but is being safe  He has had no intercurrent illnesses he did undergo colonoscopy  Reviewed medications and he states he is feeling well with no specific complaints  ASSESSMENT/PLAN:  1  Renal    Patient's chronic kidney disease likely due to diabetic nephropathy with proteinuria  His latest creatinine is 1 7 looking back a year even though it fluctuate slightly around this level it has been stable without significant progression  Protein estimation also significantly lower as it is less than 1 g so that is improved  I did see his hemoglobin A1c is improved and it is less than 7% his blood pressure is under good control  I also explained on there may be some element of tacrolimus toxicity but he requires that medications so I favor that it is mostly related to diabetic kidney disease  He is on an ACE-inhibitor  Continue current medications  Patient continuing to try and lose a little bit await which may help  Labs and follow-up in 6 months    He was told to call if there is any problems between visits  2  Hypertension    Blood pressure is well controlled continue current medications with no changes      SUBJECTIVE:  Review of Systems Constitution: Negative for chills, fever, malaise/fatigue and night sweats  HENT: Negative  Eyes: Negative  Cardiovascular: Negative  Negative for chest pain, dyspnea on exertion, leg swelling and orthopnea  Respiratory: Negative  Negative for cough, shortness of breath, sputum production and wheezing  Skin: Negative  Negative for itching and rash  Gastrointestinal: Negative  Negative for bloating, abdominal pain, diarrhea, nausea and vomiting  Genitourinary: Negative  Negative for dysuria, flank pain, hematuria and incomplete emptying  Neurological: Negative  Psychiatric/Behavioral: Negative  Negative for altered mental status, depression, hallucinations and hypervigilance  OBJECTIVE:  Current Weight: Weight - Scale: 93 4 kg (206 lb)  [email or web address]: Temperature: (!) 97 4 °F (36 3 °C)   Blood pressure 136/78, pulse 70, temperature (!) 97 4 °F (36 3 °C), temperature source Temporal, height 5' 2" (1 575 m), weight 93 4 kg (206 lb)  , Body mass index is 37 68 kg/m²  [unfilled]    Physical Exam: /78 (BP Location: Left arm, Patient Position: Sitting, Cuff Size: Standard)   Pulse 70   Temp (!) 97 4 °F (36 3 °C) (Temporal)   Ht 5' 2" (1 575 m)   Wt 93 4 kg (206 lb)   BMI 37 68 kg/m²   Physical Exam  Constitutional:       Appearance: Normal appearance  He is not ill-appearing or diaphoretic  HENT:      Head: Normocephalic and atraumatic  Nose:      Comments: Wearing mask     Mouth/Throat:      Comments: Wearing mask  Eyes:      Extraocular Movements: Extraocular movements intact  Conjunctiva/sclera: Conjunctivae normal    Neck:      Musculoskeletal: Normal range of motion and neck supple  Cardiovascular:      Rate and Rhythm: Normal rate and regular rhythm  Heart sounds: No friction rub  No gallop  Comments: No significant edema  Pulmonary:      Effort: Pulmonary effort is normal  No respiratory distress        Breath sounds: Normal breath sounds  No wheezing, rhonchi or rales  Abdominal:      General: Bowel sounds are normal  There is no distension  Palpations: Abdomen is soft  Tenderness: There is no abdominal tenderness  There is no rebound  Skin:     General: Skin is warm and dry  Neurological:      General: No focal deficit present  Mental Status: He is alert and oriented to person, place, and time  Mental status is at baseline  Psychiatric:         Mood and Affect: Mood normal          Behavior: Behavior normal          Thought Content:  Thought content normal          Medications:    Current Outpatient Medications:     alendronate (FOSAMAX) 70 mg tablet, TAKE 1 TABLET EVERY 7 DAYS, Disp: 12 tablet, Rfl: 4    amLODIPine (NORVASC) 10 mg tablet, TAKE 1 TABLET DAILY, Disp: 90 tablet, Rfl: 3    atorvastatin (LIPITOR) 40 mg tablet, TAKE 1 TABLET DAILY AT BEDTIME, Disp: 90 tablet, Rfl: 4    azaTHIOprine (IMURAN) 50 mg tablet, Take 1 tablet (50 mg total) by mouth daily, Disp: 90 tablet, Rfl: 3    Cholecalciferol (VITAMIN D3) 1000 units CAPS, Take 1 capsule by mouth daily, Disp: , Rfl:     hydrochlorothiazide (MICROZIDE) 12 5 mg capsule, TAKE 1 CAPSULE DAILY, Disp: 90 capsule, Rfl: 3    insulin degludec (TRESIBA) 100 units/mL injection pen, Inject 38 Units under the skin daily, Disp: 12 pen, Rfl: 1    insulin lispro (HumaLOG) 100 units/mL injection pen, 6 units before breakfast and 4 units before lunch and dinner, Disp: 5 pen, Rfl: 1    Insulin Pen Needle 32G X 4 MM MISC, FOR USE WITH INSULIN PEN FOUR TIMES DAILY, Disp: 100 each, Rfl: 3    labetalol (NORMODYNE) 100 mg tablet, TAKE 1 TABLET EVERY 12 HOURS, Disp: 180 tablet, Rfl: 4    lisinopril (ZESTRIL) 10 mg tablet, Take 1 tablet (10 mg total) by mouth daily, Disp: 30 tablet, Rfl: 5    MAGNESIUM OXIDE 400 PO, Take 1 tablet by mouth 2 (two) times a day , Disp: , Rfl:     Multiple Vitamins-Minerals (MULTIVITAMIN WITH MINERALS) tablet, Take 1 tablet by mouth daily, Disp: , Rfl:     tacrolimus (PROGRAF) 1 mg capsule, Takes 4 tabs in am, 3 tabs in pm, Disp: , Rfl:     TRUE METRIX BLOOD GLUCOSE TEST test strip, Test twice daily, Disp: , Rfl:     Laboratory Results:  Lab Results   Component Value Date    WBC 4 15 (L) 09/08/2020    HGB 11 4 (L) 09/08/2020    HCT 35 4 (L) 09/08/2020    MCV 93 09/08/2020     (L) 09/08/2020     Lab Results   Component Value Date    SODIUM 141 09/08/2020    K 5 2 09/08/2020     09/08/2020    CO2 28 09/08/2020    BUN 38 (H) 09/08/2020    CREATININE 1 74 (H) 09/08/2020    GLUC 182 (H) 09/14/2019    CALCIUM 9 2 09/08/2020     Lab Results   Component Value Date    CALCIUM 9 2 09/08/2020    PHOS 4 0 10/24/2014     No results found for: LABPROT

## 2020-09-10 NOTE — PROGRESS NOTES
NEPHROLOGY PROGRESS NOTE    Juan Colunga 62 y o  male MRN: [id number]  Unit/Bed#:  Encounter: [id number]  Reason for Consult:  Chronic kidney disease    The patient is here for routine follow-up he has been doing well continuing to work but is being safe  He has had no intercurrent illnesses he did undergo colonoscopy  Reviewed medications and he states he is feeling well with no specific complaints  ASSESSMENT/PLAN:  1  Renal    Patient's chronic kidney disease likely due to diabetic nephropathy with proteinuria  His latest creatinine is 1 7 looking back a year even though it fluctuate slightly around this level it has been stable without significant progression  Protein estimation also significantly lower as it is less than 1 g so that is improved  I did see his hemoglobin A1c is improved and it is less than 7% his blood pressure is under good control  I also explained on there may be some element of tacrolimus toxicity but he requires that medications so I favor that it is mostly related to diabetic kidney disease  He is on an ACE-inhibitor  Continue current medications  Patient continuing to try and lose a little bit await which may help  Labs and follow-up in 6 months    He was told to call if there is any problems between visits  2  Hypertension    Blood pressure is well controlled continue current medications with no changes  SUBJECTIVE:  Review of Systems   Constitution: Negative for chills, fever, malaise/fatigue and night sweats  HENT: Negative  Eyes: Negative  Cardiovascular: Negative  Negative for chest pain, dyspnea on exertion, leg swelling and orthopnea  Respiratory: Negative  Negative for cough, shortness of breath, sputum production and wheezing  Skin: Negative  Negative for itching and rash  Gastrointestinal: Negative  Negative for bloating, abdominal pain, diarrhea, nausea and vomiting  Genitourinary: Negative    Negative for dysuria, flank pain, hematuria and incomplete emptying  Neurological: Negative  Psychiatric/Behavioral: Negative  Negative for altered mental status, depression, hallucinations and hypervigilance  OBJECTIVE:  Current Weight: Weight - Scale: 93 4 kg (206 lb)  [email or web address] com: Temperature: (!) 97 4 °F (36 3 °C)   Blood pressure 136/78, pulse 70, temperature (!) 97 4 °F (36 3 °C), temperature source Temporal, height 5' 2" (1 575 m), weight 93 4 kg (206 lb)  , Body mass index is 37 68 kg/m²  [unfilled]    Physical Exam: /78 (BP Location: Left arm, Patient Position: Sitting, Cuff Size: Standard)   Pulse 70   Temp (!) 97 4 °F (36 3 °C) (Temporal)   Ht 5' 2" (1 575 m)   Wt 93 4 kg (206 lb)   BMI 37 68 kg/m²   Physical Exam  Constitutional:       Appearance: Normal appearance  He is not ill-appearing or diaphoretic  HENT:      Head: Normocephalic and atraumatic  Nose:      Comments: Wearing mask     Mouth/Throat:      Comments: Wearing mask  Eyes:      Extraocular Movements: Extraocular movements intact  Conjunctiva/sclera: Conjunctivae normal    Neck:      Musculoskeletal: Normal range of motion and neck supple  Cardiovascular:      Rate and Rhythm: Normal rate and regular rhythm  Heart sounds: No friction rub  No gallop  Comments: No significant edema  Pulmonary:      Effort: Pulmonary effort is normal  No respiratory distress  Breath sounds: Normal breath sounds  No wheezing, rhonchi or rales  Abdominal:      General: Bowel sounds are normal  There is no distension  Palpations: Abdomen is soft  Tenderness: There is no abdominal tenderness  There is no rebound  Skin:     General: Skin is warm and dry  Neurological:      General: No focal deficit present  Mental Status: He is alert and oriented to person, place, and time  Mental status is at baseline     Psychiatric:         Mood and Affect: Mood normal          Behavior: Behavior normal          Thought Content: Thought content normal          Medications:    Current Outpatient Medications:     alendronate (FOSAMAX) 70 mg tablet, TAKE 1 TABLET EVERY 7 DAYS, Disp: 12 tablet, Rfl: 4    amLODIPine (NORVASC) 10 mg tablet, TAKE 1 TABLET DAILY, Disp: 90 tablet, Rfl: 3    atorvastatin (LIPITOR) 40 mg tablet, TAKE 1 TABLET DAILY AT BEDTIME, Disp: 90 tablet, Rfl: 4    azaTHIOprine (IMURAN) 50 mg tablet, Take 1 tablet (50 mg total) by mouth daily, Disp: 90 tablet, Rfl: 3    Cholecalciferol (VITAMIN D3) 1000 units CAPS, Take 1 capsule by mouth daily, Disp: , Rfl:     hydrochlorothiazide (MICROZIDE) 12 5 mg capsule, TAKE 1 CAPSULE DAILY, Disp: 90 capsule, Rfl: 3    insulin degludec (TRESIBA) 100 units/mL injection pen, Inject 38 Units under the skin daily, Disp: 12 pen, Rfl: 1    insulin lispro (HumaLOG) 100 units/mL injection pen, 6 units before breakfast and 4 units before lunch and dinner, Disp: 5 pen, Rfl: 1    Insulin Pen Needle 32G X 4 MM MISC, FOR USE WITH INSULIN PEN FOUR TIMES DAILY, Disp: 100 each, Rfl: 3    labetalol (NORMODYNE) 100 mg tablet, TAKE 1 TABLET EVERY 12 HOURS, Disp: 180 tablet, Rfl: 4    lisinopril (ZESTRIL) 10 mg tablet, Take 1 tablet (10 mg total) by mouth daily, Disp: 30 tablet, Rfl: 5    MAGNESIUM OXIDE 400 PO, Take 1 tablet by mouth 2 (two) times a day , Disp: , Rfl:     Multiple Vitamins-Minerals (MULTIVITAMIN WITH MINERALS) tablet, Take 1 tablet by mouth daily, Disp: , Rfl:     tacrolimus (PROGRAF) 1 mg capsule, Takes 4 tabs in am, 3 tabs in pm, Disp: , Rfl:     TRUE METRIX BLOOD GLUCOSE TEST test strip, Test twice daily, Disp: , Rfl:     Laboratory Results:  Lab Results   Component Value Date    WBC 4 15 (L) 09/08/2020    HGB 11 4 (L) 09/08/2020    HCT 35 4 (L) 09/08/2020    MCV 93 09/08/2020     (L) 09/08/2020     Lab Results   Component Value Date    SODIUM 141 09/08/2020    K 5 2 09/08/2020     09/08/2020    CO2 28 09/08/2020    BUN 38 (H) 09/08/2020    CREATININE 1 74 (H) 09/08/2020    GLUC 182 (H) 09/14/2019    CALCIUM 9 2 09/08/2020     Lab Results   Component Value Date    CALCIUM 9 2 09/08/2020    PHOS 4 0 10/24/2014     No results found for: LABPROT

## 2020-09-14 ENCOUNTER — OFFICE VISIT (OUTPATIENT)
Dept: FAMILY MEDICINE CLINIC | Facility: CLINIC | Age: 60
End: 2020-09-14

## 2020-09-14 VITALS
HEIGHT: 62 IN | SYSTOLIC BLOOD PRESSURE: 140 MMHG | RESPIRATION RATE: 18 BRPM | BODY MASS INDEX: 37.76 KG/M2 | WEIGHT: 205.2 LBS | HEART RATE: 74 BPM | TEMPERATURE: 98.1 F | DIASTOLIC BLOOD PRESSURE: 80 MMHG

## 2020-09-14 DIAGNOSIS — Z11.4 SCREENING FOR HIV (HUMAN IMMUNODEFICIENCY VIRUS): ICD-10-CM

## 2020-09-14 DIAGNOSIS — M81.8 OTHER OSTEOPOROSIS WITHOUT CURRENT PATHOLOGICAL FRACTURE: Primary | ICD-10-CM

## 2020-09-14 DIAGNOSIS — M85.88 OSTEOPENIA OF LUMBAR SPINE: ICD-10-CM

## 2020-09-14 DIAGNOSIS — N18.9 CHRONIC KIDNEY DISEASE, UNSPECIFIED CKD STAGE: ICD-10-CM

## 2020-09-14 DIAGNOSIS — M10.9 GOUT, UNSPECIFIED CAUSE, UNSPECIFIED CHRONICITY, UNSPECIFIED SITE: ICD-10-CM

## 2020-09-14 DIAGNOSIS — Z23 ENCOUNTER FOR IMMUNIZATION: ICD-10-CM

## 2020-09-14 PROBLEM — T38.0X5A STEROID-INDUCED HYPERGLYCEMIA: Status: RESOLVED | Noted: 2019-09-04 | Resolved: 2020-09-14

## 2020-09-14 PROBLEM — R73.9 STEROID-INDUCED HYPERGLYCEMIA: Status: RESOLVED | Noted: 2019-09-04 | Resolved: 2020-09-14

## 2020-09-14 PROCEDURE — 90682 RIV4 VACC RECOMBINANT DNA IM: CPT | Performed by: FAMILY MEDICINE

## 2020-09-14 PROCEDURE — 90471 IMMUNIZATION ADMIN: CPT | Performed by: FAMILY MEDICINE

## 2020-09-14 PROCEDURE — 99214 OFFICE O/P EST MOD 30 MIN: CPT | Performed by: FAMILY MEDICINE

## 2020-09-14 PROCEDURE — 3725F SCREEN DEPRESSION PERFORMED: CPT | Performed by: FAMILY MEDICINE

## 2020-09-14 NOTE — PROGRESS NOTES
Assessment/Plan     Hypertension  Blood pressure 140/80  Within goal   Continue current regimen  Patient also follows up with Nephrology  Encouraged to continue follow-up    Osteopenia  Patient has history of liver transplant, was subsequently on steroids and developed osteoporosis  He has been on Fosamax since 2015  Recent DEXA scan shows improvement bone mineral density in left hip, but slightly decreased density in lumbar spine  T-score -1 9 from -1 7 in 2018  Patient is no longer taking steroids, but used twice this year for gout  DEXA scan is now consistent with osteopenia  He does take calcium and vitamin-D supplements  Since patient is no longer on steroids and has completed therapy for 5 years, will discontinue Fosamax at this time  Will repeat DEXA scan next year to look for any further decrease in bone mineral density  Advised to continue taking calcium and vitamin-D  Gout  Patient had 2 flares of gout this year  Will check uric acid levels  Discussed low purine diet with patient  Consider discontinuing hydrochlorothiazide in case of high uric acid levels or another episode of gout  Diagnoses and all orders for this visit:    Other osteoporosis without current pathological fracture    Gout, unspecified cause, unspecified chronicity, unspecified site  -     Uric acid; Future    Screening for HIV (human immunodeficiency virus)  -     HIV 1/2 Antigen/Antibody (4th Generation) w Reflex [facility]; Future    Encounter for immunization  -     influenza vaccine, quadrivalent, recombinant, PF, 0 5 mL, for patients 18 yr+ (FLUBLOK)    Chronic kidney disease, unspecified CKD stage    Osteopenia of lumbar spine         Subjective     Chief Complaint   Patient presents with    bone density     follow up        55-year-old male presented to office for follow-up of DEXA scan results  He has history of liver transplant, follows with liver transplant team, Nephrology, Endocrinology    He takes medications regularly  He was on steroids after the transplant for a year, but has not taken since then  He was found to have osteoporosis of lumbar spine in 2015, and has been on Fosamax since then  In 2018 DEXA scan was repeated and showed much improvement in bone mineral density  DEXA scan was repeated this year, and patient is here to follow-up on results  He is currently not on steroids  He did have 2 gout attacks this ear, for which he was given steroids  He does not follow low purine diet  He also wants a flu shot today  The following portions of the patient's history were reviewed and updated as appropriate: allergies, current medications, past family history, past medical history, past social history, past surgical history and problem list     Review of Systems   Constitutional: Negative for activity change, chills and fever  HENT: Negative for congestion  Respiratory: Negative for cough, shortness of breath and wheezing  Cardiovascular: Negative for chest pain  Gastrointestinal: Negative for abdominal pain, diarrhea, nausea and vomiting  Genitourinary: Negative for difficulty urinating  Neurological: Negative for dizziness and headaches  Objective     Vitals:Blood pressure 140/80, pulse 74, temperature 98 1 °F (36 7 °C), temperature source Tympanic, resp  rate 18, height 5' 2" (1 575 m), weight 93 1 kg (205 lb 3 2 oz)  Physical Exam:  Physical Exam  Vitals signs reviewed  Constitutional:       Appearance: He is well-developed  HENT:      Head: Normocephalic and atraumatic  Right Ear: External ear normal       Left Ear: External ear normal    Eyes:      Conjunctiva/sclera: Conjunctivae normal       Pupils: Pupils are equal, round, and reactive to light  Neck:      Musculoskeletal: Normal range of motion and neck supple  Cardiovascular:      Rate and Rhythm: Normal rate and regular rhythm  Heart sounds: Normal heart sounds  No murmur  No friction rub     Pulmonary: Effort: Pulmonary effort is normal  No respiratory distress  Breath sounds: Normal breath sounds  No wheezing or rales  Abdominal:      General: Bowel sounds are normal  There is no distension  Palpations: Abdomen is soft  Tenderness: There is no abdominal tenderness  Musculoskeletal: Normal range of motion  Skin:     General: Skin is warm and dry  Neurological:      Mental Status: He is alert and oriented to person, place, and time

## 2020-09-14 NOTE — ASSESSMENT & PLAN NOTE
Patient had 2 flares of gout this year  Will check uric acid levels  Discussed low purine diet with patient  Consider discontinuing hydrochlorothiazide in case of high uric acid levels or another episode of gout

## 2020-09-14 NOTE — ASSESSMENT & PLAN NOTE
Blood pressure 140/80  Within goal   Continue current regimen  Patient also follows up with Nephrology    Encouraged to continue follow-up

## 2020-09-14 NOTE — ASSESSMENT & PLAN NOTE
Patient has history of liver transplant, was subsequently on steroids and developed osteoporosis  He has been on Fosamax since 2015  Recent DEXA scan shows improvement bone mineral density in left hip, but slightly decreased density in lumbar spine  T-score -1 9 from -1 7 in 2018  Patient is no longer taking steroids, but used twice this year for gout  DEXA scan is now consistent with osteopenia  He does take calcium and vitamin-D supplements  Since patient is no longer on steroids and has completed therapy for 5 years, will discontinue Fosamax at this time  Will repeat DEXA scan next year to look for any further decrease in bone mineral density    Advised to continue taking calcium and vitamin-D

## 2020-09-16 ENCOUNTER — TELEPHONE (OUTPATIENT)
Dept: ENDOCRINOLOGY | Facility: CLINIC | Age: 60
End: 2020-09-16

## 2020-09-23 DIAGNOSIS — E11.39 TYPE 2 DIABETES MELLITUS WITH OTHER OPHTHALMIC COMPLICATION, WITHOUT LONG-TERM CURRENT USE OF INSULIN (HCC): ICD-10-CM

## 2020-09-23 RX ORDER — PEN NEEDLE, DIABETIC 32GX 5/32"
NEEDLE, DISPOSABLE MISCELLANEOUS
Qty: 100 EACH | Refills: 3 | Status: SHIPPED | OUTPATIENT
Start: 2020-09-23 | End: 2021-03-20

## 2020-09-30 DIAGNOSIS — E11.39 TYPE 2 DIABETES MELLITUS WITH OTHER OPHTHALMIC COMPLICATION, WITHOUT LONG-TERM CURRENT USE OF INSULIN (HCC): ICD-10-CM

## 2020-09-30 NOTE — TELEPHONE ENCOUNTER
Looks good, but bedtime readings are on the low side  For now stop dinner dose of humalog  Send Log again in two weeks or sooner if frequently below 80 or above 180     Check a few prelunch readings on the next log so we can see how the breakfast dose of humalog is working

## 2020-10-01 RX ORDER — INSULIN LISPRO 100 [IU]/ML
INJECTION, SOLUTION INTRAVENOUS; SUBCUTANEOUS
Qty: 5 PEN | Refills: 1
Start: 2020-10-01 | End: 2020-10-16 | Stop reason: DRUGHIGH

## 2020-10-01 NOTE — TELEPHONE ENCOUNTER
Pt informed to stop Humalog at dinner   Advised to also check sugars pre lunch and send a log in again in 2 weeks or sooner if needed    Medication list updated and pt requested logs be mailed to his home

## 2020-10-15 ENCOUNTER — OFFICE VISIT (OUTPATIENT)
Dept: FAMILY MEDICINE CLINIC | Facility: CLINIC | Age: 60
End: 2020-10-15

## 2020-10-15 ENCOUNTER — TELEPHONE (OUTPATIENT)
Dept: ENDOCRINOLOGY | Facility: CLINIC | Age: 60
End: 2020-10-15

## 2020-10-15 VITALS
SYSTOLIC BLOOD PRESSURE: 122 MMHG | TEMPERATURE: 96.7 F | WEIGHT: 202.8 LBS | OXYGEN SATURATION: 97 % | RESPIRATION RATE: 16 BRPM | DIASTOLIC BLOOD PRESSURE: 78 MMHG | HEART RATE: 70 BPM | HEIGHT: 62 IN | BODY MASS INDEX: 37.32 KG/M2

## 2020-10-15 DIAGNOSIS — Z00.00 ANNUAL PHYSICAL EXAM: Primary | ICD-10-CM

## 2020-10-15 DIAGNOSIS — E11.65 TYPE 2 DIABETES MELLITUS WITH HYPERGLYCEMIA, WITH LONG-TERM CURRENT USE OF INSULIN (HCC): ICD-10-CM

## 2020-10-15 DIAGNOSIS — E11.39 TYPE 2 DIABETES MELLITUS WITH OTHER OPHTHALMIC COMPLICATION, WITHOUT LONG-TERM CURRENT USE OF INSULIN (HCC): ICD-10-CM

## 2020-10-15 DIAGNOSIS — Z79.4 TYPE 2 DIABETES MELLITUS WITH HYPERGLYCEMIA, WITH LONG-TERM CURRENT USE OF INSULIN (HCC): ICD-10-CM

## 2020-10-15 DIAGNOSIS — I10 ESSENTIAL HYPERTENSION: ICD-10-CM

## 2020-10-15 DIAGNOSIS — M85.88 OSTEOPENIA OF LUMBAR SPINE: ICD-10-CM

## 2020-10-15 DIAGNOSIS — M10.9 GOUT, UNSPECIFIED CAUSE, UNSPECIFIED CHRONICITY, UNSPECIFIED SITE: ICD-10-CM

## 2020-10-15 PROCEDURE — 3008F BODY MASS INDEX DOCD: CPT | Performed by: FAMILY MEDICINE

## 2020-10-15 PROCEDURE — 1036F TOBACCO NON-USER: CPT | Performed by: FAMILY MEDICINE

## 2020-10-15 PROCEDURE — 99396 PREV VISIT EST AGE 40-64: CPT | Performed by: FAMILY MEDICINE

## 2020-10-16 RX ORDER — INSULIN LISPRO 100 [IU]/ML
6 INJECTION, SOLUTION INTRAVENOUS; SUBCUTANEOUS
COMMUNITY
End: 2020-10-19 | Stop reason: SDUPTHER

## 2020-10-19 DIAGNOSIS — E11.39 TYPE 2 DIABETES MELLITUS WITH OTHER OPHTHALMIC COMPLICATION, WITHOUT LONG-TERM CURRENT USE OF INSULIN (HCC): Primary | ICD-10-CM

## 2020-10-19 RX ORDER — INSULIN LISPRO 100 [IU]/ML
6 INJECTION, SOLUTION INTRAVENOUS; SUBCUTANEOUS
Qty: 5 PEN | Refills: 2 | Status: SHIPPED | OUTPATIENT
Start: 2020-10-19 | End: 2022-01-18 | Stop reason: SDUPTHER

## 2020-10-30 ENCOUNTER — TELEPHONE (OUTPATIENT)
Dept: ENDOCRINOLOGY | Facility: CLINIC | Age: 60
End: 2020-10-30

## 2020-11-10 ENCOUNTER — NURSE TRIAGE (OUTPATIENT)
Dept: OTHER | Facility: OTHER | Age: 60
End: 2020-11-10

## 2020-11-10 DIAGNOSIS — R80.9 PROTEINURIA, UNSPECIFIED TYPE: ICD-10-CM

## 2020-11-10 DIAGNOSIS — I10 HYPERTENSION, UNSPECIFIED TYPE: ICD-10-CM

## 2020-11-10 PROCEDURE — 3066F NEPHROPATHY DOC TX: CPT | Performed by: FAMILY MEDICINE

## 2020-11-10 PROCEDURE — 4010F ACE/ARB THERAPY RXD/TAKEN: CPT | Performed by: FAMILY MEDICINE

## 2020-11-10 RX ORDER — AMLODIPINE BESYLATE 10 MG/1
10 TABLET ORAL DAILY
Qty: 90 TABLET | Refills: 3 | Status: SHIPPED | OUTPATIENT
Start: 2020-11-10 | End: 2021-10-18

## 2020-11-10 RX ORDER — HYDROCHLOROTHIAZIDE 12.5 MG/1
12.5 CAPSULE, GELATIN COATED ORAL DAILY
Qty: 90 CAPSULE | Refills: 3 | Status: SHIPPED | OUTPATIENT
Start: 2020-11-10 | End: 2021-07-22 | Stop reason: ALTCHOICE

## 2020-11-10 RX ORDER — HYDROCHLOROTHIAZIDE 12.5 MG/1
12.5 CAPSULE, GELATIN COATED ORAL DAILY
Qty: 90 CAPSULE | Refills: 3 | Status: CANCELLED | OUTPATIENT
Start: 2020-11-10

## 2020-11-10 RX ORDER — LISINOPRIL 10 MG/1
10 TABLET ORAL DAILY
Qty: 30 TABLET | Refills: 5 | Status: CANCELLED | OUTPATIENT
Start: 2020-11-10

## 2020-11-10 RX ORDER — LISINOPRIL 10 MG/1
10 TABLET ORAL DAILY
Qty: 90 TABLET | Refills: 3 | Status: SHIPPED | OUTPATIENT
Start: 2020-11-10 | End: 2020-12-21

## 2020-11-10 RX ORDER — AMLODIPINE BESYLATE 10 MG/1
10 TABLET ORAL DAILY
Qty: 90 TABLET | Refills: 3 | Status: CANCELLED | OUTPATIENT
Start: 2020-11-10

## 2020-11-11 ENCOUNTER — TELEMEDICINE (OUTPATIENT)
Dept: FAMILY MEDICINE CLINIC | Facility: CLINIC | Age: 60
End: 2020-11-11

## 2020-11-11 DIAGNOSIS — Z20.822 CLOSE EXPOSURE TO COVID-19 VIRUS: ICD-10-CM

## 2020-11-11 DIAGNOSIS — Z20.822 CLOSE EXPOSURE TO COVID-19 VIRUS: Primary | ICD-10-CM

## 2020-11-11 PROCEDURE — 1036F TOBACCO NON-USER: CPT | Performed by: FAMILY MEDICINE

## 2020-11-11 PROCEDURE — 99214 OFFICE O/P EST MOD 30 MIN: CPT | Performed by: FAMILY MEDICINE

## 2020-11-11 PROCEDURE — U0003 INFECTIOUS AGENT DETECTION BY NUCLEIC ACID (DNA OR RNA); SEVERE ACUTE RESPIRATORY SYNDROME CORONAVIRUS 2 (SARS-COV-2) (CORONAVIRUS DISEASE [COVID-19]), AMPLIFIED PROBE TECHNIQUE, MAKING USE OF HIGH THROUGHPUT TECHNOLOGIES AS DESCRIBED BY CMS-2020-01-R: HCPCS | Performed by: STUDENT IN AN ORGANIZED HEALTH CARE EDUCATION/TRAINING PROGRAM

## 2020-11-13 LAB — SARS-COV-2 RNA SPEC QL NAA+PROBE: NOT DETECTED

## 2020-11-16 ENCOUNTER — TELEPHONE (OUTPATIENT)
Dept: ENDOCRINOLOGY | Facility: CLINIC | Age: 60
End: 2020-11-16

## 2020-11-19 ENCOUNTER — TELEPHONE (OUTPATIENT)
Dept: UROLOGY | Facility: CLINIC | Age: 60
End: 2020-11-19

## 2020-11-19 DIAGNOSIS — Z12.5 SCREENING FOR PROSTATE CANCER: Primary | ICD-10-CM

## 2020-12-07 ENCOUNTER — TELEPHONE (OUTPATIENT)
Dept: ENDOCRINOLOGY | Facility: CLINIC | Age: 60
End: 2020-12-07

## 2020-12-10 DIAGNOSIS — E11.39 TYPE 2 DIABETES MELLITUS WITH OTHER OPHTHALMIC COMPLICATION, WITHOUT LONG-TERM CURRENT USE OF INSULIN (HCC): ICD-10-CM

## 2020-12-16 ENCOUNTER — TELEPHONE (OUTPATIENT)
Dept: ENDOCRINOLOGY | Facility: CLINIC | Age: 60
End: 2020-12-16

## 2020-12-19 DIAGNOSIS — R80.9 PROTEINURIA, UNSPECIFIED TYPE: ICD-10-CM

## 2020-12-21 PROCEDURE — 4010F ACE/ARB THERAPY RXD/TAKEN: CPT | Performed by: FAMILY MEDICINE

## 2020-12-21 RX ORDER — LISINOPRIL 10 MG/1
TABLET ORAL
Qty: 30 TABLET | Refills: 5 | Status: SHIPPED | OUTPATIENT
Start: 2020-12-21 | End: 2021-07-22

## 2021-01-02 ENCOUNTER — TRANSCRIBE ORDERS (OUTPATIENT)
Dept: LAB | Facility: CLINIC | Age: 61
End: 2021-01-02

## 2021-01-02 ENCOUNTER — LAB (OUTPATIENT)
Dept: LAB | Facility: CLINIC | Age: 61
End: 2021-01-02
Payer: COMMERCIAL

## 2021-01-02 DIAGNOSIS — C22.1 MALIGNANT NEOPLASM OF INTRAHEPATIC BILE DUCTS (HCC): ICD-10-CM

## 2021-01-02 DIAGNOSIS — N18.9 CHRONIC KIDNEY DISEASE, UNSPECIFIED CKD STAGE: ICD-10-CM

## 2021-01-02 DIAGNOSIS — Z94.4 LIVER REPLACED BY TRANSPLANT (HCC): ICD-10-CM

## 2021-01-02 DIAGNOSIS — D68.9 BLOOD CLOTTING DISORDER (HCC): ICD-10-CM

## 2021-01-02 DIAGNOSIS — C22.1 MALIGNANT NEOPLASM OF INTRAHEPATIC BILE DUCTS (HCC): Primary | ICD-10-CM

## 2021-01-02 DIAGNOSIS — R79.1 ABNORMAL COAGULATION PROFILE: ICD-10-CM

## 2021-01-02 LAB
CREAT UR-MCNC: 153 MG/DL
PROT UR-MCNC: 170 MG/DL
PROT/CREAT UR: 1.11 MG/G{CREAT} (ref 0–0.1)

## 2021-01-02 PROCEDURE — 84156 ASSAY OF PROTEIN URINE: CPT

## 2021-01-02 PROCEDURE — 3066F NEPHROPATHY DOC TX: CPT | Performed by: INTERNAL MEDICINE

## 2021-01-02 PROCEDURE — 82570 ASSAY OF URINE CREATININE: CPT

## 2021-01-02 PROCEDURE — 3061F NEG MICROALBUMINURIA REV: CPT | Performed by: INTERNAL MEDICINE

## 2021-01-04 ENCOUNTER — TELEPHONE (OUTPATIENT)
Dept: ENDOCRINOLOGY | Facility: CLINIC | Age: 61
End: 2021-01-04

## 2021-01-12 ENCOUNTER — TELEMEDICINE (OUTPATIENT)
Dept: NEPHROLOGY | Facility: CLINIC | Age: 61
End: 2021-01-12
Payer: COMMERCIAL

## 2021-01-12 VITALS — DIASTOLIC BLOOD PRESSURE: 78 MMHG | SYSTOLIC BLOOD PRESSURE: 130 MMHG

## 2021-01-12 DIAGNOSIS — R80.8 OTHER PROTEINURIA: ICD-10-CM

## 2021-01-12 DIAGNOSIS — N18.9 CHRONIC KIDNEY DISEASE, UNSPECIFIED CKD STAGE: Primary | ICD-10-CM

## 2021-01-12 PROCEDURE — 99214 OFFICE O/P EST MOD 30 MIN: CPT | Performed by: INTERNAL MEDICINE

## 2021-01-12 PROCEDURE — 3078F DIAST BP <80 MM HG: CPT | Performed by: INTERNAL MEDICINE

## 2021-01-12 PROCEDURE — 3075F SYST BP GE 130 - 139MM HG: CPT | Performed by: INTERNAL MEDICINE

## 2021-01-12 NOTE — PROGRESS NOTES
Virtual Regular Visit      Assessment/Plan:    1  Renal    The patient is chronic kidney disease with proteinuria likely due to diabetic nephropathy  Is been a diabetic for 20 years or so it has around 1 g of proteinuria  This estimation is stable  Also his creatinine is 1 6 which is stable over the last couple years with no progression  I explained him that he is doing well and continue with glycemic control to target A1c of 7%  Blood pressure control which is good  He is on ACE-inhibitor and lipid treatment  Continue current medications  No changes  BMP urine protein estimation in 6 months    Told to call if there is any problems or questions before next visit  Problem List Items Addressed This Visit        Genitourinary    CKD (chronic kidney disease) - Primary    Relevant Orders    Basic metabolic panel    Protein / creatinine ratio, urine       Other    Other proteinuria               Reason for visit is   Chief Complaint   Patient presents with    Virtual Regular Visit        Encounter provider Ritika Khan MD    Provider located at [address] [id number]      Recent Visits  No visits were found meeting these conditions  Showing recent visits within past 7 days and meeting all other requirements     Today's Visits  Date Type Provider Dept   01/12/21 Telemedicine Ritika Khan MD [place] [id number] today's visits and meeting all other requirements     Future Appointments  No visits were found meeting these conditions  Showing future appointments within next 150 days and meeting all other requirements        The patient was identified by name and date of birth  Ilene Songois was informed that this is a telemedicine visit and that the visit is being conducted through telephone  My office door was closed  No one else was in the room    He acknowledged consent and understanding of privacy and security of the video platform  The patient has agreed to participate and understands they can discontinue the visit at any time  It was my intent to perform this visit via video technology but the patient was not able to do a video connection so the visit was completed via audio telephone only  Patient is aware this is a billable service  Subjective  Maru Weathers is a 56 y o  male was followed for chronic kidney disease  HPI     The patient is doing well states that he is working sometimes is being her him after work but he is on cement long as he all day walking around  It then resolved on his home  Does not feel like neuropathy  Otherwise no changes in his health status or medications is feeling well with no complaints  We reviewed his medications and dosages  He did tell me he is on lower insulin  Past Medical History:   Diagnosis Date    Chronic kidney disease, stage III (moderate)     RESOLVED: [id number]    Cirrhosis (HCC)     [facility] [address] [facility]     Hepatic encephalopathy (HCC)     Hepatitis C     Hyperkalemia     Hyperlipidemia     Hypertension     Pancytopenia ([facility] [address] [facility]     Status post liver transplant ([facility] [address] [facility]     Umbilical hernia        Past Surgical History:   Procedure Laterality Date    ANKLE SURGERY      CHOLECYSTECTOMY      GALLBLADDER SURGERY      LIVER TRANSPLANTATION      LAST ASSESSED: [id number]    MS COLONOSCOPY FLX DX W/COLLJ SPEC WHEN PFRMD N/A 2/16/2017    Procedure: COLONOSCOPY;  Surgeon: Héctor Pearson MD;  Location: [facility];   Service: Gastroenterology    TONSILLECTOMY         Current Outpatient Medications   Medication Sig Dispense Refill    amLODIPine (NORVASC) 10 mg tablet Take 1 tablet (10 mg total) by mouth daily 90 tablet 3    atorvastatin (LIPITOR) 40 mg tablet TAKE 1 TABLET DAILY AT BEDTIME 90 tablet 4    azaTHIOprine (IMURAN) 50 mg tablet Take 1 tablet (50 mg total) by mouth daily 90 tablet 3    BD Pen Needle [place] U/F 32G X 4 MM MISC BY DOES NOT APPLY ROUTE 4 (FOUR) TIMES A  each 3    Cholecalciferol (VITAMIN D3) 1000 units CAPS Take 1 capsule by mouth daily      hydrochlorothiazide (MICROZIDE) 12 5 mg capsule Take 1 capsule (12 5 mg total) by mouth daily 90 capsule 3    insulin degludec (TRESIBA) 100 units/mL injection pen Inject 35 Units under the skin daily 12 pen 1    insulin lispro (HumaLOG) 100 units/mL injection pen Inject 6 Units under the skin daily before breakfast 5 pen 2    labetalol (NORMODYNE) 100 mg tablet TAKE 1 TABLET EVERY 12 HOURS 180 tablet 4    lisinopril (ZESTRIL) 10 mg tablet TAKE 1 TABLET BY MOUTH EVERY DAY 30 tablet 5    MAGNESIUM OXIDE 400 PO Take 1 tablet by mouth 2 (two) times a day       Multiple Vitamins-Minerals (MULTIVITAMIN WITH MINERALS) tablet Take 1 tablet by mouth daily      tacrolimus (PROGRAF) 1 mg capsule Takes 4 tabs in am, 3 tabs in pm      TRUE METRIX BLOOD GLUCOSE TEST test strip Test twice daily       No current facility-administered medications for this visit  No Known Allergies    Review of Systems   Constitutional: Negative for chills, diaphoresis, fatigue and fever  HENT: Negative  Eyes: Negative  Respiratory: Negative  Negative for cough, chest tightness, shortness of breath and wheezing  Cardiovascular: Negative  Negative for chest pain, palpitations and leg swelling  Gastrointestinal: Negative  Negative for abdominal pain, diarrhea, nausea and vomiting  Genitourinary: Negative  Negative for difficulty urinating, dysuria, flank pain and hematuria  Musculoskeletal:        Feet pain after work  Neurological: Negative  Negative for dizziness, tremors, weakness and headaches  Psychiatric/Behavioral: Negative  Negative for agitation, behavioral problems, confusion and decreased concentration  Video Exam    Vitals:    01/12/21 0908   BP: 130/78       Physical Exam     Alert oriented x3 no confusion denied focal weakness in arms or legs    Denies swelling in his legs abdomen was soft and not tender  No shortness of breaths talking full sentences no appreciated wheezing  Pulse was regular  I spent 26 minutes directly with the patient during this visit      VIRTUAL VISIT DISCLAIMER    Stephanie Cason acknowledges that he has consented to an online visit or consultation  He understands that the online visit is based solely on information provided by him, and that, in the absence of a face-to-face physical evaluation by the physician, the diagnosis he receives is both limited and provisional in terms of accuracy and completeness  This is not intended to replace a full medical face-to-face evaluation by the physician  Stephanie Cason understands and accepts these terms

## 2021-01-25 ENCOUNTER — TELEPHONE (OUTPATIENT)
Dept: ENDOCRINOLOGY | Facility: CLINIC | Age: 61
End: 2021-01-25

## 2021-01-26 DIAGNOSIS — E11.39 TYPE 2 DIABETES MELLITUS WITH OTHER OPHTHALMIC COMPLICATION, WITHOUT LONG-TERM CURRENT USE OF INSULIN (HCC): ICD-10-CM

## 2021-01-26 RX ORDER — INSULIN DEGLUDEC INJECTION 100 U/ML
INJECTION, SOLUTION SUBCUTANEOUS
Qty: 30 ML | Refills: 3 | Status: SHIPPED | OUTPATIENT
Start: 2021-01-26 | End: 2021-09-17 | Stop reason: SDUPTHER

## 2021-02-05 ENCOUNTER — TELEPHONE (OUTPATIENT)
Dept: ENDOCRINOLOGY | Facility: CLINIC | Age: 61
End: 2021-02-05

## 2021-02-11 ENCOUNTER — TELEPHONE (OUTPATIENT)
Dept: ENDOCRINOLOGY | Facility: CLINIC | Age: 61
End: 2021-02-11

## 2021-02-22 ENCOUNTER — TELEPHONE (OUTPATIENT)
Dept: UROLOGY | Facility: AMBULATORY SURGERY CENTER | Age: 61
End: 2021-02-22

## 2021-02-22 NOTE — TELEPHONE ENCOUNTER
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

## 2021-02-25 ENCOUNTER — TELEPHONE (OUTPATIENT)
Dept: ENDOCRINOLOGY | Facility: CLINIC | Age: 61
End: 2021-02-25

## 2021-02-27 ENCOUNTER — TRANSCRIBE ORDERS (OUTPATIENT)
Dept: LAB | Facility: CLINIC | Age: 61
End: 2021-02-27

## 2021-02-27 ENCOUNTER — LAB (OUTPATIENT)
Dept: LAB | Facility: CLINIC | Age: 61
End: 2021-02-27
Payer: COMMERCIAL

## 2021-02-27 DIAGNOSIS — N18.9 CHRONIC KIDNEY DISEASE, UNSPECIFIED CKD STAGE: ICD-10-CM

## 2021-02-27 LAB
CREAT UR-MCNC: 99 MG/DL
PROT UR-MCNC: 134 MG/DL
PROT/CREAT UR: 1.35 MG/G{CREAT} (ref 0–0.1)

## 2021-02-27 PROCEDURE — 82570 ASSAY OF URINE CREATININE: CPT

## 2021-02-27 PROCEDURE — 84156 ASSAY OF PROTEIN URINE: CPT

## 2021-03-15 ENCOUNTER — TELEPHONE (OUTPATIENT)
Dept: ENDOCRINOLOGY | Facility: CLINIC | Age: 61
End: 2021-03-15

## 2021-03-20 DIAGNOSIS — E11.39 TYPE 2 DIABETES MELLITUS WITH OTHER OPHTHALMIC COMPLICATION, WITHOUT LONG-TERM CURRENT USE OF INSULIN (HCC): ICD-10-CM

## 2021-03-20 RX ORDER — PEN NEEDLE, DIABETIC 32GX 5/32"
NEEDLE, DISPOSABLE MISCELLANEOUS
Qty: 100 EACH | Refills: 3 | Status: SHIPPED | OUTPATIENT
Start: 2021-03-20 | End: 2021-09-17 | Stop reason: SDUPTHER

## 2021-03-29 ENCOUNTER — TELEPHONE (OUTPATIENT)
Dept: ENDOCRINOLOGY | Facility: CLINIC | Age: 61
End: 2021-03-29

## 2021-03-30 DIAGNOSIS — Z23 ENCOUNTER FOR IMMUNIZATION: ICD-10-CM

## 2021-04-08 ENCOUNTER — TELEPHONE (OUTPATIENT)
Dept: NEPHROLOGY | Facility: CLINIC | Age: 61
End: 2021-04-08

## 2021-04-08 NOTE — TELEPHONE ENCOUNTER
Attempted to call patient, to set-up June F/U appt  Phone continued to ring and ring  I was not able to speak with the patient/leave a voicemail  We will F/U based on Nephrology protocol

## 2021-04-15 ENCOUNTER — OFFICE VISIT (OUTPATIENT)
Dept: FAMILY MEDICINE CLINIC | Facility: CLINIC | Age: 61
End: 2021-04-15

## 2021-04-15 ENCOUNTER — TELEPHONE (OUTPATIENT)
Dept: ENDOCRINOLOGY | Facility: CLINIC | Age: 61
End: 2021-04-15

## 2021-04-15 VITALS
HEART RATE: 70 BPM | RESPIRATION RATE: 18 BRPM | TEMPERATURE: 97.8 F | BODY MASS INDEX: 37.87 KG/M2 | HEIGHT: 62 IN | WEIGHT: 205.8 LBS | SYSTOLIC BLOOD PRESSURE: 122 MMHG | DIASTOLIC BLOOD PRESSURE: 82 MMHG | OXYGEN SATURATION: 97 %

## 2021-04-15 DIAGNOSIS — Z79.4 TYPE 2 DIABETES MELLITUS WITH HYPERGLYCEMIA, WITH LONG-TERM CURRENT USE OF INSULIN (HCC): Primary | ICD-10-CM

## 2021-04-15 DIAGNOSIS — M85.80 OSTEOPENIA, UNSPECIFIED LOCATION: ICD-10-CM

## 2021-04-15 DIAGNOSIS — I10 ESSENTIAL HYPERTENSION: ICD-10-CM

## 2021-04-15 DIAGNOSIS — E11.65 TYPE 2 DIABETES MELLITUS WITH HYPERGLYCEMIA, WITH LONG-TERM CURRENT USE OF INSULIN (HCC): Primary | ICD-10-CM

## 2021-04-15 DIAGNOSIS — E78.5 HYPERLIPIDEMIA, UNSPECIFIED HYPERLIPIDEMIA TYPE: ICD-10-CM

## 2021-04-15 LAB — SL AMB POCT HEMOGLOBIN AIC: 6.9 (ref ?–6.5)

## 2021-04-15 PROCEDURE — 3008F BODY MASS INDEX DOCD: CPT | Performed by: FAMILY MEDICINE

## 2021-04-15 PROCEDURE — 83036 HEMOGLOBIN GLYCOSYLATED A1C: CPT | Performed by: FAMILY MEDICINE

## 2021-04-15 PROCEDURE — 1036F TOBACCO NON-USER: CPT | Performed by: FAMILY MEDICINE

## 2021-04-15 PROCEDURE — 99214 OFFICE O/P EST MOD 30 MIN: CPT | Performed by: FAMILY MEDICINE

## 2021-04-15 PROCEDURE — 3044F HG A1C LEVEL LT 7.0%: CPT | Performed by: FAMILY MEDICINE

## 2021-04-15 PROCEDURE — 3074F SYST BP LT 130 MM HG: CPT | Performed by: FAMILY MEDICINE

## 2021-04-15 PROCEDURE — 3079F DIAST BP 80-89 MM HG: CPT | Performed by: FAMILY MEDICINE

## 2021-04-15 RX ORDER — FLUDROCORTISONE ACETATE 0.1 MG/1
0.1 TABLET ORAL DAILY
COMMUNITY
Start: 2021-01-21

## 2021-04-15 NOTE — ASSESSMENT & PLAN NOTE
His hypertension has been well controlled on Lisinopril 10 mg, labetalol 100 mg and hydrochlorothiazide 12 5 mg   reviewed recent CMP, GFR 45 stable  Continue follow-up with nephrology for CKD    Will also check lipid panel   - Follow up in 6 months

## 2021-04-15 NOTE — ASSESSMENT & PLAN NOTE
Lab Results   Component Value Date    HGBA1C 6 9 (A) 04/15/2021     Patient has been well controlled on Insulin and follows with endocrinology    He has a recent eye exam and had his diabetic foot exam preformed in the office today    He is currently on Tresiba 35 units daily, Humalog 6 units before breakfast     -     POCT hemoglobin A1c was 6 9  - well controlled, continue current regimen  - Follow up in 6 months

## 2021-04-15 NOTE — ASSESSMENT & PLAN NOTE
ASCVD risk 21%, currently on Lipitor 40 mg daily  Will recheck lipid panel  Lifestyle modification with diet and exercise

## 2021-04-15 NOTE — PROGRESS NOTES
Assessment/Plan:    Hyperlipidemia   ASCVD risk 21%, currently on Lipitor 40 mg daily  Will recheck lipid panel  Lifestyle modification with diet and exercise  Type 2 diabetes mellitus with ophthalmic complication, without long-term current use of insulin (HCC)    Lab Results   Component Value Date    HGBA1C 6 9 (A) 04/15/2021     Patient has been well controlled on Insulin and follows with endocrinology  He has a recent eye exam and had his diabetic foot exam preformed in the office today    He is currently on Tresiba 35 units daily, Humalog 6 units before breakfast     -     POCT hemoglobin A1c was 6 9  - well controlled, continue current regimen  - Follow up in 6 months    Hypertension  His hypertension has been well controlled on Lisinopril 10 mg, labetalol 100 mg and hydrochlorothiazide 12 5 mg   reviewed recent CMP, GFR 45 stable  Continue follow-up with nephrology for CKD  Will also check lipid panel   - Follow up in 6 months    Osteopenia  Pt had osteopenia diagnosed secondary to chronic steroid use  Steroids have since been discontinued   - redo DEXA in 1 year   - Continue vitamin D3 1000 units       Diagnoses and all orders for this visit:    Type 2 diabetes mellitus with hyperglycemia, with long-term current use of insulin ([facility])      Essential hypertension    Osteopenia, unspecified location      Other orders  -     Magnesium 400 MG CAPS  -     Multiple Vitamin (MULTIVITAMIN ADULT PO); Take by mouth Daily  -     fludrocortisone (FLORINEF) 0 1 mg tablet        Subjective:      Patient ID: Skylar Gonzalez is a 56 y o  male with a PMH of a 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, HTN, T2DM and drug induced osteopenia who is coming in for a follow up on diabetes and hypertension  Mr Shani Quach says that he has no complaints today and has been feeling well  His HbA1c in the office was 6 9 which is similar to his last of 6 8 in September 2020    He had a coloscopy in 2020 that was significant for one sessile polyp and he does not require screening again for 3 years  He has a recent diabetic eye exam a few days ago  His blood pressure has been well controlled and is 122/80 in the office today  We discussed diet, exercise and COVID precautions  He says that he is scheduled to get the COVID vaccine in a week  The following portions of the patient's history were reviewed and updated as appropriate: allergies, current medications, past family history, past medical history, past social history, past surgical history and problem list     Review of Systems   Constitutional: Negative for chills, fatigue, fever and unexpected weight change  HENT: Negative for congestion  Eyes: Negative for visual disturbance  Respiratory: Negative for cough, shortness of breath and wheezing  Cardiovascular: Negative for chest pain and palpitations  Gastrointestinal: Negative for constipation, diarrhea, nausea and vomiting  Genitourinary: Negative for difficulty urinating  Musculoskeletal: Negative for arthralgias and back pain  Skin: Negative  Neurological: Negative for headaches         Current Outpatient Medications on File Prior to Visit   Medication Sig Dispense Refill    amLODIPine (NORVASC) 10 mg tablet Take 1 tablet (10 mg total) by mouth daily 90 tablet 3    atorvastatin (LIPITOR) 40 mg tablet TAKE 1 TABLET DAILY AT BEDTIME 90 tablet 4    azaTHIOprine (IMURAN) 50 mg tablet Take 1 tablet (50 mg total) by mouth daily 90 tablet 3    BD Pen Needle [place] U/F 32G X 4 MM MISC USE FOUR TIMES DAILY 100 each 3    Cholecalciferol (VITAMIN D3) 1000 units CAPS Take 1 capsule by mouth daily      fludrocortisone (FLORINEF) 0 1 mg tablet       hydrochlorothiazide (MICROZIDE) 12 5 mg capsule Take 1 capsule (12 5 mg total) by mouth daily 90 capsule 3    insulin degludec [employer] FlexTouch) 100 units/mL injection pen 35 units daily 30 mL 3    insulin lispro (HumaLOG) 100 units/mL injection pen Inject 6 Units under the skin daily before breakfast 5 pen 2    labetalol (NORMODYNE) 100 mg tablet TAKE 1 TABLET EVERY 12 HOURS 180 tablet 4    lisinopril (ZESTRIL) 10 mg tablet TAKE 1 TABLET BY MOUTH EVERY DAY 30 tablet 5    Magnesium 400 MG CAPS       MAGNESIUM OXIDE 400 PO Take 1 tablet by mouth 2 (two) times a day       Multiple Vitamin (MULTIVITAMIN ADULT PO) Take by mouth Daily      Multiple Vitamins-Minerals (MULTIVITAMIN WITH MINERALS) tablet Take 1 tablet by mouth daily      tacrolimus (PROGRAF) 1 mg capsule Takes 4 tabs in am, 3 tabs in pm      TRUE METRIX BLOOD GLUCOSE TEST test strip Test twice daily       No current facility-administered medications on file prior to visit  Objective:      /82 (BP Location: Left arm, Patient Position: Sitting, Cuff Size: Standard)   Pulse 70   Temp 97 8 °F (36 6 °C) (Tympanic)   Resp 18   Ht 5' 2" (1 575 m)   Wt 93 4 kg (205 lb 12 8 oz)   SpO2 97%   BMI 37 64 kg/m²     Patient's shoes and socks removed  Right Foot/Ankle   Right Foot Inspection  Skin Exam: skin normal and skin intact no dry skin, no warmth, no callus, no erythema, no maceration, no abnormal color, no pre-ulcer, no ulcer and no callus                          Toe Exam: ROM and strength within normal limits  Sensory       Monofilament testing: intact  Vascular    The right DP pulse is 2+  Left Foot/Ankle  Left Foot Inspection  Skin Exam: skin normal and skin intactno dry skin, no warmth, no erythema, no maceration, normal color, no pre-ulcer, no ulcer and no callus                         Toe Exam: ROM and strength within normal limits                   Sensory       Monofilament: intact  Vascular    The left DP pulse is 2+  Assign Risk Category:  No deformity present; No loss of protective sensation; No weak pulses       Risk: 0         Physical Exam  Constitutional:       General: He is not in acute distress  HENT:      Head: Normocephalic and atraumatic  Right Ear: There is impacted cerumen  Left Ear: Tympanic membrane normal       Nose: No congestion  Mouth/Throat:      Mouth: Mucous membranes are moist       Pharynx: Oropharynx is clear  Eyes:      Conjunctiva/sclera: Conjunctivae normal       Pupils: Pupils are equal, round, and reactive to light  Neck:      Musculoskeletal: Normal range of motion and neck supple  Cardiovascular:      Rate and Rhythm: Normal rate and regular rhythm  Pulses: Normal pulses  no weak pulses          Dorsalis pedis pulses are 2+ on the right side and 2+ on the left side  Heart sounds: Normal heart sounds  Pulmonary:      Effort: Pulmonary effort is normal       Breath sounds: Normal breath sounds  Abdominal:      General: Bowel sounds are normal       Palpations: Abdomen is soft  Tenderness: There is no abdominal tenderness  Musculoskeletal:         General: No swelling  Feet:      Right foot:      Skin integrity: No ulcer, skin breakdown, erythema, warmth, callus or dry skin  Left foot:      Skin integrity: No ulcer, skin breakdown, erythema, warmth, callus or dry skin  Skin:     General: Skin is warm and dry  Neurological:      Mental Status: He is alert and oriented to person, place, and time     Psychiatric:         Mood and Affect: Mood normal          Behavior: Behavior normal

## 2021-04-15 NOTE — ASSESSMENT & PLAN NOTE
Pt had osteopenia diagnosed secondary to chronic steroid use    Steroids have since been discontinued   - redo DEXA in 1 year   - Continue vitamin D3 1000 units

## 2021-04-25 ENCOUNTER — IMMUNIZATIONS (OUTPATIENT)
Dept: FAMILY MEDICINE CLINIC | Facility: HOSPITAL | Age: 61
End: 2021-04-25

## 2021-04-25 DIAGNOSIS — Z23 ENCOUNTER FOR IMMUNIZATION: Primary | ICD-10-CM

## 2021-04-25 PROCEDURE — 0001A SARS-COV-2 / COVID-19 MRNA VACCINE (PFIZER-BIONTECH) 30 MCG: CPT

## 2021-04-25 PROCEDURE — 91300 SARS-COV-2 / COVID-19 MRNA VACCINE (PFIZER-BIONTECH) 30 MCG: CPT

## 2021-04-30 ENCOUNTER — TELEPHONE (OUTPATIENT)
Dept: ENDOCRINOLOGY | Facility: CLINIC | Age: 61
End: 2021-04-30

## 2021-04-30 NOTE — TELEPHONE ENCOUNTER
Blood sugars higher over the past week, but looked great prior to that continue same meds and bring log to upcoming visit in may

## 2021-05-16 ENCOUNTER — IMMUNIZATIONS (OUTPATIENT)
Dept: FAMILY MEDICINE CLINIC | Facility: HOSPITAL | Age: 61
End: 2021-05-16

## 2021-05-16 DIAGNOSIS — Z23 ENCOUNTER FOR IMMUNIZATION: Primary | ICD-10-CM

## 2021-05-16 PROCEDURE — 91300 SARS-COV-2 / COVID-19 MRNA VACCINE (PFIZER-BIONTECH) 30 MCG: CPT

## 2021-05-16 PROCEDURE — 0002A SARS-COV-2 / COVID-19 MRNA VACCINE (PFIZER-BIONTECH) 30 MCG: CPT

## 2021-05-17 ENCOUNTER — TELEPHONE (OUTPATIENT)
Dept: ENDOCRINOLOGY | Facility: CLINIC | Age: 61
End: 2021-05-17

## 2021-06-02 ENCOUNTER — TELEPHONE (OUTPATIENT)
Dept: ENDOCRINOLOGY | Facility: CLINIC | Age: 61
End: 2021-06-02

## 2021-06-21 ENCOUNTER — TELEPHONE (OUTPATIENT)
Dept: ENDOCRINOLOGY | Facility: CLINIC | Age: 61
End: 2021-06-21

## 2021-06-21 NOTE — TELEPHONE ENCOUNTER
Most BG readings are in acceptable range  Continue current treatment  He has an upcoming appointment with Mariaelena Smith on 7/1  He should bring a log to that appointment

## 2021-07-08 ENCOUNTER — TELEPHONE (OUTPATIENT)
Dept: ENDOCRINOLOGY | Facility: CLINIC | Age: 61
End: 2021-07-08

## 2021-07-09 NOTE — TELEPHONE ENCOUNTER
Notified pt of recommendation  He would like to schedule an appt  Please call him back  The call got disconnected      Thanks

## 2021-07-16 ENCOUNTER — APPOINTMENT (OUTPATIENT)
Dept: LAB | Facility: CLINIC | Age: 61
End: 2021-07-16
Payer: COMMERCIAL

## 2021-07-16 DIAGNOSIS — E78.5 HYPERLIPIDEMIA, UNSPECIFIED HYPERLIPIDEMIA TYPE: ICD-10-CM

## 2021-07-16 DIAGNOSIS — Z11.4 SCREENING FOR HIV (HUMAN IMMUNODEFICIENCY VIRUS): ICD-10-CM

## 2021-07-16 DIAGNOSIS — N18.9 CHRONIC KIDNEY DISEASE, UNSPECIFIED CKD STAGE: ICD-10-CM

## 2021-07-16 DIAGNOSIS — Z12.5 SCREENING FOR PROSTATE CANCER: ICD-10-CM

## 2021-07-16 DIAGNOSIS — M10.9 GOUT, UNSPECIFIED CAUSE, UNSPECIFIED CHRONICITY, UNSPECIFIED SITE: ICD-10-CM

## 2021-07-16 DIAGNOSIS — Z94.4 LIVER REPLACED BY TRANSPLANT (HCC): ICD-10-CM

## 2021-07-16 LAB
ALBUMIN SERPL BCP-MCNC: 4.1 G/DL (ref 3.5–5)
ALP SERPL-CCNC: 118 U/L (ref 46–116)
ALT SERPL W P-5'-P-CCNC: 38 U/L (ref 12–78)
ANION GAP SERPL CALCULATED.3IONS-SCNC: 11 MMOL/L (ref 4–13)
AST SERPL W P-5'-P-CCNC: 14 U/L (ref 5–45)
BASOPHILS # BLD AUTO: 0.03 THOUSANDS/ΜL (ref 0–0.1)
BASOPHILS NFR BLD AUTO: 1 % (ref 0–1)
BILIRUB SERPL-MCNC: 0.35 MG/DL (ref 0.2–1)
BUN SERPL-MCNC: 43 MG/DL (ref 5–25)
CALCIUM SERPL-MCNC: 9 MG/DL (ref 8.3–10.1)
CHLORIDE SERPL-SCNC: 107 MMOL/L (ref 100–108)
CHOLEST SERPL-MCNC: 193 MG/DL (ref 50–200)
CO2 SERPL-SCNC: 26 MMOL/L (ref 21–32)
CREAT SERPL-MCNC: 1.71 MG/DL (ref 0.6–1.3)
EOSINOPHIL # BLD AUTO: 0.15 THOUSAND/ΜL (ref 0–0.61)
EOSINOPHIL NFR BLD AUTO: 3 % (ref 0–6)
ERYTHROCYTE [DISTWIDTH] IN BLOOD BY AUTOMATED COUNT: 14.1 % (ref 11.6–15.1)
GFR SERPL CREATININE-BSD FRML MDRD: 42 ML/MIN/1.73SQ M
GGT SERPL-CCNC: 68 U/L (ref 5–85)
GLUCOSE P FAST SERPL-MCNC: 143 MG/DL (ref 65–99)
HCT VFR BLD AUTO: 35.3 % (ref 36.5–49.3)
HDLC SERPL-MCNC: 35 MG/DL
HGB BLD-MCNC: 11.5 G/DL (ref 12–17)
IMM GRANULOCYTES # BLD AUTO: 0.01 THOUSAND/UL (ref 0–0.2)
IMM GRANULOCYTES NFR BLD AUTO: 0 % (ref 0–2)
INR PPP: 0.99 (ref 0.84–1.19)
LYMPHOCYTES # BLD AUTO: 0.75 THOUSANDS/ΜL (ref 0.6–4.47)
LYMPHOCYTES NFR BLD AUTO: 15 % (ref 14–44)
MAGNESIUM SERPL-MCNC: 1.9 MG/DL (ref 1.6–2.6)
MCH RBC QN AUTO: 29.4 PG (ref 26.8–34.3)
MCHC RBC AUTO-ENTMCNC: 32.6 G/DL (ref 31.4–37.4)
MCV RBC AUTO: 90 FL (ref 82–98)
MONOCYTES # BLD AUTO: 0.48 THOUSAND/ΜL (ref 0.17–1.22)
MONOCYTES NFR BLD AUTO: 10 % (ref 4–12)
NEUTROPHILS # BLD AUTO: 3.44 THOUSANDS/ΜL (ref 1.85–7.62)
NEUTS SEG NFR BLD AUTO: 71 % (ref 43–75)
NONHDLC SERPL-MCNC: 158 MG/DL
NRBC BLD AUTO-RTO: 0 /100 WBCS
PLATELET # BLD AUTO: 138 THOUSANDS/UL (ref 149–390)
PMV BLD AUTO: 11.2 FL (ref 8.9–12.7)
POTASSIUM SERPL-SCNC: 5.2 MMOL/L (ref 3.5–5.3)
PROT SERPL-MCNC: 7.1 G/DL (ref 6.4–8.2)
PROTHROMBIN TIME: 13.2 SECONDS (ref 11.6–14.5)
PSA SERPL-MCNC: 0.9 NG/ML (ref 0–4)
RBC # BLD AUTO: 3.91 MILLION/UL (ref 3.88–5.62)
SODIUM SERPL-SCNC: 144 MMOL/L (ref 136–145)
TRIGL SERPL-MCNC: 413 MG/DL
URATE SERPL-MCNC: 8.6 MG/DL (ref 4.2–8)
WBC # BLD AUTO: 4.86 THOUSAND/UL (ref 4.31–10.16)

## 2021-07-16 PROCEDURE — G0103 PSA SCREENING: HCPCS

## 2021-07-16 PROCEDURE — 85610 PROTHROMBIN TIME: CPT

## 2021-07-16 PROCEDURE — 87389 HIV-1 AG W/HIV-1&-2 AB AG IA: CPT

## 2021-07-16 PROCEDURE — 85025 COMPLETE CBC W/AUTO DIFF WBC: CPT

## 2021-07-16 PROCEDURE — 80053 COMPREHEN METABOLIC PANEL: CPT

## 2021-07-16 PROCEDURE — 80197 ASSAY OF TACROLIMUS: CPT

## 2021-07-16 PROCEDURE — 83735 ASSAY OF MAGNESIUM: CPT

## 2021-07-16 PROCEDURE — 82977 ASSAY OF GGT: CPT

## 2021-07-16 PROCEDURE — 84550 ASSAY OF BLOOD/URIC ACID: CPT

## 2021-07-16 PROCEDURE — 80061 LIPID PANEL: CPT

## 2021-07-16 PROCEDURE — 36415 COLL VENOUS BLD VENIPUNCTURE: CPT

## 2021-07-17 LAB — TACROLIMUS BLD-MCNC: 5.5 NG/ML (ref 2–20)

## 2021-07-18 LAB — HIV 1+2 AB+HIV1 P24 AG SERPL QL IA: NORMAL

## 2021-07-22 ENCOUNTER — OFFICE VISIT (OUTPATIENT)
Dept: FAMILY MEDICINE CLINIC | Facility: CLINIC | Age: 61
End: 2021-07-22

## 2021-07-22 VITALS
DIASTOLIC BLOOD PRESSURE: 82 MMHG | OXYGEN SATURATION: 98 % | BODY MASS INDEX: 36.95 KG/M2 | TEMPERATURE: 96.5 F | SYSTOLIC BLOOD PRESSURE: 122 MMHG | HEART RATE: 81 BPM | RESPIRATION RATE: 18 BRPM | WEIGHT: 200.8 LBS | HEIGHT: 62 IN

## 2021-07-22 DIAGNOSIS — M25.422 SWELLING OF LEFT ELBOW: Primary | ICD-10-CM

## 2021-07-22 DIAGNOSIS — L03.114 CELLULITIS OF LEFT UPPER EXTREMITY: ICD-10-CM

## 2021-07-22 DIAGNOSIS — R80.9 PROTEINURIA, UNSPECIFIED TYPE: ICD-10-CM

## 2021-07-22 DIAGNOSIS — M10.9 GOUT, UNSPECIFIED CAUSE, UNSPECIFIED CHRONICITY, UNSPECIFIED SITE: ICD-10-CM

## 2021-07-22 DIAGNOSIS — I10 ESSENTIAL HYPERTENSION: ICD-10-CM

## 2021-07-22 PROCEDURE — 3008F BODY MASS INDEX DOCD: CPT | Performed by: FAMILY MEDICINE

## 2021-07-22 PROCEDURE — 3074F SYST BP LT 130 MM HG: CPT | Performed by: FAMILY MEDICINE

## 2021-07-22 PROCEDURE — 3066F NEPHROPATHY DOC TX: CPT | Performed by: FAMILY MEDICINE

## 2021-07-22 PROCEDURE — 4010F ACE/ARB THERAPY RXD/TAKEN: CPT | Performed by: FAMILY MEDICINE

## 2021-07-22 PROCEDURE — 99214 OFFICE O/P EST MOD 30 MIN: CPT | Performed by: FAMILY MEDICINE

## 2021-07-22 PROCEDURE — 1036F TOBACCO NON-USER: CPT | Performed by: FAMILY MEDICINE

## 2021-07-22 PROCEDURE — 3079F DIAST BP 80-89 MM HG: CPT | Performed by: FAMILY MEDICINE

## 2021-07-22 RX ORDER — LISINOPRIL 20 MG/1
20 TABLET ORAL DAILY
Qty: 90 TABLET | Refills: 0 | Status: SHIPPED | OUTPATIENT
Start: 2021-07-22 | End: 2022-04-21 | Stop reason: SDUPTHER

## 2021-07-22 RX ORDER — CLINDAMYCIN HYDROCHLORIDE 300 MG/1
300 CAPSULE ORAL 3 TIMES DAILY
Qty: 15 CAPSULE | Refills: 0 | Status: SHIPPED | OUTPATIENT
Start: 2021-07-22 | End: 2021-07-27

## 2021-07-22 NOTE — ASSESSMENT & PLAN NOTE
Blood pressure 122/82  With elevated uric acid levels, will discontinue hydrochlorothiazide and increase lisinopril to 20 mg daily    Patient to follow-up in 4 weeks for blood pressure recheck

## 2021-07-22 NOTE — PATIENT INSTRUCTIONS
of uric acid your body makes  Some medicines may help you pass more uric acid when you urinate  · Take your medicine as directed  Contact your healthcare provider if you think your medicine is not helping or if you have side effects  Tell him or her if you are allergic to any medicine  Keep a list of the medicines, vitamins, and herbs you take  Include the amounts, and when and why you take them  Bring the list or the pill bottles to follow-up visits  Carry your medicine list with you in case of an emergency  Manage your symptoms:   · Rest your painful joint so it can heal   Your healthcare provider may recommend crutches or a walker if the affected joint is in a leg  · Apply ice to your joint  Ice decreases pain and swelling  Use an ice pack, or put crushed ice in a plastic bag  Cover the ice pack or bag with a towel before you apply it to your painful joint  Apply ice for 15 to 20 minutes every hour, or as directed  · Elevate your joint  Elevation helps reduce swelling and pain  Raise your joint above the level of your heart as often as you can  Prop your painful joint on pillows to keep it above your heart comfortably  · Go to physical therapy if directed  A physical therapist can teach you exercises to improve flexibility and range of motion  Help prevent gout attacks:   · Do not eat high-purine foods  These foods include meats, seafood, asparagus, spinach, cauliflower, and some types of beans  Healthcare providers may tell you to eat more low-fat milk products, such as yogurt  Milk products may decrease your risk for gout attacks  Vitamin C and coffee may also help  Your healthcare provider or dietitian can help you create a meal plan  · Drink liquids as directed  Liquids such as water help remove uric acid from your body  Ask how much liquid to drink each day and which liquids are best for you  · Maintain a healthy weight    Weight loss may decrease the amount of uric acid in your body  Ask your healthcare provider how much you should weigh  Ask him to help you create a weight loss plan if you are overweight  · Control your blood sugar level if you have diabetes  Keep your blood sugar level in a normal range  This can help prevent gout attacks  · Limit or do not drink alcohol as directed  Alcohol can trigger a gout attack  Alcohol also increases your risk for dehydration  Ask your healthcare provider if alcohol is safe for you  Follow up with your doctor as directed: You may be referred to a rheumatologist or podiatrist  Write down your questions so you remember to ask them during your visits  © Copyright [employer] 2021 Information is for End User's use only and may not be sold, redistributed or otherwise used for commercial purposes  All illustrations and images included in CareNotes® are the copyrighted property of [employer]  or [facility] Katy White   The above information is an  only  It is not intended as medical advice for individual conditions or treatments  Talk to your doctor, nurse or pharmacist before following any medical regimen to see if it is safe and effective for you

## 2021-07-22 NOTE — ASSESSMENT & PLAN NOTE
Acute swelling and erythema of left elbow  Tender to touch  Differentials include cellulitis, flare of gout  Will treat this episode as cellulitis with clindamycin 300 mg t i d  for 5 days  Patient to call tomorrow if no relief with antibiotics  If he continues to have pain and swelling tomorrow, will trial colchicine as well    Patient agreeable with the plan

## 2021-07-22 NOTE — ASSESSMENT & PLAN NOTE
Patient did have elevated uric acid in recent blood work  Reports he eats red meat regularly  At this time will discontinue hydrochlorothiazide, and increase lisinopril to 20 mg daily    Will have patient follow-up in 4 weeks for blood pressure recheck

## 2021-07-22 NOTE — PROGRESS NOTES
Assessment/Plan     Gout  Patient did have elevated uric acid in recent blood work  Reports he eats red meat regularly  At this time will discontinue hydrochlorothiazide, and increase lisinopril to 20 mg daily  Will have patient follow-up in 4 weeks for blood pressure recheck    Hypertension  Blood pressure 122/82  With elevated uric acid levels, will discontinue hydrochlorothiazide and increase lisinopril to 20 mg daily  Patient to follow-up in 4 weeks for blood pressure recheck    Swelling of left elbow  Acute swelling and erythema of left elbow  Tender to touch  Differentials include cellulitis, flare of gout  Will treat this episode as cellulitis with clindamycin 300 mg t i d  for 5 days  Patient to call tomorrow if no relief with antibiotics  If he continues to have pain and swelling tomorrow, will trial colchicine as well  Patient agreeable with the plan    Diagnoses and all orders for this visit:    Swelling of left elbow    Gout, unspecified cause, unspecified chronicity, unspecified site    Essential hypertension    Cellulitis of left upper extremity  -     clindamycin (CLEOCIN) 300 MG capsule; Take 1 capsule (300 mg total) by mouth 3 (three) times a day for 5 days    Proteinuria, unspecified type  -     lisinopril (ZESTRIL) 20 mg tablet; Take 1 tablet (20 mg total) by mouth daily         Subjective     Chief Complaint   Patient presents with    Pain     onset Sunday sharp pains, swelling        57 yo Mr Nicolas Ridley presented to office with complaints of swelling and pain in left elbow for 5 days  He states he was sleeping and suddenly felt sharp pain in his left elbow, since then it has been swollen and slightly red  He has tried the medication non home including Tylenol, Aleve but the pain continued  He has difficulty bending the elbow  He denies any trauma and states most likely it is bug bite  He does have history of gout but has not had flares for 2 years now        The following portions of the patient's history were reviewed and updated as appropriate: allergies, current medications, past family history, past medical history, past social history, past surgical history and problem list     Review of Systems   Constitutional: Negative for chills and fever  HENT: Negative for congestion  Respiratory: Negative for shortness of breath  Cardiovascular: Negative for chest pain  Gastrointestinal: Negative for diarrhea, nausea and vomiting  Genitourinary: Negative for difficulty urinating  Musculoskeletal: Positive for arthralgias  Neurological: Negative for headaches  Objective     Vitals:Blood pressure 122/82, pulse 81, temperature (!) 96 5 °F (35 8 °C), temperature source Tympanic, resp  rate 18, height 5' 2" (1 575 m), weight 91 1 kg (200 lb 12 8 oz), SpO2 98 %  Physical Exam:  Physical Exam  Constitutional:       Appearance: He is well-developed  HENT:      Head: Normocephalic and atraumatic  Right Ear: External ear normal       Left Ear: External ear normal    Eyes:      Conjunctiva/sclera: Conjunctivae normal       Pupils: Pupils are equal, round, and reactive to light  Cardiovascular:      Rate and Rhythm: Normal rate and regular rhythm  Heart sounds: Normal heart sounds  No murmur heard  No friction rub  Pulmonary:      Effort: Pulmonary effort is normal  No respiratory distress  Breath sounds: Normal breath sounds  No wheezing or rales  Musculoskeletal:         General: Normal range of motion  Cervical back: Normal range of motion and neck supple  Skin:     General: Skin is warm and dry  Comments: erythematous left elbow with tenderness   Neurological:      Mental Status: He is alert and oriented to person, place, and time

## 2021-08-04 ENCOUNTER — TELEPHONE (OUTPATIENT)
Dept: ENDOCRINOLOGY | Facility: CLINIC | Age: 61
End: 2021-08-04

## 2021-08-13 ENCOUNTER — TELEPHONE (OUTPATIENT)
Dept: ENDOCRINOLOGY | Facility: CLINIC | Age: 61
End: 2021-08-13

## 2021-08-19 DIAGNOSIS — I10 ESSENTIAL HYPERTENSION: ICD-10-CM

## 2021-08-19 RX ORDER — LABETALOL 100 MG/1
TABLET, FILM COATED ORAL
Qty: 180 TABLET | Refills: 3 | Status: SHIPPED | OUTPATIENT
Start: 2021-08-19

## 2021-08-26 ENCOUNTER — TELEPHONE (OUTPATIENT)
Dept: ENDOCRINOLOGY | Facility: CLINIC | Age: 61
End: 2021-08-26

## 2021-09-02 ENCOUNTER — OFFICE VISIT (OUTPATIENT)
Dept: FAMILY MEDICINE CLINIC | Facility: CLINIC | Age: 61
End: 2021-09-02

## 2021-09-02 VITALS
TEMPERATURE: 97.7 F | BODY MASS INDEX: 38.09 KG/M2 | HEART RATE: 72 BPM | OXYGEN SATURATION: 98 % | WEIGHT: 207 LBS | RESPIRATION RATE: 18 BRPM | HEIGHT: 62 IN | SYSTOLIC BLOOD PRESSURE: 150 MMHG | DIASTOLIC BLOOD PRESSURE: 80 MMHG

## 2021-09-02 DIAGNOSIS — N18.32 STAGE 3B CHRONIC KIDNEY DISEASE (HCC): ICD-10-CM

## 2021-09-02 DIAGNOSIS — I10 ESSENTIAL HYPERTENSION: Primary | ICD-10-CM

## 2021-09-02 DIAGNOSIS — Z94.4 STATUS POST LIVER TRANSPLANT (HCC): ICD-10-CM

## 2021-09-02 PROCEDURE — 1036F TOBACCO NON-USER: CPT | Performed by: FAMILY MEDICINE

## 2021-09-02 PROCEDURE — 3008F BODY MASS INDEX DOCD: CPT | Performed by: FAMILY MEDICINE

## 2021-09-02 PROCEDURE — 3077F SYST BP >= 140 MM HG: CPT | Performed by: FAMILY MEDICINE

## 2021-09-02 PROCEDURE — 99214 OFFICE O/P EST MOD 30 MIN: CPT | Performed by: FAMILY MEDICINE

## 2021-09-02 PROCEDURE — 3079F DIAST BP 80-89 MM HG: CPT | Performed by: FAMILY MEDICINE

## 2021-09-02 RX ORDER — BLOOD PRESSURE TEST KIT
KIT MISCELLANEOUS WEEKLY
Qty: 1 KIT | Refills: 0 | Status: SHIPPED | OUTPATIENT
Start: 2021-09-02

## 2021-09-02 NOTE — ASSESSMENT & PLAN NOTE
Since patient is status post liver transplant, and is taking immunosuppressive medications, he is eligible for 3rd dose of COVID vaccine  Per CDC, he is eligible for this dose 4 weeks after his 2nd dose  Discussed with patient

## 2021-09-02 NOTE — ASSESSMENT & PLAN NOTE
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

## 2021-09-02 NOTE — PROGRESS NOTES
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 and thinks that may contribute     goal blood pressure 130/80 or less  - Currently taking lisinopril 20 mg, amlodipine 10 mg, labetalol 100 mg twice daily  - Recommended monitoring blood pressures at home  - f/u in 2 weeks  -Pt will also get blood work done in meantime  - if blood pressure continues to be uncontrolled, will increase lisinopril  Status post liver transplant   Since patient is status post liver transplant, and is taking immunosuppressive medications, he is eligible for 3rd dose of COVID vaccine  Per CDC, he is eligible for this dose 4 weeks after his 2nd dose  Discussed with patient  CKD (chronic kidney disease)  Lab Results   Component Value Date    EGFR 42 07/16/2021    EGFR 45 02/27/2021    EGFR 45 01/02/2021    CREATININE 1 71 (H) 07/16/2021    CREATININE 1 61 (H) 02/27/2021    CREATININE 1 61 (H) 01/02/2021     Patient follows with Nephrology, and gets blood work done every 3 months from transplant team   Will follow up on creatinine and GFR with increase in lisinopril  Advised patient to avoid NSAIDs       Diagnoses and all orders for this visit:    Essential hypertension  -     Blood Pressure Monitor KIT; Use once a week    Status post liver transplant    Stage 3b chronic kidney disease ([facility] [address] )          Subjective:      Patient ID: Bianca Blanco is a 56 y o  male  HPI  56 YOM presents for f/u for his HTN  Hydrochlorothiazide was discontinued last visit due to increased uric acid levels , and lisinopril was increased to 20 mg  He is also on amlodipine 10 mg and labetalol 100 mg b i d  BP in office today is 150/80, up from 122/82 in the office on 7/22/21   He reports that his blood pressure commonly fluctuates while in office, though he does not monitor it at home  He only gets 4 hours of 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  he is also interested in COVID booster dose  The following portions of the patient's history were reviewed and updated as appropriate: problem list     Review of Systems   Constitutional: Negative for activity change, appetite change, chills, fatigue and fever  HENT: Negative for congestion  Respiratory: Negative for shortness of breath  Cardiovascular: Negative for chest pain  Gastrointestinal: Negative for diarrhea, nausea and vomiting  Genitourinary: Negative for difficulty urinating  Neurological: Negative for headaches  Objective:      /80   Pulse 72   Temp 97 7 °F (36 5 °C) (Temporal)   Resp 18   Ht 5' 2" (1 575 m)   Wt 93 9 kg (207 lb)   SpO2 98%   BMI 37 86 kg/m²          Physical Exam  Vitals reviewed  Constitutional:       Appearance: Normal appearance  He is well-developed  HENT:      Head: Normocephalic and atraumatic  Right Ear: External ear normal       Left Ear: External ear normal    Eyes:      Conjunctiva/sclera: Conjunctivae normal    Cardiovascular:      Rate and Rhythm: Normal rate and regular rhythm  Heart sounds: Normal heart sounds  No murmur heard  No friction rub  Pulmonary:      Effort: Pulmonary effort is normal  No respiratory distress  Breath sounds: Normal breath sounds  No wheezing or rales  Musculoskeletal:         General: Normal range of motion  Cervical back: Normal range of motion and neck supple  Skin:     General: Skin is warm and dry  Neurological:      Mental Status: He is alert and oriented to person, place, and time     Psychiatric:         Mood and Affect: Mood normal

## 2021-09-02 NOTE — ASSESSMENT & PLAN NOTE
Lab Results   Component Value Date    EGFR 42 07/16/2021    EGFR 45 02/27/2021    EGFR 45 01/02/2021    CREATININE 1 71 (H) 07/16/2021    CREATININE 1 61 (H) 02/27/2021    CREATININE 1 61 (H) 01/02/2021     Patient follows with Nephrology, and gets blood work done every 3 months from transplant team   Will follow up on creatinine and GFR with increase in lisinopril    Advised patient to avoid NSAIDs

## 2021-09-07 ENCOUNTER — APPOINTMENT (OUTPATIENT)
Dept: LAB | Facility: CLINIC | Age: 61
End: 2021-09-07
Payer: COMMERCIAL

## 2021-09-07 DIAGNOSIS — Z94.4 LIVER REPLACED BY TRANSPLANT (HCC): ICD-10-CM

## 2021-09-07 LAB
ALBUMIN SERPL BCP-MCNC: 4 G/DL (ref 3.5–5)
ALP SERPL-CCNC: 101 U/L (ref 46–116)
ALT SERPL W P-5'-P-CCNC: 31 U/L (ref 12–78)
ANION GAP SERPL CALCULATED.3IONS-SCNC: 8 MMOL/L (ref 4–13)
AST SERPL W P-5'-P-CCNC: 9 U/L (ref 5–45)
BASOPHILS # BLD AUTO: 0.02 THOUSANDS/ΜL (ref 0–0.1)
BASOPHILS NFR BLD AUTO: 0 % (ref 0–1)
BILIRUB SERPL-MCNC: 0.31 MG/DL (ref 0.2–1)
BUN SERPL-MCNC: 33 MG/DL (ref 5–25)
CALCIUM SERPL-MCNC: 9.1 MG/DL (ref 8.3–10.1)
CHLORIDE SERPL-SCNC: 108 MMOL/L (ref 100–108)
CO2 SERPL-SCNC: 26 MMOL/L (ref 21–32)
CREAT SERPL-MCNC: 1.79 MG/DL (ref 0.6–1.3)
EOSINOPHIL # BLD AUTO: 0.14 THOUSAND/ΜL (ref 0–0.61)
EOSINOPHIL NFR BLD AUTO: 3 % (ref 0–6)
ERYTHROCYTE [DISTWIDTH] IN BLOOD BY AUTOMATED COUNT: 14.2 % (ref 11.6–15.1)
GFR SERPL CREATININE-BSD FRML MDRD: 40 ML/MIN/1.73SQ M
GGT SERPL-CCNC: 34 U/L (ref 5–85)
GLUCOSE SERPL-MCNC: 163 MG/DL (ref 65–140)
HCT VFR BLD AUTO: 36.3 % (ref 36.5–49.3)
HGB BLD-MCNC: 11.9 G/DL (ref 12–17)
IMM GRANULOCYTES # BLD AUTO: 0.02 THOUSAND/UL (ref 0–0.2)
IMM GRANULOCYTES NFR BLD AUTO: 0 % (ref 0–2)
INR PPP: 0.99 (ref 0.84–1.19)
LYMPHOCYTES # BLD AUTO: 0.68 THOUSANDS/ΜL (ref 0.6–4.47)
LYMPHOCYTES NFR BLD AUTO: 14 % (ref 14–44)
MAGNESIUM SERPL-MCNC: 1.6 MG/DL (ref 1.6–2.6)
MCH RBC QN AUTO: 30.1 PG (ref 26.8–34.3)
MCHC RBC AUTO-ENTMCNC: 32.8 G/DL (ref 31.4–37.4)
MCV RBC AUTO: 92 FL (ref 82–98)
MONOCYTES # BLD AUTO: 0.51 THOUSAND/ΜL (ref 0.17–1.22)
MONOCYTES NFR BLD AUTO: 10 % (ref 4–12)
NEUTROPHILS # BLD AUTO: 3.52 THOUSANDS/ΜL (ref 1.85–7.62)
NEUTS SEG NFR BLD AUTO: 73 % (ref 43–75)
NRBC BLD AUTO-RTO: 0 /100 WBCS
PLATELET # BLD AUTO: 132 THOUSANDS/UL (ref 149–390)
PMV BLD AUTO: 11 FL (ref 8.9–12.7)
POTASSIUM SERPL-SCNC: 5 MMOL/L (ref 3.5–5.3)
PROT SERPL-MCNC: 7.2 G/DL (ref 6.4–8.2)
PROTHROMBIN TIME: 13.1 SECONDS (ref 11.6–14.5)
RBC # BLD AUTO: 3.96 MILLION/UL (ref 3.88–5.62)
SODIUM SERPL-SCNC: 142 MMOL/L (ref 136–145)
TACROLIMUS BLD-MCNC: 2.9 NG/ML (ref 2–20)
WBC # BLD AUTO: 4.89 THOUSAND/UL (ref 4.31–10.16)

## 2021-09-07 PROCEDURE — 82977 ASSAY OF GGT: CPT

## 2021-09-07 PROCEDURE — 85610 PROTHROMBIN TIME: CPT

## 2021-09-07 PROCEDURE — 80197 ASSAY OF TACROLIMUS: CPT

## 2021-09-07 PROCEDURE — 36415 COLL VENOUS BLD VENIPUNCTURE: CPT

## 2021-09-07 PROCEDURE — 83735 ASSAY OF MAGNESIUM: CPT

## 2021-09-07 PROCEDURE — 80053 COMPREHEN METABOLIC PANEL: CPT

## 2021-09-07 PROCEDURE — 85025 COMPLETE CBC W/AUTO DIFF WBC: CPT

## 2021-09-10 ENCOUNTER — TELEPHONE (OUTPATIENT)
Dept: ENDOCRINOLOGY | Facility: CLINIC | Age: 61
End: 2021-09-10

## 2021-09-17 ENCOUNTER — OFFICE VISIT (OUTPATIENT)
Dept: ENDOCRINOLOGY | Facility: CLINIC | Age: 61
End: 2021-09-17
Payer: COMMERCIAL

## 2021-09-17 ENCOUNTER — TELEPHONE (OUTPATIENT)
Dept: ADMINISTRATIVE | Facility: OTHER | Age: 61
End: 2021-09-17

## 2021-09-17 VITALS
HEART RATE: 64 BPM | SYSTOLIC BLOOD PRESSURE: 122 MMHG | DIASTOLIC BLOOD PRESSURE: 80 MMHG | BODY MASS INDEX: 37.93 KG/M2 | WEIGHT: 206.13 LBS | HEIGHT: 62 IN

## 2021-09-17 DIAGNOSIS — I10 ESSENTIAL HYPERTENSION: ICD-10-CM

## 2021-09-17 DIAGNOSIS — E11.65 TYPE 2 DIABETES MELLITUS WITH HYPERGLYCEMIA, WITH LONG-TERM CURRENT USE OF INSULIN (HCC): Primary | ICD-10-CM

## 2021-09-17 DIAGNOSIS — E78.5 HYPERLIPIDEMIA, UNSPECIFIED HYPERLIPIDEMIA TYPE: ICD-10-CM

## 2021-09-17 DIAGNOSIS — Z79.4 TYPE 2 DIABETES MELLITUS WITH HYPERGLYCEMIA, WITH LONG-TERM CURRENT USE OF INSULIN (HCC): Primary | ICD-10-CM

## 2021-09-17 LAB — SL AMB POCT HEMOGLOBIN AIC: 6.8 (ref ?–6.5)

## 2021-09-17 PROCEDURE — 3008F BODY MASS INDEX DOCD: CPT | Performed by: FAMILY MEDICINE

## 2021-09-17 PROCEDURE — 99214 OFFICE O/P EST MOD 30 MIN: CPT | Performed by: NURSE PRACTITIONER

## 2021-09-17 PROCEDURE — 83036 HEMOGLOBIN GLYCOSYLATED A1C: CPT | Performed by: NURSE PRACTITIONER

## 2021-09-17 PROCEDURE — 3044F HG A1C LEVEL LT 7.0%: CPT | Performed by: FAMILY MEDICINE

## 2021-09-17 PROCEDURE — 3074F SYST BP LT 130 MM HG: CPT | Performed by: NURSE PRACTITIONER

## 2021-09-17 PROCEDURE — 3079F DIAST BP 80-89 MM HG: CPT | Performed by: NURSE PRACTITIONER

## 2021-09-17 PROCEDURE — 3044F HG A1C LEVEL LT 7.0%: CPT | Performed by: NURSE PRACTITIONER

## 2021-09-17 PROCEDURE — 1036F TOBACCO NON-USER: CPT | Performed by: NURSE PRACTITIONER

## 2021-09-17 PROCEDURE — 3008F BODY MASS INDEX DOCD: CPT | Performed by: NURSE PRACTITIONER

## 2021-09-17 RX ORDER — INSULIN DEGLUDEC INJECTION 100 U/ML
INJECTION, SOLUTION SUBCUTANEOUS
Qty: 30 ML | Refills: 3 | Status: SHIPPED | OUTPATIENT
Start: 2021-09-17 | End: 2021-12-14

## 2021-09-17 RX ORDER — PEN NEEDLE, DIABETIC 32GX 5/32"
NEEDLE, DISPOSABLE MISCELLANEOUS 4 TIMES DAILY
Qty: 100 EACH | Refills: 3 | Status: SHIPPED | OUTPATIENT
Start: 2021-09-17 | End: 2022-04-11 | Stop reason: SDUPTHER

## 2021-09-17 NOTE — LETTER
Diabetic Eye Exam Form    Date Requested: 21  Patient: Jason Men  Patient : 1960   Referring Provider: Isaias Schwartz MD    Dilated Retinal Exam, Optomap-Iris Exam, or Fundus Photography Done         Yes ([place] one above)         No     Date of Diabetic Eye Exam ______________________________  Left Eye      Exam did show retinopathy    Exam did not show retinopathy         Mild       Moderate       None       Proliferative       Severe     Right Eye     Exam did show retinopathy    Exam did not show retinopathy         Mild       Moderate       None       Proliferative       Severe     Comments __________________________________________________________    Practice Providing Exam ______________________________________________    Exam Performed By (print name) _______________________________________      Provider Signature ___________________________________________________      These reports are needed for  compliance  Please fax this completed form and a copy of the Diabetic Eye Exam report to our office located at [place] [address] as soon as possible to [phone] carleen Langston Cindi: Phone [phone]    We thank you for your assistance in treating our mutual patient

## 2021-09-17 NOTE — PROGRESS NOTES
Established Patient Progress Note      Chief Complaint   Patient presents with    Diabetes Type 2          History of Present Illness:   Hung Haskins is a 56 y o  male with a history of HTN, HLD, and type 2 diabetes with long term use of insulin  Reports complications of CKD and retinopathy  POC A1C 6 8  Denies recent illness or hospitalizations  Denies recent severe hypoglycemic or severe hyperglycemic episodes  Denies any issues with his current regimen  Home glucose monitoring: are performed regularly    Home blood glucose readings:   Before breakfast: 110-140s  Before lunch: does not check   Before dinner: 110-130s  Bedtime: 100-120s    Current regimen: Tresiba 35 units and Novolog 6 units before breakfast  compliant all of the timedenies any side effects from current medications     Hypoglycemic episodes: No never   H/o of hypoglycemia causing hospitalization or Intervention such as glucagon injection or ambulance call No     Last Eye Exam: Dr Espino Brain Exam: 4/15/21    Has hypertension: Taking Amlodipine and Lisinopril   Has hyperlipidemia: Taking Atorvastatin       Patient Active Problem List   Diagnosis    Hypertension    Type 2 diabetes mellitus with ophthalmic complication, without long-term current use of insulin [facility]    Cirrhosis    Hyperlipidemia    Status post liver transplant    Hyperglycemia    Encounter for immunization    Osteopenia    Male erectile disorder    Type 2 diabetes mellitus with hyperglycemia, with long-term current use of insulin (HCC)    Other proteinuria    Respiratory tract infection    CKD (chronic kidney disease)    Gout    Cellulitis of left upper extremity    Swelling of left elbow      Past Medical History:   Diagnosis Date    Chronic kidney disease, stage III (moderate) (HCC)     RESOLVED: [id number]    Cirrhosis ([address]     Diabetes mellitus ([address] )     Hepatic encephalopathy (HCC)     Hepatitis C     Hyperkalemia     Hyperlipidemia     Hypertension     [facility]     Status post liver transplant ([facility]     Umbilical hernia       Past Surgical History:   Procedure Laterality Date    ANKLE SURGERY      CHOLECYSTECTOMY      GALLBLADDER SURGERY      LIVER TRANSPLANTATION      LAST ASSESSED: [id number]    MT COLONOSCOPY FLX DX W/COLLJ SPEC WHEN PFRMD N/A 2/16/2017    Procedure: COLONOSCOPY;  Surgeon: Adam Hutchins MD;  Location: [facility]; Service: Gastroenterology    TONSILLECTOMY        Family History   Problem Relation Age of Onset    Diabetes Mother         TYPE 2    Hypertension Mother     Stroke Mother 68        SYNDROME     Hepatitis Brother         C    Cirrhosis Brother         LIVER     Cancer Family      Social History     Tobacco Use    Smoking status: Never Smoker    Smokeless tobacco: Never Used   Substance Use Topics    Alcohol use:  Yes     Alcohol/week: 1 0 standard drinks     Types: 1 Cans of beer per week     Comment: occassionally     No Known Allergies      Current Outpatient Medications:     amLODIPine (NORVASC) 10 mg tablet, Take 1 tablet (10 mg total) by mouth daily, Disp: 90 tablet, Rfl: 3    atorvastatin (LIPITOR) 40 mg tablet, TAKE 1 TABLET DAILY AT BEDTIME, Disp: 90 tablet, Rfl: 4    azaTHIOprine (IMURAN) 50 mg tablet, Take 1 tablet (50 mg total) by mouth daily, Disp: 90 tablet, Rfl: 3    Blood Pressure Monitor KIT, Use once a week, Disp: 1 kit, Rfl: 0    Cholecalciferol (VITAMIN D3) 1000 units CAPS, Take 1 capsule by mouth daily, Disp: , Rfl:     fludrocortisone (FLORINEF) 0 1 mg tablet, , Disp: , Rfl:     insulin degludec Marilee Shantanu FlexTouch) 100 units/mL injection pen, 35 units daily, Disp: 30 mL, Rfl: 3    insulin lispro (HumaLOG) 100 units/mL injection pen, Inject 6 Units under the skin daily before breakfast, Disp: 5 pen, Rfl: 2    Insulin Pen Needle (BD Pen Needle Montserrat U/F) 32G X 4 MM MISC, Use 4 (four) times a day, Disp: 100 each, Rfl: 3    labetalol (NORMODYNE) 100 mg tablet, TAKE 1 TABLET EVERY 12 HOURS, Disp: 180 tablet, Rfl: 3    lisinopril (ZESTRIL) 20 mg tablet, Take 1 tablet (20 mg total) by mouth daily, Disp: 90 tablet, Rfl: 0    Magnesium 400 MG CAPS, , Disp: , Rfl:     Multiple Vitamin (MULTIVITAMIN ADULT PO), Take by mouth Daily, Disp: , Rfl:     tacrolimus (PROGRAF) 1 mg capsule, Takes 4 tabs in am, 3 tabs in pm, Disp: , Rfl:     TRUE METRIX BLOOD GLUCOSE TEST test strip, Test twice daily, Disp: , Rfl:     MAGNESIUM OXIDE 400 PO, Take 1 tablet by mouth 2 (two) times a day  (Patient not taking: Reported on 9/17/2021), Disp: , Rfl:     Multiple Vitamins-Minerals (MULTIVITAMIN WITH MINERALS) tablet, Take 1 tablet by mouth daily (Patient not taking: Reported on 9/17/2021), Disp: , Rfl:     Review of Systems   Constitutional: Negative for activity change, appetite change and fatigue  HENT: Negative for sore throat, trouble swallowing and voice change  Eyes: Negative for visual disturbance  Respiratory: Negative for choking, chest tightness and shortness of breath  Cardiovascular: Negative for chest pain, palpitations and leg swelling  Gastrointestinal: Negative for abdominal pain, constipation and diarrhea  Endocrine: Negative for cold intolerance, heat intolerance, polydipsia, polyphagia and polyuria  Genitourinary: Negative for frequency  Musculoskeletal: Negative for arthralgias and myalgias  Skin: Negative for rash  Neurological: Negative for dizziness and syncope  Hematological: Negative for adenopathy  Psychiatric/Behavioral: Negative for sleep disturbance  All other systems reviewed and are negative  Physical Exam:  Body mass index is 37 7 kg/m²    /80 (BP Location: Left arm, Patient Position: Sitting, Cuff Size: Large)   Pulse 64   Ht 5' 2" (1 575 m)   Wt 93 5 kg (206 lb 2 oz)   BMI 37 70 kg/m²    Wt Readings from Last 3 Encounters:   09/17/21 93 5 kg (206 lb 2 oz)   09/02/21 93 9 kg (207 lb)   07/22/21 91 1 kg (200 lb 12 8 oz)       Physical Exam  Vitals reviewed  Constitutional:       General: He is not in acute distress  Appearance: He is well-developed  HENT:      Head: Normocephalic and atraumatic  Eyes:      Conjunctiva/sclera: Conjunctivae normal       Pupils: Pupils are equal, round, and reactive to light  Neck:      Thyroid: No thyromegaly  Cardiovascular:      Rate and Rhythm: Normal rate and regular rhythm  Heart sounds: Normal heart sounds  No murmur heard  Pulmonary:      Effort: Pulmonary effort is normal  No respiratory distress  Breath sounds: Normal breath sounds  No wheezing or rales  Abdominal:      General: Bowel sounds are normal  There is no distension  Palpations: Abdomen is soft  Tenderness: There is no abdominal tenderness  Musculoskeletal:         General: Normal range of motion  Cervical back: Normal range of motion and neck supple  Lymphadenopathy:      Cervical: No cervical adenopathy  Skin:     General: Skin is warm and dry  Neurological:      Mental Status: He is alert and oriented to person, place, and time             Labs:     Lab Results   Component Value Date    HGBA1C 6 8 (A) 09/17/2021       Lab Results   Component Value Date     01/08/2016    SODIUM 142 09/07/2021    K 5 0 09/07/2021     09/07/2021    CO2 26 09/07/2021    ANIONGAP 11 01/08/2016    AGAP 8 09/07/2021    BUN 33 (H) 09/07/2021    CREATININE 1 79 (H) 09/07/2021    GLUC 163 (H) 09/07/2021    GLUF 143 (H) 07/16/2021    CALCIUM 9 1 09/07/2021    AST 9 09/07/2021    ALT 31 09/07/2021    ALKPHOS 101 09/07/2021    PROT 7 3 01/08/2016    TP 7 2 09/07/2021    BILITOT 0 29 01/08/2016    TBILI 0 31 09/07/2021    EGFR 40 09/07/2021         Lab Results   Component Value Date    CHOL 174 03/10/2015    HDL 35 (L) 07/16/2021    TRIG 413 (H) 07/16/2021       Lab Results   Component Value Date    FJN8TZSWXEHA 1 432 06/01/2019    AVY8OPJSDUON 1 603 05/02/2017     Lab Results   Component Value Date    FREET4 0 96 06/01/2019       Impression & Plan:    Problem List Items Addressed This Visit        Endocrine    Type 2 diabetes mellitus with hyperglycemia, with long-term current use of insulin ([address] ) - Primary     Close to goal with no episodes of hypo or hyperglycemia  Continue current regimen  Focus on dietary and lifestyle modifications  Relevant Medications    insulin degludec Vianne Christelle FlexTouch) 100 units/mL injection pen    Insulin Pen Needle (BD Pen Needle Montserrat U/F) 32G X 4 MM MISC    Other Relevant Orders    POCT hemoglobin A1c (Completed)    Hemoglobin A1C    Lipid Panel with Direct LDL reflex    Comprehensive metabolic panel    Hemoglobin A1C    Comprehensive metabolic panel    Lipid Panel with Direct LDL reflex       Cardiovascular and Mediastinum    Hypertension     BP stable, continue current regimen          Relevant Orders    Comprehensive metabolic panel    Comprehensive metabolic panel       Other    Hyperlipidemia     Continue statin          Relevant Orders    Lipid Panel with Direct LDL reflex    Lipid Panel with Direct LDL reflex          Orders Placed This Encounter   Procedures    Hemoglobin A1C     Standing Status:   Future     Standing Expiration Date:   9/17/2022    Lipid Panel with Direct LDL reflex     This is a patient instruction: This test requires patient fasting for 10-12 hours or longer  Drinking of black coffee or black tea is acceptable  Standing Status:   Future     Standing Expiration Date:   9/17/2022    Comprehensive metabolic panel     This is a patient instruction: Patient fasting for 8 hours or longer recommended  Standing Status:   Future     Standing Expiration Date:   9/17/2022    Hemoglobin A1C     Standing Status:   Future     Standing Expiration Date:   9/17/2022    Comprehensive metabolic panel     This is a patient instruction: Patient fasting for 8 hours or longer recommended       Standing Status:   Future     Standing Expiration Date:   9/17/2022    Lipid Panel with Direct LDL reflex     This is a patient instruction: This test requires patient fasting for 10-12 hours or longer  Drinking of black coffee or black tea is acceptable  Standing Status:   Future     Standing Expiration Date:   9/17/2022    POCT hemoglobin A1c         Discussed with the patient and all questioned fully answered  He will call me if any problems arise  Follow-up appointment in 3-4 months       Counseled patient on diagnostic results, prognosis, risk and benefit of treatment options, instruction for management, importance of treatment compliance, Risk  factor reduction and impressions      Alexsander Dooley [address]

## 2021-09-17 NOTE — ASSESSMENT & PLAN NOTE
Close to goal with no episodes of hypo or hyperglycemia  Continue current regimen  Focus on dietary and lifestyle modifications

## 2021-09-17 NOTE — TELEPHONE ENCOUNTER
Upon review of the In Basket request and the patient's chart, initial outreach has been made via fax, please see Contacts section for details       Thank you  Chirag Abreu, [address] [place] [address] [phone] Fax: [phone]

## 2021-09-17 NOTE — TELEPHONE ENCOUNTER
----- Message from Be Flores sent at 9/17/2021  9:42 AM EDT -----  Regarding: HM DM EYE EXAM  09/17/21 9:42 AM    Hello, our patient John Treadwell has had Diabetic Eye Exam completed/performed  Please assist in updating the patient chart by calling [address] [facility]    Phone: [phone];  Fax: [phone]          Thank you,  Mendy Srivastava   [facility]

## 2021-09-17 NOTE — LETTER
Diabetic Eye Exam Form    Date Requested: 21  Patient: Augustin Roasles  Patient : 1960   Referring Provider: Sharonda Shafer MD    2nd Request    Dilated Retinal Exam, Optomap-Iris Exam, or Fundus Photography Done         Yes ([facility] one above)         No     Date of Diabetic Eye Exam ______________________________  Left Eye      Exam did show retinopathy    Exam did not show retinopathy         Mild       Moderate       None       Proliferative       Severe     Right Eye     Exam did show retinopathy    Exam did not show retinopathy         Mild       Moderate       None       Proliferative       Severe     Comments __________________________________________________________    Practice Providing Exam ______________________________________________    Exam Performed By (print name) _______________________________________      Provider Signature ___________________________________________________      These reports are needed for  compliance  Please fax this completed form and a copy of the Diabetic Eye Exam report to our office located at [place] [address] as soon as possible to [phone] carleen Delacruz: Phone [phone]    We thank you for your assistance in treating our mutual patient

## 2021-09-21 NOTE — TELEPHONE ENCOUNTER
As a follow-up, a second attempt has been made for outreach via fax, please see Contacts section for details      Thank you  Jennifer Montes, [address] [place] [address] [place] [phone] Fax: [phone]

## 2021-09-24 NOTE — TELEPHONE ENCOUNTER
As a final attempt, a third outreach has been made via telephone call  Please see Contacts section for details  This encounter will be closed and completed by end of day  Should we receive the requested information because of previous outreach attempts, the requested patient's chart will be updated appropriately       Thank you  Tommy Delgadillo

## 2021-09-27 ENCOUNTER — TELEPHONE (OUTPATIENT)
Dept: ENDOCRINOLOGY | Facility: CLINIC | Age: 61
End: 2021-09-27

## 2021-10-06 DIAGNOSIS — E78.5 DYSLIPIDEMIA: ICD-10-CM

## 2021-10-06 RX ORDER — ATORVASTATIN CALCIUM 40 MG/1
40 TABLET, FILM COATED ORAL
Qty: 90 TABLET | Refills: 2 | Status: SHIPPED | OUTPATIENT
Start: 2021-10-06 | End: 2021-10-14 | Stop reason: SDUPTHER

## 2021-10-14 ENCOUNTER — OFFICE VISIT (OUTPATIENT)
Dept: FAMILY MEDICINE CLINIC | Facility: CLINIC | Age: 61
End: 2021-10-14

## 2021-10-14 ENCOUNTER — TELEPHONE (OUTPATIENT)
Dept: ENDOCRINOLOGY | Facility: CLINIC | Age: 61
End: 2021-10-14

## 2021-10-14 VITALS
OXYGEN SATURATION: 99 % | HEIGHT: 62 IN | TEMPERATURE: 97.8 F | WEIGHT: 204 LBS | RESPIRATION RATE: 18 BRPM | DIASTOLIC BLOOD PRESSURE: 80 MMHG | SYSTOLIC BLOOD PRESSURE: 142 MMHG | HEART RATE: 82 BPM | BODY MASS INDEX: 37.54 KG/M2

## 2021-10-14 DIAGNOSIS — I10 PRIMARY HYPERTENSION: Primary | ICD-10-CM

## 2021-10-14 DIAGNOSIS — Z23 ENCOUNTER FOR IMMUNIZATION: ICD-10-CM

## 2021-10-14 DIAGNOSIS — E78.49 OTHER HYPERLIPIDEMIA: ICD-10-CM

## 2021-10-14 DIAGNOSIS — E78.5 DYSLIPIDEMIA: ICD-10-CM

## 2021-10-14 DIAGNOSIS — N18.32 STAGE 3B CHRONIC KIDNEY DISEASE (HCC): ICD-10-CM

## 2021-10-14 PROBLEM — L03.114 CELLULITIS OF LEFT UPPER EXTREMITY: Status: RESOLVED | Noted: 2021-07-22 | Resolved: 2021-10-14

## 2021-10-14 PROCEDURE — 3008F BODY MASS INDEX DOCD: CPT | Performed by: NURSE PRACTITIONER

## 2021-10-14 PROCEDURE — 3725F SCREEN DEPRESSION PERFORMED: CPT | Performed by: FAMILY MEDICINE

## 2021-10-14 PROCEDURE — 90471 IMMUNIZATION ADMIN: CPT | Performed by: FAMILY MEDICINE

## 2021-10-14 PROCEDURE — 3008F BODY MASS INDEX DOCD: CPT | Performed by: FAMILY MEDICINE

## 2021-10-14 PROCEDURE — 1036F TOBACCO NON-USER: CPT | Performed by: FAMILY MEDICINE

## 2021-10-14 PROCEDURE — 3077F SYST BP >= 140 MM HG: CPT | Performed by: FAMILY MEDICINE

## 2021-10-14 PROCEDURE — 3079F DIAST BP 80-89 MM HG: CPT | Performed by: FAMILY MEDICINE

## 2021-10-14 PROCEDURE — 99214 OFFICE O/P EST MOD 30 MIN: CPT | Performed by: FAMILY MEDICINE

## 2021-10-14 PROCEDURE — 90682 RIV4 VACC RECOMBINANT DNA IM: CPT | Performed by: FAMILY MEDICINE

## 2021-10-14 RX ORDER — ATORVASTATIN CALCIUM 40 MG/1
40 TABLET, FILM COATED ORAL
Qty: 90 TABLET | Refills: 2 | Status: SHIPPED | OUTPATIENT
Start: 2021-10-14 | End: 2021-11-03 | Stop reason: SDUPTHER

## 2021-10-14 RX ORDER — HYDROCHLOROTHIAZIDE 12.5 MG/1
12.5 TABLET ORAL DAILY
Qty: 90 TABLET | Refills: 1
Start: 2021-10-14 | End: 2022-05-31 | Stop reason: SDUPTHER

## 2021-10-18 DIAGNOSIS — R80.9 PROTEINURIA, UNSPECIFIED TYPE: ICD-10-CM

## 2021-10-18 DIAGNOSIS — I10 HYPERTENSION, UNSPECIFIED TYPE: ICD-10-CM

## 2021-10-18 PROCEDURE — 4010F ACE/ARB THERAPY RXD/TAKEN: CPT | Performed by: FAMILY MEDICINE

## 2021-10-18 PROCEDURE — 3066F NEPHROPATHY DOC TX: CPT | Performed by: NURSE PRACTITIONER

## 2021-10-18 PROCEDURE — 4010F ACE/ARB THERAPY RXD/TAKEN: CPT | Performed by: NURSE PRACTITIONER

## 2021-10-18 PROCEDURE — 3066F NEPHROPATHY DOC TX: CPT | Performed by: FAMILY MEDICINE

## 2021-10-18 RX ORDER — LISINOPRIL 10 MG/1
TABLET ORAL
Qty: 90 TABLET | Refills: 3 | Status: SHIPPED | OUTPATIENT
Start: 2021-10-18 | End: 2022-01-17 | Stop reason: ALTCHOICE

## 2021-10-18 RX ORDER — AMLODIPINE BESYLATE 10 MG/1
TABLET ORAL
Qty: 90 TABLET | Refills: 3 | Status: SHIPPED | OUTPATIENT
Start: 2021-10-18

## 2021-10-29 ENCOUNTER — TELEPHONE (OUTPATIENT)
Dept: ENDOCRINOLOGY | Facility: CLINIC | Age: 61
End: 2021-10-29

## 2021-11-03 ENCOUNTER — OFFICE VISIT (OUTPATIENT)
Dept: FAMILY MEDICINE CLINIC | Facility: CLINIC | Age: 61
End: 2021-11-03

## 2021-11-03 VITALS
OXYGEN SATURATION: 98 % | DIASTOLIC BLOOD PRESSURE: 92 MMHG | HEART RATE: 83 BPM | RESPIRATION RATE: 18 BRPM | SYSTOLIC BLOOD PRESSURE: 142 MMHG | WEIGHT: 201 LBS | TEMPERATURE: 97.4 F | HEIGHT: 62 IN | BODY MASS INDEX: 36.99 KG/M2

## 2021-11-03 DIAGNOSIS — M76.61 ACHILLES TENDINITIS OF RIGHT LOWER EXTREMITY: Primary | ICD-10-CM

## 2021-11-03 DIAGNOSIS — E78.5 DYSLIPIDEMIA: ICD-10-CM

## 2021-11-03 PROCEDURE — 99214 OFFICE O/P EST MOD 30 MIN: CPT | Performed by: FAMILY MEDICINE

## 2021-11-03 PROCEDURE — 3008F BODY MASS INDEX DOCD: CPT | Performed by: FAMILY MEDICINE

## 2021-11-03 PROCEDURE — 3080F DIAST BP >= 90 MM HG: CPT | Performed by: FAMILY MEDICINE

## 2021-11-03 PROCEDURE — 1036F TOBACCO NON-USER: CPT | Performed by: FAMILY MEDICINE

## 2021-11-03 PROCEDURE — 3077F SYST BP >= 140 MM HG: CPT | Performed by: FAMILY MEDICINE

## 2021-11-03 RX ORDER — ATORVASTATIN CALCIUM 40 MG/1
40 TABLET, FILM COATED ORAL
Qty: 90 TABLET | Refills: 2 | Status: SHIPPED | OUTPATIENT
Start: 2021-11-03

## 2021-11-09 ENCOUNTER — IMMUNIZATIONS (OUTPATIENT)
Dept: FAMILY MEDICINE CLINIC | Facility: HOSPITAL | Age: 61
End: 2021-11-09

## 2021-11-09 DIAGNOSIS — Z23 ENCOUNTER FOR IMMUNIZATION: Primary | ICD-10-CM

## 2021-11-09 PROCEDURE — 0001A COVID-19 PFIZER VACC 0.3 ML: CPT

## 2021-11-09 PROCEDURE — 91300 COVID-19 PFIZER VACC 0.3 ML: CPT

## 2021-11-17 ENCOUNTER — TELEPHONE (OUTPATIENT)
Dept: ENDOCRINOLOGY | Facility: CLINIC | Age: 61
End: 2021-11-17

## 2021-12-01 ENCOUNTER — OFFICE VISIT (OUTPATIENT)
Dept: FAMILY MEDICINE CLINIC | Facility: CLINIC | Age: 61
End: 2021-12-01

## 2021-12-01 ENCOUNTER — TELEPHONE (OUTPATIENT)
Dept: ENDOCRINOLOGY | Facility: CLINIC | Age: 61
End: 2021-12-01

## 2021-12-01 VITALS
HEART RATE: 77 BPM | DIASTOLIC BLOOD PRESSURE: 72 MMHG | SYSTOLIC BLOOD PRESSURE: 136 MMHG | TEMPERATURE: 97.2 F | BODY MASS INDEX: 37.36 KG/M2 | HEIGHT: 62 IN | RESPIRATION RATE: 18 BRPM | OXYGEN SATURATION: 98 % | WEIGHT: 203 LBS

## 2021-12-01 DIAGNOSIS — M21.611 BUNION OF GREAT TOE OF RIGHT FOOT: Primary | ICD-10-CM

## 2021-12-01 PROCEDURE — 99213 OFFICE O/P EST LOW 20 MIN: CPT | Performed by: FAMILY MEDICINE

## 2021-12-01 PROCEDURE — 3078F DIAST BP <80 MM HG: CPT | Performed by: FAMILY MEDICINE

## 2021-12-01 PROCEDURE — 3008F BODY MASS INDEX DOCD: CPT | Performed by: FAMILY MEDICINE

## 2021-12-01 PROCEDURE — 3075F SYST BP GE 130 - 139MM HG: CPT | Performed by: FAMILY MEDICINE

## 2021-12-01 PROCEDURE — 1036F TOBACCO NON-USER: CPT | Performed by: FAMILY MEDICINE

## 2021-12-14 DIAGNOSIS — E11.65 TYPE 2 DIABETES MELLITUS WITH HYPERGLYCEMIA, WITH LONG-TERM CURRENT USE OF INSULIN (HCC): ICD-10-CM

## 2021-12-14 DIAGNOSIS — Z79.4 TYPE 2 DIABETES MELLITUS WITH HYPERGLYCEMIA, WITH LONG-TERM CURRENT USE OF INSULIN (HCC): ICD-10-CM

## 2021-12-14 RX ORDER — INSULIN DEGLUDEC INJECTION 100 U/ML
INJECTION, SOLUTION SUBCUTANEOUS
Qty: 30 ML | Refills: 3 | Status: SHIPPED | OUTPATIENT
Start: 2021-12-14 | End: 2022-05-23

## 2021-12-21 ENCOUNTER — TELEPHONE (OUTPATIENT)
Dept: ENDOCRINOLOGY | Facility: CLINIC | Age: 61
End: 2021-12-21

## 2022-01-04 ENCOUNTER — OFFICE VISIT (OUTPATIENT)
Dept: PODIATRY | Facility: CLINIC | Age: 62
End: 2022-01-04
Payer: COMMERCIAL

## 2022-01-04 VITALS
SYSTOLIC BLOOD PRESSURE: 163 MMHG | HEART RATE: 102 BPM | DIASTOLIC BLOOD PRESSURE: 78 MMHG | HEIGHT: 62 IN | WEIGHT: 204.8 LBS | BODY MASS INDEX: 37.69 KG/M2

## 2022-01-04 DIAGNOSIS — M21.611 BUNION OF GREAT TOE OF RIGHT FOOT: ICD-10-CM

## 2022-01-04 DIAGNOSIS — M20.12 HALLUX VALGUS OF LEFT FOOT: ICD-10-CM

## 2022-01-04 DIAGNOSIS — M20.11 HALLUX VALGUS OF RIGHT FOOT: Primary | ICD-10-CM

## 2022-01-04 PROCEDURE — 99242 OFF/OP CONSLTJ NEW/EST SF 20: CPT | Performed by: PODIATRIST

## 2022-01-04 RX ORDER — CHLORHEXIDINE GLUCONATE 0.12 MG/ML
15 RINSE ORAL ONCE
Status: CANCELLED | OUTPATIENT
Start: 2022-02-04 | End: 2022-01-04

## 2022-01-04 RX ORDER — CEFAZOLIN SODIUM 2 G/50ML
2000 SOLUTION INTRAVENOUS ONCE
Status: CANCELLED | OUTPATIENT
Start: 2022-02-04 | End: 2022-01-04

## 2022-01-04 NOTE — PROGRESS NOTES
Assessment/Plan:    I personally reviewed x-rays of the right foot  They reveal a severe hallux valgus deformity  Calcification noted in 1st metatarsal interspace  Diffuse osteoporosis present    I personally reviewed x-rays of the left foot  They reveal a moderate hallux valgus deformity  Diffuse osteoporosis present  Discussed treatment options for hallux valgus  In order to correct this disorder, surgical intervention needed  An offset V bunionectomy was recommended  This will be performed as an outpatient at [facility] pending medical clearance  Patient will be scheduled for consent signing  No problem-specific Assessment & Plan notes found for this encounter  Diagnoses and all orders for this visit:    Hallux valgus of right foot  -     XR foot 2 vw right; Future    Bunion of great toe of right foot  -     Ambulatory referral to Podiatry    Hallux valgus of left foot  -     XR foot 2 vw left; Future          Subjective:      Patient ID: Gee Metzger is a 59 y o  male  HPI     Patient, a 10-year-old type 2 diabetic utilizing insulin for control presents with a painful bunion deformity affecting the right foot  Patient states that this bunion has gotten larger over the past few months and he has difficulty wearing a work boot comfortably  He also notes on questioning that he has been dealing with periodic gout attacks for which she has been prescribed prednisone  It is noted that patient has a left foot bunion deformity but it is not as large of the right and is relatively asymptomatic  Patient has a complicated medical history  In addition to diabetes, he had a liver transplant years ago  He also has renal disease  I personally reviewed A1c dated 09/17/2021  It is 6 8    I personally reviewed an A1c dated 04/15/2021  It was 6 9      The following portions of the patient's history were reviewed and updated as appropriate: allergies, current medications, past family history, past medical history, past social history, past surgical history and problem list     Review of Systems   Genitourinary:        Status post liver transplant; renal disease   Musculoskeletal:        Osteopenia   Psychiatric/Behavioral: Negative  Objective:      /78   Pulse 102   Ht 5' 2" (1 575 m) Comment: verbal  Wt 92 9 kg (204 lb 12 8 oz)   BMI 37 46 kg/m²          Physical Exam  Cardiovascular:      Pulses: no weak pulses          Dorsalis pedis pulses are 2+ on the right side and 2+ on the left side  Posterior tibial pulses are 2+ on the right side and 2+ on the left side  Feet:      Right foot:      Skin integrity: No ulcer, skin breakdown, erythema, warmth, callus or dry skin  Left foot:      Skin integrity: No ulcer, skin breakdown, erythema, warmth, callus or dry skin  Diabetic Foot Exam    Patient's shoes and socks removed  Right Foot/Ankle   Right Foot Inspection  Skin Exam: skin normal and skin intact  No dry skin, no warmth, no callus, no erythema, no maceration, no abnormal color, no pre-ulcer, no ulcer and no callus  Toe Exam: right toe deformity  Sensory   Vibration: intact  Proprioception: intact  Monofilament testing: intact    Vascular  Capillary refills: < 3 seconds  The right DP pulse is 2+  The right PT pulse is 2+  Right Toe  - Comprehensive Exam  Ecchymosis: none  Arch: normal  Hammertoes: absent  Claw Toes: absent  Swelling: none   Tenderness: great toe metatarsophalangeal joint         Left Foot/Ankle  Left Foot Inspection  Skin Exam: skin normal and skin intact  No dry skin, no warmth, no erythema, no maceration, normal color, no pre-ulcer, no ulcer and no callus  Toe Exam: left toe deformity  Sensory   Vibration: intact  Proprioception: intact  Monofilament testing: intact    Vascular  Capillary refills: < 3 seconds  The left DP pulse is 2+  The left PT pulse is 2+       Left Toe  - Comprehensive Exam  Ecchymosis: none  Arch: normal  Hammertoes: absent  Claw toes: absent  Swelling: none   Tenderness: none           Assign Risk Category  Deformity present  No loss of protective sensation  No weak pulses  Risk: 0

## 2022-01-07 ENCOUNTER — TELEPHONE (OUTPATIENT)
Dept: ENDOCRINOLOGY | Facility: CLINIC | Age: 62
End: 2022-01-07

## 2022-01-17 ENCOUNTER — OFFICE VISIT (OUTPATIENT)
Dept: FAMILY MEDICINE CLINIC | Facility: CLINIC | Age: 62
End: 2022-01-17

## 2022-01-17 VITALS
OXYGEN SATURATION: 98 % | DIASTOLIC BLOOD PRESSURE: 80 MMHG | TEMPERATURE: 98.7 F | BODY MASS INDEX: 38.09 KG/M2 | SYSTOLIC BLOOD PRESSURE: 138 MMHG | RESPIRATION RATE: 18 BRPM | HEART RATE: 86 BPM | WEIGHT: 207 LBS | HEIGHT: 62 IN

## 2022-01-17 DIAGNOSIS — Z01.810 PREOP CARDIOVASCULAR EXAM: Primary | ICD-10-CM

## 2022-01-17 DIAGNOSIS — M85.80 OSTEOPENIA, UNSPECIFIED LOCATION: ICD-10-CM

## 2022-01-17 DIAGNOSIS — E11.65 TYPE 2 DIABETES MELLITUS WITH HYPERGLYCEMIA, WITH LONG-TERM CURRENT USE OF INSULIN (HCC): ICD-10-CM

## 2022-01-17 DIAGNOSIS — I10 PRIMARY HYPERTENSION: ICD-10-CM

## 2022-01-17 DIAGNOSIS — Z79.4 TYPE 2 DIABETES MELLITUS WITH HYPERGLYCEMIA, WITH LONG-TERM CURRENT USE OF INSULIN (HCC): ICD-10-CM

## 2022-01-17 DIAGNOSIS — K72.90 ENCEPHALOPATHY, PORTAL SYSTEMIC (HCC): ICD-10-CM

## 2022-01-17 DIAGNOSIS — D84.9 IMMUNOSUPPRESSION (HCC): ICD-10-CM

## 2022-01-17 PROBLEM — K76.82 ENCEPHALOPATHY, PORTAL SYSTEMIC: Status: ACTIVE | Noted: 2022-01-17

## 2022-01-17 LAB — SL AMB POCT HEMOGLOBIN AIC: 7.4 (ref ?–6.5)

## 2022-01-17 PROCEDURE — 99242 OFF/OP CONSLTJ NEW/EST SF 20: CPT | Performed by: FAMILY MEDICINE

## 2022-01-17 PROCEDURE — 83036 HEMOGLOBIN GLYCOSYLATED A1C: CPT | Performed by: FAMILY MEDICINE

## 2022-01-17 PROCEDURE — 1036F TOBACCO NON-USER: CPT | Performed by: FAMILY MEDICINE

## 2022-01-17 NOTE — H&P (VIEW-ONLY)
[address]  1960    This preop evaluation is being done at the request of Dr Vargas Gem is a 59 y o  male with right foot bunion who is planning to undergo bunionectomy under general by Dr Misty Parmar on 2/4/22  Patient has not  had complications with anesthesia in the past     ROS:   Chest pain: no   Shortness of breath: no  Shortness of breath with exertion: no  Orthopnea: no  Dizziness: no  Unexplained weight change: no    PMH:  CAD: no  HTN: yes  CKD: yes  DM: yes on insulin: yes  History of CVA: no     reports that he has never smoked  He has never used smokeless tobacco  He reports current alcohol use of about 1 0 standard drink of alcohol per week  He reports that he does not use drugs  /80 (BP Location: Left arm, Patient Position: Sitting, Cuff Size: Large)   Pulse 86   Temp 98 7 °F (37 1 °C) (Temporal)   Resp 18   Ht 5' 2" (1 575 m)   Wt 93 9 kg (207 lb)   SpO2 98%   BMI 37 86 kg/m²   Physical Exam  Vitals and nursing note reviewed  Constitutional:       Appearance: Normal appearance  He is well-developed  HENT:      Head: Normocephalic and atraumatic  Eyes:      Conjunctiva/sclera: Conjunctivae normal    Cardiovascular:      Rate and Rhythm: Normal rate and regular rhythm  Heart sounds: No murmur heard  Pulmonary:      Effort: Pulmonary effort is normal  No respiratory distress  Breath sounds: Normal breath sounds  Musculoskeletal:         General: Swelling (trace edema bilaterally) present  Cervical back: Neck supple  Skin:     General: Skin is warm and dry  Capillary Refill: Capillary refill takes less than 2 seconds  Neurological:      Mental Status: He is alert and oriented to person, place, and time           Revised Cardiac Risk Index (RCRI) for Pre-Operative Risk   (estimates risk of cardiac complications after noncardiac surgery)    · High-risk surgery:no  · Score 1: Intraperitoneal, intrathoracic, suprainguinal vascular  · History of ischemic heart disease: no  · Score 1: Hx of MI, (+) exercise test, current chest pain considered due to myocardial ischemia, use of nitrate therapy or ECG with pathological Q waves)  · History of CHF: no  · Score 1: Pulmonary edema, B/L rales or S3 gallop; HURD, orthopnea, PND, CXR showing pulmonary vascular redistribution)  · History of cerebrovascular disease: no  · Score 1: Prior TIA or stroke  · Pre-operative treatment with insulin: yes,         Score 1: for yes  · Pre-operative creatinine >2 mg/dL: no         Score 1: for yes    RCRI Scoring:  · 0 points: Class I Risk, 3 9% Risk of Major Cardiac Event  · 1 point: Class II Risk, 6 0% Risk of Major Cardiac Event  · 2 points: Class III Risk, 10 1% Risk of Major Cardiac Event  · 3 points: Class IV Risk, 15% Risk of Major Cardiac Event  · 4 points: Class IV Risk, 15% Risk of Major Cardiac Event  · 5 points: Class IV Risk, 15% Risk of Major Cardiac Event  · 6 points: Class IV Risk, 15% Risk of Major Cardiac Event    Lab Results   Component Value Date    CREATININE 1 79 (H) 09/07/2021       Current Outpatient Medications:     amLODIPine (NORVASC) 10 mg tablet, TAKE 1 TABLET DAILY, Disp: 90 tablet, Rfl: 3    atorvastatin (LIPITOR) 40 mg tablet, Take 1 tablet (40 mg total) by mouth daily at bedtime, Disp: 90 tablet, Rfl: 2    azaTHIOprine (IMURAN) 50 mg tablet, Take 1 tablet (50 mg total) by mouth daily, Disp: 90 tablet, Rfl: 3    Blood Pressure Monitor KIT, Use once a week, Disp: 1 kit, Rfl: 0    Cholecalciferol (VITAMIN D3) 1000 units CAPS, Take 1 capsule by mouth daily, Disp: , Rfl:     Diclofenac Sodium (VOLTAREN) 1 %, Apply 2 g topically 4 (four) times a day, Disp: 50 g, Rfl: 0    fludrocortisone (FLORINEF) 0 1 mg tablet, , Disp: , Rfl:     hydrochlorothiazide (HYDRODIURIL) 12 5 mg tablet, Take 1 tablet (12 5 mg total) by mouth daily, Disp: 90 tablet, Rfl: 1    insulin degludec Osmani Carbone FlexTouch) 100 units/mL injection pen, INJECT 35 UNITS DAILY, Disp: 30 mL, Rfl: 3    insulin lispro (HumaLOG) 100 units/mL injection pen, Inject 6 Units under the skin daily before breakfast, Disp: 5 pen, Rfl: 2    Insulin Pen Needle (BD Pen Needle Montserrat U/F) 32G X 4 MM MISC, Use 4 (four) times a day, Disp: 100 each, Rfl: 3    labetalol (NORMODYNE) 100 mg tablet, TAKE 1 TABLET EVERY 12 HOURS, Disp: 180 tablet, Rfl: 3    lisinopril (ZESTRIL) 20 mg tablet, Take 1 tablet (20 mg total) by mouth daily, Disp: 90 tablet, Rfl: 0    Magnesium 400 MG CAPS, , Disp: , Rfl:     MAGNESIUM OXIDE 400 PO, Take 1 tablet by mouth 2 (two) times a day  , Disp: , Rfl:     Multiple Vitamin (MULTIVITAMIN ADULT PO), Take by mouth Daily, Disp: , Rfl:     Multiple Vitamins-Minerals (MULTIVITAMIN WITH MINERALS) tablet, Take 1 tablet by mouth daily  , Disp: , Rfl:     tacrolimus (PROGRAF) 1 mg capsule, Takes 4 tabs in am, 3 tabs in pm, Disp: , Rfl:     TRUE METRIX BLOOD GLUCOSE TEST test strip, Test twice daily, Disp: , Rfl:     Assessment/Plan:    Osteopenia  Pt had osteopenia diagnosed secondary to chronic steroid use  Will repeat DEXA scan    Type 2 diabetes mellitus with hyperglycemia, with long-term current use of insulin ([facility])    Lab Results   Component Value Date    HGBA1C 7 4 (A) 01/17/2022     Follows with Endocrinology  Currently on 35 units of Tresiba daily with mealtime insulin, continue current regimen  Lifestyle modification diet and exercise    Hypertension  Blood pressure 138/80, continue current regimen of amlodipine 10 milligrams daily, lisinopril 20 milligrams daily, hydrochlorothiazide 12 5 milligrams daily, labetalol 100 milligrams b i d  Repeat CMP  Advised to hold lisinopril on the day of surgery             Recommendations:  Helena Keller is undergoing an elective Moderate Risk surgery,offset V Bunionectomy  He is RCRI class II risk (1 points for IDDM) with 6% risk for major adverse cardiac event (MACE)   He may proceed with surgery as planned without further workup  Pre-operative form completed and faxed today to office as requested at Fax no  [phone]

## 2022-01-17 NOTE — ASSESSMENT & PLAN NOTE
Lab Results   Component Value Date    HGBA1C 7 4 (A) 01/17/2022     Follows with Endocrinology  Currently on 35 units of Tresiba daily with mealtime insulin, continue current regimen    Lifestyle modification diet and exercise

## 2022-01-17 NOTE — ASSESSMENT & PLAN NOTE
Blood pressure 138/80, continue current regimen of amlodipine 10 milligrams daily, lisinopril 20 milligrams daily, hydrochlorothiazide 12 5 milligrams daily, labetalol 100 milligrams b i d  Repeat CMP    Advised to hold lisinopril on the day of surgery

## 2022-01-17 NOTE — PROGRESS NOTES
[address]  1960    This preop evaluation is being done at the request of Dr Estevan Levyy is a 59 y o  male with right foot bunion who is planning to undergo bunionectomy under general by Dr Jessica Levy on 2/4/22  Patient has not  had complications with anesthesia in the past     ROS:   Chest pain: no   Shortness of breath: no  Shortness of breath with exertion: no  Orthopnea: no  Dizziness: no  Unexplained weight change: no    PMH:  CAD: no  HTN: yes  CKD: yes  DM: yes on insulin: yes  History of CVA: no     reports that he has never smoked  He has never used smokeless tobacco  He reports current alcohol use of about 1 0 standard drink of alcohol per week  He reports that he does not use drugs  /80 (BP Location: Left arm, Patient Position: Sitting, Cuff Size: Large)   Pulse 86   Temp 98 7 °F (37 1 °C) (Temporal)   Resp 18   Ht 5' 2" (1 575 m)   Wt 93 9 kg (207 lb)   SpO2 98%   BMI 37 86 kg/m²   Physical Exam  Vitals and nursing note reviewed  Constitutional:       Appearance: Normal appearance  He is well-developed  HENT:      Head: Normocephalic and atraumatic  Eyes:      Conjunctiva/sclera: Conjunctivae normal    Cardiovascular:      Rate and Rhythm: Normal rate and regular rhythm  Heart sounds: No murmur heard  Pulmonary:      Effort: Pulmonary effort is normal  No respiratory distress  Breath sounds: Normal breath sounds  Musculoskeletal:         General: Swelling (trace edema bilaterally) present  Cervical back: Neck supple  Skin:     General: Skin is warm and dry  Capillary Refill: Capillary refill takes less than 2 seconds  Neurological:      Mental Status: He is alert and oriented to person, place, and time           Revised Cardiac Risk Index (RCRI) for Pre-Operative Risk   (estimates risk of cardiac complications after noncardiac surgery)    · High-risk surgery:no  · Score 1: Intraperitoneal, intrathoracic, suprainguinal vascular  · History of ischemic heart disease: no  · Score 1: Hx of MI, (+) exercise test, current chest pain considered due to myocardial ischemia, use of nitrate therapy or ECG with pathological Q waves)  · History of CHF: no  · Score 1: Pulmonary edema, B/L rales or S3 gallop; HURD, orthopnea, PND, CXR showing pulmonary vascular redistribution)  · History of cerebrovascular disease: no  · Score 1: Prior TIA or stroke  · Pre-operative treatment with insulin: yes,         Score 1: for yes  · Pre-operative creatinine >2 mg/dL: no         Score 1: for yes    RCRI Scoring:  · 0 points: Class I Risk, 3 9% Risk of Major Cardiac Event  · 1 point: Class II Risk, 6 0% Risk of Major Cardiac Event  · 2 points: Class III Risk, 10 1% Risk of Major Cardiac Event  · 3 points: Class IV Risk, 15% Risk of Major Cardiac Event  · 4 points: Class IV Risk, 15% Risk of Major Cardiac Event  · 5 points: Class IV Risk, 15% Risk of Major Cardiac Event  · 6 points: Class IV Risk, 15% Risk of Major Cardiac Event    Lab Results   Component Value Date    CREATININE 1 79 (H) 09/07/2021       Current Outpatient Medications:     amLODIPine (NORVASC) 10 mg tablet, TAKE 1 TABLET DAILY, Disp: 90 tablet, Rfl: 3    atorvastatin (LIPITOR) 40 mg tablet, Take 1 tablet (40 mg total) by mouth daily at bedtime, Disp: 90 tablet, Rfl: 2    azaTHIOprine (IMURAN) 50 mg tablet, Take 1 tablet (50 mg total) by mouth daily, Disp: 90 tablet, Rfl: 3    Blood Pressure Monitor KIT, Use once a week, Disp: 1 kit, Rfl: 0    Cholecalciferol (VITAMIN D3) 1000 units CAPS, Take 1 capsule by mouth daily, Disp: , Rfl:     Diclofenac Sodium (VOLTAREN) 1 %, Apply 2 g topically 4 (four) times a day, Disp: 50 g, Rfl: 0    fludrocortisone (FLORINEF) 0 1 mg tablet, , Disp: , Rfl:     hydrochlorothiazide (HYDRODIURIL) 12 5 mg tablet, Take 1 tablet (12 5 mg total) by mouth daily, Disp: 90 tablet, Rfl: 1    insulin degludec Bibiana Rise FlexTouch) 100 units/mL injection pen, INJECT 35 UNITS DAILY, Disp: 30 mL, Rfl: 3    insulin lispro (HumaLOG) 100 units/mL injection pen, Inject 6 Units under the skin daily before breakfast, Disp: 5 pen, Rfl: 2    Insulin Pen Needle (BD Pen Needle Montserrat U/F) 32G X 4 MM MISC, Use 4 (four) times a day, Disp: 100 each, Rfl: 3    labetalol (NORMODYNE) 100 mg tablet, TAKE 1 TABLET EVERY 12 HOURS, Disp: 180 tablet, Rfl: 3    lisinopril (ZESTRIL) 20 mg tablet, Take 1 tablet (20 mg total) by mouth daily, Disp: 90 tablet, Rfl: 0    Magnesium 400 MG CAPS, , Disp: , Rfl:     MAGNESIUM OXIDE 400 PO, Take 1 tablet by mouth 2 (two) times a day  , Disp: , Rfl:     Multiple Vitamin (MULTIVITAMIN ADULT PO), Take by mouth Daily, Disp: , Rfl:     Multiple Vitamins-Minerals (MULTIVITAMIN WITH MINERALS) tablet, Take 1 tablet by mouth daily  , Disp: , Rfl:     tacrolimus (PROGRAF) 1 mg capsule, Takes 4 tabs in am, 3 tabs in pm, Disp: , Rfl:     TRUE METRIX BLOOD GLUCOSE TEST test strip, Test twice daily, Disp: , Rfl:     Assessment/Plan:    Osteopenia  Pt had osteopenia diagnosed secondary to chronic steroid use  Will repeat DEXA scan    Type 2 diabetes mellitus with hyperglycemia, with long-term current use of insulin ([facility])    Lab Results   Component Value Date    HGBA1C 7 4 (A) 01/17/2022     Follows with Endocrinology  Currently on 35 units of Tresiba daily with mealtime insulin, continue current regimen  Lifestyle modification diet and exercise    Hypertension  Blood pressure 138/80, continue current regimen of amlodipine 10 milligrams daily, lisinopril 20 milligrams daily, hydrochlorothiazide 12 5 milligrams daily, labetalol 100 milligrams b i d  Repeat CMP  Advised to hold lisinopril on the day of surgery             Recommendations:  Rhys Scheuermann is undergoing an elective Moderate Risk surgery,offset V Bunionectomy  He is RCRI class II risk (1 points for IDDM) with 6% risk for major adverse cardiac event (MACE)   He may proceed with surgery as planned without further workup  Pre-operative form completed and faxed today to office as requested at Fax no  [phone]

## 2022-01-18 ENCOUNTER — OFFICE VISIT (OUTPATIENT)
Dept: ENDOCRINOLOGY | Facility: CLINIC | Age: 62
End: 2022-01-18
Payer: COMMERCIAL

## 2022-01-18 VITALS
DIASTOLIC BLOOD PRESSURE: 90 MMHG | BODY MASS INDEX: 38.02 KG/M2 | SYSTOLIC BLOOD PRESSURE: 138 MMHG | HEIGHT: 62 IN | WEIGHT: 206.6 LBS | HEART RATE: 79 BPM

## 2022-01-18 DIAGNOSIS — E11.65 TYPE 2 DIABETES MELLITUS WITH HYPERGLYCEMIA, WITH LONG-TERM CURRENT USE OF INSULIN (HCC): Primary | ICD-10-CM

## 2022-01-18 DIAGNOSIS — I10 PRIMARY HYPERTENSION: ICD-10-CM

## 2022-01-18 DIAGNOSIS — E11.39 TYPE 2 DIABETES MELLITUS WITH OTHER OPHTHALMIC COMPLICATION, WITHOUT LONG-TERM CURRENT USE OF INSULIN (HCC): ICD-10-CM

## 2022-01-18 DIAGNOSIS — Z94.4 STATUS POST LIVER TRANSPLANT (HCC): ICD-10-CM

## 2022-01-18 DIAGNOSIS — E78.49 OTHER HYPERLIPIDEMIA: ICD-10-CM

## 2022-01-18 DIAGNOSIS — Z79.4 TYPE 2 DIABETES MELLITUS WITH HYPERGLYCEMIA, WITH LONG-TERM CURRENT USE OF INSULIN (HCC): Primary | ICD-10-CM

## 2022-01-18 PROCEDURE — 99214 OFFICE O/P EST MOD 30 MIN: CPT | Performed by: INTERNAL MEDICINE

## 2022-01-18 RX ORDER — INSULIN LISPRO 100 [IU]/ML
INJECTION, SOLUTION INTRAVENOUS; SUBCUTANEOUS
Qty: 15 ML | Refills: 1 | Status: SHIPPED | OUTPATIENT
Start: 2022-01-18

## 2022-01-18 NOTE — PATIENT INSTRUCTIONS
The day of surgery hold off on humalog - take 20 units of tresiba rather than 35         Hypoglycemia in a Person with Diabetes   WHAT YOU NEED TO KNOW:   Hypoglycemia is a serious condition that happens when your blood glucose (sugar) level drops too low  The blood sugar level is usually too high in a person with diabetes, but the level can also drop too low  It is important to follow your diabetes management plan to keep your blood sugar level steady  DISCHARGE INSTRUCTIONS:   You or someone close to you needs to call the local emergency number (911 in the [address] [facility]) if:   · You have a seizure or pass out  · Your blood sugar is less than 50 mg/dL and does not respond to treatment  · You feel you are going to pass out  · You have trouble thinking clearly  Call your diabetes care team if:   · You have had symptoms of low blood sugar several times  · You have questions about the amount of insulin or diabetes medicine you are taking  · You have questions or concerns about your condition or care  Medicines:   · Insulin or diabetes medicine  help to keep your blood sugar under control  · Glucagon  may be needed if you have severe hypoglycemia  · Take your medicine as directed  Contact your healthcare provider if you think your medicine is not helping or if you have side effects  Tell him or her if you are allergic to any medicine  Keep a list of the medicines, vitamins, and herbs you take  Include the amounts, and when and why you take them  Bring the list or the pill bottles to follow-up visits  Carry your medicine list with you in case of an emergency  Manage hypoglycemia:   · Check your blood sugar level right away if you have symptoms of hypoglycemia  Hypoglycemia usually happens when your blood sugar level is 70 mg/dL or below  Ask your diabetes care team what blood sugar level is too low for you      · If your blood sugar level is too low, eat or drink 15 grams of fast-acting carbohydrate  Examples of this amount of fast-acting carbohydrate are 4 ounces (½ cup) of fruit juice or 4 ounces of regular soda  Other examples are 2 tablespoons of raisins or 1 tube of glucose gel  Check your blood sugar level 15 minutes later  Sit still as you wait  If the level is still low (less than 100 mg/dL), have another 15 grams of carbohydrate  When the level returns to 100 mg/dL, eat a meal if it is time  If your meal time is more than 1 hour away, eat a snack  The snack should contain carbohydrates, such as the following:     ? 3/4 cup of cereal    ? 1 cup of skim or low fat milk    ? 6 soda crackers    ? 1/2 of a turkey sandwich    ? 15 fat-free chips  This will help prevent another drop in blood sugar  Always carefully follow your diabetes care team's instructions on how to treat low blood sugar levels  · Always carry a source of fast-acting carbohydrate  If you have symptoms of hypoglycemia and you do not have a blood glucose meter, have a source of fast-acting carbohydrate anyway  Avoid carbohydrate foods that are high in fat  The fat content may make the carbohydrate take longer to increase your blood sugar level  Ask your diabetes care team if you should carry a glucagon kit  Glucagon is a medicine that is injected when you develop severe hypoglycemia and become unconscious  Check the expiration date every month and replace it before it expires  · Teach others how to help you if you have symptoms of hypoglycemia  Tell them about the symptoms of hypoglycemia  Ask them to give you a source of fast-acting carbohydrate if you cannot get it yourself  Ask them to give you a glucagon injection if you have signs of hypoglycemia and you become unconscious or have a seizure  Ask them to call the local emergency number (911 in the [address] [place] [address],[address])   This is an emergency  Tell them never to try to make you swallow anything if you faint or have a seizure      · Wear medical alert jewelry  or carry a card that says you have diabetes  Ask where to get these items  Prevent hypoglycemia:   · Take diabetes medicine as directed  Take your medicine at the right time and in the right amount  Do not  double the amount of medicine you take unless instructed by your diabetes care team      · Eat regular meals and snacks  Talk to your dietitian or diabetes care team about a meal plan that is right for you  Do not skip meals  · Check your blood sugar level as directed  Ask your diabetes care team what your blood sugar levels should be before and after you eat  Ask when and how often to check your blood sugar level  You may need to check at least 3 times each day  Record your blood sugar level results and take the record with you when you see your care team  Changes may need to be made to your medicine, food, or exercise schedules using the record  · Check your blood sugar level before you exercise  Physical activity, such as exercise, can decrease your blood sugar level  If your blood sugar level is less than 100 mg/dL, have a carbohydrate snack  Examples are 4 to 6 crackers, ½ banana, 8 ounces (1 cup) of nonfat or 1% milk, or 4 ounces (½ cup) of juice  If you will be active for more than 1 hour, you may need to check your blood sugar level every 30 minutes  Your diabetes care team may also recommend that you check your blood sugar level after your activity  · Know the risks if you choose to drink alcohol  Alcohol can cause your blood sugar levels to be low if you use insulin  Alcohol can cause high blood sugar levels and weight gain if you drink too much  Women 21 years or older and men 72 years or older should limit alcohol to 1 drink a day  Men aged 24 to 59 years should limit alcohol to 2 drinks a day  A drink of alcohol is 12 ounces of beer, 5 ounces of wine, or 1½ ounces of liquor  Follow up with your diabetes care team or specialist as directed:   You may need your insulin or diabetes medicine changed if you continue to have hypoglycemia episodes  Write down your questions so you remember to ask them during your visits  © Copyright [employer] 2021 Information is for End User's use only and may not be sold, redistributed or otherwise used for commercial purposes  All illustrations and images included in CareNotes® are the copyrighted property of [employer]  or Vivek Rivas  The above information is an  only  It is not intended as medical advice for individual conditions or treatments  Talk to your doctor, nurse or pharmacist before following any medical regimen to see if it is safe and effective for you

## 2022-01-18 NOTE — PROGRESS NOTES
Chasedayne Martínez 59 y o  male MRN: [id number]    Encounter: [id number]      Assessment/Plan     Problem List Items Addressed This Visit        Digestive    Status post liver transplant       Endocrine    Type 2 diabetes mellitus with hyperglycemia, with long-term current use of insulin ([facility] ) - Primary       Lab Results   Component Value Date    HGBA1C 7 4 (A) 01/17/2022   A1C slightly above goal, continue current regimen and watch diet         Relevant Medications    insulin lispro (HumaLOG) 100 units/mL injection pen    Other Relevant Orders    Comprehensive metabolic panel Lab Collect    HEMOGLOBIN A1C W/ EAG ESTIMATION Lab Collect    T4, free Lab Collect    TSH, 3rd generation Lab Collect    Type 2 diabetes mellitus with ophthalmic complication, without long-term current use of insulin ([facility])       Lab Results   Component Value Date    HGBA1C 7 4 (A) 01/17/2022   continue f/u with optho         Relevant Medications    insulin lispro (HumaLOG) 100 units/mL injection pen    Other Relevant Orders    Comprehensive metabolic panel Lab Collect    Microalbumin / creatinine urine ratio Lab Collect       Cardiovascular and Mediastinum    Hypertension    Relevant Orders    Comprehensive metabolic panel Lab Collect       Other    Hyperlipidemia    Relevant Orders    Lipid Panel with Direct LDL reflex Lab Collect        CC: Diabetes    History of Present Illness     HPI:  58 y/o male with type 2 DM on insulin therapy seen in f/u  he will undergoing   Bunion surgery next month     Current regimen   tresiba 35 units morning   humalog 6 units before breakfast      checks f s 1-3/day   Sugars a little higher over the holidays   Otherwise 90-120s   No hypoglycemia     No polyuria , polydpsia , no blurry vision , no numbness and tingling in feet  Weight stable     Review of Systems    Historical Information   Past Medical History:   Diagnosis Date    Chronic kidney disease, stage III (moderate) ([facility] )     RESOLVED: [id number]    Cirrhosis ([facility] )     Diabetes mellitus ([facility] )     Hepatic encephalopathy ([facility] )     Hepatitis C     Hyperkalemia     Hyperlipidemia     Hypertension     Pancytopenia ([facility] )     Status post liver transplant ([facility] )     Umbilical hernia      Past Surgical History:   Procedure Laterality Date    ANKLE SURGERY      CHOLECYSTECTOMY      GALLBLADDER SURGERY      LIVER TRANSPLANTATION      LAST ASSESSED: [id number]    HI COLONOSCOPY FLX DX W/COLLJ SPEC WHEN PFRMD N/A 2/16/2017    Procedure: COLONOSCOPY;  Surgeon: Maximino Donohue MD;  Location: [facility];   Service: Gastroenterology    TONSILLECTOMY       Social History   Social History     Substance and Sexual Activity   Alcohol Use Yes    Alcohol/week: 1 0 standard drink    Types: 1 Cans of beer per week    Comment: occassionally     Social History     Substance and Sexual Activity   Drug Use No     Social History     Tobacco Use   Smoking Status Never Smoker   Smokeless Tobacco Never Used     Family History:   Family History   Problem Relation Age of Onset    Diabetes Mother         TYPE 2    Hypertension Mother     Stroke Mother 68        SYNDROME     Hepatitis Brother         C    Cirrhosis Brother         LIVER     Cancer Family        Meds/Allergies   Current Outpatient Medications   Medication Sig Dispense Refill    amLODIPine (NORVASC) 10 mg tablet TAKE 1 TABLET DAILY 90 tablet 3    atorvastatin (LIPITOR) 40 mg tablet Take 1 tablet (40 mg total) by mouth daily at bedtime 90 tablet 2    azaTHIOprine (IMURAN) 50 mg tablet Take 1 tablet (50 mg total) by mouth daily 90 tablet 3    Blood Pressure Monitor KIT Use once a week 1 kit 0    Cholecalciferol (VITAMIN D3) 1000 units CAPS Take 1 capsule by mouth daily      Diclofenac Sodium (VOLTAREN) 1 % Apply 2 g topically 4 (four) times a day 50 g 0    fludrocortisone (FLORINEF) 0 1 mg tablet       hydrochlorothiazide (HYDRODIURIL) 12 5 mg tablet Take 1 tablet (12 5 mg total) by mouth daily 90 tablet 1    insulin degludec Stacey La FlexTouch) 100 units/mL injection pen INJECT 35 UNITS DAILY 30 mL 3    insulin lispro (HumaLOG) 100 units/mL injection pen 6 units before breakfast 15 mL 1    Insulin Pen Needle (BD Pen Needle Montserrat U/F) 32G X 4 MM MISC Use 4 (four) times a day 100 each 3    labetalol (NORMODYNE) 100 mg tablet TAKE 1 TABLET EVERY 12 HOURS 180 tablet 3    lisinopril (ZESTRIL) 20 mg tablet Take 1 tablet (20 mg total) by mouth daily 90 tablet 0    Magnesium 400 MG CAPS       Multiple Vitamin (MULTIVITAMIN ADULT PO) Take by mouth Daily      Multiple Vitamins-Minerals (MULTIVITAMIN WITH MINERALS) tablet Take 1 tablet by mouth daily        tacrolimus (PROGRAF) 1 mg capsule Takes 4 tabs in am, 3 tabs in pm      TRUE METRIX BLOOD GLUCOSE TEST test strip Test twice daily      MAGNESIUM OXIDE 400 PO Take 1 tablet by mouth 2 (two) times a day   (Patient not taking: Reported on 1/18/2022 )       No current facility-administered medications for this visit  No Known Allergies    Objective   Vitals: Blood pressure 138/90, pulse 79, height 5' 2" (1 575 m), weight 93 7 kg (206 lb 9 6 oz)  Physical Exam  Vitals reviewed  Constitutional:       Appearance: Normal appearance  He is obese  He is not ill-appearing or diaphoretic  HENT:      Head: Normocephalic and atraumatic  Eyes:      General: No scleral icterus  Extraocular Movements: Extraocular movements intact  Cardiovascular:      Rate and Rhythm: Normal rate and regular rhythm  Heart sounds: Normal heart sounds  No murmur heard  Pulmonary:      Effort: Pulmonary effort is normal  No respiratory distress  Breath sounds: Normal breath sounds  No wheezing  Abdominal:      General: There is no distension  Palpations: Abdomen is soft  Tenderness: There is no abdominal tenderness  Musculoskeletal:      Cervical back: Neck supple  Right lower leg: No edema  Left lower leg: No edema  Lymphadenopathy:      Cervical: No cervical adenopathy  Skin:     General: Skin is warm and dry  Neurological:      General: No focal deficit present  Mental Status: He is alert and oriented to person, place, and time  Psychiatric:         Mood and Affect: Mood normal          Behavior: Behavior normal          Thought Content: Thought content normal          Judgment: Judgment normal          The history was obtained from the review of the chart, patient      Lab Results:   Lab Results   Component Value Date/Time    Hemoglobin A1C 7 4 (A) 01/17/2022 09:34 AM    Hemoglobin A1C 6 8 (A) 09/17/2021 09:51 AM    Hemoglobin A1C 6 9 (A) 04/15/2021 09:28 AM    WBC 4 89 09/07/2021 09:22 AM    WBC 4 86 07/16/2021 10:01 AM    WBC 4 70 02/27/2021 10:58 AM    Hemoglobin 11 9 (L) 09/07/2021 09:22 AM    Hemoglobin 11 5 (L) 07/16/2021 10:01 AM    Hemoglobin 12 2 02/27/2021 10:58 AM    Hematocrit 36 3 (L) 09/07/2021 09:22 AM    Hematocrit 35 3 (L) 07/16/2021 10:01 AM    Hematocrit 37 7 02/27/2021 10:58 AM    MCV 92 09/07/2021 09:22 AM    MCV 90 07/16/2021 10:01 AM    MCV 90 02/27/2021 10:58 AM    Platelets 501 (L) 36/45/0168 09:22 AM    Platelets 581 (L) 42/66/9510 10:01 AM    Platelets 001 75/74/9788 10:58 AM    BUN 33 (H) 09/07/2021 09:22 AM    BUN 43 (H) 07/16/2021 09:47 AM    BUN 39 (H) 02/27/2021 10:58 AM    Potassium 5 0 09/07/2021 09:22 AM    Potassium 5 2 07/16/2021 09:47 AM    Potassium 5 0 02/27/2021 10:58 AM    Chloride 108 09/07/2021 09:22 AM    Chloride 107 07/16/2021 09:47 AM    Chloride 105 02/27/2021 10:58 AM    CO2 26 09/07/2021 09:22 AM    CO2 26 07/16/2021 09:47 AM    CO2 29 02/27/2021 10:58 AM    Creatinine 1 79 (H) 09/07/2021 09:22 AM    Creatinine 1 71 (H) 07/16/2021 09:47 AM    Creatinine 1 61 (H) 02/27/2021 10:58 AM    AST 9 09/07/2021 09:22 AM    AST 14 07/16/2021 09:47 AM    AST 17 02/27/2021 10:58 AM    ALT 31 09/07/2021 09:22 AM    ALT 38 07/16/2021 09:47 AM    ALT 32 02/27/2021 10:58 AM Albumin 4 0 09/07/2021 09:22 AM    Albumin 4 1 07/16/2021 09:47 AM    Albumin 4 2 02/27/2021 10:58 AM    HDL, Direct 35 (L) 07/16/2021 09:47 AM    Triglycerides 413 (H) 07/16/2021 09:47 AM           Imaging Studies:     Portions of the record may have been created with voice recognition software  Occasional wrong word or "sound a like" substitutions may have occurred due to the inherent limitations of voice recognition software  Read the chart carefully and recognize, using context, where substitutions have occurred

## 2022-01-23 NOTE — ASSESSMENT & PLAN NOTE
Lab Results   Component Value Date    HGBA1C 7 4 (A) 01/17/2022   A1C slightly above goal, continue current regimen and watch diet

## 2022-01-24 ENCOUNTER — OFFICE VISIT (OUTPATIENT)
Dept: PODIATRY | Facility: CLINIC | Age: 62
End: 2022-01-24
Payer: COMMERCIAL

## 2022-01-24 ENCOUNTER — TELEPHONE (OUTPATIENT)
Dept: ENDOCRINOLOGY | Facility: CLINIC | Age: 62
End: 2022-01-24

## 2022-01-24 VITALS
HEART RATE: 94 BPM | SYSTOLIC BLOOD PRESSURE: 178 MMHG | WEIGHT: 206 LBS | DIASTOLIC BLOOD PRESSURE: 80 MMHG | HEIGHT: 62 IN | BODY MASS INDEX: 37.91 KG/M2

## 2022-01-24 DIAGNOSIS — Z79.4 CONTROLLED TYPE 2 DIABETES MELLITUS WITHOUT COMPLICATION, WITH LONG-TERM CURRENT USE OF INSULIN (HCC): ICD-10-CM

## 2022-01-24 DIAGNOSIS — E11.9 CONTROLLED TYPE 2 DIABETES MELLITUS WITHOUT COMPLICATION, WITH LONG-TERM CURRENT USE OF INSULIN (HCC): ICD-10-CM

## 2022-01-24 DIAGNOSIS — M20.11 HALLUX VALGUS OF RIGHT FOOT: Primary | ICD-10-CM

## 2022-01-24 PROCEDURE — 3077F SYST BP >= 140 MM HG: CPT | Performed by: PODIATRIST

## 2022-01-24 PROCEDURE — 3008F BODY MASS INDEX DOCD: CPT | Performed by: PODIATRIST

## 2022-01-24 PROCEDURE — 3079F DIAST BP 80-89 MM HG: CPT | Performed by: PODIATRIST

## 2022-01-24 PROCEDURE — 99213 OFFICE O/P EST LOW 20 MIN: CPT | Performed by: PODIATRIST

## 2022-01-24 PROCEDURE — 1036F TOBACCO NON-USER: CPT | Performed by: PODIATRIST

## 2022-01-24 PROCEDURE — 3008F BODY MASS INDEX DOCD: CPT | Performed by: FAMILY MEDICINE

## 2022-01-24 NOTE — PROGRESS NOTES
Assessment/Plan:    Procedure, offset V bunionectomy right foot explained to patient including pre and postoperative course, risks and complications  Consent form was signed  Patient to utilize Percocet for pain relief  No problem-specific Assessment & Plan notes found for this encounter  There are no diagnoses linked to this encounter  Subjective:      Patient ID: Annabelle Moran is a 59 y o  male  HPI     Patient, a 25-year-old type 2 diabetic presents for consent signing  He is scheduled for an offset V bunionectomy at [facility] on February 4th  Patient has pain  secondary to the bunion deformity and shoe pressure  I personally reviewed A1c dated 01/17/2022  It was 7 4  I personally reviewed A1c dated 09/17/2021  It was 6 8  I personally reviewed a preop clearance visit scheduled on January 17, 2022  Patient was cleared for surgery  It is noted that patient had liver transplant in the past and is diabetic  The following portions of the patient's history were reviewed and updated as appropriate: allergies, current medications, past family history, past medical history, past social history, past surgical history and problem list     Review of Systems   Genitourinary:        Status post liver transplant ; chronic renal disease   Neurological: Negative for numbness  Psychiatric/Behavioral: Negative  Objective:      BP (!) 178/80   Pulse 94   Ht 5' 2" (1 575 m)   Wt 93 4 kg (206 lb)   BMI 37 68 kg/m²          Physical Exam  Constitutional:       Appearance: Normal appearance  Cardiovascular:      Pulses: Normal pulses  Musculoskeletal:         General: Deformity present  Comments: Hallux valgus deformity right foot  Inflamed medial eminence 1st metatarsal head right foot  Skin:     General: Skin is warm  Neurological:      General: No focal deficit present  Mental Status: He is oriented to person, place, and time

## 2022-01-25 ENCOUNTER — APPOINTMENT (OUTPATIENT)
Dept: LAB | Facility: CLINIC | Age: 62
End: 2022-01-25
Payer: COMMERCIAL

## 2022-01-25 ENCOUNTER — OFFICE VISIT (OUTPATIENT)
Dept: LAB | Facility: CLINIC | Age: 62
End: 2022-01-25
Payer: COMMERCIAL

## 2022-01-25 DIAGNOSIS — Z01.818 PRE-OP TESTING: ICD-10-CM

## 2022-01-25 DIAGNOSIS — M20.11 HALLUX VALGUS OF RIGHT FOOT: ICD-10-CM

## 2022-01-25 DIAGNOSIS — Z94.4 LIVER REPLACED BY TRANSPLANT (HCC): ICD-10-CM

## 2022-01-25 LAB
ALBUMIN SERPL BCP-MCNC: 4 G/DL (ref 3.5–5)
ALP SERPL-CCNC: 109 U/L (ref 46–116)
ALT SERPL W P-5'-P-CCNC: 35 U/L (ref 12–78)
ANION GAP SERPL CALCULATED.3IONS-SCNC: 8 MMOL/L (ref 4–13)
AST SERPL W P-5'-P-CCNC: 18 U/L (ref 5–45)
ATRIAL RATE: 78 BPM
BASOPHILS # BLD AUTO: 0.02 THOUSANDS/ΜL (ref 0–0.1)
BASOPHILS NFR BLD AUTO: 0 % (ref 0–1)
BILIRUB SERPL-MCNC: 0.42 MG/DL (ref 0.2–1)
BUN SERPL-MCNC: 36 MG/DL (ref 5–25)
CALCIUM SERPL-MCNC: 9.4 MG/DL (ref 8.3–10.1)
CHLORIDE SERPL-SCNC: 105 MMOL/L (ref 100–108)
CO2 SERPL-SCNC: 27 MMOL/L (ref 21–32)
CREAT SERPL-MCNC: 1.73 MG/DL (ref 0.6–1.3)
EOSINOPHIL # BLD AUTO: 0.15 THOUSAND/ΜL (ref 0–0.61)
EOSINOPHIL NFR BLD AUTO: 3 % (ref 0–6)
ERYTHROCYTE [DISTWIDTH] IN BLOOD BY AUTOMATED COUNT: 14.6 % (ref 11.6–15.1)
GFR SERPL CREATININE-BSD FRML MDRD: 41 ML/MIN/1.73SQ M
GGT SERPL-CCNC: 57 U/L (ref 5–85)
GLUCOSE P FAST SERPL-MCNC: 120 MG/DL (ref 65–99)
HCT VFR BLD AUTO: 36.9 % (ref 36.5–49.3)
HGB BLD-MCNC: 11.8 G/DL (ref 12–17)
IMM GRANULOCYTES # BLD AUTO: 0.02 THOUSAND/UL (ref 0–0.2)
IMM GRANULOCYTES NFR BLD AUTO: 0 % (ref 0–2)
INR PPP: 1.06 (ref 0.84–1.19)
LYMPHOCYTES # BLD AUTO: 0.79 THOUSANDS/ΜL (ref 0.6–4.47)
LYMPHOCYTES NFR BLD AUTO: 15 % (ref 14–44)
MAGNESIUM SERPL-MCNC: 1.7 MG/DL (ref 1.6–2.6)
MCH RBC QN AUTO: 28.9 PG (ref 26.8–34.3)
MCHC RBC AUTO-ENTMCNC: 32 G/DL (ref 31.4–37.4)
MCV RBC AUTO: 90 FL (ref 82–98)
MONOCYTES # BLD AUTO: 0.49 THOUSAND/ΜL (ref 0.17–1.22)
MONOCYTES NFR BLD AUTO: 9 % (ref 4–12)
NEUTROPHILS # BLD AUTO: 3.81 THOUSANDS/ΜL (ref 1.85–7.62)
NEUTS SEG NFR BLD AUTO: 73 % (ref 43–75)
NRBC BLD AUTO-RTO: 0 /100 WBCS
P AXIS: 75 DEGREES
PLATELET # BLD AUTO: 163 THOUSANDS/UL (ref 149–390)
PMV BLD AUTO: 12 FL (ref 8.9–12.7)
POTASSIUM SERPL-SCNC: 4.8 MMOL/L (ref 3.5–5.3)
PR INTERVAL: 142 MS
PROT SERPL-MCNC: 7.3 G/DL (ref 6.4–8.2)
PROTHROMBIN TIME: 13.8 SECONDS (ref 11.6–14.5)
QRS AXIS: 41 DEGREES
QRSD INTERVAL: 78 MS
QT INTERVAL: 414 MS
QTC INTERVAL: 471 MS
RBC # BLD AUTO: 4.09 MILLION/UL (ref 3.88–5.62)
SODIUM SERPL-SCNC: 140 MMOL/L (ref 136–145)
T WAVE AXIS: 32 DEGREES
TACROLIMUS BLD-MCNC: 4.2 NG/ML (ref 2–20)
VENTRICULAR RATE: 78 BPM
WBC # BLD AUTO: 5.28 THOUSAND/UL (ref 4.31–10.16)

## 2022-01-25 PROCEDURE — 93010 ELECTROCARDIOGRAM REPORT: CPT | Performed by: INTERNAL MEDICINE

## 2022-01-25 PROCEDURE — 82977 ASSAY OF GGT: CPT

## 2022-01-25 PROCEDURE — 80053 COMPREHEN METABOLIC PANEL: CPT

## 2022-01-25 PROCEDURE — 85610 PROTHROMBIN TIME: CPT

## 2022-01-25 PROCEDURE — 80197 ASSAY OF TACROLIMUS: CPT

## 2022-01-25 PROCEDURE — 36415 COLL VENOUS BLD VENIPUNCTURE: CPT

## 2022-01-25 PROCEDURE — 93005 ELECTROCARDIOGRAM TRACING: CPT

## 2022-01-25 PROCEDURE — 83036 HEMOGLOBIN GLYCOSYLATED A1C: CPT

## 2022-01-25 PROCEDURE — 85025 COMPLETE CBC W/AUTO DIFF WBC: CPT

## 2022-01-25 PROCEDURE — 83735 ASSAY OF MAGNESIUM: CPT

## 2022-01-26 LAB
EST. AVERAGE GLUCOSE BLD GHB EST-MCNC: 166 MG/DL
HBA1C MFR BLD: 7.4 %

## 2022-01-26 PROCEDURE — 3051F HG A1C>EQUAL 7.0%<8.0%: CPT | Performed by: PODIATRIST

## 2022-01-26 PROCEDURE — 3051F HG A1C>EQUAL 7.0%<8.0%: CPT | Performed by: FAMILY MEDICINE

## 2022-01-31 ENCOUNTER — ANESTHESIA EVENT (OUTPATIENT)
Dept: PERIOP | Facility: HOSPITAL | Age: 62
End: 2022-01-31
Payer: COMMERCIAL

## 2022-01-31 NOTE — PRE-PROCEDURE INSTRUCTIONS
Pre-Surgery Instructions:   Medication Instructions    amLODIPine (NORVASC) 10 mg tablet Instructed patient per Anesthesia Guidelines   atorvastatin (LIPITOR) 40 mg tablet Instructed patient per Anesthesia Guidelines   azaTHIOprine (IMURAN) 50 mg tablet Instructed patient per Anesthesia Guidelines   Cholecalciferol (VITAMIN D3) 1000 units CAPS Instructed patient per Anesthesia Guidelines   Diclofenac Sodium (VOLTAREN) 1 % Instructed patient per Anesthesia Guidelines   fludrocortisone (FLORINEF) 0 1 mg tablet Instructed patient per Anesthesia Guidelines   hydrochlorothiazide (HYDRODIURIL) 12 5 mg tablet Instructed patient per Anesthesia Guidelines   insulin degludec Doretha Rafael FlexTouch) 100 units/mL injection pen Instructed patient per Anesthesia Guidelines   insulin lispro (HumaLOG) 100 units/mL injection pen Instructed patient per Anesthesia Guidelines   labetalol (NORMODYNE) 100 mg tablet Instructed patient per Anesthesia Guidelines   lisinopril (ZESTRIL) 20 mg tablet Instructed patient per Anesthesia Guidelines   MAGNESIUM OXIDE 400 PO Instructed patient per Anesthesia Guidelines   Multiple Vitamin (MULTIVITAMIN ADULT PO) Instructed patient per Anesthesia Guidelines   tacrolimus (PROGRAF) 1 mg capsule Instructed patient per Anesthesia Guidelines  Instructed to take amlodipine/ labetolol/ florinef am of surgery with sip of water per anesthesia guidelines  Pt does not want to take anti-rejection meds am of surgery as needs to take with food he states  will take when gets home  He was instructed per endocrinologist to take only 10-12 units tresiba insulin  am of surgery

## 2022-02-03 NOTE — ANESTHESIA PREPROCEDURE EVALUATION
Procedure:  BUNIONECTOMY TENZIN (Right Foot)    Relevant Problems   ANESTHESIA (within normal limits)      CARDIO   (+) Hyperlipidemia   (+) Hypertension      ENDO   (+) Type 2 diabetes mellitus with hyperglycemia, with long-term current use of insulin (HCC)   (+) Type 2 diabetes mellitus with ophthalmic complication, without long-term current use of insulin (HCC)      GI/HEPATIC   (+) Cirrhosis (s/p liver transplant for hep C 2014)      /RENAL   (+) CKD (chronic kidney disease) (stable )      HEMATOLOGY (within normal limits)      MUSCULOSKELETAL   (+) Gout      NEURO/PSYCH (within normal limits)      PULMONARY   (-) Asthma   (-) Sleep apnea   (-) Smoking      Other   (+) Bunion of great toe of right foot   (+) Encephalopathy, portal systemic (HCC)   (+) Immunosuppression (HCC)   (+) Status post liver transplant        Physical Exam    Airway  Comment: Short neck but denies snoring or sleep apnea  Mallampati score: II  TM Distance: >3 FB  Neck ROM: full     Dental       Cardiovascular  Cardiovascular exam normal    Pulmonary  Pulmonary exam normal     Other Findings        Anesthesia Plan  ASA Score- 3     Anesthesia Type- general with ASA Monitors  Additional Monitors:   Airway Plan: LMA  Plan Factors-Exercise tolerance (METS): >4 METS  Chart reviewed  EKG reviewed  Existing labs reviewed  Patient is not a current smoker  Patient not instructed to abstain from smoking on day of procedure  Patient did not smoke on day of surgery  Induction- intravenous  Postoperative Plan- Plan for postoperative opioid use  Informed Consent- Anesthetic plan and risks discussed with patient  I personally reviewed this patient with the CRNA  Discussed and agreed on the Anesthesia Plan with the CRNA             Lab Results   Component Value Date    HGBA1C 7 4 (H) 01/25/2022       Lab Results   Component Value Date     01/08/2016    K 4 8 01/25/2022     01/25/2022    CO2 27 01/25/2022 ANIONGAP 11 01/08/2016    BUN 36 (H) 01/25/2022    CREATININE 1 73 (H) 01/25/2022    GLUCOSE 146 (H) 01/08/2016    GLUF 120 (H) 01/25/2022    CALCIUM 9 4 01/25/2022    AST 18 01/25/2022    ALT 35 01/25/2022    ALKPHOS 109 01/25/2022    PROT 7 3 01/08/2016    BILITOT 0 29 01/08/2016    EGFR 41 01/25/2022       Lab Results   Component Value Date    WBC 5 28 01/25/2022    HGB 11 8 (L) 01/25/2022    HCT 36 9 01/25/2022    MCV 90 01/25/2022     01/25/2022    Sinus rhythm with Premature supraventricular complexes  When compared with ECG of 03-MAR-2015 16:36,  Premature supraventricular complexes are now Present  Non-specific change in ST segment in Anterior leads  Confirmed by Brinda Bentley ([id number]) on 1/25/2022 9:40:39 AM    Echo 2014 STRESS 2D ECHO RESULTS:   BASELINE: There were no regional wall motion abnormalities  There was   pseudodyskinesis of the basal inferior wall due to subdiaphragmatic   compression, likely due to ascites in this patient  Left ventricular size was   normal  Overall left ventricular systolic function was normal  Estimated left   ventricular ejection fraction was 60 %   LOW STRESS: There was an appropriate reduction in left ventricular size  There   was an appropriate augmentation in LV function  PEAK STRESS: There was an appropriate reduction in left ventricular size  There   was an appropriate augmentation in LV function  RECOVERY: LV size returned to baseline  LV systolic function returned to   baseline  OTHER ECHO FINDINGS: Aortic sclerosis with calcification was seen with no   hemodynamically significant stenosis  ECHO CONCLUSIONS: There was no echocardiographic evidence for stress-induced   ischemia  The image quality was good       pcp and endo clearance noted

## 2022-02-04 ENCOUNTER — HOSPITAL ENCOUNTER (OUTPATIENT)
Facility: HOSPITAL | Age: 62
Setting detail: OUTPATIENT SURGERY
Discharge: HOME | End: 2022-02-04
Attending: PODIATRIST | Admitting: PODIATRIST
Payer: COMMERCIAL

## 2022-02-04 ENCOUNTER — ANESTHESIA (OUTPATIENT)
Dept: PERIOP | Facility: HOSPITAL | Age: 62
End: 2022-02-04
Payer: COMMERCIAL

## 2022-02-04 ENCOUNTER — APPOINTMENT (OUTPATIENT)
Dept: RADIOLOGY | Facility: HOSPITAL | Age: 62
End: 2022-02-04
Payer: COMMERCIAL

## 2022-02-04 VITALS
BODY MASS INDEX: 36.8 KG/M2 | HEART RATE: 81 BPM | TEMPERATURE: 96.9 F | DIASTOLIC BLOOD PRESSURE: 83 MMHG | HEIGHT: 62 IN | RESPIRATION RATE: 18 BRPM | OXYGEN SATURATION: 93 % | WEIGHT: 200 LBS | SYSTOLIC BLOOD PRESSURE: 156 MMHG

## 2022-02-04 DIAGNOSIS — G89.18 POST-OP PAIN: Primary | ICD-10-CM

## 2022-02-04 LAB
GLUCOSE SERPL-MCNC: 160 MG/DL (ref 65–140)
GLUCOSE SERPL-MCNC: 175 MG/DL (ref 65–140)

## 2022-02-04 PROCEDURE — C1769 GUIDE WIRE: HCPCS | Performed by: PODIATRIST

## 2022-02-04 PROCEDURE — 28296 COR HLX VLGS DSTL MTAR OSTEO: CPT | Performed by: PODIATRIST

## 2022-02-04 PROCEDURE — 82948 REAGENT STRIP/BLOOD GLUCOSE: CPT

## 2022-02-04 PROCEDURE — C1713 ANCHOR/SCREW BN/BN,TIS/BN: HCPCS | Performed by: PODIATRIST

## 2022-02-04 PROCEDURE — NC001 PR NO CHARGE: Performed by: PODIATRIST

## 2022-02-04 PROCEDURE — 73630 X-RAY EXAM OF FOOT: CPT

## 2022-02-04 DEVICE — SCREW COMP 2.5 X 16MM MICRO FT: Type: IMPLANTABLE DEVICE | Site: FOOT | Status: FUNCTIONAL

## 2022-02-04 RX ORDER — FENTANYL CITRATE 50 UG/ML
INJECTION, SOLUTION INTRAMUSCULAR; INTRAVENOUS AS NEEDED
Status: DISCONTINUED | OUTPATIENT
Start: 2022-02-04 | End: 2022-02-04

## 2022-02-04 RX ORDER — BUPIVACAINE HYDROCHLORIDE 5 MG/ML
INJECTION, SOLUTION PERINEURAL AS NEEDED
Status: DISCONTINUED | OUTPATIENT
Start: 2022-02-04 | End: 2022-02-04 | Stop reason: HOSPADM

## 2022-02-04 RX ORDER — SODIUM CHLORIDE, SODIUM LACTATE, POTASSIUM CHLORIDE, CALCIUM CHLORIDE 600; 310; 30; 20 MG/100ML; MG/100ML; MG/100ML; MG/100ML
50 INJECTION, SOLUTION INTRAVENOUS CONTINUOUS
Status: DISCONTINUED | OUTPATIENT
Start: 2022-02-04 | End: 2022-02-04 | Stop reason: HOSPADM

## 2022-02-04 RX ORDER — OXYCODONE HYDROCHLORIDE AND ACETAMINOPHEN 5; 325 MG/1; MG/1
1 TABLET ORAL EVERY 4 HOURS PRN
Qty: 20 TABLET | Refills: 0 | Status: SHIPPED | OUTPATIENT
Start: 2022-02-04 | End: 2022-02-14

## 2022-02-04 RX ORDER — CHLORHEXIDINE GLUCONATE 0.12 MG/ML
15 RINSE ORAL ONCE
Status: COMPLETED | OUTPATIENT
Start: 2022-02-04 | End: 2022-02-04

## 2022-02-04 RX ORDER — CEFAZOLIN SODIUM 2 G/50ML
2000 SOLUTION INTRAVENOUS ONCE
Status: DISCONTINUED | OUTPATIENT
Start: 2022-02-04 | End: 2022-02-04 | Stop reason: HOSPADM

## 2022-02-04 RX ORDER — FENTANYL CITRATE/PF 50 MCG/ML
50 SYRINGE (ML) INJECTION
Status: DISCONTINUED | OUTPATIENT
Start: 2022-02-04 | End: 2022-02-04 | Stop reason: HOSPADM

## 2022-02-04 RX ORDER — DEXAMETHASONE SODIUM PHOSPHATE 4 MG/ML
INJECTION, SOLUTION INTRA-ARTICULAR; INTRALESIONAL; INTRAMUSCULAR; INTRAVENOUS; SOFT TISSUE AS NEEDED
Status: DISCONTINUED | OUTPATIENT
Start: 2022-02-04 | End: 2022-02-04

## 2022-02-04 RX ORDER — CEFAZOLIN SODIUM 2 G/50ML
SOLUTION INTRAVENOUS AS NEEDED
Status: DISCONTINUED | OUTPATIENT
Start: 2022-02-04 | End: 2022-02-04

## 2022-02-04 RX ORDER — PROPOFOL 10 MG/ML
INJECTION, EMULSION INTRAVENOUS AS NEEDED
Status: DISCONTINUED | OUTPATIENT
Start: 2022-02-04 | End: 2022-02-04

## 2022-02-04 RX ORDER — MAGNESIUM HYDROXIDE 1200 MG/15ML
LIQUID ORAL AS NEEDED
Status: DISCONTINUED | OUTPATIENT
Start: 2022-02-04 | End: 2022-02-04 | Stop reason: HOSPADM

## 2022-02-04 RX ORDER — OXYCODONE HYDROCHLORIDE 5 MG/1
5 TABLET ORAL EVERY 4 HOURS PRN
Status: DISCONTINUED | OUTPATIENT
Start: 2022-02-04 | End: 2022-02-04 | Stop reason: HOSPADM

## 2022-02-04 RX ORDER — LIDOCAINE HYDROCHLORIDE 10 MG/ML
INJECTION, SOLUTION EPIDURAL; INFILTRATION; INTRACAUDAL; PERINEURAL AS NEEDED
Status: DISCONTINUED | OUTPATIENT
Start: 2022-02-04 | End: 2022-02-04

## 2022-02-04 RX ORDER — ONDANSETRON 2 MG/ML
INJECTION INTRAMUSCULAR; INTRAVENOUS AS NEEDED
Status: DISCONTINUED | OUTPATIENT
Start: 2022-02-04 | End: 2022-02-04

## 2022-02-04 RX ORDER — MIDAZOLAM HYDROCHLORIDE 2 MG/2ML
INJECTION, SOLUTION INTRAMUSCULAR; INTRAVENOUS AS NEEDED
Status: DISCONTINUED | OUTPATIENT
Start: 2022-02-04 | End: 2022-02-04

## 2022-02-04 RX ORDER — ONDANSETRON 2 MG/ML
4 INJECTION INTRAMUSCULAR; INTRAVENOUS ONCE AS NEEDED
Status: DISCONTINUED | OUTPATIENT
Start: 2022-02-04 | End: 2022-02-04 | Stop reason: HOSPADM

## 2022-02-04 RX ADMIN — CEFAZOLIN SODIUM 2000 MG: 2 SOLUTION INTRAVENOUS at 10:35

## 2022-02-04 RX ADMIN — ONDANSETRON 4 MG: 2 INJECTION INTRAMUSCULAR; INTRAVENOUS at 11:59

## 2022-02-04 RX ADMIN — LIDOCAINE HYDROCHLORIDE 50 MG: 10 INJECTION, SOLUTION EPIDURAL; INFILTRATION; INTRACAUDAL; PERINEURAL at 10:43

## 2022-02-04 RX ADMIN — MIDAZOLAM 2 MG: 1 INJECTION INTRAMUSCULAR; INTRAVENOUS at 10:40

## 2022-02-04 RX ADMIN — DEXAMETHASONE SODIUM PHOSPHATE 4 MG: 4 INJECTION, SOLUTION INTRAMUSCULAR; INTRAVENOUS at 10:50

## 2022-02-04 RX ADMIN — CHLORHEXIDINE GLUCONATE 0.12% ORAL RINSE 15 ML: 1.2 LIQUID ORAL at 08:26

## 2022-02-04 RX ADMIN — PROPOFOL 200 MG: 10 INJECTION, EMULSION INTRAVENOUS at 10:43

## 2022-02-04 RX ADMIN — FENTANYL CITRATE 50 MCG: 50 INJECTION, SOLUTION INTRAMUSCULAR; INTRAVENOUS at 11:08

## 2022-02-04 RX ADMIN — FENTANYL CITRATE 50 MCG: 50 INJECTION, SOLUTION INTRAMUSCULAR; INTRAVENOUS at 10:43

## 2022-02-04 RX ADMIN — SODIUM CHLORIDE, SODIUM LACTATE, POTASSIUM CHLORIDE, AND CALCIUM CHLORIDE: .6; .31; .03; .02 INJECTION, SOLUTION INTRAVENOUS at 10:10

## 2022-02-04 NOTE — OP NOTE
OPERATIVE REPORT - Podiatry  PATIENT NAME: Demetri Ventura    :  1960  MRN: [id number]  Pt Location:  OR ROOM 12    SURGERY DATE: 2022    Surgeon(s) and Role:     * Amandeep Joy DPM - Primary     * Elder Summers DPM - Assisting    Pre-op Diagnosis:  Hallux valgus of right foot [M20 11]    Post-Op Diagnosis Codes:     * Hallux valgus of right foot [M20 11]    Procedure(s) (LRB):  BUNIONECTOMY TENZIN (Right)    Specimen(s):  * No specimens in log *    Estimated Blood Loss:   Minimal    Drains:  * No LDAs found *    Anesthesia Type:   General/LMA with 10 ml of 1% Lidocaine and 0 5% Bupivacaine in a 1:1 mixture prior to incision  4 ml 0 5% Bupivacaine post-operatively  Hemostasis:  Pneumatic ankle tourniquet, electrocautery, anatomic dissection    Materials:  Implant Name Type Inv  Item Serial No   Lot No  LRB No  Used Action   SCREW COMP 2 5 X 16MM MICRO FT - [id number]  SCREW COMP 2 5 X 16MM MICRO FT  [employer]  Right 1 Implanted       Operative Findings:  Consistent with diagnosis  The 1st metatarsal bone was noted to be soft and osteopenic during procedure  Complications:   None    Procedure and Technique:     Under mild sedation, the patient was brought into the operating room and placed on the operating room table in the supine position  IV sedation was achieved by anesthesia team and a universal timeout was performed where all parties are in agreement of correct patient, correct procedure and correct site  A pneumatic tourniquet was then placed over the patient's right lower extremity with ample padding  A local block was performed consisting of 10 ml of 1% Lidocaine and 0 5% Bupivacaine in a 1:1 mixture  The foot was then prepped and draped in the usual aseptic manner  An esmarch bandage was used to exsangunate the foot and the pneumatic tourniquet was then inflated to 250mmHg      Attention was then directed to the dorsal aspect of the first metatarsophalangeal joint medial to the EHL tendon where an approximately 6 cm linear incision was made through skin and subcutaneous tissue  The incision was deepened through the subcutaneous tissues using sharp and blunt dissection  Care was taken to identify and retract all vital neural and vascular structures  All bleeders were cauterized and ligated as necessary  A capsuloptomy was performed over the dorsal aspect of the MPJ  The periosteal and capsular structures were then carefully dissected free of their osseous attachments and reflected medially and laterally, thus exposing the head of the first metatarsal at the operative site  Attention was then directed to the 1st interspace via the original skin incision where the dissection was continued deep using sharp dissection down to the conjoined tendon of the adductor halluces was then identified and transected at its attachment  At this time the lateral contraction presents on the hallux was noted to be reduced and the sesamoid apparatus was noted to float into a more corrected medial position  Attention was then directed to the first met head where the medial prominence was resected by the sagittal bone saw  A k-wire was used as a guidewire at the proximal-medial aspect of the 1st met head  A through and through V type osteotomy was made at a 60 degree angle  This cut was created in the metataphyseal region of the bone utilizing a sagittal bone saw and the apices of this osteotomy pointing proximal plantarly and proximal dorsally  Upon completion of this osteotomy, the capital fragment was distracted and shifted laterally into a more corrected position and impacted onto the shaft of the first met  2 K wires were used as temp fixation across the osteotomy site  With proper AO technique two 2 5x16mm micro compression screws by [employer] serves as fixation across the osteotomy site   Attention was directed to the remaining medial bone shelf proximal to the osteotomy site which was resected using a sagittal saw and passed from operative field  Correction of the deformity was assessed at this time and noted to be adequate, the Renaldo was no needed  The wound was then flushed with copious amounts of sterile saline  The periosteal and capsular structures were reapproximated using 3-0 Vicryl  The subQ tissues were reapproximated using 4-0 Vicryl and the skin was reapproximated using 4-0 Nylon in a horizontal mattress suture technique  The incision was then dressed with Betadine adaptic, Dry sterile dressing  The pneumatic ankle tourniquet was then deflated and a prompt hyperemic response was noted to all digits of the foot  Dr Mike Orozco was present during the entire procedure and participated in all key aspects  SIGNATURE: Darian Chu DPM  DATE: February 4, 2022  TIME: 12:05 PM      Portions of the record may have been created with voice recognition software  Occasional wrong word or "sound a like" substitutions may have occurred due to the inherent limitations of voice recognition software  Read the chart carefully and recognize, using context, where substitutions have occurred

## 2022-02-04 NOTE — DISCHARGE SUMMARY
Discharge Summary Outpatient Procedure Podiatry -   Kevin Lara 59 y o  male MRN: [id number]  Unit/Bed#: OR POOL Encounter: [id number]    Admission Date: 2/4/2022     Admitting Diagnosis: Hallux valgus of right foot [M20 11]    Discharge Diagnosis: same    Procedures Performed: BUNIONECTOMY TENZIN: 17474 (CPT®)    Complications: none    Condition at Discharge: stable    Discharge instructions/Information to patient and family:   See after visit summary for information provided to patient and family  Provisions for Follow-Up Care/Important appointments:  See after visit summary for information related to follow-up care and any pertinent home health orders  Discharge Medications:  See after visit summary for reconciled discharge medications provided to patient and family

## 2022-02-04 NOTE — DISCHARGE INSTRUCTIONS
[facility]  Post-Operative Instructions    Pain / Swelling   There is expected to be some discomfort, swelling and bruising of the foot  You might see some blood on the bandage  This is not a cause for alarm  However, if there is active or persistent bleeding (blood running out of the bandage while at rest) - call the office at once   Apply an ice bag to right ankle for 30 minutes for each waking hour, for the first 72 hours  This should be discontinued when sleeping  This will also work through your cast if you have one  Ice must not leak and wet the dressings  Also, using the ice inappropriately can cause permanent nerve damage and frostbite   Your foot should be elevated as much as possible for the first 72 hours  The foot should be above heart level  If your foot is below heart level, throbbing and pain will increase  When sleeping, elevation can be accomplished by putting a small hard suitcase between the box spring and mattress at the foot of the bed  Walking and standing will increase pain, throbbing and bleeding   Persistent pain despite elevation and your pain meds can many times be relieved by removing the tight brown compression layer (called the ACE wrap) that is over the white gauze dressing  If you are elevating and taking your pain meds and pain is still severe, remove this brown stretchy layer but leave the gauze intact  Wait 30 minutes  If the pain subsides, reapply the ACE so its not so tight  If pain doesnt get better, call your doctor  Dressings / Casts   Do not remove your surgical dressings - they will be changed at your doctor appointment  Do not allow surgical dressings to get wet  Sponge baths should be used until the sutures are removed  Do not try to keep the foot dry using a garbage bag and tape - this rarely works  If you get your dressings or cast wet - call your doctor immediately     If your cast or dressings feel tight - elevate your foot for 30 minutes  If this doesnt help and you feel tingling or see toe discoloration - call your doctor   Do not put things in your cast such as powder, coat hangers to scratch, etc  This can cause skin damage and infections  Infection   If you have a fever at or above 100 degrees, chills, sweats, or notice red streaks rising above the dressing or smell odor / see pus (creamy white drainage), call your doctor immediately  Constipation   If you have severe constipation after surgery, this can be due to the pain medication  Notify your doctor and special medication will be prescribed to deal with this  Numbness   It is normal for your foot to be numb until about dinner time  If youve had a popliteal block procedure, you might be numb until the following day  When you start to feel pins and needles in the foot - this means the block is wearing off  That is the appropriate time to take your pain medication  Pain Medication   Take your post-operative pain medication as directed by your surgeon   Do not supplement your pain medication with over the counter drugs, old leftover pain medications, or extra Tylenol  You must discuss any additional medications with your doctor prior to taking them for pain  Driving   No driving is allowed without discussion with the doctor  Ambulation   Weight bear as tolerated to surgical foot   If given a flat, stiff shoe / darco wedge shoe, Do not walk at all without it

## 2022-02-07 ENCOUNTER — TELEPHONE (OUTPATIENT)
Dept: PODIATRY | Facility: CLINIC | Age: 62
End: 2022-02-07

## 2022-02-10 ENCOUNTER — OFFICE VISIT (OUTPATIENT)
Dept: PODIATRY | Facility: CLINIC | Age: 62
End: 2022-02-10

## 2022-02-10 VITALS
WEIGHT: 199.4 LBS | BODY MASS INDEX: 36.7 KG/M2 | HEART RATE: 97 BPM | DIASTOLIC BLOOD PRESSURE: 70 MMHG | HEIGHT: 62 IN | SYSTOLIC BLOOD PRESSURE: 138 MMHG

## 2022-02-10 DIAGNOSIS — M20.11 HALLUX VALGUS OF RIGHT FOOT: Primary | ICD-10-CM

## 2022-02-10 PROCEDURE — 99024 POSTOP FOLLOW-UP VISIT: CPT | Performed by: PODIATRIST

## 2022-02-10 NOTE — PROGRESS NOTES
Patient presents 1 week postop Herrera bunionectomy right foot  Surgical site is healing uneventfully  Mild to moderate pain and edema noted all within normal limits for procedure performed  Explained to patient that due to osteoporosis we were limited in our surgical approach  Therefore there has been improvement with the correction but still a small prominence is present  It is anticipated that he will be comfortable in his shoes  Patient is to continue with current orders    Patient is rescheduled in 1 week for possible suture removal

## 2022-02-17 ENCOUNTER — OFFICE VISIT (OUTPATIENT)
Dept: PODIATRY | Facility: CLINIC | Age: 62
End: 2022-02-17

## 2022-02-17 VITALS
HEIGHT: 62 IN | HEART RATE: 98 BPM | SYSTOLIC BLOOD PRESSURE: 126 MMHG | WEIGHT: 199.2 LBS | DIASTOLIC BLOOD PRESSURE: 72 MMHG | BODY MASS INDEX: 36.66 KG/M2

## 2022-02-17 DIAGNOSIS — M20.11 HALLUX VALGUS OF RIGHT FOOT: Primary | ICD-10-CM

## 2022-02-17 PROCEDURE — 99024 POSTOP FOLLOW-UP VISIT: CPT | Performed by: PODIATRIST

## 2022-02-17 NOTE — PROGRESS NOTES
Patient presents 13 days post Oumoulambert Daugherty bunionectomy right foot  Surgical site healing uneventfully  Minimal pain related  Only mild edema present  There is no evidence of infection  All sutures were removed  Patient to continue in surgical shoe for 2 more weeks  He will be reassessed in 10 days  He may now get right foot wet

## 2022-02-28 ENCOUNTER — OFFICE VISIT (OUTPATIENT)
Dept: PODIATRY | Facility: CLINIC | Age: 62
End: 2022-02-28

## 2022-02-28 VITALS
SYSTOLIC BLOOD PRESSURE: 130 MMHG | HEIGHT: 62 IN | DIASTOLIC BLOOD PRESSURE: 83 MMHG | BODY MASS INDEX: 38.09 KG/M2 | HEART RATE: 72 BPM | WEIGHT: 207 LBS

## 2022-02-28 DIAGNOSIS — M20.11 HALLUX VALGUS OF RIGHT FOOT: Primary | ICD-10-CM

## 2022-02-28 PROCEDURE — 3008F BODY MASS INDEX DOCD: CPT | Performed by: FAMILY MEDICINE

## 2022-02-28 PROCEDURE — 99024 POSTOP FOLLOW-UP VISIT: CPT | Performed by: PODIATRIST

## 2022-02-28 PROCEDURE — 3008F BODY MASS INDEX DOCD: CPT | Performed by: PODIATRIST

## 2022-02-28 NOTE — PROGRESS NOTES
Patient presents approximately 4 weeks postop Herrera bunionectomy right foot  No pain related  Surgical site healing uneventfully  Patient may return to a running shoe in 4 days  He will be reassessed in 3 weeks

## 2022-03-09 ENCOUNTER — TELEPHONE (OUTPATIENT)
Dept: ENDOCRINOLOGY | Facility: CLINIC | Age: 62
End: 2022-03-09

## 2022-03-09 NOTE — TELEPHONE ENCOUNTER
Some morning numbers high otherwise ok- is he having any low sugars overnight ?  Suggest trial of CGM to get a better understanding of patterns

## 2022-03-09 NOTE — TELEPHONE ENCOUNTER
Per Pt the high numbers in the morning  are because he was eating Snack or he was eating dinner late, Pt is not working right now he had foot surgery, Pt does not want to get a personal CGM  for now, pt has an upcoming appointment around May , and he will considered  them

## 2022-03-14 ENCOUNTER — TELEPHONE (OUTPATIENT)
Dept: ENDOCRINOLOGY | Facility: CLINIC | Age: 62
End: 2022-03-14

## 2022-03-21 ENCOUNTER — OFFICE VISIT (OUTPATIENT)
Dept: PODIATRY | Facility: CLINIC | Age: 62
End: 2022-03-21

## 2022-03-21 VITALS
SYSTOLIC BLOOD PRESSURE: 138 MMHG | HEIGHT: 62 IN | HEART RATE: 99 BPM | BODY MASS INDEX: 38.28 KG/M2 | WEIGHT: 208 LBS | DIASTOLIC BLOOD PRESSURE: 72 MMHG

## 2022-03-21 DIAGNOSIS — M20.11 HALLUX VALGUS OF RIGHT FOOT: Primary | ICD-10-CM

## 2022-03-21 PROCEDURE — 99024 POSTOP FOLLOW-UP VISIT: CPT | Performed by: PODIATRIST

## 2022-03-21 NOTE — PROGRESS NOTES
Patient presents approximately 7 weeks post hallux valgus correction right foot  Surgical site healing uneventfully  Minimal edema present  Only intermittent pain related  Patient is wearing a running shoe comfortably  Patient may return to work April 4, 2022  Arlene carbajal

## 2022-03-28 ENCOUNTER — TELEPHONE (OUTPATIENT)
Dept: ENDOCRINOLOGY | Facility: CLINIC | Age: 62
End: 2022-03-28

## 2022-04-04 ENCOUNTER — TELEPHONE (OUTPATIENT)
Dept: PODIATRY | Facility: CLINIC | Age: 62
End: 2022-04-04

## 2022-04-04 NOTE — TELEPHONE ENCOUNTER
Brenna Gipson was supposed to go back to work to today  He went there and they did not release him to work as they never received the Attending Physician's Statement releasing him for today    (It is in the chart)  Please print and fax to Alexander Kinney  @ [phone]

## 2022-04-10 DIAGNOSIS — Z79.4 TYPE 2 DIABETES MELLITUS WITH HYPERGLYCEMIA, WITH LONG-TERM CURRENT USE OF INSULIN (HCC): ICD-10-CM

## 2022-04-10 DIAGNOSIS — E11.65 TYPE 2 DIABETES MELLITUS WITH HYPERGLYCEMIA, WITH LONG-TERM CURRENT USE OF INSULIN (HCC): ICD-10-CM

## 2022-04-11 DIAGNOSIS — E11.65 TYPE 2 DIABETES MELLITUS WITH HYPERGLYCEMIA, WITH LONG-TERM CURRENT USE OF INSULIN (HCC): ICD-10-CM

## 2022-04-11 DIAGNOSIS — Z79.4 TYPE 2 DIABETES MELLITUS WITH HYPERGLYCEMIA, WITH LONG-TERM CURRENT USE OF INSULIN (HCC): ICD-10-CM

## 2022-04-11 RX ORDER — PEN NEEDLE, DIABETIC 32GX 5/32"
NEEDLE, DISPOSABLE MISCELLANEOUS
Qty: 100 EACH | Refills: 3 | Status: SHIPPED | OUTPATIENT
Start: 2022-04-11

## 2022-04-11 RX ORDER — PEN NEEDLE, DIABETIC 32GX 5/32"
NEEDLE, DISPOSABLE MISCELLANEOUS 2 TIMES DAILY
Qty: 200 EACH | Refills: 3 | Status: SHIPPED | OUTPATIENT
Start: 2022-04-11

## 2022-04-13 ENCOUNTER — TELEPHONE (OUTPATIENT)
Dept: ENDOCRINOLOGY | Facility: CLINIC | Age: 62
End: 2022-04-13

## 2022-04-18 ENCOUNTER — OFFICE VISIT (OUTPATIENT)
Dept: FAMILY MEDICINE CLINIC | Facility: CLINIC | Age: 62
End: 2022-04-18

## 2022-04-18 VITALS
TEMPERATURE: 97.2 F | HEART RATE: 77 BPM | BODY MASS INDEX: 37.73 KG/M2 | HEIGHT: 62 IN | DIASTOLIC BLOOD PRESSURE: 91 MMHG | RESPIRATION RATE: 16 BRPM | WEIGHT: 205 LBS | OXYGEN SATURATION: 97 % | SYSTOLIC BLOOD PRESSURE: 150 MMHG

## 2022-04-18 DIAGNOSIS — M85.80 OSTEOPENIA, UNSPECIFIED LOCATION: ICD-10-CM

## 2022-04-18 DIAGNOSIS — I10 PRIMARY HYPERTENSION: Primary | ICD-10-CM

## 2022-04-18 PROCEDURE — 99214 OFFICE O/P EST MOD 30 MIN: CPT | Performed by: FAMILY MEDICINE

## 2022-04-18 PROCEDURE — 1036F TOBACCO NON-USER: CPT | Performed by: FAMILY MEDICINE

## 2022-04-18 PROCEDURE — 3008F BODY MASS INDEX DOCD: CPT | Performed by: FAMILY MEDICINE

## 2022-04-18 PROCEDURE — 3080F DIAST BP >= 90 MM HG: CPT | Performed by: FAMILY MEDICINE

## 2022-04-18 PROCEDURE — 3077F SYST BP >= 140 MM HG: CPT | Performed by: FAMILY MEDICINE

## 2022-04-18 NOTE — ASSESSMENT & PLAN NOTE
Lab Results   Component Value Date    HGBA1C 7 4 (H) 01/25/2022     Continue follow-up with Endocrinology    Lifestyle modification with diet and exercise

## 2022-04-18 NOTE — ASSESSMENT & PLAN NOTE
Blood pressure 150/91 today  Goal blood pressure less than 130/80  Current regimen includes amlodipine 10 mg daily, lisinopril 20 mg daily, hydrochlorothiazide 12 5 mg daily, labetalol 100 mg b i d   Advised patient to follow-up in 4 weeks for repeat blood pressure, if continues to be elevated will consider increasing lisinopril

## 2022-04-18 NOTE — PROGRESS NOTES
Assessment/Plan     Type 2 diabetes mellitus with hyperglycemia, with long-term current use of insulin ([facility])    Lab Results   Component Value Date    HGBA1C 7 4 (H) 01/25/2022     Continue follow-up with Endocrinology  Lifestyle modification with diet and exercise    Hypertension  Blood pressure 150/91 today  Goal blood pressure less than 130/80  Current regimen includes amlodipine 10 mg daily, lisinopril 20 mg daily, hydrochlorothiazide 12 5 mg daily, labetalol 100 mg b i d   Advised patient to follow-up in 4 weeks for repeat blood pressure, if continues to be elevated will consider increasing lisinopril  Osteopenia  Pt had osteopenia diagnosed secondary to chronic steroid use  Will repeat DEXA scan    Diagnoses and all orders for this visit:    Primary hypertension    Osteopenia, unspecified location       BMI Counseling: Body mass index is 37 49 kg/m²  The BMI is above normal  Nutrition recommendations include reducing portion sizes and decreasing overall calorie intake  Exercise recommendations include moderate aerobic physical activity for 150 minutes/week  Subjective     Chief Complaint   Patient presents with    Hypertension       Ladi Neal presented to office for follow-up of hypertension, he recently had a foot surgery  done which she reports went well  He denies any pain, has been walking normally  He does have his DEXA scan scheduled, he does not have any other concerns today  Hypertension  Pertinent negatives include no chest pain, headaches or shortness of breath  The following portions of the patient's history were reviewed and updated as appropriate: allergies, current medications, past family history, past medical history, past social history, past surgical history and problem list     Review of Systems   Constitutional: Negative for activity change, chills and fever  HENT: Negative for congestion  Respiratory: Negative for shortness of breath      Cardiovascular: Negative for chest pain  Gastrointestinal: Negative for diarrhea, nausea and vomiting  Genitourinary: Negative for difficulty urinating  Neurological: Negative for headaches  Objective     Vitals:Blood pressure 150/91, pulse 77, temperature (!) 97 2 °F (36 2 °C), temperature source Temporal, resp  rate 16, height 5' 2" (1 575 m), weight 93 kg (205 lb), SpO2 97 %  Physical Exam:  Physical Exam  Vitals reviewed  Constitutional:       Appearance: He is well-developed  HENT:      Head: Normocephalic and atraumatic  Right Ear: External ear normal       Left Ear: External ear normal    Eyes:      Conjunctiva/sclera: Conjunctivae normal    Cardiovascular:      Rate and Rhythm: Normal rate and regular rhythm  Heart sounds: Normal heart sounds  No murmur heard  No friction rub  Pulmonary:      Effort: Pulmonary effort is normal  No respiratory distress  Breath sounds: Normal breath sounds  No wheezing or rales  Musculoskeletal:         General: Normal range of motion  Cervical back: Normal range of motion and neck supple  Skin:     General: Skin is warm and dry  Neurological:      Mental Status: He is alert and oriented to person, place, and time

## 2022-04-20 NOTE — TELEPHONE ENCOUNTER
Patient informed and I provided him with [phone] and [phone] codes so he can check with insurance

## 2022-04-21 DIAGNOSIS — R80.9 PROTEINURIA, UNSPECIFIED TYPE: ICD-10-CM

## 2022-04-21 PROCEDURE — 3066F NEPHROPATHY DOC TX: CPT | Performed by: FAMILY MEDICINE

## 2022-04-21 PROCEDURE — 4010F ACE/ARB THERAPY RXD/TAKEN: CPT | Performed by: FAMILY MEDICINE

## 2022-04-21 RX ORDER — LISINOPRIL 20 MG/1
20 TABLET ORAL DAILY
Qty: 90 TABLET | Refills: 3 | Status: SHIPPED | OUTPATIENT
Start: 2022-04-21

## 2022-04-29 ENCOUNTER — TELEPHONE (OUTPATIENT)
Dept: ENDOCRINOLOGY | Facility: CLINIC | Age: 62
End: 2022-04-29

## 2022-05-05 ENCOUNTER — APPOINTMENT (OUTPATIENT)
Dept: LAB | Facility: CLINIC | Age: 62
End: 2022-05-05
Payer: COMMERCIAL

## 2022-05-05 DIAGNOSIS — Z79.4 TYPE 2 DIABETES MELLITUS WITH HYPERGLYCEMIA, WITH LONG-TERM CURRENT USE OF INSULIN (HCC): ICD-10-CM

## 2022-05-05 DIAGNOSIS — E11.39 TYPE 2 DIABETES MELLITUS WITH OTHER OPHTHALMIC COMPLICATION, WITHOUT LONG-TERM CURRENT USE OF INSULIN (HCC): ICD-10-CM

## 2022-05-05 DIAGNOSIS — I10 ESSENTIAL HYPERTENSION: ICD-10-CM

## 2022-05-05 DIAGNOSIS — D68.9 POSTPARTUM COAGULATION DEFECTS, WITH DELIVERY (HCC): ICD-10-CM

## 2022-05-05 DIAGNOSIS — Z94.4 LIVER REPLACED BY TRANSPLANT (HCC): ICD-10-CM

## 2022-05-05 DIAGNOSIS — E78.5 HYPERLIPIDEMIA, UNSPECIFIED HYPERLIPIDEMIA TYPE: ICD-10-CM

## 2022-05-05 DIAGNOSIS — E78.49 OTHER HYPERLIPIDEMIA: ICD-10-CM

## 2022-05-05 DIAGNOSIS — E11.65 TYPE 2 DIABETES MELLITUS WITH HYPERGLYCEMIA, WITH LONG-TERM CURRENT USE OF INSULIN (HCC): ICD-10-CM

## 2022-05-05 DIAGNOSIS — T86.40 COMPLICATION OF TRANSPLANTED LIVER, UNSPECIFIED COMPLICATION (HCC): ICD-10-CM

## 2022-05-05 DIAGNOSIS — I10 PRIMARY HYPERTENSION: ICD-10-CM

## 2022-05-05 DIAGNOSIS — R79.1 ABNORMAL COAGULATION PROFILE: ICD-10-CM

## 2022-05-05 LAB
CHOLEST SERPL-MCNC: 144 MG/DL
CREAT UR-MCNC: 134 MG/DL
EST. AVERAGE GLUCOSE BLD GHB EST-MCNC: 169 MG/DL
HBA1C MFR BLD: 7.5 %
HDLC SERPL-MCNC: 34 MG/DL
LDLC SERPL CALC-MCNC: 57 MG/DL (ref 0–100)
MICROALBUMIN UR-MCNC: 1350 MG/L (ref 0–20)
MICROALBUMIN/CREAT 24H UR: 1007 MG/G CREATININE (ref 0–30)
T4 FREE SERPL-MCNC: 0.91 NG/DL (ref 0.76–1.46)
TRIGL SERPL-MCNC: 267 MG/DL
TSH SERPL DL<=0.05 MIU/L-ACNC: 1.09 UIU/ML (ref 0.45–4.5)

## 2022-05-05 PROCEDURE — 82043 UR ALBUMIN QUANTITATIVE: CPT

## 2022-05-05 PROCEDURE — 83036 HEMOGLOBIN GLYCOSYLATED A1C: CPT

## 2022-05-05 PROCEDURE — 3051F HG A1C>EQUAL 7.0%<8.0%: CPT | Performed by: FAMILY MEDICINE

## 2022-05-05 PROCEDURE — 3051F HG A1C>EQUAL 7.0%<8.0%: CPT

## 2022-05-05 PROCEDURE — 80061 LIPID PANEL: CPT

## 2022-05-05 PROCEDURE — 84439 ASSAY OF FREE THYROXINE: CPT

## 2022-05-05 PROCEDURE — 3062F POS MACROALBUMINURIA REV: CPT

## 2022-05-05 PROCEDURE — 84443 ASSAY THYROID STIM HORMONE: CPT

## 2022-05-05 PROCEDURE — 82570 ASSAY OF URINE CREATININE: CPT

## 2022-05-05 PROCEDURE — 3062F POS MACROALBUMINURIA REV: CPT | Performed by: FAMILY MEDICINE

## 2022-05-17 ENCOUNTER — TELEPHONE (OUTPATIENT)
Dept: ENDOCRINOLOGY | Facility: CLINIC | Age: 62
End: 2022-05-17

## 2022-05-23 ENCOUNTER — OFFICE VISIT (OUTPATIENT)
Dept: ENDOCRINOLOGY | Facility: CLINIC | Age: 62
End: 2022-05-23
Payer: COMMERCIAL

## 2022-05-23 VITALS
BODY MASS INDEX: 37.91 KG/M2 | DIASTOLIC BLOOD PRESSURE: 80 MMHG | SYSTOLIC BLOOD PRESSURE: 136 MMHG | WEIGHT: 206 LBS | HEIGHT: 62 IN

## 2022-05-23 DIAGNOSIS — R80.8 OTHER PROTEINURIA: ICD-10-CM

## 2022-05-23 DIAGNOSIS — Z79.4 TYPE 2 DIABETES MELLITUS WITH HYPERGLYCEMIA, WITH LONG-TERM CURRENT USE OF INSULIN (HCC): Primary | ICD-10-CM

## 2022-05-23 DIAGNOSIS — E11.65 TYPE 2 DIABETES MELLITUS WITH HYPERGLYCEMIA, WITH LONG-TERM CURRENT USE OF INSULIN (HCC): Primary | ICD-10-CM

## 2022-05-23 DIAGNOSIS — E78.49 OTHER HYPERLIPIDEMIA: ICD-10-CM

## 2022-05-23 DIAGNOSIS — I10 PRIMARY HYPERTENSION: ICD-10-CM

## 2022-05-23 PROCEDURE — 99214 OFFICE O/P EST MOD 30 MIN: CPT

## 2022-05-23 PROCEDURE — 3008F BODY MASS INDEX DOCD: CPT

## 2022-05-23 PROCEDURE — 3066F NEPHROPATHY DOC TX: CPT

## 2022-05-23 PROCEDURE — 1036F TOBACCO NON-USER: CPT

## 2022-05-23 PROCEDURE — 3066F NEPHROPATHY DOC TX: CPT | Performed by: FAMILY MEDICINE

## 2022-05-23 PROCEDURE — 3008F BODY MASS INDEX DOCD: CPT | Performed by: FAMILY MEDICINE

## 2022-05-23 RX ORDER — INSULIN DEGLUDEC INJECTION 100 U/ML
INJECTION, SOLUTION SUBCUTANEOUS
Qty: 30 ML | Refills: 3 | Status: SHIPPED | OUTPATIENT
Start: 2022-05-23

## 2022-05-23 NOTE — ASSESSMENT & PLAN NOTE
LDL at goal  Triglycerides elevated  Continue statin and work on decreasing high fat foods from diet

## 2022-05-23 NOTE — ASSESSMENT & PLAN NOTE
A1c above goal  BGs running high in the morning, in range throughout the day  Will increase Tresiba to 38 units to help with fasting BGs  Advised him to send updated BG log in 2 weeks  Highly encouraged to work on lifestyle modifications  Denies follow up with dietician at this time  Reviewed proper foot care, he perform self care  Repeat labs prior to next visit     Lab Results   Component Value Date    HGBA1C 7 5 (H) 05/05/2022

## 2022-05-23 NOTE — PROGRESS NOTES
Established Patient Progress Note      Chief Complaint   Patient presents with    Diabetes Type 2        History of Present Illness:   Leora Jolly is a 59 y o  male with type 2 diabetes with long term use of insulin  Last seen in the office 1/18/22  Reports complications of retinopathy, microalbumunuria and CKD  Last A1C was 7 5  Denies recent illness or hospitalizations  Denies recent severe hypoglycemic or severe hyperglycemic episodes  Denies any issues with his current regimen  Home glucose monitoring: are performed regularly  Admits to not eating the healthiest  Yolanda Billet to walk occasionally for exercise, he is active at his job and is on his feet       Home blood glucose readings:   Before breakfast: 120-190  Before dinner: 115-150  Bedtime: 110-130     Current regimen:   Tresiba 35 units daily   Humalog 6 units before breakfast     Last Eye Exam: 4/27/22, NPDR in the right eye   Last Foot Exam: 1/4/22, UTD    Has hypertension: Taking amlodipine, HCTZ, lisinopril, and labetalol  Has hyperlipidemia: Taking atorvastatin     Vitamin D Deficiency: Taking 1,000 units daily     Patient Active Problem List   Diagnosis    Hypertension    Type 2 diabetes mellitus with ophthalmic complication, without long-term current use of insulin (HCC)    Cirrhosis    Hyperlipidemia    Status post liver transplant    Hyperglycemia    Preop cardiovascular exam    Osteopenia    Male erectile disorder    Type 2 diabetes mellitus with hyperglycemia, with long-term current use of insulin (HCC)    Other proteinuria    Respiratory tract infection    CKD (chronic kidney disease)    Gout    Swelling of left elbow    Achilles tendinitis of right lower extremity    Bunion of great toe of right foot    Encephalopathy, portal systemic (HCC)    Immunosuppression ([facility] )      Past Medical History:   Diagnosis Date    Bunion of right foot     Chronic kidney disease, stage III (moderate) ([facility] )     RESOLVED: [id number]    Cirrhosis ([facility] )     Diabetes mellitus ([facility] )     Gout 10/6/18    Hepatic encephalopathy (HCC)     Hepatitis C     Hyperkalemia     Hyperlipidemia     Hypertension     Pancytopenia ([facility] )     Status post liver transplant ([place] [address] )     [id number]    Umbilical hernia     Wears glasses       Past Surgical History:   Procedure Laterality Date    ANKLE SURGERY      CHOLECYSTECTOMY      FOOT SURGERY  03/2022    Bunion removal    FRACTURE SURGERY      left foot    GALLBLADDER SURGERY      HIP SURGERY      LIVER TRANSPLANTATION      LAST ASSESSED: [id number]    AR COLONOSCOPY FLX DX W/COLLJ SPEC WHEN PFRMD N/A 02/16/2017    Procedure: COLONOSCOPY;  Surgeon: Sukhjinder Campbell MD;  Location:  GI LAB; Service: Gastroenterology    AR CORRJ HALLUX VALGUS W/SESMDC W/DIST [place] Right 02/04/2022    Procedure: Nuzhat Velásquez;  Surgeon: Epifanio Hobbs DPM;  Location: [address] [place] [address] OR;  Service: Podiatry    TONSILLECTOMY        Family History   Problem Relation Age of Onset    Diabetes Mother         TYPE 2    Hypertension Mother     Stroke Mother 68        SYNDROME     Hepatitis Brother         C    Cirrhosis Brother         LIVER     Cancer Family      Social History     Tobacco Use    Smoking status: Never Smoker    Smokeless tobacco: Never Used   Substance Use Topics    Alcohol use:  Yes     Alcohol/week: 1 0 standard drink     Types: 1 Cans of beer per week     Comment: occassionally     No Known Allergies      Current Outpatient Medications:     amLODIPine (NORVASC) 10 mg tablet, TAKE 1 TABLET DAILY, Disp: 90 tablet, Rfl: 3    atorvastatin (LIPITOR) 40 mg tablet, Take 1 tablet (40 mg total) by mouth daily at bedtime, Disp: 90 tablet, Rfl: 2    azaTHIOprine (IMURAN) 50 mg tablet, Take 1 tablet (50 mg total) by mouth daily, Disp: 90 tablet, Rfl: 3    BD Pen Needle [place]errat 2nd Gen 32G X 4 MM MISC, USE 4 (FOUR) TIMES A DAY, Disp: 100 each, Rfl: 3    Blood Pressure Monitor KIT, Use once a week, Disp: 1 kit, Rfl: 0    Cholecalciferol (VITAMIN D3) 1000 units CAPS, Take 1 capsule by mouth daily, Disp: , Rfl:     fludrocortisone (FLORINEF) 0 1 mg tablet, Take 0 1 mg by mouth daily  , Disp: , Rfl:     hydrochlorothiazide (HYDRODIURIL) 12 5 mg tablet, Take 1 tablet (12 5 mg total) by mouth daily, Disp: 90 tablet, Rfl: 1    insulin degludec (Tresiba FlexTouch) 100 units/mL injection pen, INJECT 38 UNITS DAILY, Disp: 30 mL, Rfl: 3    insulin lispro (HumaLOG) 100 units/mL injection pen, 6 units before breakfast, Disp: 15 mL, Rfl: 1    Insulin Pen Needle (BD Pen Needle [place]at U/F) 32G X 4 MM MISC, Use 2 (two) times a day, Disp: 200 each, Rfl: 3    labetalol (NORMODYNE) 100 mg tablet, TAKE 1 TABLET EVERY 12 HOURS, Disp: 180 tablet, Rfl: 3    lisinopril (ZESTRIL) 20 mg tablet, Take 1 tablet (20 mg total) by mouth daily, Disp: 90 tablet, Rfl: 3    MAGNESIUM OXIDE 400 PO, Take 1 tablet by mouth 2 (two) times a day  , Disp: , Rfl:     Multiple Vitamin (MULTIVITAMIN ADULT PO), Take by mouth Daily, Disp: , Rfl:     tacrolimus (PROGRAF) 1 mg capsule, Takes 4 tabs in am, 3 tabs in pm, Disp: , Rfl:     TRUE METRIX BLOOD GLUCOSE TEST test strip, Test twice daily, Disp: , Rfl:     Diclofenac Sodium (VOLTAREN) 1 %, Apply 2 g topically 4 (four) times a day (Patient not taking: Reported on 5/23/2022), Disp: 50 g, Rfl: 0    Review of Systems   Constitutional: Negative for activity change, appetite change, chills, diaphoresis, fatigue, fever and unexpected weight change  Respiratory: Negative for chest tightness and shortness of breath  Cardiovascular: Negative for chest pain, palpitations and leg swelling  Gastrointestinal: Negative for abdominal pain, constipation, diarrhea, nausea and vomiting  Endocrine: Negative for polydipsia, polyphagia and polyuria  Genitourinary: Negative for frequency  Musculoskeletal: Negative for arthralgias, back pain, gait problem and joint swelling     Skin: Negative for color change, pallor, rash and wound  Neurological: Negative for dizziness, tremors, weakness, light-headedness and numbness  All other systems reviewed and are negative  Physical Exam:  Body mass index is 37 68 kg/m²  /80 (BP Location: Left arm, Patient Position: Sitting, Cuff Size: Large)   Ht 5' 2" (1 575 m)   Wt 93 4 kg (206 lb)   BMI 37 68 kg/m²    Wt Readings from Last 3 Encounters:   05/23/22 93 4 kg (206 lb)   04/18/22 93 kg (205 lb)   03/21/22 94 3 kg (208 lb)       Physical Exam  Vitals reviewed  Constitutional:       Appearance: Normal appearance  HENT:      Head: Normocephalic  Cardiovascular:      Rate and Rhythm: Normal rate  Pulses: Normal pulses  Heart sounds: Normal heart sounds  Pulmonary:      Effort: Pulmonary effort is normal  No respiratory distress  Breath sounds: Normal breath sounds  No wheezing, rhonchi or rales  Musculoskeletal:      Right lower leg: No edema  Left lower leg: No edema  Skin:     General: Skin is warm and dry  Neurological:      Mental Status: He is alert and oriented to person, place, and time  Psychiatric:         Mood and Affect: Mood normal          Behavior: Behavior normal          Thought Content:  Thought content normal          Judgment: Judgment normal        Labs:   Lab Results   Component Value Date    HGBA1C 7 5 (H) 05/05/2022    HGBA1C 7 4 (H) 01/25/2022    HGBA1C 7 4 (A) 01/17/2022     Lab Results   Component Value Date    CREATININE 1 77 (H) 05/05/2022    CREATININE 1 73 (H) 01/25/2022    CREATININE 1 79 (H) 09/07/2021    BUN 39 (H) 05/05/2022     01/08/2016    K 5 5 (H) 05/05/2022     05/05/2022    CO2 28 05/05/2022     eGFR   Date Value Ref Range Status   05/05/2022 39 ml/min/1 73sq m Final     Lab Results   Component Value Date    CHOL 174 03/10/2015    HDL 34 (L) 05/05/2022    TRIG 267 (H) 05/05/2022     Lab Results   Component Value Date    ALT 18 05/05/2022    AST 14 05/05/2022    GGT 40 05/05/2022    ALKPHOS 86 05/05/2022    BILITOT 0 29 01/08/2016     Lab Results   Component Value Date    YQD8KEKXALCX 1 091 05/05/2022    VWP5LOSJTEWU 1 432 06/01/2019    EZH7IVKGFLOC 1 603 05/02/2017     Lab Results   Component Value Date    FREET4 0 91 05/05/2022       Impression & Plan:    Problem List Items Addressed This Visit        Endocrine    Type 2 diabetes mellitus with hyperglycemia, with long-term current use of insulin ([address] ) - Primary     A1c above goal  BGs running high in the morning, in range throughout the day  Will increase Tresiba to 38 units to help with fasting BGs  Advised him to send updated BG log in 2 weeks  Highly encouraged to work on lifestyle modifications  Denies follow up with dietician at this time  Reviewed proper foot care, he perform self care  Repeat labs prior to next visit  Lab Results   Component Value Date    HGBA1C 7 5 (H) 05/05/2022              Relevant Medications    insulin degludec [place] FlexTouch) 100 units/mL injection pen    Other Relevant Orders    HEMOGLOBIN A1C W/ EAG ESTIMATION Lab Collect    Comprehensive metabolic panel Lab Collect       Cardiovascular and Mediastinum    Hypertension     BP at goal  Continue current medication regimen  Other    Hyperlipidemia     LDL at goal  Triglycerides elevated  Continue statin and work on decreasing high fat foods from diet  Other proteinuria     Continue follow up with nephrology  Orders Placed This Encounter   Procedures    HEMOGLOBIN A1C W/ EAG ESTIMATION Lab Collect     Standing Status:   Future     Standing Expiration Date:   5/23/2023    Comprehensive metabolic panel Lab Collect     This is a patient instruction: Patient fasting for 8 hours or longer recommended       Standing Status:   Future     Standing Expiration Date:   5/23/2023       Patient Instructions   Increase [place] to 38 units   Continue novolog 6 units with breakfast       Discussed with the patient and all questioned fully answered  He will call me if any problems arise      LETICIA Claros

## 2022-05-26 ENCOUNTER — OFFICE VISIT (OUTPATIENT)
Dept: FAMILY MEDICINE CLINIC | Facility: CLINIC | Age: 62
End: 2022-05-26

## 2022-05-26 VITALS
WEIGHT: 206 LBS | BODY MASS INDEX: 37.68 KG/M2 | TEMPERATURE: 97.6 F | SYSTOLIC BLOOD PRESSURE: 138 MMHG | HEART RATE: 70 BPM | OXYGEN SATURATION: 97 % | DIASTOLIC BLOOD PRESSURE: 73 MMHG

## 2022-05-26 DIAGNOSIS — E87.5 HYPERKALEMIA: ICD-10-CM

## 2022-05-26 DIAGNOSIS — E11.65 TYPE 2 DIABETES MELLITUS WITH HYPERGLYCEMIA, WITH LONG-TERM CURRENT USE OF INSULIN (HCC): ICD-10-CM

## 2022-05-26 DIAGNOSIS — I10 PRIMARY HYPERTENSION: Primary | ICD-10-CM

## 2022-05-26 DIAGNOSIS — Z79.4 TYPE 2 DIABETES MELLITUS WITH HYPERGLYCEMIA, WITH LONG-TERM CURRENT USE OF INSULIN (HCC): ICD-10-CM

## 2022-05-26 DIAGNOSIS — N18.32 STAGE 3B CHRONIC KIDNEY DISEASE (HCC): ICD-10-CM

## 2022-05-26 DIAGNOSIS — R01.1 MURMUR, CARDIAC: ICD-10-CM

## 2022-05-26 PROCEDURE — 3075F SYST BP GE 130 - 139MM HG: CPT | Performed by: FAMILY MEDICINE

## 2022-05-26 PROCEDURE — 1036F TOBACCO NON-USER: CPT | Performed by: FAMILY MEDICINE

## 2022-05-26 PROCEDURE — 3078F DIAST BP <80 MM HG: CPT | Performed by: FAMILY MEDICINE

## 2022-05-26 PROCEDURE — 3725F SCREEN DEPRESSION PERFORMED: CPT | Performed by: FAMILY MEDICINE

## 2022-05-26 PROCEDURE — 99214 OFFICE O/P EST MOD 30 MIN: CPT | Performed by: FAMILY MEDICINE

## 2022-05-26 NOTE — PROGRESS NOTES
Assessment/Plan:    Hypertension  Bp today 138/73 5/26/22, within goal   Was 136/80 on 5/23/22 and is better controlled compared to prior 150/91 on 4/18/22  Continue current regimen of labetalol 100 mg b i d , lisinopril 20 mg daily, hydrochlorothiazide 12 5 mg daily, and amlodipine 10 mg  Lifestyle modification with diet and exercise  Follow up in 3 months for annual physical       Type 2 diabetes mellitus with hyperglycemia, with long-term current use of insulin (HCC)    Lab Results   Component Value Date    HGBA1C 7 5 (H) 05/05/2022     Continue follow-up with Endocrinology    CKD (chronic kidney disease)  Lab Results   Component Value Date    EGFR 39 05/05/2022    EGFR 41 01/25/2022    EGFR 40 09/07/2021    CREATININE 1 77 (H) 05/05/2022    CREATININE 1 73 (H) 01/25/2022    CREATININE 1 79 (H) 09/07/2021     Patient says he will schedule a nephrology appointment for follow up  Takes Calcium and vitamin D supplements  Normal urination, normal color, no blood  Plan  Follow up with nephrology    Hyperkalemia  Patient has potassium level of 5 5 today 5/5/22  Elevated level may be due to tacrolimus  Plan  - recheck potassium left as per transplant team    Murmur, cardiac  Systolic 3/6 murmur on left sternal border 2nd-3rd intercostal space  Plan  Echo complete w/ contrast if indicated, future       Diagnoses and all orders for this visit:    Primary hypertension    Type 2 diabetes mellitus with hyperglycemia, with long-term current use of insulin (HCC)    Stage 3b chronic kidney disease (HCC)    Murmur, cardiac  -     Echo complete w/ contrast if indicated; Future    Hyperkalemia          Subjective:      Patient ID: Samantha Wang is a 59 y o  male  Samantha Wang is a 58 y/o male presenting for a follow up on blood pressure control  He has a past medical history significant for primary hypertension, CKD, liver transplant, and T2DM  His bp today is 138/73 on 5/26/22 and was 136/80 on 5/23/22 and 150/91 on 4/18/22  He says he has no problems taking his medication regimen of lisinopril, labetalol, amlodipine, and hctz  He says he has no side effects from his medications  Patient was counseled on the importance of a low salt diet, bp control, and medication maintenance to which he displayed understanding  He says he has some muscle aches and pains from work that he takes tylenol for  He says he is constantly on his feet and moving around at work  He says he feels his diabetes is under control and he checks his feet for any abnormalities  He ways he would like to eventually get off insulin  His A1c on 5/5/22 was a 7 5  During visit, patient had an elevated potassium level of 5 5, which may be due to his tacrolimus medication, he plans to meet with his transplant team and repeat labs in 1 week  Patient plans to follow up with his transplant team in a week and get new labs  The following portions of the patient's history were reviewed and updated as appropriate: current medications and problem list     Review of Systems   Constitutional: Negative for appetite change and fatigue  HENT: Negative  Eyes: Negative  Respiratory: Negative for cough, chest tightness and shortness of breath  Cardiovascular: Negative for chest pain and leg swelling  Gastrointestinal: Negative for constipation, diarrhea, nausea and vomiting  Endocrine: Negative  Genitourinary: Negative for difficulty urinating  Musculoskeletal: Positive for myalgias  Skin: Negative for rash and wound  Neurological: Negative for headaches  Hematological: Negative  Psychiatric/Behavioral: Negative  Objective:      /73 (BP Location: Left arm, Patient Position: Sitting, Cuff Size: Large)   Pulse 70   Temp 97 6 °F (36 4 °C)   Wt 93 4 kg (206 lb)   SpO2 97%   BMI 37 68 kg/m²          Physical Exam  Constitutional:       Appearance: Normal appearance  Eyes:      General: No scleral icterus       Conjunctiva/sclera: Conjunctivae normal    Cardiovascular:      Rate and Rhythm: Normal rate  Heart sounds: Murmur (3/6 systolic  along left sternal border) heard  Pulmonary:      Effort: Pulmonary effort is normal       Breath sounds: Normal breath sounds  Musculoskeletal:         General: No swelling  Neurological:      General: No focal deficit present  Mental Status: He is alert and oriented to person, place, and time     Psychiatric:         Mood and Affect: Mood normal          Behavior: Behavior normal

## 2022-05-26 NOTE — ASSESSMENT & PLAN NOTE
Bp today 138/73 5/26/22, within goal   Was 136/80 on 5/23/22 and is better controlled compared to prior 150/91 on 4/18/22  Continue current regimen of labetalol 100 mg b i d , lisinopril 20 mg daily, hydrochlorothiazide 12 5 mg daily, and amlodipine 10 mg  Lifestyle modification with diet and exercise  Follow up in 3 months for annual physical

## 2022-05-26 NOTE — ASSESSMENT & PLAN NOTE
Lab Results   Component Value Date    EGFR 39 05/05/2022    EGFR 41 01/25/2022    EGFR 40 09/07/2021    CREATININE 1 77 (H) 05/05/2022    CREATININE 1 73 (H) 01/25/2022    CREATININE 1 79 (H) 09/07/2021     Patient says he will schedule a nephrology appointment for follow up  Takes Calcium and vitamin D supplements  Normal urination, normal color, no blood  Plan  Follow up with nephrology

## 2022-05-26 NOTE — ASSESSMENT & PLAN NOTE
Patient has potassium level of 5 5 today 5/5/22  Elevated level may be due to tacrolimus  Plan  - recheck potassium left as per transplant team

## 2022-05-26 NOTE — ASSESSMENT & PLAN NOTE
Lab Results   Component Value Date    HGBA1C 7 5 (H) 05/05/2022     Continue follow-up with Endocrinology

## 2022-05-26 NOTE — ASSESSMENT & PLAN NOTE
Systolic 3/6 murmur on left sternal border 2nd-3rd intercostal space  Plan  Echo complete w/ contrast if indicated, future

## 2022-05-31 DIAGNOSIS — I10 PRIMARY HYPERTENSION: ICD-10-CM

## 2022-05-31 RX ORDER — HYDROCHLOROTHIAZIDE 12.5 MG/1
12.5 TABLET ORAL DAILY
Qty: 90 TABLET | Refills: 1
Start: 2022-05-31 | End: 2022-06-09 | Stop reason: SDUPTHER

## 2022-06-02 ENCOUNTER — TELEPHONE (OUTPATIENT)
Dept: ENDOCRINOLOGY | Facility: CLINIC | Age: 62
End: 2022-06-02

## 2022-06-07 ENCOUNTER — APPOINTMENT (OUTPATIENT)
Dept: LAB | Facility: CLINIC | Age: 62
End: 2022-06-07
Payer: COMMERCIAL

## 2022-06-07 DIAGNOSIS — Z94.4 LIVER REPLACED BY TRANSPLANT (HCC): ICD-10-CM

## 2022-06-07 LAB
ALBUMIN SERPL BCP-MCNC: 4.5 G/DL (ref 3.5–5)
ALP SERPL-CCNC: 89 U/L (ref 34–104)
ALT SERPL W P-5'-P-CCNC: 22 U/L (ref 7–52)
ANION GAP SERPL CALCULATED.3IONS-SCNC: 10 MMOL/L (ref 4–13)
AST SERPL W P-5'-P-CCNC: 19 U/L (ref 13–39)
BASOPHILS # BLD AUTO: 0.03 THOUSANDS/ΜL (ref 0–0.1)
BASOPHILS NFR BLD AUTO: 1 % (ref 0–1)
BILIRUB SERPL-MCNC: 0.46 MG/DL (ref 0.2–1)
BUN SERPL-MCNC: 50 MG/DL (ref 5–25)
CALCIUM SERPL-MCNC: 9.6 MG/DL (ref 8.4–10.2)
CHLORIDE SERPL-SCNC: 106 MMOL/L (ref 96–108)
CO2 SERPL-SCNC: 24 MMOL/L (ref 21–32)
CREAT SERPL-MCNC: 1.86 MG/DL (ref 0.6–1.3)
EOSINOPHIL # BLD AUTO: 0.14 THOUSAND/ΜL (ref 0–0.61)
EOSINOPHIL NFR BLD AUTO: 3 % (ref 0–6)
ERYTHROCYTE [DISTWIDTH] IN BLOOD BY AUTOMATED COUNT: 14.6 % (ref 11.6–15.1)
GFR SERPL CREATININE-BSD FRML MDRD: 37 ML/MIN/1.73SQ M
GGT SERPL-CCNC: 47 U/L (ref 5–85)
GLUCOSE P FAST SERPL-MCNC: 95 MG/DL (ref 65–99)
HCT VFR BLD AUTO: 38.1 % (ref 36.5–49.3)
HGB BLD-MCNC: 12.2 G/DL (ref 12–17)
IMM GRANULOCYTES # BLD AUTO: 0.01 THOUSAND/UL (ref 0–0.2)
IMM GRANULOCYTES NFR BLD AUTO: 0 % (ref 0–2)
INR PPP: 1.05 (ref 0.84–1.19)
LYMPHOCYTES # BLD AUTO: 0.84 THOUSANDS/ΜL (ref 0.6–4.47)
LYMPHOCYTES NFR BLD AUTO: 16 % (ref 14–44)
MAGNESIUM SERPL-MCNC: 1.8 MG/DL (ref 1.9–2.7)
MCH RBC QN AUTO: 28.4 PG (ref 26.8–34.3)
MCHC RBC AUTO-ENTMCNC: 32 G/DL (ref 31.4–37.4)
MCV RBC AUTO: 89 FL (ref 82–98)
MONOCYTES # BLD AUTO: 0.5 THOUSAND/ΜL (ref 0.17–1.22)
MONOCYTES NFR BLD AUTO: 9 % (ref 4–12)
NEUTROPHILS # BLD AUTO: 3.85 THOUSANDS/ΜL (ref 1.85–7.62)
NEUTS SEG NFR BLD AUTO: 71 % (ref 43–75)
NRBC BLD AUTO-RTO: 0 /100 WBCS
PLATELET # BLD AUTO: 161 THOUSANDS/UL (ref 149–390)
PMV BLD AUTO: 11.8 FL (ref 8.9–12.7)
POTASSIUM SERPL-SCNC: 4.7 MMOL/L (ref 3.5–5.3)
PROT SERPL-MCNC: 7.1 G/DL (ref 6.4–8.4)
PROTHROMBIN TIME: 13.7 SECONDS (ref 11.6–14.5)
RBC # BLD AUTO: 4.29 MILLION/UL (ref 3.88–5.62)
SODIUM SERPL-SCNC: 140 MMOL/L (ref 135–147)
TACROLIMUS BLD-MCNC: 2.8 NG/ML (ref 2–20)
WBC # BLD AUTO: 5.37 THOUSAND/UL (ref 4.31–10.16)

## 2022-06-07 PROCEDURE — 80197 ASSAY OF TACROLIMUS: CPT

## 2022-06-07 PROCEDURE — 80053 COMPREHEN METABOLIC PANEL: CPT

## 2022-06-07 PROCEDURE — 85025 COMPLETE CBC W/AUTO DIFF WBC: CPT

## 2022-06-07 PROCEDURE — 85610 PROTHROMBIN TIME: CPT

## 2022-06-07 PROCEDURE — 36415 COLL VENOUS BLD VENIPUNCTURE: CPT

## 2022-06-07 PROCEDURE — 83735 ASSAY OF MAGNESIUM: CPT

## 2022-06-07 PROCEDURE — 82977 ASSAY OF GGT: CPT

## 2022-06-09 DIAGNOSIS — I10 PRIMARY HYPERTENSION: ICD-10-CM

## 2022-06-09 RX ORDER — HYDROCHLOROTHIAZIDE 12.5 MG/1
12.5 TABLET ORAL DAILY
Qty: 90 TABLET | Refills: 3 | Status: SHIPPED | OUTPATIENT
Start: 2022-06-09

## 2022-06-22 ENCOUNTER — TELEPHONE (OUTPATIENT)
Dept: ENDOCRINOLOGY | Facility: CLINIC | Age: 62
End: 2022-06-22

## 2022-07-01 ENCOUNTER — TELEPHONE (OUTPATIENT)
Dept: ENDOCRINOLOGY | Facility: CLINIC | Age: 62
End: 2022-07-01

## 2022-07-08 ENCOUNTER — HOSPITAL ENCOUNTER (OUTPATIENT)
Dept: NON INVASIVE DIAGNOSTICS | Facility: CLINIC | Age: 62
Discharge: HOME | End: 2022-07-08
Payer: COMMERCIAL

## 2022-07-08 VITALS
HEIGHT: 62 IN | WEIGHT: 206 LBS | DIASTOLIC BLOOD PRESSURE: 73 MMHG | BODY MASS INDEX: 37.91 KG/M2 | SYSTOLIC BLOOD PRESSURE: 138 MMHG | HEART RATE: 63 BPM

## 2022-07-08 DIAGNOSIS — R01.1 MURMUR, CARDIAC: ICD-10-CM

## 2022-07-08 LAB
AORTIC ROOT: 3.1 CM
AORTIC VALVE MEAN VELOCITY: 15.4 M/S
APICAL FOUR CHAMBER EJECTION FRACTION: 71 %
ASCENDING AORTA: 3.1 CM
AV AREA BY CONTINUOUS VTI: 1.5 CM2
AV AREA PEAK VELOCITY: 1.3 CM2
AV LVOT MEAN GRADIENT: 2 MMHG
AV LVOT PEAK GRADIENT: 4 MMHG
AV MEAN GRADIENT: 11 MMHG
AV PEAK GRADIENT: 19 MMHG
AV VALVE AREA: 1.46 CM2
AV VELOCITY RATIO: 0.43
DOP CALC AO PEAK VEL: 2.19 M/S
DOP CALC AO VTI: 49.69 CM
DOP CALC LVOT AREA: 3.14 CM2
DOP CALC LVOT DIAMETER: 2 CM
DOP CALC LVOT PEAK VEL VTI: 23.1 CM
DOP CALC LVOT PEAK VEL: 0.94 M/S
DOP CALC LVOT STROKE INDEX: 37.6 ML/M2
DOP CALC LVOT STROKE VOLUME: 72.53 CM3
DOP CALC MV VTI: 37.03 CM
E WAVE DECELERATION TIME: 226 MS
FRACTIONAL SHORTENING: 36 % (ref 28–44)
INTERVENTRICULAR SEPTUM IN DIASTOLE (PARASTERNAL SHORT AXIS VIEW): 1.3 CM
INTERVENTRICULAR SEPTUM: 1.3 CM (ref 0.6–1.1)
LAAS-AP2: 16.6 CM2
LAAS-AP4: 20.8 CM2
LEFT ATRIUM SIZE: 4.7 CM
LEFT INTERNAL DIMENSION IN SYSTOLE: 2.8 CM (ref 2.1–4)
LEFT VENTRICULAR INTERNAL DIMENSION IN DIASTOLE: 4.4 CM (ref 3.5–6)
LEFT VENTRICULAR POSTERIOR WALL IN END DIASTOLE: 1.2 CM
LEFT VENTRICULAR STROKE VOLUME: 59 ML
LVSV (TEICH): 59 ML
MV E'TISSUE VEL-SEP: 8 CM/S
MV MEAN GRADIENT: 3 MMHG
MV PEAK A VEL: 1.27 M/S
MV PEAK E VEL: 119 CM/S
MV PEAK GRADIENT: 6 MMHG
MV STENOSIS PRESSURE HALF TIME: 66 MS
MV VALVE AREA BY CONTINUITY EQUATION: 1.96 CM2
MV VALVE AREA P 1/2 METHOD: 3.33 CM2
PA SYSTOLIC PRESSURE: 26 MMHG
RIGHT ATRIUM AREA SYSTOLE A4C: 7.9 CM2
RIGHT VENTRICLE ID DIMENSION: 2.9 CM
SL CV LEFT ATRIUM LENGTH A2C: 5 CM
SL CV LV EF: 55
SL CV PED ECHO LEFT VENTRICLE DIASTOLIC VOLUME (MOD BIPLANE) 2D: 89 ML
SL CV PED ECHO LEFT VENTRICLE SYSTOLIC VOLUME (MOD BIPLANE) 2D: 30 ML
TR MAX PG: 22 MMHG
TR PEAK VELOCITY: 2.3 M/S
TRICUSPID VALVE PEAK REGURGITATION VELOCITY: 2.33 M/S

## 2022-07-08 PROCEDURE — 93306 TTE W/DOPPLER COMPLETE: CPT

## 2022-07-08 PROCEDURE — 93306 TTE W/DOPPLER COMPLETE: CPT | Performed by: INTERNAL MEDICINE

## 2022-07-12 DIAGNOSIS — I35.0 AORTIC STENOSIS, MODERATE: Primary | ICD-10-CM

## 2022-07-25 ENCOUNTER — TELEPHONE (OUTPATIENT)
Dept: ENDOCRINOLOGY | Facility: CLINIC | Age: 62
End: 2022-07-25

## 2022-08-11 ENCOUNTER — TELEPHONE (OUTPATIENT)
Dept: ENDOCRINOLOGY | Facility: CLINIC | Age: 62
End: 2022-08-11

## 2022-08-19 ENCOUNTER — HOSPITAL ENCOUNTER (OUTPATIENT)
Dept: RADIOLOGY | Age: 62
Discharge: HOME | End: 2022-08-19
Payer: COMMERCIAL

## 2022-08-19 DIAGNOSIS — M85.80 OSTEOPENIA, UNSPECIFIED LOCATION: ICD-10-CM

## 2022-08-19 PROCEDURE — 77080 DXA BONE DENSITY AXIAL: CPT

## 2022-08-24 ENCOUNTER — TELEPHONE (OUTPATIENT)
Dept: ENDOCRINOLOGY | Facility: CLINIC | Age: 62
End: 2022-08-24

## 2022-08-29 DIAGNOSIS — I10 ESSENTIAL HYPERTENSION: ICD-10-CM

## 2022-08-29 RX ORDER — LABETALOL 100 MG/1
TABLET, FILM COATED ORAL
Qty: 180 TABLET | Refills: 3 | Status: SHIPPED | OUTPATIENT
Start: 2022-08-29

## 2022-09-01 ENCOUNTER — OFFICE VISIT (OUTPATIENT)
Dept: FAMILY MEDICINE CLINIC | Facility: CLINIC | Age: 62
End: 2022-09-01

## 2022-09-01 VITALS
SYSTOLIC BLOOD PRESSURE: 138 MMHG | HEIGHT: 62 IN | DIASTOLIC BLOOD PRESSURE: 84 MMHG | BODY MASS INDEX: 37.47 KG/M2 | OXYGEN SATURATION: 98 % | TEMPERATURE: 96.7 F | HEART RATE: 70 BPM | WEIGHT: 203.6 LBS | RESPIRATION RATE: 18 BRPM

## 2022-09-01 DIAGNOSIS — I10 PRIMARY HYPERTENSION: ICD-10-CM

## 2022-09-01 DIAGNOSIS — R01.1 MURMUR, CARDIAC: ICD-10-CM

## 2022-09-01 DIAGNOSIS — Z79.4 TYPE 2 DIABETES MELLITUS WITH HYPERGLYCEMIA, WITH LONG-TERM CURRENT USE OF INSULIN (HCC): Primary | ICD-10-CM

## 2022-09-01 DIAGNOSIS — Z94.4 STATUS POST LIVER TRANSPLANT (HCC): ICD-10-CM

## 2022-09-01 DIAGNOSIS — E11.65 TYPE 2 DIABETES MELLITUS WITH HYPERGLYCEMIA, WITH LONG-TERM CURRENT USE OF INSULIN (HCC): Primary | ICD-10-CM

## 2022-09-01 DIAGNOSIS — M85.80 OSTEOPENIA, UNSPECIFIED LOCATION: ICD-10-CM

## 2022-09-01 DIAGNOSIS — E83.42 HYPOMAGNESEMIA: ICD-10-CM

## 2022-09-01 LAB — SL AMB POCT HEMOGLOBIN AIC: 7.1 (ref ?–6.5)

## 2022-09-01 PROCEDURE — 3051F HG A1C>EQUAL 7.0%<8.0%: CPT | Performed by: INTERNAL MEDICINE

## 2022-09-01 PROCEDURE — 83036 HEMOGLOBIN GLYCOSYLATED A1C: CPT | Performed by: FAMILY MEDICINE

## 2022-09-01 PROCEDURE — 99214 OFFICE O/P EST MOD 30 MIN: CPT | Performed by: FAMILY MEDICINE

## 2022-09-01 PROCEDURE — 3051F HG A1C>EQUAL 7.0%<8.0%: CPT | Performed by: FAMILY MEDICINE

## 2022-09-01 RX ORDER — MAGNESIUM OXIDE 240 MG
400 POWDER IN PACKET (EA) ORAL 2 TIMES DAILY
Qty: 1 EACH | Refills: 1 | Status: SHIPPED | OUTPATIENT
Start: 2022-09-01

## 2022-09-01 RX ORDER — AMOXICILLIN 500 MG/1
CAPSULE ORAL
COMMUNITY
Start: 2022-07-27

## 2022-09-01 RX ORDER — LATANOPROST 50 UG/ML
SOLUTION/ DROPS OPHTHALMIC
COMMUNITY
Start: 2022-08-23

## 2022-09-01 NOTE — ASSESSMENT & PLAN NOTE
Recent echo showed mild to moderate aortic valve stenosis  Discussed results with patient in detail  He denies any chest pain, shortness of breath at this time  Patient does have appointment with Cardiology scheduled and will follow-up

## 2022-09-01 NOTE — ASSESSMENT & PLAN NOTE
Discussed results of DEXA scan  Patient used to be on Fosamax which was started in 2015 and eventually discontinued in 2021  DEXA scan was done after patient was of Fosamax for at least a year  DEXA scan shows significant decrease in bone density in left hip, but T-score of left hip is-0 4 and left femoral neck is -1  Patient is now off prednisone, but does take Florinef 0 1 mg daily  Discussed results in great detail, also discussed typical duration of treatment with bisphosphonates  After shared decision making, patient opted for continued observation off of bisphosphonates with repeat DEXA scan in 2-3 years  Patient will continue taking vitamin-D and calcium and weight-bearing exercises  He will also follow-up with transplant team in November

## 2022-09-01 NOTE — ASSESSMENT & PLAN NOTE
Lab Results   Component Value Date    HGBA1C 7 1 (A) 09/01/2022       Well controlled with current regimen  Continue follow-up with Endocrinology

## 2022-09-01 NOTE — ASSESSMENT & PLAN NOTE
Since patient is status post liver transplant, and is taking immunosuppressive medications, he is eligible for 3rd dose of COVID vaccine  Per CDC, he is eligible for this dose 4 weeks after his 2nd dose  Discussed with patient  Also provided referral for Dermatology today

## 2022-09-01 NOTE — ASSESSMENT & PLAN NOTE
Blood pressure 138/84 today, within goal   Continue current regimen of labetalol 100 mg b i d , lisinopril 20 mg daily, hydrochlorothiazide 12 5 mg daily and amlodipine 10 mg daily  Continue lifestyle modification with diet and exercise    Patient will follow-up in 3 months for annual physical

## 2022-09-01 NOTE — PROGRESS NOTES
Assessment/Plan     Status post liver transplant   Since patient is status post liver transplant, and is taking immunosuppressive medications, he is eligible for 3rd dose of COVID vaccine  Per CDC, he is eligible for this dose 4 weeks after his 2nd dose  Discussed with patient  Also provided referral for Dermatology today  Type 2 diabetes mellitus with hyperglycemia, with long-term current use of insulin ([facility])    Lab Results   Component Value Date    HGBA1C 7 1 (A) 09/01/2022       Well controlled with current regimen  Continue follow-up with Endocrinology  Hypertension  Blood pressure 138/84 today, within goal   Continue current regimen of labetalol 100 mg b i d , lisinopril 20 mg daily, hydrochlorothiazide 12 5 mg daily and amlodipine 10 mg daily  Continue lifestyle modification with diet and exercise  Patient will follow-up in 3 months for annual physical    Osteopenia  Discussed results of DEXA scan  Patient used to be on Fosamax which was started in 2015 and eventually discontinued in 2021  DEXA scan was done after patient was of Fosamax for at least a year  DEXA scan shows significant decrease in bone density in left hip, but T-score of left hip is-0 4 and left femoral neck is -1  Patient is now off prednisone, but does take Florinef 0 1 mg daily  Discussed results in great detail, also discussed typical duration of treatment with bisphosphonates  After shared decision making, patient opted for continued observation off of bisphosphonates with repeat DEXA scan in 2-3 years  Patient will continue taking vitamin-D and calcium and weight-bearing exercises  He will also follow-up with transplant team in November  Murmur, cardiac  Recent echo showed mild to moderate aortic valve stenosis  Discussed results with patient in detail  He denies any chest pain, shortness of breath at this time  Patient does have appointment with Cardiology scheduled and will follow-up      Diagnoses and all orders for this visit:    Type 2 diabetes mellitus with hyperglycemia, with long-term current use of insulin ([facility])  -     POCT hemoglobin A1c    Status post liver transplant  -     Ambulatory Referral to Dermatology; Future    Hypomagnesemia  -     Magnesium Oxide (Magnesium Oxide 400) 240 MG PACK; Take 400 mg by mouth 2 (two) times a day    Primary hypertension    Osteopenia, unspecified location    Murmur, cardiac    Other orders  -     amoxicillin (AMOXIL) 500 mg capsule; TAKE 4 CAPSULES BY MOUTH 1 HOUR PRIOR TO APPOINTMENT  -     latanoprost (XALATAN) 0 005 % ophthalmic solution; INSTILL 1 DROP INTO LEFT EYE AT BEDTIME FOR 3 WEEKS         Subjective     Chief Complaint   Patient presents with    Physical Exam       Mr Efrain Villarreal presented to office for follow-up of hypertension, and for discussion of results of echo and DEXA scan  He has no acute concerns today  He does have appointment with Nephrology and Cardiology in the next 2 weeks and will be following transplant medicine in November  He still did not get the booster shot for COVID, but will schedule soon  He is not sure if he has received hepatitis-A and B vaccines, but will confirm and follow-up in next visit  The following portions of the patient's history were reviewed and updated as appropriate: allergies, current medications, past family history, past medical history, past social history, past surgical history and problem list     Review of Systems   Constitutional: Negative for activity change, chills and fever  HENT: Negative for congestion  Respiratory: Negative for shortness of breath  Cardiovascular: Negative for chest pain  Gastrointestinal: Negative for diarrhea, nausea and vomiting  Genitourinary: Negative for difficulty urinating  Neurological: Negative for headaches  Objective     Vitals:Blood pressure 138/84, pulse 70, temperature (!) 96 7 °F (35 9 °C), temperature source Temporal, resp   rate 18, height 5' 2" (1 575 m), weight 92 4 kg (203 lb 9 6 oz), SpO2 98 %  Physical Exam:  Physical Exam  Constitutional:       Appearance: He is well-developed  HENT:      Head: Normocephalic and atraumatic  Right Ear: External ear normal       Left Ear: External ear normal    Eyes:      Conjunctiva/sclera: Conjunctivae normal       Pupils: Pupils are equal, round, and reactive to light  Cardiovascular:      Rate and Rhythm: Normal rate and regular rhythm  Heart sounds: Normal heart sounds  No murmur heard  No friction rub  Pulmonary:      Effort: Pulmonary effort is normal  No respiratory distress  Breath sounds: Normal breath sounds  No wheezing or rales  Musculoskeletal:         General: Normal range of motion  Cervical back: Normal range of motion and neck supple  Skin:     General: Skin is warm and dry  Neurological:      Mental Status: He is alert and oriented to person, place, and time

## 2022-09-07 ENCOUNTER — TELEPHONE (OUTPATIENT)
Dept: NEPHROLOGY | Facility: CLINIC | Age: 62
End: 2022-09-07

## 2022-09-07 NOTE — TELEPHONE ENCOUNTER
Appointment Confirmation   Person confirmed appointment with  If not patient, name of the person Patient     Date and time of appointment 09/08   Patient acknowledged and will be at appointment?  yes   Did you advise the patient that they will need a urine sample if they are a new patient? no   Did you advise the patient to bring their current medications for verification? (including any OTC) yes   Additional Information

## 2022-09-08 ENCOUNTER — OFFICE VISIT (OUTPATIENT)
Dept: NEPHROLOGY | Facility: CLINIC | Age: 62
End: 2022-09-08
Payer: COMMERCIAL

## 2022-09-08 ENCOUNTER — TELEPHONE (OUTPATIENT)
Dept: ENDOCRINOLOGY | Facility: CLINIC | Age: 62
End: 2022-09-08

## 2022-09-08 VITALS
HEIGHT: 63 IN | DIASTOLIC BLOOD PRESSURE: 82 MMHG | SYSTOLIC BLOOD PRESSURE: 140 MMHG | BODY MASS INDEX: 35.61 KG/M2 | WEIGHT: 201 LBS | HEART RATE: 70 BPM

## 2022-09-08 DIAGNOSIS — R80.8 OTHER PROTEINURIA: ICD-10-CM

## 2022-09-08 DIAGNOSIS — N18.9 CHRONIC KIDNEY DISEASE, UNSPECIFIED CKD STAGE: Primary | ICD-10-CM

## 2022-09-08 PROBLEM — R80.9 PROTEINURIA: Status: ACTIVE | Noted: 2022-09-08

## 2022-09-08 PROCEDURE — 3066F NEPHROPATHY DOC TX: CPT | Performed by: FAMILY MEDICINE

## 2022-09-08 PROCEDURE — 99214 OFFICE O/P EST MOD 30 MIN: CPT | Performed by: INTERNAL MEDICINE

## 2022-09-08 PROCEDURE — 3066F NEPHROPATHY DOC TX: CPT | Performed by: INTERNAL MEDICINE

## 2022-09-08 NOTE — PROGRESS NOTES
NEPHROLOGY PROGRESS NOTE    Pushpa Kaur 59 y o  male MRN: [id number]  Unit/Bed#:  Encounter: [id number]  Reason for Consult:  Chronic kidney disease and proteinuria    Patient is here for routine follow-up I have not seen close to 2 years it we had done tele visits on the prior to visits during [facility]  Patient looks well says things have been going well did have some podiatric surgery since I have seen him  No hospitalizations or changes in medications that he can recall  ASSESSMENT/PLAN:  1  Renal    The patient is chronic kidney disease likely due to diabetic nephropathy with proteinuria  His latest creatinine is 1 8 looking back over the last couple years it has range 1 6-1 7 so it is pretty much near his baseline  I told him the way to treat this to delay progression is glycemic control and his last A1c was around 7% so that is excellent  Blood pressure control lipid treatment with a statin and he is on an ACE-inhibitor  Protein estimation was about a gram on a urine microalbumin screen  This can underestimate total proteinuria as it only measures albumin so next time I will do urine protein creatinine ratio  Continue current medications  Continue I seen control  BMP in urine protein creatinine ratio before next visit    He was told to call if there is any problems or concerns before next visit  2  Liver transplant    Patient clinically is doing well he is on azathioprine and tacrolimus  Tacrolimus may play a role in his chronic renal disease but this is the mandatory medication for immunosuppression  Continue to focus on diabetic control to delay progression of his kidney disease  He follows with the transplant center  SUBJECTIVE:  Review of Systems   Constitutional: Negative for chills, diaphoresis, fever and night sweats  HENT: Negative  Eyes: Negative  Cardiovascular: Negative  Negative for chest pain, dyspnea on exertion, leg swelling and orthopnea  Respiratory: Negative  Negative for cough, shortness of breath, sputum production and wheezing  Gastrointestinal: Negative for abdominal pain, diarrhea, nausea and vomiting  Genitourinary: Negative for dysuria, flank pain, hematuria and incomplete emptying  Neurological: Negative for dizziness, focal weakness, headaches and weakness  Psychiatric/Behavioral: Negative for altered mental status, depression, hallucinations and hypervigilance  OBJECTIVE:  Current Weight:    [email or web address] com:     There were no vitals taken for this visit  , There is no height or weight on file to calculate BMI  [unfilled]    Physical Exam: There were no vitals taken for this visit  Physical Exam  Constitutional:       General: He is not in acute distress  Appearance: He is not toxic-appearing or diaphoretic  HENT:      Head: Normocephalic and atraumatic  Nose: Nose normal       Mouth/Throat:      Mouth: Mucous membranes are moist    Eyes:      General: No scleral icterus  Extraocular Movements: Extraocular movements intact  Cardiovascular:      Rate and Rhythm: Normal rate and regular rhythm  Heart sounds: No friction rub  No gallop  Comments: Trace edema  Pulmonary:      Effort: Pulmonary effort is normal  No respiratory distress  Breath sounds: No wheezing, rhonchi or rales  Abdominal:      General: Bowel sounds are normal  There is no distension  Palpations: Abdomen is soft  Tenderness: There is no abdominal tenderness  There is no rebound  Musculoskeletal:      Cervical back: Normal range of motion and neck supple  Neurological:      General: No focal deficit present  Mental Status: He is alert and oriented to person, place, and time  Mental status is at baseline  Psychiatric:         Mood and Affect: Mood normal          Behavior: Behavior normal          Thought Content:  Thought content normal          Judgment: Judgment normal          Medications:    Current Outpatient Medications:     amLODIPine (NORVASC) 10 mg tablet, TAKE 1 TABLET DAILY, Disp: 90 tablet, Rfl: 3    amoxicillin (AMOXIL) 500 mg capsule, TAKE 4 CAPSULES BY MOUTH 1 HOUR PRIOR TO APPOINTMENT, Disp: , Rfl:     atorvastatin (LIPITOR) 40 mg tablet, Take 1 tablet (40 mg total) by mouth daily at bedtime, Disp: 90 tablet, Rfl: 2    azaTHIOprine (IMURAN) 50 mg tablet, Take 1 tablet (50 mg total) by mouth daily, Disp: 90 tablet, Rfl: 3    BD Pen Needle Montserrat 2nd Gen 32G X 4 MM MISC, USE 4 (FOUR) TIMES A DAY, Disp: 100 each, Rfl: 3    Blood Pressure Monitor KIT, Use once a week (Patient not taking: No sig reported), Disp: 1 kit, Rfl: 0    Cholecalciferol (Vitamin D3) 50 MCG (2000 UT) capsule, Take 1 capsule by mouth daily Gel caps, Disp: , Rfl:     Diclofenac Sodium (VOLTAREN) 1 %, Apply 2 g topically 4 (four) times a day (Patient not taking: No sig reported), Disp: 50 g, Rfl: 0    fludrocortisone (FLORINEF) 0 1 mg tablet, Take 0 1 mg by mouth daily  , Disp: , Rfl:     hydrochlorothiazide (HYDRODIURIL) 12 5 mg tablet, Take 1 tablet (12 5 mg total) by mouth daily, Disp: 90 tablet, Rfl: 3    insulin degludec (Tresiba FlexTouch) 100 units/mL injection pen, INJECT 38 UNITS DAILY, Disp: 30 mL, Rfl: 3    insulin lispro (HumaLOG) 100 units/mL injection pen, 6 units before breakfast, Disp: 15 mL, Rfl: 1    Insulin Pen Needle (BD Pen Needle [place]serrat U/F) 32G X 4 MM MISC, Use 2 (two) times a day, Disp: 200 each, Rfl: 3    labetalol (NORMODYNE) 100 mg tablet, TAKE 1 TABLET EVERY 12 HOURS, Disp: 180 tablet, Rfl: 3    latanoprost (XALATAN) 0 005 % ophthalmic solution, INSTILL 1 DROP INTO LEFT EYE AT BEDTIME FOR 3 WEEKS, Disp: , Rfl:     lisinopril (ZESTRIL) 20 mg tablet, Take 1 tablet (20 mg total) by mouth daily, Disp: 90 tablet, Rfl: 3    Magnesium Oxide (Magnesium Oxide 400) 240 MG PACK, Take 400 mg by mouth 2 (two) times a day, Disp: 1 each, Rfl: 1    Multiple Vitamin (MULTIVITAMIN ADULT PO), Take by mouth Daily, Disp: , Rfl:     tacrolimus (PROGRAF) 1 mg capsule, Takes 4 tabs in am, 3 tabs in pm, Disp: , Rfl:     TRUE METRIX BLOOD GLUCOSE TEST test strip, Test twice daily, Disp: , Rfl:     Laboratory Results:  Lab Results   Component Value Date    WBC 5 37 06/07/2022    HGB 12 2 06/07/2022    HCT 38 1 06/07/2022    MCV 89 06/07/2022     06/07/2022     Lab Results   Component Value Date    SODIUM 140 06/07/2022    K 4 7 06/07/2022     06/07/2022    CO2 24 06/07/2022    BUN 50 (H) 06/07/2022    CREATININE 1 86 (H) 06/07/2022    GLUC 163 (H) 09/07/2021    CALCIUM 9 6 06/07/2022     Lab Results   Component Value Date    CALCIUM 9 6 06/07/2022    PHOS 4 0 10/24/2014     No results found for: LABPROT

## 2022-09-08 NOTE — LETTER
September 8, 2022     Sandie Brink MD  [address]    Patient: Demetri Ventura   YOB: 1960   Date of Visit: 9/8/2022       Dear Dr Shabnam Torres: Thank you for referring Demetri Ventura to me for evaluation  Below are my notes for this consultation  If you have questions, please do not hesitate to call me  I look forward to following your patient along with you  Sincerely,        Serenity Almazan MD        CC: No Recipients  Serenity Almazan MD  9/8/2022  8:48 AM  Sign when Signing Visit  NEPHROLOGY PROGRESS NOTE    Demetri Ventura 59 y o  male MRN: [id number]  Unit/Bed#:  Encounter: [id number]  Reason for Consult:  Chronic kidney disease and proteinuria    Patient is here for routine follow-up I have not seen close to 2 years it we had done tele visits on the prior to visits during [facility]  Patient looks well says things have been going well did have some podiatric surgery since I have seen him  No hospitalizations or changes in medications that he can recall  ASSESSMENT/PLAN:  1  Renal    The patient is chronic kidney disease likely due to diabetic nephropathy with proteinuria  His latest creatinine is 1 8 looking back over the last couple years it has range 1 6-1 7 so it is pretty much near his baseline  I told him the way to treat this to delay progression is glycemic control and his last A1c was around 7% so that is excellent  Blood pressure control lipid treatment with a statin and he is on an ACE-inhibitor  Protein estimation was about a gram on a urine microalbumin screen  This can underestimate total proteinuria as it only measures albumin so next time I will do urine protein creatinine ratio  Continue current medications  Continue I seen control  BMP in urine protein creatinine ratio before next visit    He was told to call if there is any problems or concerns before next visit      2  Liver transplant    Patient clinically is doing well he is on azathioprine and tacrolimus  Tacrolimus may play a role in his chronic renal disease but this is the mandatory medication for immunosuppression  Continue to focus on diabetic control to delay progression of his kidney disease  He follows with the transplant center  SUBJECTIVE:  Review of Systems   Constitutional: Negative for chills, diaphoresis, fever and night sweats  HENT: Negative  Eyes: Negative  Cardiovascular: Negative  Negative for chest pain, dyspnea on exertion, leg swelling and orthopnea  Respiratory: Negative  Negative for cough, shortness of breath, sputum production and wheezing  Gastrointestinal: Negative for abdominal pain, diarrhea, nausea and vomiting  Genitourinary: Negative for dysuria, flank pain, hematuria and incomplete emptying  Neurological: Negative for dizziness, focal weakness, headaches and weakness  Psychiatric/Behavioral: Negative for altered mental status, depression, hallucinations and hypervigilance  OBJECTIVE:  Current Weight:    [email or web address] com:     There were no vitals taken for this visit  , There is no height or weight on file to calculate BMI  [unfilled]    Physical Exam: There were no vitals taken for this visit  Physical Exam  Constitutional:       General: He is not in acute distress  Appearance: He is not toxic-appearing or diaphoretic  HENT:      Head: Normocephalic and atraumatic  Nose: Nose normal       Mouth/Throat:      Mouth: Mucous membranes are moist    Eyes:      General: No scleral icterus  Extraocular Movements: Extraocular movements intact  Cardiovascular:      Rate and Rhythm: Normal rate and regular rhythm  Heart sounds: No friction rub  No gallop  Comments: Trace edema  Pulmonary:      Effort: Pulmonary effort is normal  No respiratory distress  Breath sounds: No wheezing, rhonchi or rales  Abdominal:      General: Bowel sounds are normal  There is no distension        Palpations: Abdomen is soft       Tenderness: There is no abdominal tenderness  There is no rebound  Musculoskeletal:      Cervical back: Normal range of motion and neck supple  Neurological:      General: No focal deficit present  Mental Status: He is alert and oriented to person, place, and time  Mental status is at baseline  Psychiatric:         Mood and Affect: Mood normal          Behavior: Behavior normal          Thought Content:  Thought content normal          Judgment: Judgment normal          Medications:    Current Outpatient Medications:     amLODIPine (NORVASC) 10 mg tablet, TAKE 1 TABLET DAILY, Disp: 90 tablet, Rfl: 3    amoxicillin (AMOXIL) 500 mg capsule, TAKE 4 CAPSULES BY MOUTH 1 HOUR PRIOR TO APPOINTMENT, Disp: , Rfl:     atorvastatin (LIPITOR) 40 mg tablet, Take 1 tablet (40 mg total) by mouth daily at bedtime, Disp: 90 tablet, Rfl: 2    azaTHIOprine (IMURAN) 50 mg tablet, Take 1 tablet (50 mg total) by mouth daily, Disp: 90 tablet, Rfl: 3    BD Pen Needle Montserrat 2nd Gen 32G X 4 MM MISC, USE 4 (FOUR) TIMES A DAY, Disp: 100 each, Rfl: 3    Blood Pressure Monitor KIT, Use once a week (Patient not taking: No sig reported), Disp: 1 kit, Rfl: 0    Cholecalciferol (Vitamin D3) 50 MCG (2000 UT) capsule, Take 1 capsule by mouth daily Gel caps, Disp: , Rfl:     Diclofenac Sodium (VOLTAREN) 1 %, Apply 2 g topically 4 (four) times a day (Patient not taking: No sig reported), Disp: 50 g, Rfl: 0    fludrocortisone (FLORINEF) 0 1 mg tablet, Take 0 1 mg by mouth daily  , Disp: , Rfl:     hydrochlorothiazide (HYDRODIURIL) 12 5 mg tablet, Take 1 tablet (12 5 mg total) by mouth daily, Disp: 90 tablet, Rfl: 3    insulin degludec (Tresiba FlexTouch) 100 units/mL injection pen, INJECT 38 UNITS DAILY, Disp: 30 mL, Rfl: 3    insulin lispro (HumaLOG) 100 units/mL injection pen, 6 units before breakfast, Disp: 15 mL, Rfl: 1    Insulin Pen Needle (BD Pen Needle [place]serrat U/F) 32G X 4 MM MISC, Use 2 (two) times a day, Disp: 200 each, Rfl: 3    labetalol (NORMODYNE) 100 mg tablet, TAKE 1 TABLET EVERY 12 HOURS, Disp: 180 tablet, Rfl: 3    latanoprost (XALATAN) 0 005 % ophthalmic solution, INSTILL 1 DROP INTO LEFT EYE AT BEDTIME FOR 3 WEEKS, Disp: , Rfl:     lisinopril (ZESTRIL) 20 mg tablet, Take 1 tablet (20 mg total) by mouth daily, Disp: 90 tablet, Rfl: 3    Magnesium Oxide (Magnesium Oxide 400) 240 MG PACK, Take 400 mg by mouth 2 (two) times a day, Disp: 1 each, Rfl: 1    Multiple Vitamin (MULTIVITAMIN ADULT PO), Take by mouth Daily, Disp: , Rfl:     tacrolimus (PROGRAF) 1 mg capsule, Takes 4 tabs in am, 3 tabs in pm, Disp: , Rfl:     TRUE METRIX BLOOD GLUCOSE TEST test strip, Test twice daily, Disp: , Rfl:     Laboratory Results:  Lab Results   Component Value Date    WBC 5 37 06/07/2022    HGB 12 2 06/07/2022    HCT 38 1 06/07/2022    MCV 89 06/07/2022     06/07/2022     Lab Results   Component Value Date    SODIUM 140 06/07/2022    K 4 7 06/07/2022     06/07/2022    CO2 24 06/07/2022    BUN 50 (H) 06/07/2022    CREATININE 1 86 (H) 06/07/2022    GLUC 163 (H) 09/07/2021    CALCIUM 9 6 06/07/2022     Lab Results   Component Value Date    CALCIUM 9 6 06/07/2022    PHOS 4 0 10/24/2014     No results found for: LABPROT

## 2022-09-08 NOTE — PATIENT INSTRUCTIONS
You are here for follow-up you look great and you said her health really been good although he had some foot surgery  Your creatinine which is the blood test for the kidney function was 1 8 in June your going to be getting labs in the near future you could have them forwarded to me to make sure things are stable  Looking back couple years your creatinine ranges 1 6-1 7 so your pretty stable  Is probably from diabetes and we talked about the treatment which is sugar control in her doing great with her A1c around 7%  Blood pressure control cholesterol treatment and your on lisinopril to help try lower protein in the urine  Continue with blood pressure control  Continue current medications with no changes    Labs and follow-up as scheduled please call if you have any questions or concerns before next visit

## 2022-09-10 ENCOUNTER — APPOINTMENT (OUTPATIENT)
Dept: LAB | Facility: CLINIC | Age: 62
End: 2022-09-10
Payer: COMMERCIAL

## 2022-09-10 DIAGNOSIS — D68.9 POSTPARTUM COAGULATION DEFECTS, WITH DELIVERY (HCC): ICD-10-CM

## 2022-09-10 DIAGNOSIS — R79.1 ABNORMAL COAGULATION PROFILE: ICD-10-CM

## 2022-09-10 DIAGNOSIS — Z94.4 LIVER REPLACED BY TRANSPLANT (HCC): ICD-10-CM

## 2022-09-10 LAB
ALBUMIN SERPL BCP-MCNC: 4.4 G/DL (ref 3.5–5)
ALP SERPL-CCNC: 86 U/L (ref 34–104)
ALT SERPL W P-5'-P-CCNC: 19 U/L (ref 7–52)
ANION GAP SERPL CALCULATED.3IONS-SCNC: 6 MMOL/L (ref 4–13)
AST SERPL W P-5'-P-CCNC: 17 U/L (ref 13–39)
BASOPHILS # BLD AUTO: 0.04 THOUSANDS/ΜL (ref 0–0.1)
BASOPHILS NFR BLD AUTO: 1 % (ref 0–1)
BILIRUB SERPL-MCNC: 0.5 MG/DL (ref 0.2–1)
BUN SERPL-MCNC: 43 MG/DL (ref 5–25)
CALCIUM SERPL-MCNC: 9.6 MG/DL (ref 8.4–10.2)
CHLORIDE SERPL-SCNC: 106 MMOL/L (ref 96–108)
CO2 SERPL-SCNC: 28 MMOL/L (ref 21–32)
CREAT SERPL-MCNC: 1.91 MG/DL (ref 0.6–1.3)
EOSINOPHIL # BLD AUTO: 0.12 THOUSAND/ΜL (ref 0–0.61)
EOSINOPHIL NFR BLD AUTO: 2 % (ref 0–6)
ERYTHROCYTE [DISTWIDTH] IN BLOOD BY AUTOMATED COUNT: 14.2 % (ref 11.6–15.1)
GFR SERPL CREATININE-BSD FRML MDRD: 36 ML/MIN/1.73SQ M
GGT SERPL-CCNC: 41 U/L (ref 5–85)
GLUCOSE P FAST SERPL-MCNC: 103 MG/DL (ref 65–99)
HCT VFR BLD AUTO: 35.1 % (ref 36.5–49.3)
HGB BLD-MCNC: 11.6 G/DL (ref 12–17)
IMM GRANULOCYTES # BLD AUTO: 0.02 THOUSAND/UL (ref 0–0.2)
IMM GRANULOCYTES NFR BLD AUTO: 0 % (ref 0–2)
INR PPP: 1.05 (ref 0.84–1.19)
LYMPHOCYTES # BLD AUTO: 0.76 THOUSANDS/ΜL (ref 0.6–4.47)
LYMPHOCYTES NFR BLD AUTO: 15 % (ref 14–44)
MAGNESIUM SERPL-MCNC: 1.8 MG/DL (ref 1.9–2.7)
MCH RBC QN AUTO: 29.7 PG (ref 26.8–34.3)
MCHC RBC AUTO-ENTMCNC: 33 G/DL (ref 31.4–37.4)
MCV RBC AUTO: 90 FL (ref 82–98)
MONOCYTES # BLD AUTO: 0.44 THOUSAND/ΜL (ref 0.17–1.22)
MONOCYTES NFR BLD AUTO: 9 % (ref 4–12)
NEUTROPHILS # BLD AUTO: 3.66 THOUSANDS/ΜL (ref 1.85–7.62)
NEUTS SEG NFR BLD AUTO: 73 % (ref 43–75)
NRBC BLD AUTO-RTO: 0 /100 WBCS
PLATELET # BLD AUTO: 150 THOUSANDS/UL (ref 149–390)
PMV BLD AUTO: 12.3 FL (ref 8.9–12.7)
POTASSIUM SERPL-SCNC: 5.1 MMOL/L (ref 3.5–5.3)
PROT SERPL-MCNC: 6.9 G/DL (ref 6.4–8.4)
PROTHROMBIN TIME: 13.9 SECONDS (ref 11.6–14.5)
RBC # BLD AUTO: 3.91 MILLION/UL (ref 3.88–5.62)
SODIUM SERPL-SCNC: 140 MMOL/L (ref 135–147)
TACROLIMUS BLD-MCNC: 4.5 NG/ML (ref 2–20)
WBC # BLD AUTO: 5.04 THOUSAND/UL (ref 4.31–10.16)

## 2022-09-10 PROCEDURE — 80197 ASSAY OF TACROLIMUS: CPT

## 2022-09-10 PROCEDURE — 36415 COLL VENOUS BLD VENIPUNCTURE: CPT

## 2022-09-10 PROCEDURE — 82977 ASSAY OF GGT: CPT

## 2022-09-10 PROCEDURE — 85610 PROTHROMBIN TIME: CPT

## 2022-09-10 PROCEDURE — 83735 ASSAY OF MAGNESIUM: CPT

## 2022-09-10 PROCEDURE — 85025 COMPLETE CBC W/AUTO DIFF WBC: CPT

## 2022-09-10 PROCEDURE — 80053 COMPREHEN METABOLIC PANEL: CPT

## 2022-09-13 DIAGNOSIS — E78.5 DYSLIPIDEMIA: ICD-10-CM

## 2022-09-14 RX ORDER — ATORVASTATIN CALCIUM 40 MG/1
TABLET, FILM COATED ORAL
Qty: 90 TABLET | Refills: 3 | Status: SHIPPED | OUTPATIENT
Start: 2022-09-14

## 2022-09-20 ENCOUNTER — CONSULT (OUTPATIENT)
Dept: CARDIOLOGY CLINIC | Facility: CLINIC | Age: 62
End: 2022-09-20
Payer: COMMERCIAL

## 2022-09-20 VITALS
HEIGHT: 63 IN | SYSTOLIC BLOOD PRESSURE: 154 MMHG | HEART RATE: 73 BPM | BODY MASS INDEX: 35.45 KG/M2 | WEIGHT: 200.1 LBS | DIASTOLIC BLOOD PRESSURE: 82 MMHG

## 2022-09-20 DIAGNOSIS — I35.0 AORTIC STENOSIS, MODERATE: ICD-10-CM

## 2022-09-20 PROCEDURE — 99204 OFFICE O/P NEW MOD 45 MIN: CPT | Performed by: INTERNAL MEDICINE

## 2022-09-20 PROCEDURE — 3077F SYST BP >= 140 MM HG: CPT | Performed by: INTERNAL MEDICINE

## 2022-09-20 PROCEDURE — 93000 ELECTROCARDIOGRAM COMPLETE: CPT | Performed by: INTERNAL MEDICINE

## 2022-09-20 PROCEDURE — 3079F DIAST BP 80-89 MM HG: CPT | Performed by: INTERNAL MEDICINE

## 2022-09-20 NOTE — PROGRESS NOTES
Outpatient Consultation - General Cardiology   Gee Metzger 59 y o  male   MRN: [id number]  Encounter: [id number]      PCP: Lizbeth Rehman MD      Risk factors:  -diabetes, hypertension    History of Present Illness   Physician Requesting Consult: Consults   Reason for Consult / Principal Problem: Aortic Stenosis     HPI: Gee Metzger is a 59y o  year old male, hepatitis with liver failure status post liver transplant 9 years ago currently on azathioprine and tacro is for immunosuppression, diabetes well controlled on current insulin regimen, hypertension, CKD was likely related to diabetic nephropathy, mild-to-moderate aortic stenosis presenting for evaluation of AS  Patient states that he is fairly active at work moving multiple boxes and supplies  He denies any chest pain, shortness of breath at rest or on exertion, presyncopal symptoms, and/or syncopal events  Denies PND, orthopnea  The patient was found to have a murmur on exam at his primary care physician's office and transthoracic echo was ordered  On the [place] aortic stenosis appears mild to moderate with moderate calcifications on the aortic valve  Review of Systems  Review of system was conducted and was negative except for as stated in the HPI        Historical Information   Past Medical History:   Diagnosis Date    Bunion of right foot     Chronic kidney disease, stage III (moderate) ([address] )     RESOLVED: [id number]    Cirrhosis ([address] )     Diabetes mellitus ([address] )     Gout 10/6/18    Hepatic encephalopathy (HCC)     Hepatitis C     Hyperkalemia     Hyperlipidemia     Hypertension     Pancytopenia ([address] )     Status post liver transplant ([facility] [address] )     [id number]    Umbilical hernia     Wears glasses      Past Surgical History:   Procedure Laterality Date    ANKLE SURGERY      CHOLECYSTECTOMY      FOOT SURGERY  03/2022    Bunion removal    FRACTURE SURGERY      left foot    GALLBLADDER SURGERY      HIP SURGERY      LIVER TRANSPLANTATION LAST ASSESSED: [id number]    SD COLONOSCOPY FLX DX W/COLLJ SPEC WHEN PFRMD N/A 02/16/2017    Procedure: COLONOSCOPY;  Surgeon: Stephanie Paz MD;  Location:  GI LAB;   Service: Gastroenterology    SD CORRJ HALLUX VALGUS W/SESMDC W/DIST METAR OSTEOT Right 02/04/2022    Procedure: Damaris Johnson;  Surgeon: Alison Alfredo DPM;  [facility]: [facility] MAIN OR;  Service: Podiatry    TONSILLECTOMY       Social History     Substance and Sexual Activity   Alcohol Use Yes    Alcohol/week: 1 0 standard drink    Types: 1 Cans of beer per week    Comment: occassionally     Social History     Substance and Sexual Activity   Drug Use No     Social History     Tobacco Use   Smoking Status Never Smoker   Smokeless Tobacco Never Used     Family History: non-contributory    Meds/Allergies   Home Medications:   Current Outpatient Medications:     amLODIPine (NORVASC) 10 mg tablet, TAKE 1 TABLET DAILY, Disp: 90 tablet, Rfl: 3    amoxicillin (AMOXIL) 500 mg capsule, TAKE 4 CAPSULES BY MOUTH 1 HOUR PRIOR TO APPOINTMENT, Disp: , Rfl:     atorvastatin (LIPITOR) 40 mg tablet, TAKE 1 TABLET DAILY AT BEDTIME, Disp: 90 tablet, Rfl: 3    azaTHIOprine (IMURAN) 50 mg tablet, Take 1 tablet (50 mg total) by mouth daily, Disp: 90 tablet, Rfl: 3    BD Pen Needle Montserrat 2nd Gen 32G X 4 MM MISC, USE 4 (FOUR) TIMES A DAY, Disp: 100 each, Rfl: 3    Blood Pressure Monitor KIT, Use once a week (Patient not taking: No sig reported), Disp: 1 kit, Rfl: 0    Cholecalciferol (Vitamin D3) 50 MCG (2000 UT) capsule, Take 1 capsule by mouth daily Gel caps, Disp: , Rfl:     Diclofenac Sodium (VOLTAREN) 1 %, Apply 2 g topically 4 (four) times a day (Patient not taking: No sig reported), Disp: 50 g, Rfl: 0    fludrocortisone (FLORINEF) 0 1 mg tablet, Take 0 1 mg by mouth daily  , Disp: , Rfl:     hydrochlorothiazide (HYDRODIURIL) 12 5 mg tablet, Take 1 tablet (12 5 mg total) by mouth daily, Disp: 90 tablet, Rfl: 3    insulin degludec Chapo Garcia FlexTouch) 100 units/mL injection pen, INJECT 38 UNITS DAILY, Disp: 30 mL, Rfl: 3    insulin lispro (HumaLOG) 100 units/mL injection pen, 6 units before breakfast, Disp: 15 mL, Rfl: 1    Insulin Pen Needle (BD Pen Needle Montserrat U/F) 32G X 4 MM MISC, Use 2 (two) times a day, Disp: 200 each, Rfl: 3    labetalol (NORMODYNE) 100 mg tablet, TAKE 1 TABLET EVERY 12 HOURS, Disp: 180 tablet, Rfl: 3    latanoprost (XALATAN) 0 005 % ophthalmic solution, INSTILL 1 DROP INTO LEFT EYE AT BEDTIME FOR 3 WEEKS, Disp: , Rfl:     lisinopril (ZESTRIL) 20 mg tablet, Take 1 tablet (20 mg total) by mouth daily, Disp: 90 tablet, Rfl: 3    Magnesium Oxide (Magnesium Oxide 400) 240 MG PACK, Take 400 mg by mouth 2 (two) times a day, Disp: 1 each, Rfl: 1    Multiple Vitamin (MULTIVITAMIN ADULT PO), Take by mouth Daily, Disp: , Rfl:     tacrolimus (PROGRAF) 1 mg capsule, Takes 4 tabs in am, 3 tabs in pm, Disp: , Rfl:     TRUE METRIX BLOOD GLUCOSE TEST test strip, Test twice daily, Disp: , Rfl:     No Known Allergies      Objective   Vitals: There were no vitals taken for this visit  Physical Exam    GEN: Demetri Ventura appears well, alert and oriented x 3, pleasant and cooperative   HEENT:  Normocephalic, atraumatic, anicteric, moist mucous membranes  NECK:  carotid bruits   HEART: JVP 6, II/VI mid systolic murmur with no radiation to the carotids, regular rhythm, regular rate, normal S1 and S2,, clicks, gallops or rubs   LUNGS: Clear to auscultation bilaterally; no wheezes, rales, or rhonchi; respiration nonlabored   ABDOMEN:  Normoactive bowel sounds, soft, no tenderness, no distention  EXTREMITIES: peripheral pulses palpable; no edema  NEURO: no gross focal findings; cranial nerves grossly intact   SKIN:  Dry, intact, warm to touch    Lab Results: I have personally reviewed pertinent lab results      No results found for: HSTNI0, HSTNI2, HSTNI4, HSTNI  No results found for: NTBNP  Lab Results   Component Value Date    CHOL 174 03/10/2015    TRIG 267 (H) 05/05/2022    HDL 34 (L) 05/05/2022    LDLCALC 57 05/05/2022     Lab Results   Component Value Date     01/08/2016    K 5 1 09/10/2022    CO2 28 09/10/2022     09/10/2022    BUN 43 (H) 09/10/2022    CREATININE 1 91 (H) 09/10/2022    ALT 19 09/10/2022    AST 17 09/10/2022     Lab Results   Component Value Date    WBC 5 04 09/10/2022    HGB 11 6 (L) 09/10/2022    HCT 35 1 (L) 09/10/2022    MCV 90 09/10/2022     09/10/2022     Lab Results   Component Value Date    INR 1 05 09/10/2022    INR 1 05 06/07/2022    INR 1 02 05/05/2022         Imaging: I have personally reviewed pertinent reports  EKG:   Normal ecg    DEVICE INTERROGATION      Previous STRESS TEST:  No results found for this or any previous visit  No results found for this or any previous visit  No results found for this or any previous visit  Previous Cath/PCI:  No results found for this or any previous visit  No results found for this or any previous visit  No results found for this or any previous visit  ECHO:  No results found for this or any previous visit  Results for orders placed during the hospital encounter of 07/08/22    Echo complete w/ contrast if indicated    Interpretation Summary    Left Ventricle: Left ventricular cavity size is normal  Wall thickness is mildly increased  The left ventricular ejection fraction is 55%  Systolic function is normal  Wall motion is normal  Diastolic function is mildly abnormal, consistent with grade I (abnormal) relaxation    Left Atrium: The atrium is mildly dilated    Aortic Valve: The aortic valve is trileaflet  The leaflets are not thickened  The leaflets are moderately calcified  There is mildly reduced mobility  There is mild to moderate stenosis    Mitral Valve: There is mild annular calcification  There is mild regurgitation    Tricuspid Valve: There is mild regurgitation        FATOUMATA:  No results found for this or any previous visit  No results found for this or any previous visit  CMR:  No results found for this or any previous visit  No results found for this or any previous visit  No results found for this or any previous visit  HOLTER  No results found for this or any previous visit  No results found for this or any previous visit  Assessment/Plan    Tom Degroot is a 59y o  year old male, hepatitis with liver failure status post liver transplant 9 years ago currently on azathioprine and tacro is for immunosuppression, diabetes well controlled on current insulin regimen, hypertension, CKD was likely related to diabetic nephropathy, mild-to-moderate aortic stenosis presenting for evaluation of AS  Mild-moderate AS with no symptoms: Personally reviewed the patient's TTE which is revealing for normal EF and mild-moderate AS with moderate aortic valve calcifications  The patient is fairly active everyday at work and denies any symptoms and exam with just mid systolic right sternal border II/VI murmur with no radiation    -discussed progressive symptoms to look out for but did inform him progression can be insidious and he should follow-up closely  I did discuss that he will need eventual replacement most likely years down the line via either AVR or TAVR  -follow-up in 1 year with TTE before next visit       HLD:  -well controlled and continue lipitor 40 mg     HTN   -not optimally controlled in clinic today but patient states he has more normal readings at home  His nephrologist and PCP appear to be following this closely for the patient   -lisinopril 20 mg daily and would consider an addition of another agent for optimal control/      To Do:  -follow-up in 1 year with TTE before next visit       Case discussed and reviewed with Dr Shaylee Judd who agrees with my assessment and plan  Thank you for involving us in the care of your patient        Savannah Sanchez MD  Cardiology Fellow PGY-5      ==========================================================================================    Epic/ Allscripts/Care Everywhere records reviewed:     ** Please Note: Fluency DirectDictation voice to text software may have been used in the creation of this document   **

## 2022-09-26 ENCOUNTER — TELEPHONE (OUTPATIENT)
Dept: ENDOCRINOLOGY | Facility: CLINIC | Age: 62
End: 2022-09-26

## 2022-10-13 ENCOUNTER — TELEPHONE (OUTPATIENT)
Dept: ENDOCRINOLOGY | Facility: CLINIC | Age: 62
End: 2022-10-13

## 2022-10-13 DIAGNOSIS — I10 HYPERTENSION, UNSPECIFIED TYPE: ICD-10-CM

## 2022-10-13 RX ORDER — AMLODIPINE BESYLATE 10 MG/1
TABLET ORAL
Qty: 90 TABLET | Refills: 3 | Status: SHIPPED | OUTPATIENT
Start: 2022-10-13

## 2022-10-28 ENCOUNTER — TELEPHONE (OUTPATIENT)
Dept: ENDOCRINOLOGY | Facility: CLINIC | Age: 62
End: 2022-10-28

## 2022-11-07 ENCOUNTER — CONSULT (OUTPATIENT)
Dept: DERMATOLOGY | Facility: CLINIC | Age: 62
End: 2022-11-07

## 2022-11-07 VITALS — TEMPERATURE: 97.8 F | BODY MASS INDEX: 35.79 KG/M2 | HEIGHT: 63 IN | WEIGHT: 202 LBS

## 2022-11-07 DIAGNOSIS — Z94.4 STATUS POST LIVER TRANSPLANT (HCC): ICD-10-CM

## 2022-11-07 DIAGNOSIS — L81.3 CAFE AU LAIT SPOTS: Primary | ICD-10-CM

## 2022-11-07 NOTE — PROGRESS NOTES
[facility] Note     Patient Name: Rich He  Encounter Date: 11/7/2022    Have you been cared for by a [facility] Dermatologist in the last 3 years and, if so, which description applies to you? NO  I am considered a "new" patient and must complete all patient intake questions  I am MALE/not capable of bearing children  REVIEW OF SYSTEMS:  Have you recently had or currently have any of the following? · Recent fever or chills? No  · Any non-healing wound? No   PAST MEDICAL HISTORY:  Have you personally ever had or currently have any of the following? If "YES," then please provide more detail  · Skin cancer (such as Melanoma, Basal Cell Carcinoma, Squamous Cell Carcinoma? No  · Tuberculosis, HIV/AIDS, Hepatitis B or C: YES, History of Hep C   · Systemic Immunosuppression such as Diabetes, Biologic or Immunotherapy, Chemotherapy, Organ Transplantation, Bone Marrow Transplantation YES, History of Diabetes and had Liver transplant about 8 year ago   · Radiation Treatment No   FAMILY HISTORY:  Any "first degree relatives" (parent, brother, sister, or child) with the following? • Skin Cancer, Pancreatic or Other Cancer? No   PATIENT EXPERIENCE:    • Do you want the Dermatologist to perform a COMPLETE skin exam today including a clinical examination under the "bra and underwear" areas? Yes  • If necessary, do we have your permission to call and leave a detailed message on your Preferred Phone number that includes your specific medical information?   Yes      No Known Allergies   Current Outpatient Medications:   •  amLODIPine (NORVASC) 10 mg tablet, TAKE 1 TABLET DAILY, Disp: 90 tablet, Rfl: 3  •  atorvastatin (LIPITOR) 40 mg tablet, TAKE 1 TABLET DAILY AT BEDTIME, Disp: 90 tablet, Rfl: 3  •  azaTHIOprine (IMURAN) 50 mg tablet, Take 1 tablet (50 mg total) by mouth daily, Disp: 90 tablet, Rfl: 3  •  BD Pen Needle Montserrat 2nd Gen 32G X 4 MM MISC, USE 4 (FOUR) TIMES A DAY, Disp: 100 each, Rfl: 3  •  Blood Pressure Monitor KIT, Use once a week, Disp: 1 kit, Rfl: 0  •  Cholecalciferol (Vitamin D3) 50 MCG (2000 UT) capsule, Take 1 capsule by mouth daily Gel caps, Disp: , Rfl:   •  fludrocortisone (FLORINEF) 0 1 mg tablet, Take 0 1 mg by mouth daily  , Disp: , Rfl:   •  hydrochlorothiazide (HYDRODIURIL) 12 5 mg tablet, Take 1 tablet (12 5 mg total) by mouth daily, Disp: 90 tablet, Rfl: 3  •  insulin degludec [place] Gambler FlexTouch) 100 units/mL injection pen, INJECT 38 UNITS DAILY, Disp: 30 mL, Rfl: 3  •  insulin lispro (HumaLOG) 100 units/mL injection pen, 6 units before breakfast, Disp: 15 mL, Rfl: 1  •  Insulin Pen Needle (BD Pen Needle Montserrat U/F) 32G X 4 MM MISC, Use 2 (two) times a day, Disp: 200 each, Rfl: 3  •  labetalol (NORMODYNE) 100 mg tablet, TAKE 1 TABLET EVERY 12 HOURS, Disp: 180 tablet, Rfl: 3  •  latanoprost (XALATAN) 0 005 % ophthalmic solution, INSTILL 1 DROP INTO LEFT EYE AT BEDTIME FOR 3 WEEKS, Disp: , Rfl:   •  lisinopril (ZESTRIL) 20 mg tablet, Take 1 tablet (20 mg total) by mouth daily, Disp: 90 tablet, Rfl: 3  •  Magnesium Oxide (Magnesium Oxide 400) 240 MG PACK, Take 400 mg by mouth 2 (two) times a day, Disp: 1 each, Rfl: 1  •  Multiple Vitamin (MULTIVITAMIN ADULT PO), Take by mouth Daily, Disp: , Rfl:   •  tacrolimus (PROGRAF) 1 mg capsule, Takes 4 tabs in am, 3 tabs in pm, Disp: , Rfl:   •  TRUE METRIX BLOOD GLUCOSE TEST test strip, Test twice daily, Disp: , Rfl:   •  amoxicillin (AMOXIL) 500 mg capsule, TAKE 4 CAPSULES BY MOUTH 1 HOUR PRIOR TO APPOINTMENT (Patient not taking: Reported on 11/7/2022), Disp: , Rfl:   •  Diclofenac Sodium (VOLTAREN) 1 %, Apply 2 g topically 4 (four) times a day (Patient not taking: Reported on 11/7/2022), Disp: 50 g, Rfl: 0          • Whom besides the patient is providing clinical information about today's encounter?   o NO ADDITIONAL HISTORIAN (patient alone provided history)    Physical Exam and Assessment/Plan by Diagnosis:    STATUS POST LIVER TRANSPLANT  SKIN CANCER SCREENING        Assessment and Plan:  Based on a thorough discussion of this condition and the management approach to it (including a comprehensive discussion of the known risks, side effects and potential benefits of treatment), the patient (family) agrees to implement the following specific plan:  • Follow up in 6 months for a full body skin check  Appointment scheduled May 10th, 2023 at 10'10 am with Dr Onel Croft  TINEA PEDIS ("ATHLETE'S FOOT")    Physical Exam:  • Anatomic Location Affected:  Bilateral foot   • Morphological Description:  Pink scaly fissured plaques  • Pertinent Positives:  • Pertinent Negatives: Additional History of Present Condition:  Noticed during examination     Assessment and Plan:  Based on a thorough discussion of this condition and the management approach to it (including a comprehensive discussion of the known risks, side effects and potential benefits of treatment), the patient (family) agrees to implement the following specific plan:  • Oral Terbinafine 250 mg 1 tablet for 12 weeks; Check with your Liver physician to see if you can take oral Terbinafine for your Athletes foot  Tinea Pedis  Tinea pedis is a fungal infection of the foot and is in fact the most common fungal infection  Tinea pedis is caused by dermatophyte fungi with the three most common being Trichophyton (T ) rubrum, T  interdigitale and Epidermophyton floccosum  Tinea pedis most commonly involves the interdigital spaces, known as "athlete's foot " Other typical sites include the toenails, groin, and palms of the hands  There are four major manifestations of tinea pedis including chronic hyperkeratotic, chronic intertriginous, acute ulcerative and vesicobullous   Signs and symptoms include:   • Itchiness, redness, and scaling between the toes  • Scales covering the soles and sides of the feet  • Blisters over the inner aspect of the feet    It is particularly common in hot, tropical, and urban environments where sweating in the feet facilitate fungal growth  Risk factors for development include:  • Occlusive footwear  • Excessive swearing  • Diabetes or other underlying immunosuppression   • Poor peripheral circulation     The diagnosis of tinea pedis can usually be made via good history and physical exam due to its characteristic clinical features  Diagnosis can be confirmed by examining skin scrapings under the microscope  Cultures are occasionally done but may not be necessary if fungi are seen under microscopy  Other diagnoses to consider if patients do not respond to therapy include psoriasis, contact dermatitis, and eczema  Tinea pedis can be treated with topical antifungal drugs applied to affected areas on a repeated basis (usually 2 twice a day) for 2 to 4 weeks  Common topical medications include topical ketoconazole, allylamines, butenafine, ciclopirox, and tolnaftate  In cases that do not respond to topical therapy, oral antifungal agents may be used which include terbinafine, itraconazole, fluconazole and griseofulvin  These oral agents are also used to treat tinea capitis (fungal infection of the scalp) and onychomycosis (fungal infection of the nails)  Those with pre-existing liver problems are usually screened for liver function prior to starting oral terbinafine  Tinea pedis can be prevented by making sure feet are clean and dry with protective footwear worn in communal facilities  Other recommendations are:  • Using drying foot powders when wearing occlusive shoes   • Thoroughly dry shoes and boots prior to wearing them   • Making sure to clean contaminated bathroom floors with bleach   • Treatment of family members and other close contacts    CAFE AU LAIT MACULE    Physical Exam:  • Anatomic Location Affected:  Back and right upper arm  • Morphological Description:  Light tan macule  • Pertinent Positives:  • Pertinent Negatives:     Additional History of Present Condition:  Noticed during examination     Assessment and Plan:  Based on a thorough discussion of this condition and the management approach to it (including a comprehensive discussion of the known risks, side effects and potential benefits of treatment), the patient (family) agrees to implement the following specific plan:  • Reassured benign  • When outside we recommend using a wide brim hat, sunglasses, long sleeve and pants, sunscreen with SPF 26+ with reapplication every 2 hours, or SPF specific clothing   •     What is a café-au-lait macule? A café-au-lait macule is a common birthmark, presenting as a hyperpigmented skin patch with a sharp border and diameter of > 0 5 cm  It is also known as circumscribed café-au-lait hypermelanosis, von Recklinghausen spot, or abbreviated as 'CALM'  Who gets café-au-lait macules? Café-au-lait macules are usually present at birth (congenital) or appear in early infancy  They sometimes become apparent later in infancy, especially after exposure to the sun, which darkens the color  They may be isolated or associated with systemic diseases such as neurofibromatosis (NF), Shirin [place] syndrome, Legius syndrome, and Shidler syndrome with multiple lentigines syndrome  The overall prevalence of café-au-lait macules varies with race  • 0 3% of Caucasians  • 0 4% of Chinese  • 3% of Hispanics  • 18% of  Americans  Isolated café-au-lait macules are invariably solitary  More than 3 in a  or more than 5 in an  are uncommon and should lead to systemic evaluation, referral and close follow-up  What causes café-au-lait macules? The brown color of a café-au-lait macule is due to a pigment called melanin, which is produced in the skin by cells called melanocytes  • The epidermal melanocytes of an isolated café-au-lait macule have excessive numbers of melanosomes (intracellular pigment granules)   This is known as epidermal melanotic hypermelanosis  • The café-au-lait macules associated with NF type 1 and Leopard syndrome have increased proliferation of epidermal melanocytes (epidermal melanocytic hyperplasia)  •  A café-au-lait macules is not classified as a congenital melanocytic naevus  Multiple café-au-lait macules are related to several genetic syndromes  Neurofibromatosis type 1  • About half of those with neurofibromatosis type 1 (NF1) have an inherited mutation of the NF1 gene on chromosome 17  NF1 codes for neurofibromin, a tumor suppressor gene  Others have a sporadic mutation of the same gene  Neurofibromatosis type 2  • Like NF1, autosomal dominant and sporadic mutations of the NF2 gene are equally common  NF2 gene codes for Merlin protein, whose physiologic function is still under investigation  Legius syndrome  • Legius syndrome is caused by SPRED gene mutation, which generally controls the ANSHU pathway and interacts with neurofibromin  Shirin Yogesh syndrome  • Shirin Yogesh syndrome is caused by mutation of Gs protein, activating adenylate cyclase  [place]iota syndrome with multiple lentigines  • Bennie syndrome with multiple lentigines is due to the autosomal dominant inheritance of mutated PTPN11 gene on chromosome 12  The gene codes protein tyrosine phosphatase SHP-2  The next three syndromes are much rarer than those described above  Andersen syndrome  • Andersen syndrome is linked to mutation of NF1 gene, or is at least allelic to NF1, or is caused by mutation of contiguous genes to NF1  Bloom syndrome  • Bloom syndrome is due to the autosomal recessive mutation in BLM gene on chromosome 15  The gene product is DNA helicase, an enzyme essential to DNA repair to prevent chromosomal breakage  Silver-Raulito syndrome  • There are several genetic abnormalities associated with Silver-Raulito syndrome   The 2 most common are:  • The absence of methylation in the genetic imprinting process of H19 and IGF2 genes on chromosome 11  They are responsible for normal cell growth  This abnormality is found in 30% of cases of Silver-Raulito syndrome  • Inheritance of both chromosome 7s from mother (maternal uniparental disomy)  What are the clinical features of café-au-lait macules? Café-au-lait macules:  • Are light brown in color  • The pigment is evenly distributed  • They are well demarcated with a smooth or irregular border   • Their shape is either round or oval     The distribution and configuration of café-au-lait macules can be a clue to an underlying syndrome  NF1  NF1 is highly variable in appearance  The main [place] [id number] ([facility]) Consensus criteria for the diagnosis of NF1 are:  • 6 or more café-au-lait macules with diameter > 5 mm in children and > 15 mm in adults  They may be on the trunk or extremities  • Axillary or inguinal freckling  These are small café-au-lait macules and have the same microscopic appearance  There are 5 other [facility] criteria:  • Cutaneous neurofibromas (> 2) or a plexiform neurofibroma (> 1)  o Cutaneous neurofibromas are present in > 90% of adults with NF1  They are soft tumors that move with the skin, are not painful and do not have malignant potential   o Plexiform neurofibromas are found in 25% of NF1 patients  They are soft, painful, hyper pigmented plaques and sometimes have excessive hair (hypertrichosis)  If large enough, they can cause distortion of surrounding structures  Plexiform neurofibromas have the potential for malignant transformation  • Lisch nodules on iris (> 2)  • Optic pathway glioma  • Osseous dysplasia: sphenoid dysplasia; thinning and bowing of long bone; pseudoarthrosis  • First degree relatives diagnosed with NF 1 using these criteria    At least two criteria are required to make a working diagnosis of neurofibromatosis  The definitive diagnosis is made by confirming the presence of a genetic mutation      NF type 2  NF2 presents with unilateral or bilateral acoustic schwannoma (vestibular schwannoma)  Patients develop hearing problems, ringing in the ears (tinnitus), and dizziness  By the age of 27, nearly all patients with NF2 have bilateral vestibular schwannoma  • Other nervous system tumors in NF2 include cranial and peripheral nerve schwannomas, meningiomas, ependymomas, and astrocytomas  • 60-80% of patients with NF2 suffer from presenile posterior subcapsular lenticular opacities (cataracts)  • The main skin lesion arising in NF2 is an elastic, firm, well-demarcated subcutaneous neurilemmoma  Café-au-lait macules are less common  Legius syndrome  Patients with Legius syndrome have multiple café-au-lait macules (> 5mm in children and > 15 mm in adults)  Axillary freckling less common  They may rarely have macrocephaly, cognitive disabilities, and several congenital malformations such as Bennie-like facies, pectus excavatum/carinatum, and lipomas  Shirin [place] syndrome  Café-au-lait macules in Shirin Yogesh syndrome are fewer than in NF1, with more irregular borders  They are classically found on the midline  The clinical diagnosis of Unk Sandman syndrome is established by a triad of abnormalities:  • Polyostotic or monostotic fibrous dysplasia  • Café-au-lait macules  • Hyperfunctioning hormonal disorders such as precocious puberty, hyperthyroidism, hypercortisolism, hyperomatotropism, and hypophosphataemic rickets  Bennie syndrome with multiple lentigines  Bennie syndrome with multiple lentigines is also known as LEOPARD syndrome; the L of LEOPARD syndrome refers to prominent lentigines  These are multiple < 5 mm, well-demarcated, brown macules presenting on the whole skin without mucous membrane involvement  They appear at birth and continue increasing in number until puberty      LEOPARD is an acronym referring to the clinical findings required to make the diagnosis:  • Lentigines  • Electrocardiogram conduction defects  • Ocular hypertelorism  • Pulmonary stenosis  • Abnormalities of genitalia  • Retardation of growth  • Sensorineural deafness  Patients with Bennie syndrome with multiple lentigines may also develop café-au-lait macules, nail malformation, and hyperelastic skin  Andersen syndrome  Andersen syndrome is extremely rare with only 4 families described between the 65s to early 36s  Their café-au-lait macules in Andersen syndrome had similar characteristics to NF1  Other features of Andersen syndrome are:  • Stenosis of the pulmonary valve  • Mental retardation  • Short stature  • Relative macrocephaly  • Lisch nodules on the iris  • Neurofibromas  Bloom syndrome  Café-au-lait macules are not the main clinical finding in Bloom syndrome  Key characteristics of Bloom syndrome are:  • Growth deficiency  • Immunodeficiency  • Malignancies  • Predilection to diabetes  • Distinctive narrow facies  • High-pitched voice  • Hypogonadism and/or infertility    Silver-Raulito syndrome  Even though café-au-lait macules are not essential for diagnosis, they are common in children with Silver-Raulito syndrome  They are mainly located on chest, stomach, and extremities  The main features of Silver-Raulito syndrome are:  • Severe retardation of intrauterine and  growth  • Relative macrocephaly  • Small, triangular facies and prominent forehead  • Other congenital malformations, including clinodactyly V, hemihypoplasia, micrognathia, and ear anomalies    How is a café-au-lait macule diagnosed? Café-au-lait macules are diagnosed clinically  If significant in number and size, a complete clinical examination should be undertaken to determine whether an associated syndrome may be present  Syndromes may be diagnosed from their clinical manifestations or by genetic testing  What is the treatment for café-au-lait macules? No medical care is required to treat café-au-lait macules   Lasers reported to have successfully faded café-au-lait macules include:  • Pulsed-dye laser  • Er:YAG laser  • Q-switched Nd:YAG laser  • Q-switched saul or alexandrite laser  Results are inconsistent  One group has found lesions with an irregular margin respond better than those with a smooth, well-defined border  Risks for laser surgery include transient/permanent hyperpigmentation, hypopigmentation, and scarring  Treatment of underlying syndromes may be complex and require multidisciplinary care  What is the outcome for café-au-lait macules? Without treatment, café-au-lait macules persist lifelong  Results from lasers are not consistent  However, for those who responded to initial treatment, recurrence rates are reported to be low  MELANOCYTIC NEVI ("Moles")    Physical Exam:  • Anatomic Location Affected:    trunk and extremities  • Morphological Description:  Scattered, 1-4mm round to ovoid, symmetric and evenly bordered, regularly pigmented macules/papules without outliers other than if noted elsewhere in today's note  • Pertinent Positives:  • Pertinent Negatives: Additional History of Present Condition:      Assessment and Plan:  Based on a thorough discussion of this condition and the management approach to it (including a comprehensive discussion of the known risks, side effects and potential benefits of treatment), the patient (family) agrees to implement the following specific plan:  • The patient was encouraged to use an SPF30+ broad spectrum sunscreen daily and re-apply every 2-3 outdoors while outside  The importance of sun protection, self-skin exams, and sun avoidance was emphasized  An annual full body skin exam is recommended, and the patient was encouraged to return to the office sooner for any new or changing lesions of concerns  • Benign, reassured  • Annual skin check     Melanocytic Nevi  Melanocytic nevi ("moles") are tan or brown, raised or flat areas of the skin which have an increased number of melanocytes  Melanocytes are the cells in our body which make pigment and account for skin color  Some moles are present at birth (I e , "congenital nevi"), while others come up later in life (i e , "acquired nevi")  The sun can stimulate the body to make more moles  Sunburns are not the only thing that triggers more moles  Chronic sun exposure can do it too  Clinically distinguishing a healthy mole from melanoma may be difficult, even for experienced dermatologists  The "ABCDE's" of moles have been suggested as a means of helping to alert a person to a suspicious mole and the possible increased risk of melanoma  The suggestions for raising alert are as follows:    Asymmetry: Healthy moles tend to be symmetric, while melanomas are often asymmetric  Asymmetry means if you draw a line through the mole, the two halves do not match in color, size, shape, or surface texture  Asymmetry can be a result of rapid enlargement of a mole, the development of a raised area on a previously flat lesion, scaling, ulceration, bleeding or scabbing within the mole  Any mole that starts to demonstrate "asymmetry" should be examined promptly by a board certified dermatologist      Border: Healthy moles tend to have discrete, even borders  The border of a melanoma often blends into the normal skin and does not sharply delineate the mole from normal skin  Any mole that starts to demonstrate "uneven borders" should be examined promptly by a board certified dermatologist      Color: Healthy moles tend to be one color throughout  Melanomas tend to be made up of different colors ranging from dark black, blue, white, or red  Any mole that demonstrates a color change should be examined promptly by a board certified dermatologist      Diameter: Healthy moles tend to be smaller than 0 6 cm in size; an exception are "congenital nevi" that can be larger    Melanomas tend to grow and can often be greater than 0 6 cm (1/4 of an inch, or the size of a pencil eraser)  This is only a guideline, and many normal moles may be larger than 0 6 cm without being unhealthy  Any mole that starts to change in size (small to bigger or bigger to smaller) should be examined promptly by a board certified dermatologist      Evolving: Healthy moles tend to "stay the same "  Melanomas may often show signs of change or evolution such as a change in size, shape, color, or elevation  Any mole that starts to itch, bleed, crust, burn, hurt, or ulcerate or demonstrate a change or evolution should be examined promptly by a board certified dermatologist         Arlen Pink    Physical Exam:  • Anatomic Location Affected:  Head   • Morphological Description:  Scattered cherry red, variably sized papules  • Pertinent Positives:  • Pertinent Negatives: Additional History of Present Condition:      Assessment and Plan:  Based on a thorough discussion of this condition and the management approach to it (including a comprehensive discussion of the known risks, side effects and potential benefits of treatment), the patient (family) agrees to implement the following specific plan:  • Monitor for changes  • Benign, reassured  •     Assessment and Plan:    Cherry angioma, also known as [employer] spots, are benign vascular skin lesions  A "cherry angioma" is a firm red, blue or purple papule, 0 1-1 cm in diameter  When thrombosed, they can appear black in colour until evaluated with a dermatoscope when the red or purple colour is more easily seen  Cherry angioma may develop on any part of the body but most often appear on the scalp, face, lips and trunk  An angioma is due to proliferating endothelial cells; these are the cells that line the inside of a blood vessel  Angiomas can arise in early life or later in life; the most common type of angioma is a cherry angioma  Cherry angiomas are very common in males and females of any age or race   They are more noticeable in white skin than in skin of colour  They markedly increase in number from about the age of 36  There may be a family history of similar lesions  Eruptive cherry angiomas have been rarely reported to be associated with internal malignancy  The cause of angiomas is unknown  Genetic analysis of cherry angiomas has shown that they frequently carry specific somatic missense mutations in the GNAQ and GNA11 (Q209H) genes, which are involved in other vascular and melanocytic proliferations  SEBORRHEIC KERATOSIS; NON-INFLAMED    Physical Exam:  • Anatomic Location Affected:  Trunk, face   • Morphological Description:  Brown waxy variably sized "stuck-on" appearing papules with reassuring dermoscopy  • Pertinent Positives:  • Pertinent Negatives: Additional History of Present Condition:      Assessment and Plan:  Based on a thorough discussion of this condition and the management approach to it (including a comprehensive discussion of the known risks, side effects and potential benefits of treatment), the patient (family) agrees to implement the following specific plan:  • Monitor for changes  • Benign, reassured  •     Seborrheic Keratosis  A seborrheic keratosis is a harmless warty spot that appears during adult life as a common sign of skin aging  Seborrheic keratoses can arise on any area of skin, covered or uncovered, with the usual exception of the palms and soles  They do not arise from mucous membranes  Seborrheic keratoses can have highly variable appearance  Seborrheic keratoses are extremely common  It has been estimated that over 90% of adults over the age of 61 years have one or more of them  They occur in males and females of all races, typically beginning to erupt in the 35s or 45s  They are uncommon under the age of 21 years  The precise cause of seborrhoeic keratoses is not known  Seborrhoeic keratoses are considered degenerative in nature   As time goes by, seborrheic keratoses tend to become more numerous  Some people inherit a tendency to develop a very large number of them; some people may have hundreds of them  There is no easy way to remove multiple lesions on a single occasion  Unless a specific lesion is "inflamed" and is causing pain or stinging/burning or is bleeding, most insurance companies do not authorize treatment        Scribe Attestation    I,:  Sadnra Winkler am acting as a scribe while in the presence of the attending physician :       I,:  Jonna Carolina MD personally performed the services described in this documentation    as scribed in my presence :

## 2022-11-16 ENCOUNTER — TELEPHONE (OUTPATIENT)
Dept: ENDOCRINOLOGY | Facility: CLINIC | Age: 62
End: 2022-11-16

## 2022-12-01 ENCOUNTER — TELEPHONE (OUTPATIENT)
Dept: ENDOCRINOLOGY | Facility: CLINIC | Age: 62
End: 2022-12-01

## 2022-12-01 NOTE — TELEPHONE ENCOUNTER
Patient has not been on the [place] in a week and that is why his numbers were high  When he was taking it he was taking 38 units  Do you still want him to do the increase?

## 2022-12-21 ENCOUNTER — TELEPHONE (OUTPATIENT)
Dept: ENDOCRINOLOGY | Facility: CLINIC | Age: 62
End: 2022-12-21

## 2022-12-29 ENCOUNTER — CLINICAL SUPPORT (OUTPATIENT)
Dept: FAMILY MEDICINE CLINIC | Facility: CLINIC | Age: 62
End: 2022-12-29

## 2022-12-29 DIAGNOSIS — Z23 COVID-19 VACCINE ADMINISTERED: Primary | ICD-10-CM

## 2023-01-06 ENCOUNTER — TELEPHONE (OUTPATIENT)
Dept: ENDOCRINOLOGY | Facility: CLINIC | Age: 63
End: 2023-01-06

## 2023-01-06 ENCOUNTER — DOCUMENTATION (OUTPATIENT)
Dept: NEPHROLOGY | Facility: CLINIC | Age: 63
End: 2023-01-06

## 2023-01-13 ENCOUNTER — TELEPHONE (OUTPATIENT)
Dept: NEPHROLOGY | Facility: CLINIC | Age: 63
End: 2023-01-13

## 2023-01-19 DIAGNOSIS — Z79.4 TYPE 2 DIABETES MELLITUS WITH HYPERGLYCEMIA, WITH LONG-TERM CURRENT USE OF INSULIN (HCC): ICD-10-CM

## 2023-01-19 DIAGNOSIS — E11.65 TYPE 2 DIABETES MELLITUS WITH HYPERGLYCEMIA, WITH LONG-TERM CURRENT USE OF INSULIN (HCC): ICD-10-CM

## 2023-01-19 RX ORDER — INSULIN DEGLUDEC INJECTION 100 U/ML
38 INJECTION, SOLUTION SUBCUTANEOUS DAILY
Qty: 30 ML | Refills: 0 | Status: SHIPPED | OUTPATIENT
Start: 2023-01-19 | End: 2023-01-25

## 2023-01-25 ENCOUNTER — DOCUMENTATION (OUTPATIENT)
Dept: ENDOCRINOLOGY | Facility: CLINIC | Age: 63
End: 2023-01-25

## 2023-01-25 ENCOUNTER — TELEPHONE (OUTPATIENT)
Dept: ENDOCRINOLOGY | Facility: CLINIC | Age: 63
End: 2023-01-25

## 2023-01-25 DIAGNOSIS — Z79.4 TYPE 2 DIABETES MELLITUS WITH HYPERGLYCEMIA, WITH LONG-TERM CURRENT USE OF INSULIN (HCC): ICD-10-CM

## 2023-01-25 DIAGNOSIS — E11.65 TYPE 2 DIABETES MELLITUS WITH HYPERGLYCEMIA, WITH LONG-TERM CURRENT USE OF INSULIN (HCC): ICD-10-CM

## 2023-01-25 RX ORDER — INSULIN DEGLUDEC INJECTION 100 U/ML
40 INJECTION, SOLUTION SUBCUTANEOUS DAILY
Qty: 30 ML | Refills: 0 | Status: SHIPPED | OUTPATIENT
Start: 2023-01-25

## 2023-01-25 NOTE — TELEPHONE ENCOUNTER
Reviewed BG log - running higher in the morning  Would recommend to increase Tresiba to 40 units daily

## 2023-02-04 ENCOUNTER — APPOINTMENT (OUTPATIENT)
Dept: LAB | Facility: CLINIC | Age: 63
End: 2023-02-04

## 2023-02-08 ENCOUNTER — TELEPHONE (OUTPATIENT)
Dept: ENDOCRINOLOGY | Facility: CLINIC | Age: 63
End: 2023-02-08

## 2023-02-22 NOTE — PROGRESS NOTES
Established Patient Progress Note      Chief Complaint   Patient presents with   • Diabetes Type 2        History of Present Illness:   Keegan More is a 57 y o  male with hx of liver transplant, HTN, HLD and type 2 diabetes with long term use of insulin  Last seen in the office 5/23/22  Reports complications of retinopathy, microalbumunuria and CKD  POCT A1C was 7 5  Denies recent illness or hospitalizations  Denies recent severe hypoglycemic or severe hyperglycemic episodes  Denies any issues with his current regimen  Home glucose monitoring: are performed regularly  He follows with GI at [facility] for hx of liver transplant  They recommended he start on GLP-1  He also follows with nephrology and cardiology       Home blood glucose readings:   Before breakfast: 110-180  Before dinner: 100-140  Bedtime: 120-150     Current regimen:   Tresiba 40 units daily   Humalog 6 units before breakfast      Last Eye Exam: 4/27/22, NPDR in the right eye   Last Foot Exam: done today      Has hypertension: Taking amlodipine, HCTZ, lisinopril, and labetalol  Has hyperlipidemia: Taking atorvastatin        Vitamin D Deficiency: Taking 1,000 units daily     Patient Active Problem List   Diagnosis   • Hypertension   • Type 2 diabetes mellitus with ophthalmic complication, without long-term current use of insulin (HCC)   • Cirrhosis   • Hyperlipidemia   • Status post liver transplant   • Hyperglycemia   • Preop cardiovascular exam   • Osteopenia   • Male erectile disorder   • Type 2 diabetes mellitus with hyperglycemia, with long-term current use of insulin (HCC)   • Respiratory tract infection   • CKD (chronic kidney disease)   • Gout   • Swelling of left elbow   • Achilles tendinitis of right lower extremity   • Bunion of great toe of right foot   • Encephalopathy, portal systemic   • Immunosuppression (HCC)   • Hyperkalemia   • Murmur, cardiac   • Proteinuria      Past Medical History:   Diagnosis Date   • Bunion of right foot    • Chronic kidney disease, stage III (moderate) ([facility] )     RESOLVED: [id number]   • Cirrhosis ([facility] )    • Diabetes mellitus ([facility] )    • Gout 10/6/18   • Hepatic encephalopathy    • Hepatitis C    • Hyperkalemia    • Hyperlipidemia    • Hypertension    • Pancytopenia ([facility] )    • Status post liver transplant ([place] [address] )     [id number]   • Umbilical hernia    • Wears glasses       Past Surgical History:   Procedure Laterality Date   • ANKLE SURGERY     • CHOLECYSTECTOMY     • FOOT SURGERY  03/2022    Bunion removal   • FRACTURE SURGERY      left foot   • GALLBLADDER SURGERY     • HIP SURGERY     • LIVER TRANSPLANTATION      LAST ASSESSED: [id number]   • [place] COLONOSCOPY FLX DX W/COLLJ SPEC WHEN PFRMD N/A 02/16/2017    Procedure: COLONOSCOPY;  Surgeon: Eben Claudio MD;  Location:  GI LAB; Service: Gastroenterology   • NJ CORRJ HALLUX VALGUS W/SESMDC W/DIST [place] Right 02/04/2022    Procedure: [place];  Surgeon: Shanti Croft DPM;  Location: [address] [place] [address];  Service: Podiatry   • TONSILLECTOMY        Family History   Problem Relation Age of Onset   • Diabetes Mother         TYPE 2   • Hypertension Mother    • Stroke Mother 68        SYNDROME    • Hepatitis Brother         C   • Cirrhosis Brother         LIVER    • Cancer Family      Social History     Tobacco Use   • Smoking status: Never   • Smokeless tobacco: Never   Substance Use Topics   • Alcohol use:  Yes     Alcohol/week: 1 0 standard drink     Types: 1 Cans of beer per week     Comment: occassionally     No Known Allergies      Current Outpatient Medications:   •  amLODIPine (NORVASC) 10 mg tablet, TAKE 1 TABLET DAILY, Disp: 90 tablet, Rfl: 3  •  atorvastatin (LIPITOR) 40 mg tablet, TAKE 1 TABLET DAILY AT BEDTIME, Disp: 90 tablet, Rfl: 3  •  azaTHIOprine (IMURAN) 50 mg tablet, Take 1 tablet (50 mg total) by mouth daily, Disp: 90 tablet, Rfl: 3  •  BD Pen Needle Montserrat 2nd Gen 32G X 4 MM MISC, USE 4 (FOUR) TIMES A DAY, Disp: 100 each, Rfl: 3  • Blood Pressure Monitor KIT, Use once a week, Disp: 1 kit, Rfl: 0  •  Cholecalciferol (Vitamin D3) 50 MCG (2000 UT) capsule, Take 1 capsule by mouth daily Gel caps, Disp: , Rfl:   •  fludrocortisone (FLORINEF) 0 1 mg tablet, Take 0 1 mg by mouth daily  , Disp: , Rfl:   •  hydrochlorothiazide (HYDRODIURIL) 12 5 mg tablet, Take 1 tablet (12 5 mg total) by mouth daily, Disp: 90 tablet, Rfl: 3  •  insulin degludec Irl Chiles FlexTouch) 100 units/mL injection pen, Inject 40 Units under the skin daily, Disp: 30 mL, Rfl: 0  •  insulin lispro (HumaLOG) 100 units/mL injection pen, 6 units before breakfast, Disp: 15 mL, Rfl: 1  •  Insulin Pen Needle (BD Pen Needle [place]serrat U/F) 32G X 4 MM MISC, Use 2 (two) times a day, Disp: 200 each, Rfl: 3  •  labetalol (NORMODYNE) 100 mg tablet, TAKE 1 TABLET EVERY 12 HOURS, Disp: 180 tablet, Rfl: 3  •  latanoprost (XALATAN) 0 005 % ophthalmic solution, INSTILL 1 DROP INTO LEFT EYE AT BEDTIME FOR 3 WEEKS, Disp: , Rfl:   •  lisinopril (ZESTRIL) 20 mg tablet, Take 1 tablet (20 mg total) by mouth daily, Disp: 90 tablet, Rfl: 3  •  Magnesium Oxide (Magnesium Oxide 400) 240 MG PACK, Take 400 mg by mouth 2 (two) times a day, Disp: 1 each, Rfl: 1  •  Multiple Vitamin (MULTIVITAMIN ADULT PO), Take by mouth Daily, Disp: , Rfl:   •  semaglutide, 0 25 or 0 5 mg/dose, (Ozempic, 0 25 or 0 5 MG/DOSE,) 2 mg/1 5 mL injection pen, Inject 0 25 mg once weekly x 2 weeks then increase to 0 5 mg weekly  , Disp: 4 5 mL, Rfl: 1  •  tacrolimus (PROGRAF) 1 mg capsule, Takes 4 tabs in am, 3 tabs in pm, Disp: , Rfl:   •  TRUE METRIX BLOOD GLUCOSE TEST test strip, Test twice daily, Disp: , Rfl:   •  amoxicillin (AMOXIL) 500 mg capsule, TAKE 4 CAPSULES BY MOUTH 1 HOUR PRIOR TO APPOINTMENT (Patient not taking: Reported on 11/7/2022), Disp: , Rfl:   •  Diclofenac Sodium (VOLTAREN) 1 %, Apply 2 g topically 4 (four) times a day (Patient not taking: Reported on 11/7/2022), Disp: 50 g, Rfl: 0  •  MAGnesium-Oxide 400 (240 Mg) MG TABS, , Disp: , Rfl:     Review of Systems   Constitutional: Negative for activity change, appetite change, chills, diaphoresis, fatigue, fever and unexpected weight change  Eyes: Negative for visual disturbance  Respiratory: Negative for chest tightness and shortness of breath  Cardiovascular: Negative for chest pain, palpitations and leg swelling  Gastrointestinal: Negative for abdominal pain, constipation, diarrhea, nausea and vomiting  Endocrine: Negative for polydipsia, polyphagia and polyuria  Skin: Negative for color change, pallor, rash and wound  Neurological: Negative for dizziness, tremors, weakness, light-headedness and numbness  All other systems reviewed and are negative  Physical Exam:  Body mass index is 35 71 kg/m²  /80   Pulse 75   Ht 5' 3" (1 6 m)   Wt 91 4 kg (201 lb 9 6 oz)   BMI 35 71 kg/m²    Wt Readings from Last 3 Encounters:   02/23/23 91 4 kg (201 lb 9 6 oz)   11/07/22 91 6 kg (202 lb)   09/20/22 90 8 kg (200 lb 1 6 oz)       Physical Exam  Vitals reviewed  Constitutional:       Appearance: Normal appearance  He is obese  HENT:      Head: Normocephalic  Cardiovascular:      Rate and Rhythm: Normal rate and regular rhythm  Pulses: Normal pulses  no weak pulses          Dorsalis pedis pulses are 2+ on the right side and 2+ on the left side  Heart sounds: Normal heart sounds  No murmur heard  Pulmonary:      Effort: Pulmonary effort is normal  No respiratory distress  Breath sounds: Normal breath sounds  No wheezing, rhonchi or rales  Musculoskeletal:      Right lower leg: No edema  Left lower leg: No edema  Feet:      Right foot:      Skin integrity: No ulcer, skin breakdown, erythema, warmth, callus or dry skin  Left foot:      Skin integrity: No ulcer, skin breakdown, erythema, warmth, callus or dry skin  Skin:     General: Skin is warm and dry     Neurological:      Mental Status: He is alert and oriented to person, place, and time  Psychiatric:         Mood and Affect: Mood normal          Behavior: Behavior normal          Thought Content: Thought content normal          Judgment: Judgment normal        Patient's shoes and socks removed  Right Foot/Ankle   Right Foot Inspection  Skin Exam: skin normal and skin intact  No dry skin, no warmth, no callus, no erythema, no maceration, no abnormal color, no pre-ulcer, no ulcer and no callus  Toe Exam: ROM and strength within normal limits  No swelling, no tenderness, erythema and  no right toe deformity    Sensory   Vibration: intact  Monofilament testing: intact    Vascular  Capillary refills: < 3 seconds  The right DP pulse is 2+  Left Foot/Ankle  Left Foot Inspection  Skin Exam: skin normal and skin intact  No dry skin, no warmth, no erythema, no maceration, normal color, no pre-ulcer, no ulcer and no callus  Toe Exam: ROM and strength within normal limits  No swelling, no tenderness, no erythema and no left toe deformity  Sensory   Vibration: intact  Monofilament testing: intact    Vascular  Capillary refills: < 3 seconds  The left DP pulse is 2+       Assign Risk Category  No deformity present  No loss of protective sensation  No weak pulses  Risk: 0      Labs:   Lab Results   Component Value Date    HGBA1C 7 5 (A) 02/23/2023    HGBA1C 7 1 (A) 09/01/2022    HGBA1C 7 5 (H) 05/05/2022     Lab Results   Component Value Date    CREATININE 1 66 (H) 02/04/2023    CREATININE 1 91 (H) 09/10/2022    CREATININE 1 86 (H) 06/07/2022    BUN 38 (H) 02/04/2023     01/08/2016    K 4 9 02/04/2023     02/04/2023    CO2 31 02/04/2023     eGFR   Date Value Ref Range Status   02/04/2023 42 ml/min/1 73sq m Final     Lab Results   Component Value Date    CHOL 174 03/10/2015    HDL 34 (L) 05/05/2022    TRIG 267 (H) 05/05/2022     Lab Results   Component Value Date    ALT 20 02/04/2023    AST 15 02/04/2023    GGT 37 02/04/2023    ALKPHOS 87 02/04/2023    BILITOT 0 29 01/08/2016     Lab Results   Component Value Date    NKJ8AIZWZWGL 1 091 05/05/2022    VBJ6LDMNIVVE 1 432 06/01/2019    DRF4SFJVSRLJ 1 603 05/02/2017     Lab Results   Component Value Date    FREET4 0 91 05/05/2022       Impression & Plan:    Problem List Items Addressed This Visit        Endocrine    Type 2 diabetes mellitus with hyperglycemia, with long-term current use of insulin ([facility] ) - Primary     HGA1C above goal, BGs overall in range  Treatment regimen:   - He would benefit from GLP1, it was also recommended by his liver transplant doctor  I have ordered Ozempic 0 25 mg weekly x 2 weeks and then increase to 0 5 mg weekly  He denies hx of pancreatitis or family/personal hx of medullary thyroid cancer  Reviewed side effects in detail  Recommend he stop novolog when he increases Ozempic to 0 5    - Recommend he continue to check BGs 2-3 times daily and send BG log in 2 weeks  Discussed risks/complications associated with uncontrolled diabetes  Advised to adhere to diabetic diet, and recommended staying active/exercising routinely  Keep carbohydrates consistent to limit blood glucose fluctuations  Advised to call if blood sugars less than 70 mg/dl or over 300 mg/dl  Discussed symptoms and treatment of hypoglycemia  Recommended routine follow-up with podiatry and ophthalmology  Ordered blood work to complete prior to next visit  Lab Results   Component Value Date    HGBA1C 7 5 (A) 02/23/2023            Relevant Medications    semaglutide, 0 25 or 0 5 mg/dose, (Ozempic, 0 25 or 0 5 MG/DOSE,) 2 mg/1 5 mL injection pen    Other Relevant Orders    POCT hemoglobin A1c (Completed)    Microalbumin / creatinine urine ratio    Comprehensive metabolic panel       Cardiovascular and Mediastinum    Hypertension     BP above goal today  HTN is currently being managed by PCP - recommend he continue current medication regimen for now  Recommend he check BP at home regularly and aim for goal BP of <140/90  Other    Hyperlipidemia     Lipid panel at goal  Continue statin  Repeat lipid panel  Relevant Orders    Lipid panel       Orders Placed This Encounter   Procedures   • Microalbumin / creatinine urine ratio     Standing Status:   Future     Standing Expiration Date:   2/23/2024   • Lipid panel     This is a patient instruction: This test requires patient fasting for 10-12 hours or longer  Drinking of black coffee or black tea is acceptable  Standing Status:   Future     Standing Expiration Date:   2/23/2024   • Comprehensive metabolic panel     This is a patient instruction: Patient fasting for 8 hours or longer recommended  Standing Status:   Future     Standing Expiration Date:   2/23/2024   • POCT hemoglobin A1c       Patient Instructions   Start Ozempic 0 25 mg weekly for 2 weeks, then increase to 0 5 mg weekly  Once you start on 0 5 mg weekly, stop humalog  Continue Tresiba at 40 units once daily and send BGs logs in 2 weeks for review  Discussed with the patient and all questioned fully answered  He will call me if any problems arise      LETICIA Dorantes

## 2023-02-23 ENCOUNTER — OFFICE VISIT (OUTPATIENT)
Dept: ENDOCRINOLOGY | Facility: CLINIC | Age: 63
End: 2023-02-23

## 2023-02-23 ENCOUNTER — TELEPHONE (OUTPATIENT)
Dept: ENDOCRINOLOGY | Facility: CLINIC | Age: 63
End: 2023-02-23

## 2023-02-23 VITALS
HEART RATE: 75 BPM | HEIGHT: 63 IN | SYSTOLIC BLOOD PRESSURE: 150 MMHG | DIASTOLIC BLOOD PRESSURE: 80 MMHG | WEIGHT: 201.6 LBS | BODY MASS INDEX: 35.72 KG/M2

## 2023-02-23 DIAGNOSIS — E78.49 OTHER HYPERLIPIDEMIA: ICD-10-CM

## 2023-02-23 DIAGNOSIS — Z79.4 TYPE 2 DIABETES MELLITUS WITH HYPERGLYCEMIA, WITH LONG-TERM CURRENT USE OF INSULIN (HCC): Primary | ICD-10-CM

## 2023-02-23 DIAGNOSIS — I10 PRIMARY HYPERTENSION: ICD-10-CM

## 2023-02-23 DIAGNOSIS — E11.65 TYPE 2 DIABETES MELLITUS WITH HYPERGLYCEMIA, WITH LONG-TERM CURRENT USE OF INSULIN (HCC): Primary | ICD-10-CM

## 2023-02-23 LAB — SL AMB POCT HEMOGLOBIN AIC: 7.5 (ref ?–6.5)

## 2023-02-23 RX ORDER — MAGNESIUM OXIDE TAB 400 MG (241.3 MG ELEMENTAL MG) 400 (241.3 MG) MG
TAB ORAL
COMMUNITY
Start: 2023-02-22

## 2023-02-23 NOTE — ASSESSMENT & PLAN NOTE
HGA1C above goal, BGs overall in range  Treatment regimen:   - He would benefit from GLP1, it was also recommended by his liver transplant doctor  I have ordered Ozempic 0 25 mg weekly x 2 weeks and then increase to 0 5 mg weekly  He denies hx of pancreatitis or family/personal hx of medullary thyroid cancer  Reviewed side effects in detail  Recommend he stop novolog when he increases Ozempic to 0 5    - Recommend he continue to check BGs 2-3 times daily and send BG log in 2 weeks  Discussed risks/complications associated with uncontrolled diabetes  Advised to adhere to diabetic diet, and recommended staying active/exercising routinely  Keep carbohydrates consistent to limit blood glucose fluctuations  Advised to call if blood sugars less than 70 mg/dl or over 300 mg/dl  Discussed symptoms and treatment of hypoglycemia  Recommended routine follow-up with podiatry and ophthalmology  Ordered blood work to complete prior to next visit    Lab Results   Component Value Date    HGBA1C 7 5 (A) 02/23/2023

## 2023-02-23 NOTE — PATIENT INSTRUCTIONS
Start Ozempic 0 25 mg weekly for 2 weeks, then increase to 0 5 mg weekly  Once you start on 0 5 mg weekly, stop humalog  Continue Tresiba at 40 units once daily and send BGs logs in 2 weeks for review

## 2023-02-23 NOTE — ASSESSMENT & PLAN NOTE
BP above goal today  HTN is currently being managed by PCP - recommend he continue current medication regimen for now  Recommend he check BP at home regularly and aim for goal BP of <140/90

## 2023-03-09 ENCOUNTER — TELEPHONE (OUTPATIENT)
Dept: ENDOCRINOLOGY | Facility: CLINIC | Age: 63
End: 2023-03-09

## 2023-03-09 NOTE — TELEPHONE ENCOUNTER
Reviewed BG log - BGs look okay  How is he tolerating Ozempic so far? If going well can increase to 0 5 mg dose  I would recommend to continue humalog for now and send another BG log in 2 weeks once on the ozempic 0 5 mg dose for 2 weeks

## 2023-03-24 DIAGNOSIS — E11.65 TYPE 2 DIABETES MELLITUS WITH HYPERGLYCEMIA, WITH LONG-TERM CURRENT USE OF INSULIN (HCC): ICD-10-CM

## 2023-03-24 DIAGNOSIS — Z79.4 TYPE 2 DIABETES MELLITUS WITH HYPERGLYCEMIA, WITH LONG-TERM CURRENT USE OF INSULIN (HCC): ICD-10-CM

## 2023-03-24 RX ORDER — INSULIN DEGLUDEC INJECTION 100 U/ML
INJECTION, SOLUTION SUBCUTANEOUS
Qty: 30 ML | Refills: 3 | Status: SHIPPED | OUTPATIENT
Start: 2023-03-24

## 2023-03-29 DIAGNOSIS — R80.9 PROTEINURIA, UNSPECIFIED TYPE: ICD-10-CM

## 2023-03-29 RX ORDER — LISINOPRIL 20 MG/1
TABLET ORAL
Qty: 90 TABLET | Refills: 3 | Status: SHIPPED | OUTPATIENT
Start: 2023-03-29

## 2023-05-02 ENCOUNTER — TELEPHONE (OUTPATIENT)
Dept: ENDOCRINOLOGY | Facility: CLINIC | Age: 63
End: 2023-05-02

## 2023-05-02 LAB
LEFT EYE DIABETIC RETINOPATHY: POSITIVE
RIGHT EYE DIABETIC RETINOPATHY: POSITIVE

## 2023-05-09 ENCOUNTER — OFFICE VISIT (OUTPATIENT)
Dept: FAMILY MEDICINE CLINIC | Facility: CLINIC | Age: 63
End: 2023-05-09

## 2023-05-09 ENCOUNTER — TELEPHONE (OUTPATIENT)
Dept: FAMILY MEDICINE CLINIC | Facility: CLINIC | Age: 63
End: 2023-05-09

## 2023-05-09 VITALS
HEIGHT: 63 IN | OXYGEN SATURATION: 98 % | HEART RATE: 84 BPM | BODY MASS INDEX: 35.08 KG/M2 | SYSTOLIC BLOOD PRESSURE: 166 MMHG | RESPIRATION RATE: 20 BRPM | TEMPERATURE: 98.3 F | WEIGHT: 198 LBS | DIASTOLIC BLOOD PRESSURE: 82 MMHG

## 2023-05-09 DIAGNOSIS — M79.601 RIGHT ARM PAIN: Primary | ICD-10-CM

## 2023-05-09 RX ORDER — IBUPROFEN 600 MG/1
600 TABLET ORAL EVERY 6 HOURS SCHEDULED
Qty: 20 TABLET | Refills: 0 | Status: SHIPPED | OUTPATIENT
Start: 2023-05-09 | End: 2023-05-14

## 2023-05-09 NOTE — PROGRESS NOTES
Name: Jerrlyn Ahumada      : 1960      MRN: [id number]  Encounter Provider: John Montgomery DO  Encounter Date: 2023   Encounter department: [address] [place] [address]  Right arm pain  Assessment & Plan:  56yo male presenting c/o 3 days R elbow pain, difficulty flexing R elbow  Cutting grass at the time, works in a [address] regularly  Pt has been managing symptoms with topical analgesics  Pt afebrile  Physical exam shows swollen, erythematous region on extensor surface of R arm, restricted ROM with R supination and R elbow flexion  Symptoms likely 2/2 R lateral epicondylitis    Plan:  - D/w Dr Bhargavi Puente, who is agreeable to plan  - Recommend pt take ibuprofen 400-600mg up to 4x/day scheduled for the next 5 days  Pt would like to purchase OTC  - Recommend pt c/w topical analgesics, ice, elevation, rest whenever possible  - F/u PRN           Subjective      61yo female presenting c/o 3 days R arm pain and difficulty moving arm since   Pt reports he was cutting his grass at the time and doing other yard work  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  Pt was concerned because pain has not gotten better, but he has been able to go to work since the pain started  Pt has been managing symptoms with topical analgesics  Review of Systems   Constitutional: Negative for chills and fever  HENT: Negative for congestion  Respiratory: Negative for shortness of breath  Cardiovascular: Negative for chest pain and palpitations  Gastrointestinal: Negative for abdominal pain  Musculoskeletal: Positive for arthralgias  Neurological: Negative for weakness and numbness  Psychiatric/Behavioral: Negative for sleep disturbance         Current Outpatient Medications on File Prior to Visit   Medication Sig   • amLODIPine (NORVASC) 10 mg tablet TAKE 1 TABLET DAILY   • atorvastatin (LIPITOR) 40 mg tablet TAKE 1 TABLET DAILY AT BEDTIME   • azaTHIOprine (IMURAN) 50 mg tablet Take 1 tablet (50 mg total) by mouth daily   • BD Pen Needle Montserrat 2nd Gen 32G X 4 MM MISC USE 4 (FOUR) TIMES A DAY   • Blood Pressure Monitor KIT Use once a week   • Cholecalciferol (Vitamin D3) 50 MCG (2000 UT) capsule Take 1 capsule by mouth daily Gel caps   • fludrocortisone (FLORINEF) 0 1 mg tablet Take 0 1 mg by mouth daily     • hydrochlorothiazide (HYDRODIURIL) 12 5 mg tablet Take 1 tablet (12 5 mg total) by mouth daily   • insulin lispro (HumaLOG) 100 units/mL injection pen 6 units before breakfast   • Insulin Pen Needle (BD Pen Needle Montserrat U/F) 32G X 4 MM MISC Use 2 (two) times a day   • labetalol (NORMODYNE) 100 mg tablet TAKE 1 TABLET EVERY 12 HOURS   • latanoprost (XALATAN) 0 005 % ophthalmic solution INSTILL 1 DROP INTO LEFT EYE AT BEDTIME FOR 3 WEEKS   • lisinopril (ZESTRIL) 20 mg tablet TAKE 1 TABLET DAILY   • Magnesium Oxide (Magnesium Oxide 400) 240 MG PACK Take 400 mg by mouth 2 (two) times a day   • Multiple Vitamin (MULTIVITAMIN ADULT PO) Take by mouth Daily   • semaglutide, 0 25 or 0 5 mg/dose, (Ozempic, 0 25 or 0 5 MG/DOSE,) 2 mg/1 5 mL injection pen Inject 0 25 mg once weekly x 2 weeks then increase to 0 5 mg weekly     • tacrolimus (PROGRAF) 1 mg capsule Takes 4 tabs in am, 3 tabs in pm   • Tresiba FlexTouch 100 units/mL injection pen INJECT 38 UNITS UNDER THE SKIN DAILY   • TRUE METRIX BLOOD GLUCOSE TEST test strip Test twice daily   • amoxicillin (AMOXIL) 500 mg capsule TAKE 4 CAPSULES BY MOUTH 1 HOUR PRIOR TO APPOINTMENT (Patient not taking: Reported on 11/7/2022)   • Diclofenac Sodium (VOLTAREN) 1 % Apply 2 g topically 4 (four) times a day (Patient not taking: Reported on 11/7/2022)   • MAGnesium-Oxide 400 (240 Mg) MG TABS  (Patient not taking: Reported on 2/23/2023)       Objective     /82 (BP Location: Left arm, Patient Position: Sitting, Cuff Size: Standard)   Pulse 84   Temp 98 3 °F (36 8 °C) "(Temporal)   Resp 20   Ht 5' 3\" (1 6 m)   Wt 89 8 kg (198 lb)   SpO2 98%   BMI 35 07 kg/m²     Physical Exam  Vitals reviewed  Constitutional:       Appearance: Normal appearance  HENT:      Head: Normocephalic and atraumatic  Cardiovascular:      Rate and Rhythm: Normal rate and regular rhythm  Heart sounds: Normal heart sounds  Pulmonary:      Effort: Pulmonary effort is normal       Breath sounds: Normal breath sounds  Abdominal:      Tenderness: There is no abdominal tenderness  Musculoskeletal:         General: Swelling (R lateral elbow, extensor surface of arm) and tenderness (R extensor surface of arm) present  No deformity  Comments: Restricted ROM R elbow flexion, R arm supination   Skin:     General: Skin is warm  Findings: Erythema (R extensor surface of arm around R epicondyle) present  Neurological:      Mental Status: He is alert     Psychiatric:         Mood and Affect: Mood normal          Behavior: Behavior normal           Calista Bravo DO"

## 2023-05-09 NOTE — TELEPHONE ENCOUNTER
Hi Dr Marisela Bowers was in this morning to see you for right arm pain  He mentioned you would call in Ibuprofen to [facility] but, it not showing in the med list   Patient would like to pick it up sometime this afternoon

## 2023-05-09 NOTE — ASSESSMENT & PLAN NOTE
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

## 2023-05-10 ENCOUNTER — OFFICE VISIT (OUTPATIENT)
Dept: URGENT CARE | Age: 63
End: 2023-05-10

## 2023-05-10 ENCOUNTER — OFFICE VISIT (OUTPATIENT)
Dept: DERMATOLOGY | Facility: CLINIC | Age: 63
End: 2023-05-10

## 2023-05-10 VITALS
DIASTOLIC BLOOD PRESSURE: 74 MMHG | TEMPERATURE: 97.2 F | OXYGEN SATURATION: 95 % | RESPIRATION RATE: 16 BRPM | HEART RATE: 103 BPM | SYSTOLIC BLOOD PRESSURE: 170 MMHG

## 2023-05-10 VITALS — TEMPERATURE: 97.5 F | HEIGHT: 63 IN | BODY MASS INDEX: 35.14 KG/M2 | WEIGHT: 198.3 LBS

## 2023-05-10 DIAGNOSIS — M70.21 OLECRANON BURSITIS OF RIGHT ELBOW: Primary | ICD-10-CM

## 2023-05-10 DIAGNOSIS — B35.3 TINEA PEDIS OF BOTH FEET: ICD-10-CM

## 2023-05-10 DIAGNOSIS — L82.1 SEBORRHEIC KERATOSIS: ICD-10-CM

## 2023-05-10 DIAGNOSIS — D22.9 MULTIPLE MELANOCYTIC NEVI: Primary | ICD-10-CM

## 2023-05-10 DIAGNOSIS — Z94.4 STATUS POST LIVER TRANSPLANT (HCC): ICD-10-CM

## 2023-05-10 NOTE — LETTER
May 10, 2023     Patient: Beau Hess   YOB: 1960   Date of Visit: 5/10/2023       To Whom it May Concern:    Beau Hess was seen in my clinic on 5/10/2023  He may return to work on 5/12/2023  If you have any questions or concerns, please don't hesitate to call           Sincerely,          LETICIA Duggan        CC: No Recipients

## 2023-05-10 NOTE — PATIENT INSTRUCTIONS
"STATUS POST LIVER TRANSPLANT  SKIN CANCER SCREENING          Assessment and Plan:  Based on a thorough discussion of this condition and the management approach to it (including a comprehensive discussion of the known risks, side effects and potential benefits of treatment), the patient (family) agrees to implement the following specific plan:  Return for full skin check in one year   MELANOCYTIC NEVI (\"Moles\")    Physical Exam:  Anatomic Location Affected:   Mostly on sun-exposed areas of the trunk and extremities      Assessment and Plan:  Based on a thorough discussion of this condition and the management approach to it (including a comprehensive discussion of the known risks, side effects and potential benefits of treatment), the patient (family) agrees to implement the following specific plan:  When outside we recommend using a wide brim hat, sunglasses, long sleeve and pants, sunscreen with SPF 14+ with reapplication every 2 hours, or SPF specific clothing   Benign, reassured  Annual skin check     Melanocytic Nevi  Melanocytic nevi (\"moles\") are tan or brown, raised or flat areas of the skin which have an increased number of melanocytes  Melanocytes are the cells in our body which make pigment and account for skin color  Some moles are present at birth (I e , \"congenital nevi\"), while others come up later in life (i e , \"acquired nevi\")  The sun can stimulate the body to make more moles  Sunburns are not the only thing that triggers more moles  Chronic sun exposure can do it too  Clinically distinguishing a healthy mole from melanoma may be difficult, even for experienced dermatologists  The \"ABCDE's\" of moles have been suggested as a means of helping to alert a person to a suspicious mole and the possible increased risk of melanoma  The suggestions for raising alert are as follows:    Asymmetry: Healthy moles tend to be symmetric, while melanomas are often asymmetric    Asymmetry means if you draw a " "line through the mole, the two halves do not match in color, size, shape, or surface texture  Asymmetry can be a result of rapid enlargement of a mole, the development of a raised area on a previously flat lesion, scaling, ulceration, bleeding or scabbing within the mole  Any mole that starts to demonstrate \"asymmetry\" should be examined promptly by a board certified dermatologist      Border: Healthy moles tend to have discrete, even borders  The border of a melanoma often blends into the normal skin and does not sharply delineate the mole from normal skin  Any mole that starts to demonstrate \"uneven borders\" should be examined promptly by a board certified dermatologist      Color: Healthy moles tend to be one color throughout  Melanomas tend to be made up of different colors ranging from dark black, blue, white, or red  Any mole that demonstrates a color change should be examined promptly by a board certified dermatologist      Diameter: Healthy moles tend to be smaller than 0 6 cm in size; an exception are \"congenital nevi\" that can be larger  Melanomas tend to grow and can often be greater than 0 6 cm (1/4 of an inch, or the size of a pencil eraser)  This is only a guideline, and many normal moles may be larger than 0 6 cm without being unhealthy  Any mole that starts to change in size (small to bigger or bigger to smaller) should be examined promptly by a board certified dermatologist      Evolving: Healthy moles tend to \"stay the same  \"  Melanomas may often show signs of change or evolution such as a change in size, shape, color, or elevation    Any mole that starts to itch, bleed, crust, burn, hurt, or ulcerate or demonstrate a change or evolution should be examined promptly by a board certified dermatologist         Mayco Denise; NON-INFLAMED    Physical Exam:  Anatomic Location Affected:  trunk      Assessment and Plan:  Based on a thorough discussion of this condition and the management " "approach to it (including a comprehensive discussion of the known risks, side effects and potential benefits of treatment), the patient (family) agrees to implement the following specific plan:  Monitor for changes  Benign, reassured      Seborrheic Keratosis  A seborrheic keratosis is a harmless warty spot that appears during adult life as a common sign of skin aging  Seborrheic keratoses can arise on any area of skin, covered or uncovered, with the usual exception of the palms and soles  They do not arise from mucous membranes  Seborrheic keratoses can have highly variable appearance  Seborrheic keratoses are extremely common  It has been estimated that over 90% of adults over the age of 61 years have one or more of them  They occur in males and females of all races, typically beginning to erupt in the 35s or 45s  They are uncommon under the age of 21 years  The precise cause of seborrhoeic keratoses is not known  Seborrhoeic keratoses are considered degenerative in nature  As time goes by, seborrheic keratoses tend to become more numerous  Some people inherit a tendency to develop a very large number of them; some people may have hundreds of them  There is no easy way to remove multiple lesions on a single occasion  Unless a specific lesion is \"inflamed\" and is causing pain or stinging/burning or is bleeding, most insurance companies do not authorize treatment  TINEA PEDIS (\"ATHLETE'S FOOT\")     Physical Exam:  Anatomic Location Affected:  Bilateral foot        Assessment and Plan:  Based on a thorough discussion of this condition and the management approach to it (including a comprehensive discussion of the known risks, side effects and potential benefits of treatment), the patient (family) agrees to implement the following specific plan:  Start ciclopirox 0 77 %cream   Apply topically to bilateral footy twice a day for 2 weeks     Tinea Pedis  Tinea pedis is a fungal infection of the foot and is " "in fact the most common fungal infection  Tinea pedis is caused by dermatophyte fungi with the three most common being Trichophyton (T ) rubrum, T  interdigitale and Epidermophyton floccosum  Tinea pedis most commonly involves the interdigital spaces, known as \"athlete's foot  \" Other typical sites include the toenails, groin, and palms of the hands  There are four major manifestations of tinea pedis including chronic hyperkeratotic, chronic intertriginous, acute ulcerative and vesicobullous  Signs and symptoms include:   Itchiness, redness, and scaling between the toes  Scales covering the soles and sides of the feet  Blisters over the inner aspect of the feet     It is particularly common in hot, tropical, and urban environments where sweating in the feet facilitate fungal growth  Risk factors for development include:  Occlusive footwear  Excessive swearing  Diabetes or other underlying immunosuppression   Poor peripheral circulation      The diagnosis of tinea pedis can usually be made via good history and physical exam due to its characteristic clinical features  Diagnosis can be confirmed by examining skin scrapings under the microscope  Cultures are occasionally done but may not be necessary if fungi are seen under microscopy  Other diagnoses to consider if patients do not respond to therapy include psoriasis, contact dermatitis, and eczema  Tinea pedis can be treated with topical antifungal drugs applied to affected areas on a repeated basis (usually 2 twice a day) for 2 to 4 weeks  Common topical medications include topical ketoconazole, allylamines, butenafine, ciclopirox, and tolnaftate  In cases that do not respond to topical therapy, oral antifungal agents may be used which include terbinafine, itraconazole, fluconazole and griseofulvin  These oral agents are also used to treat tinea capitis (fungal infection of the scalp) and onychomycosis (fungal infection of the nails)    Those with " pre-existing liver problems are usually screened for liver function prior to starting oral terbinafine  Tinea pedis can be prevented by making sure feet are clean and dry with protective footwear worn in communal facilities   Other recommendations are:  Using drying foot powders when wearing occlusive shoes   Thoroughly dry shoes and boots prior to wearing them   Making sure to clean contaminated bathroom floors with bleach   Treatment of family members and other close contacts

## 2023-05-10 NOTE — PATIENT INSTRUCTIONS
Rest, ice, compression, elevation  Ibuprofen as needed for pain     Follow-up with PCP if symptoms persist

## 2023-05-10 NOTE — PROGRESS NOTES
"[facility] Note     Patient Name: Nyasia Brian  Encounter Date: 05 10 2023     Have you been cared for by a [place] [id number] Dermatologist in the last 3 years and, if so, which description applies to you? Yes  I have been here within the last 3 years, and my medical history has NOT changed since that time  I am MALE/not capable of bearing children  REVIEW OF SYSTEMS:  Have you recently had or currently have any of the following? · No changes in my recent health  PAST MEDICAL HISTORY:  Have you personally ever had or currently have any of the following? If \"YES,\" then please provide more detail  · No changes in my medical history  FAMILY HISTORY:  Any \"first degree relatives\" (parent, brother, sister, or child) with the following? • No changes in my family's known health  PATIENT EXPERIENCE:    • Do you want the Dermatologist to perform a COMPLETE skin exam today including a clinical examination under the \"bra and underwear\" areas? Yes  • If necessary, do we have your permission to call and leave a detailed message on your Preferred Phone number that includes your specific medical information?   Yes      No Known Allergies   Current Outpatient Medications:   •  amLODIPine (NORVASC) 10 mg tablet, TAKE 1 TABLET DAILY, Disp: 90 tablet, Rfl: 3  •  amoxicillin (AMOXIL) 500 mg capsule, TAKE 4 CAPSULES BY MOUTH 1 HOUR PRIOR TO APPOINTMENT (Patient not taking: Reported on 11/7/2022), Disp: , Rfl:   •  atorvastatin (LIPITOR) 40 mg tablet, TAKE 1 TABLET DAILY AT BEDTIME, Disp: 90 tablet, Rfl: 3  •  azaTHIOprine (IMURAN) 50 mg tablet, Take 1 tablet (50 mg total) by mouth daily, Disp: 90 tablet, Rfl: 3  •  BD Pen Needle Montserrat 2nd Gen 32G X 4 MM MISC, USE 4 (FOUR) TIMES A DAY, Disp: 100 each, Rfl: 3  •  Blood Pressure Monitor KIT, Use once a week, Disp: 1 kit, Rfl: 0  •  Cholecalciferol (Vitamin D3) 50 MCG (2000 UT) capsule, Take 1 capsule by mouth daily Gel caps, Disp: , Rfl:   •  Diclofenac Sodium " (VOLTAREN) 1 %, Apply 2 g topically 4 (four) times a day (Patient not taking: Reported on 11/7/2022), Disp: 50 g, Rfl: 0  •  fludrocortisone (FLORINEF) 0 1 mg tablet, Take 0 1 mg by mouth daily  , Disp: , Rfl:   •  hydrochlorothiazide (HYDRODIURIL) 12 5 mg tablet, Take 1 tablet (12 5 mg total) by mouth daily, Disp: 90 tablet, Rfl: 3  •  ibuprofen (MOTRIN) 600 mg tablet, Take 1 tablet (600 mg total) by mouth every 6 (six) hours for 5 days, Disp: 20 tablet, Rfl: 0  •  insulin lispro (HumaLOG) 100 units/mL injection pen, 6 units before breakfast, Disp: 15 mL, Rfl: 1  •  Insulin Pen Needle (BD Pen Needle Montserrat U/F) 32G X 4 MM MISC, Use 2 (two) times a day, Disp: 200 each, Rfl: 3  •  labetalol (NORMODYNE) 100 mg tablet, TAKE 1 TABLET EVERY 12 HOURS, Disp: 180 tablet, Rfl: 3  •  latanoprost (XALATAN) 0 005 % ophthalmic solution, INSTILL 1 DROP INTO LEFT EYE AT BEDTIME FOR 3 WEEKS, Disp: , Rfl:   •  lisinopril (ZESTRIL) 20 mg tablet, TAKE 1 TABLET DAILY, Disp: 90 tablet, Rfl: 3  •  Magnesium Oxide (Magnesium Oxide 400) 240 MG PACK, Take 400 mg by mouth 2 (two) times a day, Disp: 1 each, Rfl: 1  •  MAGnesium-Oxide 400 (240 Mg) MG TABS, , Disp: , Rfl:   •  Multiple Vitamin (MULTIVITAMIN ADULT PO), Take by mouth Daily, Disp: , Rfl:   •  semaglutide, 0 25 or 0 5 mg/dose, (Ozempic, 0 25 or 0 5 MG/DOSE,) 2 mg/1 5 mL injection pen, Inject 0 25 mg once weekly x 2 weeks then increase to 0 5 mg weekly  , Disp: 4 5 mL, Rfl: 1  •  tacrolimus (PROGRAF) 1 mg capsule, Takes 4 tabs in am, 3 tabs in pm, Disp: , Rfl:   •  Tresiba FlexTouch 100 units/mL injection pen, INJECT 38 UNITS UNDER THE SKIN DAILY, Disp: 30 mL, Rfl: 3  •  TRUE METRIX BLOOD GLUCOSE TEST test strip, Test twice daily, Disp: , Rfl:           • Whom besides the patient is providing clinical information about today's encounter?   o NO ADDITIONAL HISTORIAN (patient alone provided history)    Physical Exam and Assessment/Plan by Teresita POST LIVER TRANSPLANT  SKIN CANCER "SCREENING          Assessment and Plan:  Based on a thorough discussion of this condition and the management approach to it (including a comprehensive discussion of the known risks, side effects and potential benefits of treatment), the patient (family) agrees to implement the following specific plan:  • Return for full skin check in one year   nosis:  MELANOCYTIC NEVI (\"Moles\")    Physical Exam:  • Anatomic Location Affected:   Mostly on sun-exposed areas of the trunk and extremities  • Morphological Description:  Scattered, 1-4mm round to ovoid, symmetrical-appearing, even bordered, skin colored to dark brown macules/papules, mostly in sun-exposed areas  • Pertinent Positives:  • Pertinent Negatives: Additional History of Present Condition:  Present on exam     Assessment and Plan:  Based on a thorough discussion of this condition and the management approach to it (including a comprehensive discussion of the known risks, side effects and potential benefits of treatment), the patient (family) agrees to implement the following specific plan:  • When outside we recommend using a wide brim hat, sunglasses, long sleeve and pants, sunscreen with SPF 30+ with reapplication every 2 hours, or SPF specific clothing   • Monitor for changes  • Annual skin check     Melanocytic Nevi  Melanocytic nevi (\"moles\") are tan or brown, raised or flat areas of the skin which have an increased number of melanocytes  Melanocytes are the cells in our body which make pigment and account for skin color  Some moles are present at birth (I e , \"congenital nevi\"), while others come up later in life (i e , \"acquired nevi\")  The sun can stimulate the body to make more moles  Sunburns are not the only thing that triggers more moles  Chronic sun exposure can do it too  Clinically distinguishing a healthy mole from melanoma may be difficult, even for experienced dermatologists   The \"ABCDE's\" of moles have been suggested as a means of " "helping to alert a person to a suspicious mole and the possible increased risk of melanoma  The suggestions for raising alert are as follows:    Asymmetry: Healthy moles tend to be symmetric, while melanomas are often asymmetric  Asymmetry means if you draw a line through the mole, the two halves do not match in color, size, shape, or surface texture  Asymmetry can be a result of rapid enlargement of a mole, the development of a raised area on a previously flat lesion, scaling, ulceration, bleeding or scabbing within the mole  Any mole that starts to demonstrate \"asymmetry\" should be examined promptly by a board certified dermatologist      Border: Healthy moles tend to have discrete, even borders  The border of a melanoma often blends into the normal skin and does not sharply delineate the mole from normal skin  Any mole that starts to demonstrate \"uneven borders\" should be examined promptly by a board certified dermatologist      Color: Healthy moles tend to be one color throughout  Melanomas tend to be made up of different colors ranging from dark black, blue, white, or red  Any mole that demonstrates a color change should be examined promptly by a board certified dermatologist      Diameter: Healthy moles tend to be smaller than 0 6 cm in size; an exception are \"congenital nevi\" that can be larger  Melanomas tend to grow and can often be greater than 0 6 cm (1/4 of an inch, or the size of a pencil eraser)  This is only a guideline, and many normal moles may be larger than 0 6 cm without being unhealthy  Any mole that starts to change in size (small to bigger or bigger to smaller) should be examined promptly by a board certified dermatologist      Evolving: Healthy moles tend to \"stay the same  \"  Melanomas may often show signs of change or evolution such as a change in size, shape, color, or elevation    Any mole that starts to itch, bleed, crust, burn, hurt, or ulcerate or demonstrate a change or " "evolution should be examined promptly by a board certified dermatologist         Mandi Boyce; NON-INFLAMED    Physical Exam:  • Anatomic Location Affected:  trunk  • Morphological Description:  Flat and raised, waxy, smooth to warty textured, yellow to brownish-grey to dark brown to blackish, discrete, \"stuck-on\" appearing papules  • Pertinent Positives:  • Pertinent Negatives: Additional History of Present Condition:  Present on exam     Assessment and Plan:  Based on a thorough discussion of this condition and the management approach to it (including a comprehensive discussion of the known risks, side effects and potential benefits of treatment), the patient (family) agrees to implement the following specific plan:  • Monitor for changes  • Benign, reassured  •     Seborrheic Keratosis  A seborrheic keratosis is a harmless warty spot that appears during adult life as a common sign of skin aging  Seborrheic keratoses can arise on any area of skin, covered or uncovered, with the usual exception of the palms and soles  They do not arise from mucous membranes  Seborrheic keratoses can have highly variable appearance  Seborrheic keratoses are extremely common  It has been estimated that over 90% of adults over the age of 61 years have one or more of them  They occur in males and females of all races, typically beginning to erupt in the 35s or 45s  They are uncommon under the age of 21 years  The precise cause of seborrhoeic keratoses is not known  Seborrhoeic keratoses are considered degenerative in nature  As time goes by, seborrheic keratoses tend to become more numerous  Some people inherit a tendency to develop a very large number of them; some people may have hundreds of them  There is no easy way to remove multiple lesions on a single occasion    Unless a specific lesion is \"inflamed\" and is causing pain or stinging/burning or is bleeding, most insurance companies do not authorize " "treatment  TINEA PEDIS (\"ATHLETE'S FOOT\")     Physical Exam:  • Anatomic Location Affected:  Bilateral foot   • Morphological Description:  Pink scaly fissured plaques  • Pertinent Positives:  • Pertinent Negatives:     Additional History of Present Condition:  No pain , no issues , no symptoms   Patient did not received treatment prescribed during last office visit        Assessment and Plan:  Based on a thorough discussion of this condition and the management approach to it (including a comprehensive discussion of the known risks, side effects and potential benefits of treatment), the patient (family) agrees to implement the following specific plan:  · Start ciclopirox 0 77 %cream   Apply topically to bilateral footy twice a day for 2 weeks   Tinea Pedis  Tinea pedis is a fungal infection of the foot and is in fact the most common fungal infection  Tinea pedis is caused by dermatophyte fungi with the three most common being Trichophyton (T ) rubrum, T  interdigitale and Epidermophyton floccosum      Tinea pedis most commonly involves the interdigital spaces, known as \"athlete's foot  \" Other typical sites include the toenails, groin, and palms of the hands  There are four major manifestations of tinea pedis including chronic hyperkeratotic, chronic intertriginous, acute ulcerative and vesicobullous  Signs and symptoms include:   • Itchiness, redness, and scaling between the toes  • Scales covering the soles and sides of the feet  • Blisters over the inner aspect of the feet     It is particularly common in hot, tropical, and urban environments where sweating in the feet facilitate fungal growth  Risk factors for development include:  • Occlusive footwear  • Excessive swearing  • Diabetes or other underlying immunosuppression   • Poor peripheral circulation      The diagnosis of tinea pedis can usually be made via good history and physical exam due to its characteristic clinical features   Diagnosis can be " confirmed by examining skin scrapings under the microscope  Cultures are occasionally done but may not be necessary if fungi are seen under microscopy       Other diagnoses to consider if patients do not respond to therapy include psoriasis, contact dermatitis, and eczema      Tinea pedis can be treated with topical antifungal drugs applied to affected areas on a repeated basis (usually 2 twice a day) for 2 to 4 weeks  Common topical medications include topical ketoconazole, allylamines, butenafine, ciclopirox, and tolnaftate  In cases that do not respond to topical therapy, oral antifungal agents may be used which include terbinafine, itraconazole, fluconazole and griseofulvin  These oral agents are also used to treat tinea capitis (fungal infection of the scalp) and onychomycosis (fungal infection of the nails)  Those with pre-existing liver problems are usually screened for liver function prior to starting oral terbinafine       Tinea pedis can be prevented by making sure feet are clean and dry with protective footwear worn in communal facilities   Other recommendations are:  • Using drying foot powders when wearing occlusive shoes   • Thoroughly dry shoes and boots prior to wearing them   • Making sure to clean contaminated bathroom floors with bleach   • Treatment of family members and other close contacts        Scribe Attestation    I,:  Chalino Li am acting as a scribe while in the presence of the attending physician :       I,:  Erinn Leslie MD personally performed the services described in this documentation    as scribed in my presence :

## 2023-05-10 NOTE — PROGRESS NOTES
[address]        NAME: Teofilo Chi is a 57 y o  male  : 1960    MRN: [id number]  DATE: May 10, 2023  TIME: 12:02 PM    Assessment and Plan   Olecranon bursitis of right elbow [M70 21]  1  Olecranon bursitis of right elbow              Patient Instructions       Rest, ice, compression, elevation  Ibuprofen as needed for pain  Follow-up with PCP if symptoms persist   Proceed to  ER if symptoms worsen  Chief Complaint     Chief Complaint   Patient presents with   • Arm Pain     Right elbow pain, extends down to right hand and fingers, no injury or trauma occurred, states having done yard work on Saturday and it started on , nonpitting edema present, so sore h cannot lift up, some numbness and tingling present,          History of Present Illness       Patient presenting for evaluation of right elbow pain  Patient states that over the past 3 days he has been having increasing pain, swelling and mild this to his right elbow  He denies any trauma or injury to the area, but states that he was doing increased yard work 4 days ago  Patient states that he has mild numbness and tingling over the area and his right upper extremity feels weak  He denies any radiation of the pain  He states that he took aspirin with mild relief of his symptoms  He denies any heat or ice application  Review of Systems   Review of Systems   Constitutional: Negative for chills and fever  Respiratory: Negative for shortness of breath  Musculoskeletal: Positive for arthralgias and joint swelling  Skin: Positive for color change  All other systems reviewed and are negative          Current Medications       Current Outpatient Medications:   •  amLODIPine (NORVASC) 10 mg tablet, TAKE 1 TABLET DAILY, Disp: 90 tablet, Rfl: 3  •  amoxicillin (AMOXIL) 500 mg capsule, , Disp: , Rfl:   •  atorvastatin (LIPITOR) 40 mg tablet, TAKE 1 TABLET DAILY AT BEDTIME, Disp: 90 tablet, Rfl: 3  •  azaTHIOprine (IMURAN) 50 mg tablet, Take 1 tablet (50 mg total) by mouth daily, Disp: 90 tablet, Rfl: 3  •  BD Pen Needle Montserrat 2nd Gen 32G X 4 MM MISC, USE 4 (FOUR) TIMES A DAY, Disp: 100 each, Rfl: 3  •  Blood Pressure Monitor KIT, Use once a week, Disp: 1 kit, Rfl: 0  •  Cholecalciferol (Vitamin D3) 50 MCG (2000 UT) capsule, Take 1 capsule by mouth daily Gel caps, Disp: , Rfl:   •  ciclopirox (LOPROX) 0 77 % cream, Apply 2x/day to in between toes for 2 weeks, Disp: 30 g, Rfl: 3  •  Diclofenac Sodium (VOLTAREN) 1 %, Apply 2 g topically 4 (four) times a day, Disp: 50 g, Rfl: 0  •  fludrocortisone (FLORINEF) 0 1 mg tablet, Take 0 1 mg by mouth daily  , Disp: , Rfl:   •  hydrochlorothiazide (HYDRODIURIL) 12 5 mg tablet, Take 1 tablet (12 5 mg total) by mouth daily, Disp: 90 tablet, Rfl: 3  •  ibuprofen (MOTRIN) 600 mg tablet, Take 1 tablet (600 mg total) by mouth every 6 (six) hours for 5 days, Disp: 20 tablet, Rfl: 0  •  insulin lispro (HumaLOG) 100 units/mL injection pen, 6 units before breakfast, Disp: 15 mL, Rfl: 1  •  Insulin Pen Needle (BD Pen Needle Montserrat U/F) 32G X 4 MM MISC, Use 2 (two) times a day, Disp: 200 each, Rfl: 3  •  labetalol (NORMODYNE) 100 mg tablet, TAKE 1 TABLET EVERY 12 HOURS, Disp: 180 tablet, Rfl: 3  •  latanoprost (XALATAN) 0 005 % ophthalmic solution, INSTILL 1 DROP INTO LEFT EYE AT BEDTIME FOR 3 WEEKS, Disp: , Rfl:   •  lisinopril (ZESTRIL) 20 mg tablet, TAKE 1 TABLET DAILY, Disp: 90 tablet, Rfl: 3  •  Magnesium Oxide (Magnesium Oxide 400) 240 MG PACK, Take 400 mg by mouth 2 (two) times a day, Disp: 1 each, Rfl: 1  •  MAGnesium-Oxide 400 (240 Mg) MG TABS, , Disp: , Rfl:   •  Multiple Vitamin (MULTIVITAMIN ADULT PO), Take by mouth Daily, Disp: , Rfl:   •  semaglutide, 0 25 or 0 5 mg/dose, (Ozempic, 0 25 or 0 5 MG/DOSE,) 2 mg/1 5 mL injection pen, Inject 0 25 mg once weekly x 2 weeks then increase to 0 5 mg weekly  , Disp: 4 5 mL, Rfl: 1  •  tacrolimus (PROGRAF) 1 mg capsule, Takes 4 tabs in am, 3 tabs in pm, Disp: , Rfl:   •  Tresiba FlexTouch 100 units/mL injection pen, INJECT 38 UNITS UNDER THE SKIN DAILY, Disp: 30 mL, Rfl: 3  •  TRUE METRIX BLOOD GLUCOSE TEST test strip, Test twice daily, Disp: , Rfl:     Current Allergies     Allergies as of 05/10/2023   • (No Known Allergies)            The following portions of the patient's history were reviewed and updated as appropriate: allergies, current medications, past family history, past medical history, past social history, past surgical history and problem list      Past Medical History:   Diagnosis Date   • Bunion of right foot    • Chronic kidney disease, stage III (moderate) ([facility] )     RESOLVED: [id number]   • Cirrhosis ([facility]  )    • Diabetes mellitus ([facility] )    • Gout 10/6/18   • Hepatic encephalopathy ([facility] )    • Hepatitis C    • Hyperkalemia    • Hyperlipidemia    • Hypertension    • Pancytopenia ([facility] )    • Status post liver transplant ([place] [address] )     [id number]   • Umbilical hernia    • Wears glasses        Past Surgical History:   Procedure Laterality Date   • ANKLE SURGERY     • CHOLECYSTECTOMY     • FOOT SURGERY  03/2022    Bunion removal   • FRACTURE SURGERY      left foot   • GALLBLADDER SURGERY     • HIP SURGERY     • LIVER TRANSPLANTATION      LAST ASSESSED: [id number]   • ME COLONOSCOPY FLX DX W/COLLJ SPEC WHEN PFRMD N/A 02/16/2017    Procedure: COLONOSCOPY;  Surgeon: Shanel Alfredo MD;  Location: [facility]; Service: Gastroenterology   • ME CORRJ HALLUX VALGUS W/SESMDC W/DIST Elyn Dnona Right 02/04/2022    Procedure: Nadya Guaman;  Surgeon: Julio Grant DPM;  Location: [address] [place] [address] MAIN OR;  Service: Podiatry   • TONSILLECTOMY         Family History   Problem Relation Age of Onset   • Diabetes Mother         TYPE 2   • Hypertension Mother    • Stroke Mother 68        SYNDROME    • Hepatitis Brother         C   • Cirrhosis Brother         LIVER    • Cancer Family          Medications have been verified          Objective   /74 Comment: manual, second time,  Pulse 103 Temp (!) 97 2 °F (36 2 °C)   Resp 16   SpO2 95%        Physical Exam     Physical Exam  Vitals and nursing note reviewed  Constitutional:       General: He is not in acute distress  Appearance: Normal appearance  He is not ill-appearing, toxic-appearing or diaphoretic  HENT:      Head: Normocephalic and atraumatic  Eyes:      General:         Right eye: No discharge  Left eye: No discharge  Cardiovascular:      Rate and Rhythm: Normal rate and regular rhythm  Pulses: Normal pulses  Heart sounds: Normal heart sounds  No murmur heard  No friction rub  No gallop  Pulmonary:      Effort: Pulmonary effort is normal  No respiratory distress  Breath sounds: Normal breath sounds  No stridor  No wheezing, rhonchi or rales  Chest:      Chest wall: No tenderness  Abdominal:      General: Bowel sounds are normal       Palpations: Abdomen is soft  Tenderness: There is no abdominal tenderness  Musculoskeletal:         General: Swelling and tenderness present  Comments: Swelling and tenderness noted to right elbow, range of motion limited by pain and swelling, tenderness to palpation to olecranon area, symptoms most likely related to bursitis  Mild erythema present, no ecchymosis present  Skin:     General: Skin is warm and dry  Capillary Refill: Capillary refill takes less than 2 seconds  Findings: Erythema present  Neurological:      Mental Status: He is alert     Psychiatric:         Mood and Affect: Mood normal          Behavior: Behavior normal

## 2023-05-16 ENCOUNTER — TELEPHONE (OUTPATIENT)
Dept: ENDOCRINOLOGY | Facility: CLINIC | Age: 63
End: 2023-05-16

## 2023-05-17 DIAGNOSIS — I10 PRIMARY HYPERTENSION: ICD-10-CM

## 2023-05-17 RX ORDER — HYDROCHLOROTHIAZIDE 12.5 MG/1
TABLET ORAL
Qty: 90 TABLET | Refills: 3 | Status: SHIPPED | OUTPATIENT
Start: 2023-05-17

## 2023-05-20 ENCOUNTER — LAB (OUTPATIENT)
Dept: LAB | Facility: CLINIC | Age: 63
End: 2023-05-20

## 2023-05-20 DIAGNOSIS — E11.65 TYPE 2 DIABETES MELLITUS WITH HYPERGLYCEMIA, WITH LONG-TERM CURRENT USE OF INSULIN (HCC): ICD-10-CM

## 2023-05-20 DIAGNOSIS — Z94.4 LIVER REPLACED BY TRANSPLANT (HCC): ICD-10-CM

## 2023-05-20 DIAGNOSIS — E78.49 OTHER HYPERLIPIDEMIA: ICD-10-CM

## 2023-05-20 DIAGNOSIS — Z79.4 TYPE 2 DIABETES MELLITUS WITH HYPERGLYCEMIA, WITH LONG-TERM CURRENT USE OF INSULIN (HCC): ICD-10-CM

## 2023-05-20 LAB
ALBUMIN SERPL BCP-MCNC: 4.3 G/DL (ref 3.5–5)
ALP SERPL-CCNC: 102 U/L (ref 34–104)
ALT SERPL W P-5'-P-CCNC: 18 U/L (ref 7–52)
ANION GAP SERPL CALCULATED.3IONS-SCNC: 7 MMOL/L (ref 4–13)
AST SERPL W P-5'-P-CCNC: 11 U/L (ref 13–39)
BASOPHILS # BLD AUTO: 0.03 THOUSANDS/ÂΜL (ref 0–0.1)
BASOPHILS NFR BLD AUTO: 1 % (ref 0–1)
BILIRUB SERPL-MCNC: 0.46 MG/DL (ref 0.2–1)
BUN SERPL-MCNC: 46 MG/DL (ref 5–25)
CALCIUM SERPL-MCNC: 9.1 MG/DL (ref 8.4–10.2)
CHLORIDE SERPL-SCNC: 108 MMOL/L (ref 96–108)
CHOLEST SERPL-MCNC: 154 MG/DL
CO2 SERPL-SCNC: 25 MMOL/L (ref 21–32)
CREAT SERPL-MCNC: 1.64 MG/DL (ref 0.6–1.3)
CREAT UR-MCNC: 88.5 MG/DL
EOSINOPHIL # BLD AUTO: 0.14 THOUSAND/ÂΜL (ref 0–0.61)
EOSINOPHIL NFR BLD AUTO: 3 % (ref 0–6)
ERYTHROCYTE [DISTWIDTH] IN BLOOD BY AUTOMATED COUNT: 14 % (ref 11.6–15.1)
GFR SERPL CREATININE-BSD FRML MDRD: 43 ML/MIN/1.73SQ M
GGT SERPL-CCNC: 64 U/L (ref 5–85)
GLUCOSE P FAST SERPL-MCNC: 123 MG/DL (ref 65–99)
HCT VFR BLD AUTO: 35.5 % (ref 36.5–49.3)
HDLC SERPL-MCNC: 39 MG/DL
HGB BLD-MCNC: 11.4 G/DL (ref 12–17)
IMM GRANULOCYTES # BLD AUTO: 0.01 THOUSAND/UL (ref 0–0.2)
IMM GRANULOCYTES NFR BLD AUTO: 0 % (ref 0–2)
INR PPP: 1.08 (ref 0.84–1.19)
LDLC SERPL CALC-MCNC: 63 MG/DL (ref 0–100)
LYMPHOCYTES # BLD AUTO: 0.8 THOUSANDS/ÂΜL (ref 0.6–4.47)
LYMPHOCYTES NFR BLD AUTO: 18 % (ref 14–44)
MAGNESIUM SERPL-MCNC: 2 MG/DL (ref 1.9–2.7)
MCH RBC QN AUTO: 28.9 PG (ref 26.8–34.3)
MCHC RBC AUTO-ENTMCNC: 32.1 G/DL (ref 31.4–37.4)
MCV RBC AUTO: 90 FL (ref 82–98)
MICROALBUMIN UR-MCNC: 1000 MG/L (ref 0–20)
MICROALBUMIN/CREAT 24H UR: 1130 MG/G CREATININE (ref 0–30)
MONOCYTES # BLD AUTO: 0.5 THOUSAND/ÂΜL (ref 0.17–1.22)
MONOCYTES NFR BLD AUTO: 11 % (ref 4–12)
NEUTROPHILS # BLD AUTO: 3.04 THOUSANDS/ÂΜL (ref 1.85–7.62)
NEUTS SEG NFR BLD AUTO: 67 % (ref 43–75)
NONHDLC SERPL-MCNC: 115 MG/DL
NRBC BLD AUTO-RTO: 0 /100 WBCS
PLATELET # BLD AUTO: 165 THOUSANDS/UL (ref 149–390)
PMV BLD AUTO: 11.8 FL (ref 8.9–12.7)
POTASSIUM SERPL-SCNC: 5 MMOL/L (ref 3.5–5.3)
PROT SERPL-MCNC: 7.1 G/DL (ref 6.4–8.4)
PROTHROMBIN TIME: 14.2 SECONDS (ref 11.6–14.5)
RBC # BLD AUTO: 3.95 MILLION/UL (ref 3.88–5.62)
SODIUM SERPL-SCNC: 140 MMOL/L (ref 135–147)
TRIGL SERPL-MCNC: 258 MG/DL
WBC # BLD AUTO: 4.52 THOUSAND/UL (ref 4.31–10.16)

## 2023-05-21 LAB — TACROLIMUS BLD-MCNC: 3.4 NG/ML (ref 3–15)

## 2023-05-30 ENCOUNTER — HOSPITAL ENCOUNTER (OUTPATIENT)
Dept: GASTROENTEROLOGY | Facility: HOSPITAL | Age: 63
Setting detail: OUTPATIENT SURGERY
Discharge: HOME | End: 2023-05-30
Attending: INTERNAL MEDICINE

## 2023-05-30 ENCOUNTER — ANESTHESIA EVENT (OUTPATIENT)
Dept: GASTROENTEROLOGY | Facility: HOSPITAL | Age: 63
End: 2023-05-30

## 2023-05-30 ENCOUNTER — ANESTHESIA (OUTPATIENT)
Dept: GASTROENTEROLOGY | Facility: HOSPITAL | Age: 63
End: 2023-05-30

## 2023-05-30 VITALS
DIASTOLIC BLOOD PRESSURE: 80 MMHG | HEART RATE: 85 BPM | RESPIRATION RATE: 18 BRPM | WEIGHT: 195 LBS | SYSTOLIC BLOOD PRESSURE: 130 MMHG | HEIGHT: 63 IN | TEMPERATURE: 96.5 F | BODY MASS INDEX: 34.55 KG/M2 | OXYGEN SATURATION: 99 %

## 2023-05-30 DIAGNOSIS — Z86.010 HX OF COLONIC POLYP: ICD-10-CM

## 2023-05-30 RX ORDER — PROPOFOL 10 MG/ML
INJECTION, EMULSION INTRAVENOUS CONTINUOUS PRN
Status: DISCONTINUED | OUTPATIENT
Start: 2023-05-30 | End: 2023-05-30

## 2023-05-30 RX ORDER — SODIUM CHLORIDE 9 MG/ML
INJECTION, SOLUTION INTRAVENOUS CONTINUOUS PRN
Status: DISCONTINUED | OUTPATIENT
Start: 2023-05-30 | End: 2023-05-30

## 2023-05-30 RX ORDER — PROPOFOL 10 MG/ML
INJECTION, EMULSION INTRAVENOUS AS NEEDED
Status: DISCONTINUED | OUTPATIENT
Start: 2023-05-30 | End: 2023-05-30

## 2023-05-30 RX ADMIN — SODIUM CHLORIDE: 0.9 INJECTION, SOLUTION INTRAVENOUS at 14:32

## 2023-05-30 RX ADMIN — PROPOFOL 120 MCG/KG/MIN: 10 INJECTION, EMULSION INTRAVENOUS at 14:36

## 2023-05-30 RX ADMIN — PROPOFOL 70 MG: 10 INJECTION, EMULSION INTRAVENOUS at 14:36

## 2023-05-30 NOTE — ANESTHESIA POSTPROCEDURE EVALUATION
Post-Op Assessment Note    CV Status:  Stable  Pain Score: 0    Pain management: adequate     Mental Status:  Alert and awake   Hydration Status:  Stable   PONV Controlled:  Controlled   Airway Patency:  Patent      Post Op Vitals Reviewed: Yes      Staff: Anesthesiologist, CRNA         No notable events documented      /64 (05/30/23 1509)    Temp (!) 96 5 °F (35 8 °C) (05/30/23 1509)    Pulse 80 (05/30/23 1509)   Resp 17 (05/30/23 1509)    SpO2 94 % (05/30/23 1509) Never smoker

## 2023-05-30 NOTE — ANESTHESIA PREPROCEDURE EVALUATION
Procedure:  COLONOSCOPY    Relevant Problems   CARDIO   (+) Hyperlipidemia   (+) Hypertension   (+) Murmur, cardiac      ENDO   (+) Type 2 diabetes mellitus with hyperglycemia, with long-term current use of insulin (HCC)   (+) Type 2 diabetes mellitus with ophthalmic complication, without long-term current use of insulin (HCC)      GI/HEPATIC   (+) Cirrhosis      /RENAL   (+) CKD (chronic kidney disease)      MUSCULOSKELETAL   (+) Gout      Other   (+) Status post liver transplant     BMI 35     Physical Exam    Airway    Mallampati score: III  TM Distance: >3 FB  Neck ROM: full     Dental   No notable dental hx     Cardiovascular      Pulmonary      Other Findings        Anesthesia Plan  ASA Score- 3     Anesthesia Type- IV sedation with anesthesia with ASA Monitors  Additional Monitors:   Airway Plan:           Plan Factors-    Chart reviewed  Patient summary reviewed  Induction- intravenous  Postoperative Plan-     Informed Consent- Anesthetic plan and risks discussed with patient  I personally reviewed this patient with the CRNA  Discussed and agreed on the Anesthesia Plan with the CRNA  Trudy Alston

## 2023-05-30 NOTE — H&P
H&P EXAM - Outpatient Endoscopy   Julius Pina 57 y o  male MRN: [id number]    [facility] GI LAB [facility]   Encounter: [id number]        Impression: polyp fu    Plancolono    Chief Complaint: screening    Physical Exam: alert   Chest: clear   Heart: regular

## 2023-05-31 ENCOUNTER — OFFICE VISIT (OUTPATIENT)
Dept: ENDOCRINOLOGY | Facility: CLINIC | Age: 63
End: 2023-05-31

## 2023-05-31 ENCOUNTER — TELEPHONE (OUTPATIENT)
Dept: ADMINISTRATIVE | Facility: OTHER | Age: 63
End: 2023-05-31

## 2023-05-31 VITALS
DIASTOLIC BLOOD PRESSURE: 96 MMHG | HEIGHT: 63 IN | SYSTOLIC BLOOD PRESSURE: 144 MMHG | HEART RATE: 83 BPM | BODY MASS INDEX: 34.8 KG/M2 | WEIGHT: 196.4 LBS

## 2023-05-31 DIAGNOSIS — E11.39 TYPE 2 DIABETES MELLITUS WITH OTHER OPHTHALMIC COMPLICATION, WITH LONG-TERM CURRENT USE OF INSULIN (HCC): ICD-10-CM

## 2023-05-31 DIAGNOSIS — Z94.4 STATUS POST LIVER TRANSPLANT (HCC): ICD-10-CM

## 2023-05-31 DIAGNOSIS — E78.49 OTHER HYPERLIPIDEMIA: ICD-10-CM

## 2023-05-31 DIAGNOSIS — E11.65 TYPE 2 DIABETES MELLITUS WITH HYPERGLYCEMIA, WITH LONG-TERM CURRENT USE OF INSULIN (HCC): Primary | ICD-10-CM

## 2023-05-31 DIAGNOSIS — Z79.4 TYPE 2 DIABETES MELLITUS WITH OTHER OPHTHALMIC COMPLICATION, WITH LONG-TERM CURRENT USE OF INSULIN (HCC): ICD-10-CM

## 2023-05-31 DIAGNOSIS — Z79.4 TYPE 2 DIABETES MELLITUS WITH CHRONIC KIDNEY DISEASE, WITH LONG-TERM CURRENT USE OF INSULIN, UNSPECIFIED CKD STAGE (HCC): ICD-10-CM

## 2023-05-31 DIAGNOSIS — Z79.4 TYPE 2 DIABETES MELLITUS WITH HYPERGLYCEMIA, WITH LONG-TERM CURRENT USE OF INSULIN (HCC): Primary | ICD-10-CM

## 2023-05-31 DIAGNOSIS — I10 PRIMARY HYPERTENSION: ICD-10-CM

## 2023-05-31 DIAGNOSIS — E11.22 TYPE 2 DIABETES MELLITUS WITH CHRONIC KIDNEY DISEASE, WITH LONG-TERM CURRENT USE OF INSULIN, UNSPECIFIED CKD STAGE (HCC): ICD-10-CM

## 2023-05-31 LAB — SL AMB POCT HEMOGLOBIN AIC: 7.1 (ref ?–6.5)

## 2023-05-31 NOTE — LETTER
Diabetic Eye Exam Form    Date Requested: 23  Patient: Jarvis Lynch  Patient : 1960   Referring Provider: Eduard Quintana MD      DIABETIC Eye Exam Date _______________________________      Type of Exam MUST be documented for Diabetic Eye Exams  Please CHECK ONE  Retinal Exam       Dilated Retinal Exam       OCT       Optomap-Iris Exam      Fundus Photography       Left Eye - Please check Retinopathy or No Retinopathy        Exam did show retinopathy    Exam did not show retinopathy       Right Eye - Please check Retinopathy or No Retinopathy       Exam did show retinopathy    Exam did not show retinopathy       Comments __________________________________________________________    Practice Providing Exam ______________________________________________    Exam Performed By (print name) _______________________________________      Provider Signature ___________________________________________________      These reports are needed for  compliance  Please fax this completed form and a copy of the Diabetic Eye Exam report to our office located at [place] [address] as soon as possible via Fax [phone] attention Jose Huang: Phone [phone]  We thank you for your assistance in treating our mutual patient     [facility] [phone]

## 2023-05-31 NOTE — TELEPHONE ENCOUNTER
----- Message from Sarah Seen sent at 5/31/2023  9:08 AM EDT -----  05/31/23 9:08 AM    Hello, our patient Constantin More has had diabetic eye exam completed/performed   Please assist in obtaining the exclusion documentation by [facility] in [facility] The date of service is within a year    Thank you,  Sarah Seen   [address]

## 2023-05-31 NOTE — ASSESSMENT & PLAN NOTE
Lab Results   Component Value Date    HGBA1C 7 1 (A) 05/31/2023   Continue follow-up with ophthalmologist

## 2023-05-31 NOTE — ASSESSMENT & PLAN NOTE
Lab Results   Component Value Date    HGBA1C 7 1 (A) 05/31/2023   Slowly improving-however he has not been using Ozempic pen correctly-showed him on a sample pen how to dial up to 0 25 mg weekly  He will stop that next Sunday, discontinue Humalog when he is on Ozempic    Monitor blood sugar 2-3 times a day and send over log in the next 2 weeks

## 2023-05-31 NOTE — PROGRESS NOTES
Valentin Mackenzie 57 y o  male MRN: [id number]    Encounter: [id number]      Assessment/Plan     Problem List Items Addressed This Visit        Endocrine    Type 2 diabetes mellitus with chronic kidney disease, with long-term current use of insulin ([facility] )    Type 2 diabetes mellitus with hyperglycemia, with long-term current use of insulin ([facility] ) - Primary       Lab Results   Component Value Date    HGBA1C 7 1 (A) 05/31/2023   Slowly improving-however he has not been using Ozempic pen correctly-showed him on a sample pen how to dial up to 0 25 mg weekly  He will stop that next Sunday, discontinue Humalog when he is on Ozempic  Monitor blood sugar 2-3 times a day and send over log in the next 2 weeks         Relevant Orders    POCT hemoglobin A1c (Completed)    Comprehensive metabolic panel Lab Collect    HEMOGLOBIN A1C W/ EAG ESTIMATION Lab Collect    TSH, 3rd generation Lab Collect    T4, free Lab Collect    Type 2 diabetes mellitus with ophthalmic complication, with long-term current use of insulin ([facility])       Lab Results   Component Value Date    HGBA1C 7 1 (A) 05/31/2023   Continue follow-up with ophthalmologist            Cardiovascular and Mediastinum    Hypertension     Blood pressure above goal-for now continue current regimen and reassess next visit            Other    Hyperlipidemia    Relevant Orders    Lipid Panel with Direct LDL reflex Lab Collect    Status post liver transplant     Has follow-up with liver transplant team            CC: Diabetes    History of Present Illness     HPI:  57 y  o  male with hx of liver transplant, HTN, HLD and type 2 diabetes with long term use of insulin  Reports complications of retinopathy, microalbumunuria and CKD      Current regimen:   Tresiba 40 units daily   Humalog 6 units before breakfast   ozempic-supposed to be on 0 5 mg weekly - not taking the correct dose , has been just dialing a few clicks- has been using the same pen since starting     Checks f s 3/day and most sugars between 120-150s   Occasional excursions in the morning due to late snacks   No hypoglycemia     No GI SE   Lost about 6 lbs since last visit      Review of Systems    Historical Information   Past Medical History:   Diagnosis Date   • Bunion of right foot    • Chronic kidney disease, stage III (moderate) ([place] [address] )     RESOLVED: [id number]   • Cirrhosis ([place] [address] )    • Diabetes mellitus ([place] [address] )    • Gout 10/6/18   • Hepatic encephalopathy (HCC)    • Hepatitis C    • Hyperkalemia    • Hyperlipidemia    • Hypertension    • Pancytopenia ([place] [address] )    • Status post liver transplant ([place] [address] )     [id number]   • Umbilical hernia    • Wears glasses      Past Surgical History:   Procedure Laterality Date   • ANKLE SURGERY     • CHOLECYSTECTOMY     • FOOT SURGERY  03/2022    Bunion removal   • FRACTURE SURGERY      left foot   • GALLBLADDER SURGERY     • HIP SURGERY     • LIVER TRANSPLANTATION      LAST ASSESSED: [id number]   • TN COLONOSCOPY FLX DX W/COLLJ SPEC WHEN PFRMD N/A 02/16/2017    Procedure: COLONOSCOPY;  Surgeon: Hai Kennedy MD;  Location:  GI LAB;   Service: Gastroenterology   • TN CORRJ HALLUX VALGUS W/SESMDC W/DIST Rinda Severs Right 02/04/2022    Procedure: Boby Bigger;  Surgeon: Joanna Eldridge DPM;  Location: [address] [place] [address];  Service: Podiatry   • TONSILLECTOMY       Social History   Social History     Substance and Sexual Activity   Alcohol Use Yes   • Alcohol/week: 1 0 standard drink of alcohol   • Types: 1 Cans of beer per week    Comment: occassionally     Social History     Substance and Sexual Activity   Drug Use No     Social History     Tobacco Use   Smoking Status Never   Smokeless Tobacco Never     Family History:   Family History   Problem Relation Age of Onset   • Diabetes Mother         TYPE 2   • Hypertension Mother    • Stroke Mother 68        SYNDROME    • Hepatitis Brother         C   • Cirrhosis Brother         LIVER    • Cancer Family        Meds/Allergies   Current Outpatient Medications Medication Sig Dispense Refill   • amLODIPine (NORVASC) 10 mg tablet TAKE 1 TABLET DAILY 90 tablet 3   • amoxicillin (AMOXIL) 500 mg capsule      • atorvastatin (LIPITOR) 40 mg tablet TAKE 1 TABLET DAILY AT BEDTIME 90 tablet 3   • azaTHIOprine (IMURAN) 50 mg tablet Take 1 tablet (50 mg total) by mouth daily 90 tablet 3   • BD Pen Needle Montserrat 2nd Gen 32G X 4 MM MISC USE 4 (FOUR) TIMES A  each 3   • Blood Pressure Monitor KIT Use once a week 1 kit 0   • Cholecalciferol (Vitamin D3) 50 MCG (2000 UT) capsule Take 1 capsule by mouth daily Gel caps     • fludrocortisone (FLORINEF) 0 1 mg tablet Take 0 1 mg by mouth daily       • hydrochlorothiazide (HYDRODIURIL) 12 5 mg tablet TAKE 1 TABLET DAILY 90 tablet 3   • ibuprofen (MOTRIN) 600 mg tablet Take 1 tablet (600 mg total) by mouth every 6 (six) hours for 5 days 20 tablet 0   • Insulin Pen Needle (BD Pen Needle Montserrat U/F) 32G X 4 MM MISC Use 2 (two) times a day 200 each 3   • labetalol (NORMODYNE) 100 mg tablet TAKE 1 TABLET EVERY 12 HOURS 180 tablet 3   • latanoprost (XALATAN) 0 005 % ophthalmic solution INSTILL 1 DROP INTO LEFT EYE AT BEDTIME FOR 3 WEEKS     • lisinopril (ZESTRIL) 20 mg tablet TAKE 1 TABLET DAILY 90 tablet 3   • Magnesium Oxide (Magnesium Oxide 400) 240 MG PACK Take 400 mg by mouth 2 (two) times a day 1 each 1   • Multiple Vitamin (MULTIVITAMIN ADULT PO) Take by mouth Daily     • semaglutide, 0 25 or 0 5 mg/dose, (Ozempic, 0 25 or 0 5 MG/DOSE,) 2 mg/1 5 mL injection pen Inject 0 25 mg once weekly x 2 weeks then increase to 0 5 mg weekly   (Patient taking differently: Inject 0 5 mg under the skin every 7 days  0 5 mg weekly ) 4 5 mL 1   • tacrolimus (PROGRAF) 1 mg capsule Takes 4 tabs in am, 3 tabs in pm     • Tresiba FlexTouch 100 units/mL injection pen INJECT 38 UNITS UNDER THE SKIN DAILY (Patient taking differently: 40 Units daily) 30 mL 3   • TRUE METRIX BLOOD GLUCOSE TEST test strip Test twice daily     • ciclopirox (LOPROX) 0 77 % cream "Apply 2x/day to in between toes for 2 weeks (Patient not taking: Reported on 5/31/2023) 30 g 3   • Diclofenac Sodium (VOLTAREN) 1 % Apply 2 g topically 4 (four) times a day (Patient not taking: Reported on 5/31/2023) 50 g 0   • MAGnesium-Oxide 400 (240 Mg) MG TABS  (Patient not taking: Reported on 5/31/2023)       No current facility-administered medications for this visit  No Known Allergies    Objective   Vitals: Blood pressure 144/96, pulse 83, height 5' 3\" (1 6 m), weight 89 1 kg (196 lb 6 4 oz)  Physical Exam  Vitals reviewed  Constitutional:       General: He is not in acute distress  Appearance: Normal appearance  He is obese  He is not ill-appearing, toxic-appearing or diaphoretic  HENT:      Head: Normocephalic and atraumatic  Eyes:      General: No scleral icterus  Extraocular Movements: Extraocular movements intact  Cardiovascular:      Rate and Rhythm: Normal rate and regular rhythm  Heart sounds: Normal heart sounds  No murmur heard  Pulmonary:      Effort: Pulmonary effort is normal  No respiratory distress  Breath sounds: Normal breath sounds  No wheezing or rales  Abdominal:      General: There is no distension  Palpations: Abdomen is soft  Tenderness: There is no abdominal tenderness  Musculoskeletal:      Cervical back: Neck supple  Right lower leg: No edema  Left lower leg: No edema  Lymphadenopathy:      Cervical: No cervical adenopathy  Skin:     General: Skin is warm and dry  Neurological:      General: No focal deficit present  Mental Status: He is alert and oriented to person, place, and time  Gait: Gait normal    Psychiatric:         Mood and Affect: Mood normal          Behavior: Behavior normal          Thought Content: Thought content normal          Judgment: Judgment normal          The history was obtained from the review of the chart, patient      Lab Results:   Lab Results   Component Value Date/Time    Albumin " "4 3 05/20/2023 10:06 AM    Albumin 4 3 02/04/2023 10:06 AM    Albumin 4 4 09/10/2022 09:10 AM    ALT 18 05/20/2023 10:06 AM    ALT 20 02/04/2023 10:06 AM    ALT 19 09/10/2022 09:10 AM    AST 11 (L) 05/20/2023 10:06 AM    AST 15 02/04/2023 10:06 AM    AST 17 09/10/2022 09:10 AM    BUN 46 (H) 05/20/2023 10:06 AM    BUN 38 (H) 02/04/2023 10:06 AM    BUN 43 (H) 09/10/2022 09:10 AM    Chloride 108 05/20/2023 10:06 AM    Chloride 107 02/04/2023 10:06 AM    Chloride 106 09/10/2022 09:10 AM    CO2 25 05/20/2023 10:06 AM    CO2 31 02/04/2023 10:06 AM    CO2 28 09/10/2022 09:10 AM    Creatinine 1 64 (H) 05/20/2023 10:06 AM    Creatinine 1 66 (H) 02/04/2023 10:06 AM    Creatinine 1 91 (H) 09/10/2022 09:10 AM    Hematocrit 35 5 (L) 05/20/2023 10:06 AM    Hematocrit 36 4 (L) 02/04/2023 10:06 AM    Hematocrit 35 1 (L) 09/10/2022 09:10 AM    HDL, Direct 39 (L) 05/20/2023 10:06 AM    Hemoglobin 11 4 (L) 05/20/2023 10:06 AM    Hemoglobin 11 9 (L) 02/04/2023 10:06 AM    Hemoglobin 11 6 (L) 09/10/2022 09:10 AM    Hemoglobin A1C 7 1 (A) 05/31/2023 09:13 AM    Hemoglobin A1C 7 5 (A) 02/23/2023 09:32 AM    Hemoglobin A1C 7 1 (A) 09/01/2022 09:22 AM    Potassium 5 0 05/20/2023 10:06 AM    Potassium 4 9 02/04/2023 10:06 AM    Potassium 5 1 09/10/2022 09:10 AM    MCV 90 05/20/2023 10:06 AM    MCV 91 02/04/2023 10:06 AM    MCV 90 09/10/2022 09:10 AM    Platelets 814 08/88/3367 10:06 AM    Platelets 335 13/29/6225 10:06 AM    Platelets 079 15/01/3901 09:10 AM    Total Protein 7 1 05/20/2023 10:06 AM    Total Protein 7 0 02/04/2023 10:06 AM    Total Protein 6 9 09/10/2022 09:10 AM    Triglycerides 258 (H) 05/20/2023 10:06 AM    WBC 4 52 05/20/2023 10:06 AM    WBC 4 47 02/04/2023 10:06 AM    WBC 5 04 09/10/2022 09:10 AM           Imaging Studies:     Portions of the record may have been created with voice recognition software   Occasional wrong word or \"sound a like\" substitutions may have occurred due to the inherent limitations of voice " recognition software  Read the chart carefully and recognize, using context, where substitutions have occurred

## 2023-06-01 NOTE — TELEPHONE ENCOUNTER
Upon review of the In Basket request we were able to locate, review, and update the patient chart as requested for Diabetic Eye Exam     Any additional questions or concerns should be emailed to the Practice Liaisons via the appropriate education email address, please do not reply via In Basket      Thank you  Cindy Rowe

## 2023-06-12 ENCOUNTER — TELEPHONE (OUTPATIENT)
Dept: ENDOCRINOLOGY | Facility: CLINIC | Age: 63
End: 2023-06-12

## 2023-06-22 DIAGNOSIS — E11.65 TYPE 2 DIABETES MELLITUS WITH HYPERGLYCEMIA, WITH LONG-TERM CURRENT USE OF INSULIN (HCC): ICD-10-CM

## 2023-06-22 DIAGNOSIS — Z79.4 TYPE 2 DIABETES MELLITUS WITH HYPERGLYCEMIA, WITH LONG-TERM CURRENT USE OF INSULIN (HCC): ICD-10-CM

## 2023-06-22 RX ORDER — PEN NEEDLE, DIABETIC 32GX 5/32"
NEEDLE, DISPOSABLE MISCELLANEOUS
Qty: 200 EACH | Refills: 3 | Status: SHIPPED | OUTPATIENT
Start: 2023-06-22

## 2023-06-26 ENCOUNTER — TELEPHONE (OUTPATIENT)
Dept: ENDOCRINOLOGY | Facility: CLINIC | Age: 63
End: 2023-06-26

## 2023-07-03 ENCOUNTER — TELEPHONE (OUTPATIENT)
Dept: ENDOCRINOLOGY | Facility: CLINIC | Age: 63
End: 2023-07-03

## 2023-07-03 RX ORDER — INSULIN DEGLUDEC INJECTION 100 U/ML
36 INJECTION, SOLUTION SUBCUTANEOUS DAILY
COMMUNITY

## 2023-07-24 ENCOUNTER — APPOINTMENT (OUTPATIENT)
Dept: LAB | Facility: CLINIC | Age: 63
End: 2023-07-24
Payer: COMMERCIAL

## 2023-07-24 ENCOUNTER — TELEPHONE (OUTPATIENT)
Dept: ENDOCRINOLOGY | Facility: CLINIC | Age: 63
End: 2023-07-24

## 2023-07-24 DIAGNOSIS — N18.9 CHRONIC KIDNEY DISEASE, UNSPECIFIED CKD STAGE: ICD-10-CM

## 2023-07-24 LAB
ANION GAP SERPL CALCULATED.3IONS-SCNC: 6 MMOL/L
BUN SERPL-MCNC: 42 MG/DL (ref 5–25)
CALCIUM SERPL-MCNC: 9 MG/DL (ref 8.4–10.2)
CHLORIDE SERPL-SCNC: 111 MMOL/L (ref 96–108)
CO2 SERPL-SCNC: 25 MMOL/L (ref 21–32)
CREAT SERPL-MCNC: 1.9 MG/DL (ref 0.6–1.3)
CREAT UR-MCNC: <1 MG/DL
GFR SERPL CREATININE-BSD FRML MDRD: 36 ML/MIN/1.73SQ M
GLUCOSE P FAST SERPL-MCNC: 95 MG/DL (ref 65–99)
POTASSIUM SERPL-SCNC: 5.3 MMOL/L (ref 3.5–5.3)
PROT UR-MCNC: <4 MG/DL
SODIUM SERPL-SCNC: 142 MMOL/L (ref 135–147)

## 2023-07-24 PROCEDURE — 82570 ASSAY OF URINE CREATININE: CPT

## 2023-07-24 PROCEDURE — 84156 ASSAY OF PROTEIN URINE: CPT

## 2023-07-24 PROCEDURE — 80048 BASIC METABOLIC PNL TOTAL CA: CPT

## 2023-07-24 PROCEDURE — 36415 COLL VENOUS BLD VENIPUNCTURE: CPT

## 2023-07-27 ENCOUNTER — OFFICE VISIT (OUTPATIENT)
Dept: NEPHROLOGY | Facility: CLINIC | Age: 63
End: 2023-07-27

## 2023-07-27 VITALS
WEIGHT: 190 LBS | HEIGHT: 63 IN | SYSTOLIC BLOOD PRESSURE: 150 MMHG | HEART RATE: 80 BPM | DIASTOLIC BLOOD PRESSURE: 80 MMHG | BODY MASS INDEX: 33.66 KG/M2

## 2023-07-27 DIAGNOSIS — N18.9 CHRONIC KIDNEY DISEASE, UNSPECIFIED CKD STAGE: Primary | ICD-10-CM

## 2023-07-27 DIAGNOSIS — R80.8 OTHER PROTEINURIA: ICD-10-CM

## 2023-07-27 NOTE — PROGRESS NOTES
NEPHROLOGY PROGRESS NOTE    Godfrey Jonas 57 y.o. male MRN: [id number]  Unit/Bed#:  Encounter: [id number]  Reason for Consult: Chronic kidney disease    Patient is here for follow-up he really looks great says his health is been doing well. He is lost about 10 pounds his blood sugars have been under good control and his liver transplant is doing excellent and has had no changes in his overall health status. No complaints today. ASSESSMENT/PLAN:  1. Renal    The patient has chronic kidney disease he is diabetic and has history of liver transplant about 9 years ago. He has been on tacrolimus which can cause renal insufficiency and of course diabetes can as well. In the past he had proteinuria on evaluations although it was not a tremendous amount so we are attributing it to a combination of the above is the cause. His latest protein estimation was negative I am not sure if that was a lab error or if it was true but he has lost weight and his blood sugars are under good control. He is on an ACE inhibitor. His creatinine is 1.9 and over the last couple years it is ranged 1.6-1.9 so is towards the higher end that could be just a fluctuation that day versus underlying progression so we will just continue to monitor but overall he is stable he looks great clinically. BMP and urine protein estimation next visit  Continue current medications  Monitor blood pressure is running a little bit high he may need to adjust or add medications    I told him to call me if there is any problems or concerns before next visit. SUBJECTIVE:  Review of Systems   Constitutional: Negative for chills, diaphoresis, fever and malaise/fatigue. HENT: Negative. Eyes: Negative. Cardiovascular: Negative for chest pain, dyspnea on exertion, leg swelling and orthopnea. Respiratory: Negative. Negative for cough, shortness of breath, sputum production and wheezing.     Gastrointestinal: Negative for abdominal pain, diarrhea, nausea and vomiting. Genitourinary: Negative for dysuria, flank pain, hematuria and incomplete emptying. Neurological: Negative for dizziness, focal weakness, headaches and weakness. Psychiatric/Behavioral: Negative for altered mental status, hallucinations and hypervigilance. The patient is not nervous/anxious. OBJECTIVE:  Current Weight:    [email or web address]:     There were no vitals taken for this visit. , There is no height or weight on file to calculate BMI. [unfilled]    Physical Exam: There were no vitals taken for this visit. Physical Exam  Constitutional:       General: He is not in acute distress. Appearance: He is not toxic-appearing. HENT:      Head: Normocephalic and atraumatic. Nose: Nose normal.      Mouth/Throat:      Mouth: Mucous membranes are moist.   Eyes:      Extraocular Movements: Extraocular movements intact. Cardiovascular:      Rate and Rhythm: Normal rate and regular rhythm. Heart sounds: No friction rub. No gallop. Comments: No edema. Pulmonary:      Effort: Pulmonary effort is normal. No respiratory distress. Breath sounds: No wheezing, rhonchi or rales. Abdominal:      General: Bowel sounds are normal. There is no distension. Palpations: Abdomen is soft. Tenderness: There is no abdominal tenderness. There is no rebound. Musculoskeletal:      Cervical back: Normal range of motion and neck supple. Neurological:      General: No focal deficit present. Mental Status: He is alert and oriented to person, place, and time. Mental status is at baseline. Psychiatric:         Mood and Affect: Mood normal.         Behavior: Behavior normal.         Thought Content:  Thought content normal.         Judgment: Judgment normal.         Medications:    Current Outpatient Medications:   •  amLODIPine (NORVASC) 10 mg tablet, TAKE 1 TABLET DAILY, Disp: 90 tablet, Rfl: 3  •  amoxicillin (AMOXIL) 500 mg capsule, , Disp: , Rfl:   • atorvastatin (LIPITOR) 40 mg tablet, TAKE 1 TABLET DAILY AT BEDTIME, Disp: 90 tablet, Rfl: 3  •  azaTHIOprine (IMURAN) 50 mg tablet, Take 1 tablet (50 mg total) by mouth daily, Disp: 90 tablet, Rfl: 3  •  BD Pen Needle [place] 2nd Gen 32G X 4 MM MISC, USE 2 (TWO) TIMES A DAY, Disp: 200 each, Rfl: 3  •  Blood Pressure Monitor KIT, Use once a week, Disp: 1 kit, Rfl: 0  •  Cholecalciferol (Vitamin D3) 50 MCG (2000 UT) capsule, Take 1 capsule by mouth daily Gel caps, Disp: , Rfl:   •  ciclopirox (LOPROX) 0.77 % cream, Apply 2x/day to in between toes for 2 weeks (Patient not taking: Reported on 5/31/2023), Disp: 30 g, Rfl: 3  •  Diclofenac Sodium (VOLTAREN) 1 %, Apply 2 g topically 4 (four) times a day (Patient not taking: Reported on 5/31/2023), Disp: 50 g, Rfl: 0  •  fludrocortisone (FLORINEF) 0.1 mg tablet, Take 0.1 mg by mouth daily  , Disp: , Rfl:   •  hydrochlorothiazide (HYDRODIURIL) 12.5 mg tablet, TAKE 1 TABLET DAILY, Disp: 90 tablet, Rfl: 3  •  ibuprofen (MOTRIN) 600 mg tablet, Take 1 tablet (600 mg total) by mouth every 6 (six) hours for 5 days, Disp: 20 tablet, Rfl: 0  •  labetalol (NORMODYNE) 100 mg tablet, TAKE 1 TABLET EVERY 12 HOURS, Disp: 180 tablet, Rfl: 3  •  latanoprost (XALATAN) 0.005 % ophthalmic solution, INSTILL 1 DROP INTO LEFT EYE AT BEDTIME FOR 3 WEEKS, Disp: , Rfl:   •  lisinopril (ZESTRIL) 20 mg tablet, TAKE 1 TABLET DAILY, Disp: 90 tablet, Rfl: 3  •  Magnesium Oxide (Magnesium Oxide 400) 240 MG PACK, Take 400 mg by mouth 2 (two) times a day, Disp: 1 each, Rfl: 1  •  MAGnesium-Oxide 400 (240 Mg) MG TABS, , Disp: , Rfl:   •  Multiple Vitamin (MULTIVITAMIN ADULT PO), Take by mouth Daily, Disp: , Rfl:   •  semaglutide, 0.25 or 0.5 mg/dose, (Ozempic, 0.25 or 0.5 MG/DOSE,) 2 mg/1.5 mL injection pen, Inject 0.25 mg once weekly x 2 weeks then increase to 0.5 mg weekly.  (Patient taking differently: Inject 0.5 mg under the skin every 7 days  0.5 mg weekly.), Disp: 4.5 mL, Rfl: 1  •  tacrolimus (PROGRAF) 1 mg capsule, Takes 4 tabs in am, 3 tabs in pm, Disp: , Rfl:   •  Tresiba FlexTouch 100 units/mL injection pen, Inject 36 Units under the skin daily, Disp: , Rfl:   •  TRUE METRIX BLOOD GLUCOSE TEST test strip, Test twice daily, Disp: , Rfl:     Laboratory Results:  Lab Results   Component Value Date    WBC 4.52 05/20/2023    HGB 11.4 (L) 05/20/2023    HCT 35.5 (L) 05/20/2023    MCV 90 05/20/2023     05/20/2023     Lab Results   Component Value Date    SODIUM 142 07/24/2023    K 5.3 07/24/2023     (H) 07/24/2023    CO2 25 07/24/2023    BUN 42 (H) 07/24/2023    CREATININE 1.90 (H) 07/24/2023    GLUC 163 (H) 09/07/2021    CALCIUM 9.0 07/24/2023     Lab Results   Component Value Date    CALCIUM 9.0 07/24/2023    PHOS 4.0 10/24/2014     No results found for: "LABPROT"

## 2023-07-27 NOTE — PATIENT INSTRUCTIONS
You are here for follow-up you really look great and I am glad to hear you have not had any health issues and you have no complaints today. Your creatinine level which is a blood test for the kidney function as I showed you over the last close to 2 years ranges 1.6-1.9 from time to time. This most recent 1 was 1.9 so it stable without any significant progression from before. There is also no protein in the urine which is a good thing I do not know if that is due to weight loss plus better sugar control but regardless lower protein as we discussed hopefully points to something that would have a slow progression over time. It is possible tacrolimus has caused a little bit of injury to the kidney also but of course its very important medication and you are on a low dose and everything has been great with your liver. Continue with your excellent sugar control weight loss and current medications. Labs and follow-up as scheduled please call if you need anything or have any problems before next visit.

## 2023-07-27 NOTE — LETTER
July 27, 2023     Alix [address] [place] [address]    Patient: Pete Garcia   YOB: 1960   Date of Visit: 7/27/2023       Dear Dr. Christa Hoskins: Thank you for referring Pete Garcia to me for evaluation. Below are my notes for this consultation. If you have questions, please do not hesitate to call me. I look forward to following your patient along with you. Sincerely,        Young Doll MD        CC: No Recipients    Young Doll MD  7/27/2023  8:20 AM  Sign when Signing Visit  NEPHROLOGY PROGRESS NOTE    Pete Garcia 57 y.o. male MRN: [id number]  Unit/Bed#:  Encounter: [id number]  Reason for Consult: Chronic kidney disease    Patient is here for follow-up he really looks great says his health is been doing well. He is lost about 10 pounds his blood sugars have been under good control and his liver transplant is doing excellent and has had no changes in his overall health status. No complaints today. ASSESSMENT/PLAN:  1. Renal    The patient has chronic kidney disease he is diabetic and has history of liver transplant about 9 years ago. He has been on tacrolimus which can cause renal insufficiency and of course diabetes can as well. In the past he had proteinuria on evaluations although it was not a tremendous amount so we are attributing it to a combination of the above is the cause. His latest protein estimation was negative I am not sure if that was a lab error or if it was true but he has lost weight and his blood sugars are under good control. He is on an ACE inhibitor. His creatinine is 1.9 and over the last couple years it is ranged 1.6-1.9 so is towards the higher end that could be just a fluctuation that day versus underlying progression so we will just continue to monitor but overall he is stable he looks great clinically.     BMP and urine protein estimation next visit  Continue current medications  Monitor blood pressure is running a little bit high he may need to adjust or add medications    I told him to call me if there is any problems or concerns before next visit. SUBJECTIVE:  Review of Systems   Constitutional: Negative for chills, diaphoresis, fever and malaise/fatigue. HENT: Negative. Eyes: Negative. Cardiovascular: Negative for chest pain, dyspnea on exertion, leg swelling and orthopnea. Respiratory: Negative. Negative for cough, shortness of breath, sputum production and wheezing. Gastrointestinal: Negative for abdominal pain, diarrhea, nausea and vomiting. Genitourinary: Negative for dysuria, flank pain, hematuria and incomplete emptying. Neurological: Negative for dizziness, focal weakness, headaches and weakness. Psychiatric/Behavioral: Negative for altered mental status, hallucinations and hypervigilance. The patient is not nervous/anxious. OBJECTIVE:  Current Weight:    [email or web address]:     There were no vitals taken for this visit. , There is no height or weight on file to calculate BMI. [unfilled]    Physical Exam: There were no vitals taken for this visit. Physical Exam  Constitutional:       General: He is not in acute distress. Appearance: He is not toxic-appearing. HENT:      Head: Normocephalic and atraumatic. Nose: Nose normal.      Mouth/Throat:      Mouth: Mucous membranes are moist.   Eyes:      Extraocular Movements: Extraocular movements intact. Cardiovascular:      Rate and Rhythm: Normal rate and regular rhythm. Heart sounds: No friction rub. No gallop. Comments: No edema. Pulmonary:      Effort: Pulmonary effort is normal. No respiratory distress. Breath sounds: No wheezing, rhonchi or rales. Abdominal:      General: Bowel sounds are normal. There is no distension. Palpations: Abdomen is soft. Tenderness: There is no abdominal tenderness. There is no rebound. Musculoskeletal:      Cervical back: Normal range of motion and neck supple. Neurological:      General: No focal deficit present. Mental Status: He is alert and oriented to person, place, and time. Mental status is at baseline. Psychiatric:         Mood and Affect: Mood normal.         Behavior: Behavior normal.         Thought Content:  Thought content normal.         Judgment: Judgment normal.         Medications:    Current Outpatient Medications:   •  amLODIPine (NORVASC) 10 mg tablet, TAKE 1 TABLET DAILY, Disp: 90 tablet, Rfl: 3  •  amoxicillin (AMOXIL) 500 mg capsule, , Disp: , Rfl:   •  atorvastatin (LIPITOR) 40 mg tablet, TAKE 1 TABLET DAILY AT BEDTIME, Disp: 90 tablet, Rfl: 3  •  azaTHIOprine (IMURAN) 50 mg tablet, Take 1 tablet (50 mg total) by mouth daily, Disp: 90 tablet, Rfl: 3  •  BD Pen Needle [place] 2nd Gen 32G X 4 MM MISC, USE 2 (TWO) TIMES A DAY, Disp: 200 each, Rfl: 3  •  Blood Pressure Monitor KIT, Use once a week, Disp: 1 kit, Rfl: 0  •  Cholecalciferol (Vitamin D3) 50 MCG (2000 UT) capsule, Take 1 capsule by mouth daily Gel caps, Disp: , Rfl:   •  ciclopirox (LOPROX) 0.77 % cream, Apply 2x/day to in between toes for 2 weeks (Patient not taking: Reported on 5/31/2023), Disp: 30 g, Rfl: 3  •  Diclofenac Sodium (VOLTAREN) 1 %, Apply 2 g topically 4 (four) times a day (Patient not taking: Reported on 5/31/2023), Disp: 50 g, Rfl: 0  •  fludrocortisone (FLORINEF) 0.1 mg tablet, Take 0.1 mg by mouth daily  , Disp: , Rfl:   •  hydrochlorothiazide (HYDRODIURIL) 12.5 mg tablet, TAKE 1 TABLET DAILY, Disp: 90 tablet, Rfl: 3  •  ibuprofen (MOTRIN) 600 mg tablet, Take 1 tablet (600 mg total) by mouth every 6 (six) hours for 5 days, Disp: 20 tablet, Rfl: 0  •  labetalol (NORMODYNE) 100 mg tablet, TAKE 1 TABLET EVERY 12 HOURS, Disp: 180 tablet, Rfl: 3  •  latanoprost (XALATAN) 0.005 % ophthalmic solution, INSTILL 1 DROP INTO LEFT EYE AT BEDTIME FOR 3 WEEKS, Disp: , Rfl:   •  lisinopril (ZESTRIL) 20 mg tablet, TAKE 1 TABLET DAILY, Disp: 90 tablet, Rfl: 3  •  Magnesium Oxide (Magnesium Oxide 400) 240 MG PACK, Take 400 mg by mouth 2 (two) times a day, Disp: 1 each, Rfl: 1  •  MAGnesium-Oxide 400 (240 Mg) MG TABS, , Disp: , Rfl:   •  Multiple Vitamin (MULTIVITAMIN ADULT PO), Take by mouth Daily, Disp: , Rfl:   •  semaglutide, 0.25 or 0.5 mg/dose, (Ozempic, 0.25 or 0.5 MG/DOSE,) 2 mg/1.5 mL injection pen, Inject 0.25 mg once weekly x 2 weeks then increase to 0.5 mg weekly.  (Patient taking differently: Inject 0.5 mg under the skin every 7 days  0.5 mg weekly.), Disp: 4.5 mL, Rfl: 1  •  tacrolimus (PROGRAF) 1 mg capsule, Takes 4 tabs in am, 3 tabs in pm, Disp: , Rfl:   •  Tresiba FlexTouch 100 units/mL injection pen, Inject 36 Units under the skin daily, Disp: , Rfl:   •  TRUE METRIX BLOOD GLUCOSE TEST test strip, Test twice daily, Disp: , Rfl:     Laboratory Results:  Lab Results   Component Value Date    WBC 4.52 05/20/2023    HGB 11.4 (L) 05/20/2023    HCT 35.5 (L) 05/20/2023    MCV 90 05/20/2023     05/20/2023     Lab Results   Component Value Date    SODIUM 142 07/24/2023    K 5.3 07/24/2023     (H) 07/24/2023    CO2 25 07/24/2023    BUN 42 (H) 07/24/2023    CREATININE 1.90 (H) 07/24/2023    GLUC 163 (H) 09/07/2021    CALCIUM 9.0 07/24/2023     Lab Results   Component Value Date    CALCIUM 9.0 07/24/2023    PHOS 4.0 10/24/2014     No results found for: "LABPROT"

## 2023-08-24 ENCOUNTER — TELEPHONE (OUTPATIENT)
Dept: ENDOCRINOLOGY | Facility: CLINIC | Age: 63
End: 2023-08-24

## 2023-08-25 NOTE — TELEPHONE ENCOUNTER
BGS reviewed. Please ask hi to lower the [place] to 80units. Further medication changes will bed discussed at his 9/6/23 appointment.   Thank you

## 2023-08-30 ENCOUNTER — DOCUMENTATION (OUTPATIENT)
Dept: ENDOCRINOLOGY | Facility: CLINIC | Age: 63
End: 2023-08-30

## 2023-08-30 RX ORDER — INSULIN DEGLUDEC INJECTION 100 U/ML
36 INJECTION, SOLUTION SUBCUTANEOUS DAILY
Qty: 15 ML | Refills: 1 | Status: SHIPPED | OUTPATIENT
Start: 2023-08-30 | End: 2023-09-06

## 2023-08-30 NOTE — PROGRESS NOTES
The written form looked like "86 units."  Please ask him to lower to 30units of [place].

## 2023-09-05 NOTE — PROGRESS NOTES
Established Patient Progress Note      Chief Complaint   Patient presents with   • Diabetes Type 2        Impression & Plan:    Problem List Items Addressed This Visit        Endocrine    Type 2 diabetes mellitus with chronic kidney disease, with long-term current use of insulin ([address]) - Primary     HGA1C well controlled, BGs tightly controlled. Treatment regimen:   - He is only using Ozempic 0.25 mg weekly, will increase to 0.5 mg weekly. - Decrease Tresiba to 20 units daily and send Bg log in about 2 weeks - will continue to decrease and possibly discontinue insulin if BGs continue to be tightly controlled. - Continue to focus on lifestyle modifications. Discussed risks/complications associated with uncontrolled diabetes. Advised to adhere to diabetic diet, and recommended staying active/exercising routinely. Keep carbohydrates consistent to limit blood glucose fluctuations. Advised to call if blood sugars less than 70 mg/dl or over 300 mg/dl. Discussed symptoms and treatment of hypoglycemia. Recommended routine follow-up with podiatry and ophthalmology. Ordered blood work to complete prior to next visit. Lab Results   Component Value Date    HGBA1C 6.0 09/06/2023            Relevant Medications    Lollie Balm FlexTouch 100 units/mL injection pen    Other Relevant Orders    POCT hemoglobin A1c (Completed)    Hemoglobin A1C    Comprehensive metabolic panel       Cardiovascular and Mediastinum    Hypertension     BP is elevated today - he is on multiple BP agents and this is being managed by PCP. Continue current medication regimen at this time and will continue to monitor. Genitourinary    CKD (chronic kidney disease)     Lab Results   Component Value Date    EGFR 36 07/24/2023    EGFR 43 05/20/2023    EGFR 42 02/04/2023    CREATININE 1.90 (H) 07/24/2023    CREATININE 1.64 (H) 05/20/2023    CREATININE 1.66 (H) 02/04/2023   Continue follow up with nephrology.              Other Hyperlipidemia     Lipid panel at goal. Continue statin. History of Present Illness:   Anastasiia Chavez is a 57 y.o. male with hx of liver transplant, HTN, HLD and type 2 diabetes with long term use of insulin. Last seen in the office 5/31/23. Reports complications of retinopathy, microalbumunuria and CKD. POCT A1C was 6. Denies recent illness or hospitalizations. Denies recent severe hypoglycemic or severe hyperglycemic episodes. Denies any issues with his current regimen. Home glucose monitoring: are performed regularly. He follows with GI at [facility] for hx of liver transplant. He also follows with nephrology and cardiology.      Home blood glucose readings:   Before breakfast:   Before dinner:   Bedtime:      Current regimen:   Tresiba 30 units daily   Ozempic 0.25 mg weekly      Last Eye Exam: 5/2/23, retinopathy b/l eyes    Last Foot Exam: 2/23/23, UTD      Has hypertension: Taking amlodipine, HCTZ, lisinopril, and labetalol  Has hyperlipidemia: Taking atorvastatin        Vitamin D Deficiency: Taking 1,000 units daily     Patient Active Problem List   Diagnosis   • Hypertension   • Type 2 diabetes mellitus with ophthalmic complication, with long-term current use of insulin (HCC)   • Cirrhosis   • Hyperlipidemia   • Status post liver transplant   • Hyperglycemia   • Preop cardiovascular exam   • Osteopenia   • Male erectile disorder   • Type 2 diabetes mellitus with hyperglycemia, with long-term current use of insulin (HCC)   • Respiratory tract infection   • CKD (chronic kidney disease)   • Gout   • Swelling of left elbow   • Achilles tendinitis of right lower extremity   • Bunion of great toe of right foot   • Encephalopathy, portal systemic (HCC)   • Immunosuppression (HCC)   • Hyperkalemia   • Murmur, cardiac   • Proteinuria   • Right arm pain   • Type 2 diabetes mellitus with chronic kidney disease, with long-term current use of insulin [facility])      Past Medical History:   Diagnosis Date   • Bunion of right foot    • Chronic kidney disease, stage III (moderate) ([address])     RESOLVED: [id number]   • Cirrhosis ([address])    • Diabetes mellitus ([address])    • Gout 10/6/18   • Hepatic encephalopathy (HCC)    • Hepatitis C    • Hyperkalemia    • Hyperlipidemia    • Hypertension    • Pancytopenia ([address])    • Status post liver transplant ([address])     [id number]   • Umbilical hernia    • Wears glasses       Past Surgical History:   Procedure Laterality Date   • ANKLE SURGERY     • CHOLECYSTECTOMY     • FOOT SURGERY  03/2022    Bunion removal   • FRACTURE SURGERY      left foot   • GALLBLADDER SURGERY     • HIP SURGERY     • LIVER TRANSPLANTATION      LAST ASSESSED: [id number]   • ME COLONOSCOPY FLX DX W/COLLJ SPEC WHEN PFRMD N/A 02/16/2017    Procedure: COLONOSCOPY;  Surgeon: Dio Horne MD;  Location:  GI LAB; Service: Gastroenterology   • ME CORRJ HALLUX VALGUS W/SESMDC W/DIST Dipti Kenning Right 02/04/2022    Procedure: Oscar Lutz;  Surgeon: Robert Oloms DPM;  Location: [address] [place] [address] MAIN OR;  Service: Podiatry   • TONSILLECTOMY        Family History   Problem Relation Age of Onset   • Diabetes Mother         TYPE 2   • Hypertension Mother    • Stroke Mother 68        SYNDROME    • Hepatitis Brother         C   • Cirrhosis Brother         LIVER    • Cancer Family      Social History     Tobacco Use   • Smoking status: Never   • Smokeless tobacco: Never   Substance Use Topics   • Alcohol use:  Yes     Alcohol/week: 1.0 standard drink of alcohol     Types: 1 Cans of beer per week     Comment: occassionally     No Known Allergies      Current Outpatient Medications:   •  amLODIPine (NORVASC) 10 mg tablet, TAKE 1 TABLET DAILY, Disp: 90 tablet, Rfl: 3  •  atorvastatin (LIPITOR) 40 mg tablet, TAKE 1 TABLET DAILY AT BEDTIME, Disp: 90 tablet, Rfl: 3  •  azaTHIOprine (IMURAN) 50 mg tablet, Take 1 tablet (50 mg total) by mouth daily, Disp: 90 tablet, Rfl: 3  •  BD Pen Needle Montserrat 2nd Gen 32G X 4 MM MISC, USE 2 (TWO) TIMES A DAY, Disp: 200 each, Rfl: 3  •  Cholecalciferol (Vitamin D3) 50 MCG (2000 UT) capsule, Take 1 capsule by mouth daily Gel caps, Disp: , Rfl:   •  fludrocortisone (FLORINEF) 0.1 mg tablet, Take 0.1 mg by mouth daily  , Disp: , Rfl:   •  hydrochlorothiazide (HYDRODIURIL) 12.5 mg tablet, TAKE 1 TABLET DAILY, Disp: 90 tablet, Rfl: 3  •  labetalol (NORMODYNE) 100 mg tablet, TAKE 1 TABLET EVERY 12 HOURS, Disp: 180 tablet, Rfl: 3  •  latanoprost (XALATAN) 0.005 % ophthalmic solution, INSTILL 1 DROP INTO LEFT EYE AT BEDTIME FOR 3 WEEKS, Disp: , Rfl:   •  lisinopril (ZESTRIL) 20 mg tablet, TAKE 1 TABLET DAILY, Disp: 90 tablet, Rfl: 3  •  Magnesium Oxide (Magnesium Oxide 400) 240 MG PACK, Take 400 mg by mouth 2 (two) times a day, Disp: 1 each, Rfl: 1  •  Multiple Vitamin (MULTIVITAMIN ADULT PO), Take by mouth Daily, Disp: , Rfl:   •  semaglutide, 0.25 or 0.5 mg/dose, (Ozempic, 0.25 or 0.5 MG/DOSE,) 2 mg/1.5 mL injection pen, Inject 0.25 mg once weekly x 2 weeks then increase to 0.5 mg weekly. (Patient taking differently: Inject 0.5 mg under the skin every 7 days  0.5 mg weekly.), Disp: 4.5 mL, Rfl: 1  •  tacrolimus (PROGRAF) 1 mg capsule, Takes 4 tabs in am, 3 tabs in pm, Disp: , Rfl:   •  Tresiba FlexTouch 100 units/mL injection pen, Inject 20units subcut once daily, Disp: 15 mL, Rfl: 1  •  TRUE METRIX BLOOD GLUCOSE TEST test strip, Test twice daily, Disp: , Rfl:   •  amoxicillin (AMOXIL) 500 mg capsule, PRIOR TO DENTAL APPT.  (Patient not taking: Reported on 9/6/2023), Disp: , Rfl:   •  Blood Pressure Monitor KIT, Use once a week (Patient not taking: Reported on 9/6/2023), Disp: 1 kit, Rfl: 0  •  ciclopirox (LOPROX) 0.77 % cream, Apply 2x/day to in between toes for 2 weeks (Patient not taking: Reported on 5/31/2023), Disp: 30 g, Rfl: 3  •  Diclofenac Sodium (VOLTAREN) 1 %, Apply 2 g topically 4 (four) times a day (Patient not taking: Reported on 5/31/2023), Disp: 50 g, Rfl: 0  •  ibuprofen (MOTRIN) 600 mg tablet, Take 1 tablet (600 mg total) by mouth every 6 (six) hours for 5 days (Patient not taking: Reported on 9/6/2023), Disp: 20 tablet, Rfl: 0  •  MAGnesium-Oxide 400 (240 Mg) MG TABS, , Disp: , Rfl:     Review of Systems   Constitutional: Negative for activity change, appetite change, chills, diaphoresis, fatigue, fever and unexpected weight change. Eyes: Negative for visual disturbance. Respiratory: Negative for chest tightness and shortness of breath. Cardiovascular: Negative for chest pain, palpitations and leg swelling. Gastrointestinal: Negative for abdominal pain, constipation, diarrhea, nausea and vomiting. Endocrine: Negative for polydipsia, polyphagia and polyuria. Skin: Negative for color change, pallor, rash and wound. Neurological: Negative for dizziness, tremors, weakness, light-headedness and numbness. All other systems reviewed and are negative. Physical Exam:  Body mass index is 33.62 kg/m². /82 (BP Location: Right arm, Patient Position: Sitting, Cuff Size: Adult)   Pulse 75   Ht 5' 3" (1.6 m)   Wt 86.1 kg (189 lb 12.8 oz)   SpO2 97%   BMI 33.62 kg/m²    Wt Readings from Last 3 Encounters:   09/06/23 86.1 kg (189 lb 12.8 oz)   07/27/23 86.2 kg (190 lb)   05/31/23 89.1 kg (196 lb 6.4 oz)       Physical Exam  Vitals reviewed. Constitutional:       Appearance: Normal appearance. He is obese. HENT:      Head: Normocephalic. Cardiovascular:      Rate and Rhythm: Normal rate and regular rhythm. Pulses: Normal pulses. Heart sounds: Normal heart sounds. No murmur heard. Pulmonary:      Effort: Pulmonary effort is normal. No respiratory distress. Breath sounds: Normal breath sounds. No wheezing, rhonchi or rales. Musculoskeletal:      Right lower leg: No edema. Left lower leg: No edema. Skin:     General: Skin is warm and dry. Neurological:      Mental Status: He is alert and oriented to person, place, and time.    Psychiatric: Mood and Affect: Mood normal.         Behavior: Behavior normal.         Thought Content: Thought content normal.         Judgment: Judgment normal.       Labs:   Lab Results   Component Value Date    HGBA1C 6.0 09/06/2023    HGBA1C 7.1 (A) 05/31/2023    HGBA1C 7.5 (A) 02/23/2023     Lab Results   Component Value Date    CREATININE 1.90 (H) 07/24/2023    CREATININE 1.64 (H) 05/20/2023    CREATININE 1.66 (H) 02/04/2023    BUN 42 (H) 07/24/2023     01/08/2016    K 5.3 07/24/2023     (H) 07/24/2023    CO2 25 07/24/2023     eGFR   Date Value Ref Range Status   07/24/2023 36 ml/min/1.73sq m Final     Lab Results   Component Value Date    CHOL 174 03/10/2015    HDL 39 (L) 05/20/2023    TRIG 258 (H) 05/20/2023     Lab Results   Component Value Date    ALT 18 05/20/2023    AST 11 (L) 05/20/2023    GGT 64 05/20/2023    ALKPHOS 102 05/20/2023    BILITOT 0.29 01/08/2016     Lab Results   Component Value Date    PVD4QDPUHRQN 1.091 05/05/2022    SPY5VEBEDZOK 1.432 06/01/2019    [id number] 1.603 05/02/2017     Lab Results   Component Value Date    FREET4 0.91 05/05/2022       Orders Placed This Encounter   Procedures   • Hemoglobin A1C     Standing Status:   Future     Standing Expiration Date:   9/6/2024   • Comprehensive metabolic panel     This is a patient instruction: Patient fasting for 8 hours or longer recommended. Standing Status:   Future     Standing Expiration Date:   9/6/2024   • POCT hemoglobin A1c       Patient Instructions   Increase Ozempic to 0.5 mg weekly  Decrease Tresiba to 20 units daily. Send BG log in 2 weeks for further adjustments. Discussed with the patient and all questioned fully answered. He will call me if any problems arise.     LETICIA Carter

## 2023-09-06 ENCOUNTER — OFFICE VISIT (OUTPATIENT)
Dept: ENDOCRINOLOGY | Facility: CLINIC | Age: 63
End: 2023-09-06
Payer: COMMERCIAL

## 2023-09-06 VITALS
HEIGHT: 63 IN | WEIGHT: 189.8 LBS | DIASTOLIC BLOOD PRESSURE: 82 MMHG | SYSTOLIC BLOOD PRESSURE: 150 MMHG | BODY MASS INDEX: 33.63 KG/M2 | OXYGEN SATURATION: 97 % | HEART RATE: 75 BPM

## 2023-09-06 DIAGNOSIS — N18.9 CHRONIC KIDNEY DISEASE, UNSPECIFIED CKD STAGE: ICD-10-CM

## 2023-09-06 DIAGNOSIS — E11.22 TYPE 2 DIABETES MELLITUS WITH CHRONIC KIDNEY DISEASE, WITH LONG-TERM CURRENT USE OF INSULIN, UNSPECIFIED CKD STAGE (HCC): Primary | ICD-10-CM

## 2023-09-06 DIAGNOSIS — I10 PRIMARY HYPERTENSION: ICD-10-CM

## 2023-09-06 DIAGNOSIS — E78.49 OTHER HYPERLIPIDEMIA: ICD-10-CM

## 2023-09-06 DIAGNOSIS — Z79.4 TYPE 2 DIABETES MELLITUS WITH CHRONIC KIDNEY DISEASE, WITH LONG-TERM CURRENT USE OF INSULIN, UNSPECIFIED CKD STAGE (HCC): Primary | ICD-10-CM

## 2023-09-06 LAB — SL AMB POCT HEMOGLOBIN AIC: 6 (ref ?–6.5)

## 2023-09-06 PROCEDURE — 99214 OFFICE O/P EST MOD 30 MIN: CPT

## 2023-09-06 PROCEDURE — 83036 HEMOGLOBIN GLYCOSYLATED A1C: CPT

## 2023-09-06 RX ORDER — INSULIN DEGLUDEC INJECTION 100 U/ML
INJECTION, SOLUTION SUBCUTANEOUS
Qty: 15 ML | Refills: 1 | Status: SHIPPED | OUTPATIENT
Start: 2023-09-06

## 2023-09-06 NOTE — ASSESSMENT & PLAN NOTE
Lab Results   Component Value Date    EGFR 36 07/24/2023    EGFR 43 05/20/2023    EGFR 42 02/04/2023    CREATININE 1.90 (H) 07/24/2023    CREATININE 1.64 (H) 05/20/2023    CREATININE 1.66 (H) 02/04/2023   Continue follow up with nephrology.

## 2023-09-06 NOTE — PATIENT INSTRUCTIONS
Increase Ozempic to 0.5 mg weekly  Decrease Tresiba to 20 units daily. Send BG log in 2 weeks for further adjustments.

## 2023-09-06 NOTE — ASSESSMENT & PLAN NOTE
BP is elevated today - he is on multiple BP agents and this is being managed by PCP. Continue current medication regimen at this time and will continue to monitor.

## 2023-09-06 NOTE — ASSESSMENT & PLAN NOTE
HGA1C well controlled, BGs tightly controlled. Treatment regimen:   - He is only using Ozempic 0.25 mg weekly, will increase to 0.5 mg weekly. - Decrease Tresiba to 20 units daily and send Bg log in about 2 weeks - will continue to decrease and possibly discontinue insulin if BGs continue to be tightly controlled. - Continue to focus on lifestyle modifications. Discussed risks/complications associated with uncontrolled diabetes. Advised to adhere to diabetic diet, and recommended staying active/exercising routinely. Keep carbohydrates consistent to limit blood glucose fluctuations. Advised to call if blood sugars less than 70 mg/dl or over 300 mg/dl. Discussed symptoms and treatment of hypoglycemia. Recommended routine follow-up with podiatry and ophthalmology. Ordered blood work to complete prior to next visit.   Lab Results   Component Value Date    HGBA1C 6.0 09/06/2023

## 2023-09-20 ENCOUNTER — HOSPITAL ENCOUNTER (OUTPATIENT)
Dept: NON INVASIVE DIAGNOSTICS | Facility: CLINIC | Age: 63
Discharge: HOME | End: 2023-09-20
Payer: COMMERCIAL

## 2023-09-20 VITALS
SYSTOLIC BLOOD PRESSURE: 150 MMHG | DIASTOLIC BLOOD PRESSURE: 82 MMHG | BODY MASS INDEX: 33.49 KG/M2 | HEART RATE: 75 BPM | WEIGHT: 189 LBS | HEIGHT: 63 IN

## 2023-09-20 DIAGNOSIS — I35.0 AORTIC STENOSIS, MODERATE: ICD-10-CM

## 2023-09-20 LAB
AORTIC ROOT: 3.2 CM
AORTIC VALVE MEAN VELOCITY: 14 M/S
APICAL FOUR CHAMBER EJECTION FRACTION: 57 %
ASCENDING AORTA: 3.4 CM
AV AREA BY CONTINUOUS VTI: 1.5 CM2
AV AREA PEAK VELOCITY: 1.3 CM2
AV LVOT MEAN GRADIENT: 2 MMHG
AV LVOT PEAK GRADIENT: 3 MMHG
AV MEAN GRADIENT: 9 MMHG
AV PEAK GRADIENT: 17 MMHG
AV VALVE AREA: 1.51 CM2
AV VELOCITY RATIO: 0.4
DOP CALC AO PEAK VEL: 2.09 M/S
DOP CALC AO VTI: 40.05 CM
DOP CALC LVOT AREA: 3.14 CM2
DOP CALC LVOT CARDIAC INDEX: 2.26 L/MIN/M2
DOP CALC LVOT CARDIAC OUTPUT: 4.28 L/MIN
DOP CALC LVOT DIAMETER: 2 CM
DOP CALC LVOT PEAK VEL VTI: 19.28 CM
DOP CALC LVOT PEAK VEL: 0.83 M/S
DOP CALC LVOT STROKE INDEX: 32.8 ML/M2
DOP CALC LVOT STROKE VOLUME: 60.54
E WAVE DECELERATION TIME: 194 MS
FRACTIONAL SHORTENING: 36 (ref 28–44)
INTERVENTRICULAR SEPTUM IN DIASTOLE (PARASTERNAL SHORT AXIS VIEW): 1.2 CM
INTERVENTRICULAR SEPTUM: 1.2 CM (ref 0.6–1.1)
LAAS-AP2: 20.5 CM2
LAAS-AP4: 12.5 CM2
LEFT ATRIUM SIZE: 4.5 CM
LEFT ATRIUM VOLUME (MOD BIPLANE): 44 ML
LEFT INTERNAL DIMENSION IN SYSTOLE: 2.9 CM (ref 2.1–4)
LEFT VENTRICULAR INTERNAL DIMENSION IN DIASTOLE: 4.5 CM (ref 3.5–6)
LEFT VENTRICULAR POSTERIOR WALL IN END DIASTOLE: 1.4 CM
LEFT VENTRICULAR STROKE VOLUME: 61 ML
LVSV (TEICH): 61 ML
MV E'TISSUE VEL-SEP: 7 CM/S
MV PEAK A VEL: 1.06 M/S
MV PEAK E VEL: 89 CM/S
MV STENOSIS PRESSURE HALF TIME: 56 MS
MV VALVE AREA P 1/2 METHOD: 3.93
RIGHT ATRIUM AREA SYSTOLE A4C: 8.6 CM2
RIGHT VENTRICLE ID DIMENSION: 4.1 CM
SL CV LEFT ATRIUM LENGTH A2C: 5.6 CM
SL CV LV EF: 60
SL CV PED ECHO LEFT VENTRICLE DIASTOLIC VOLUME (MOD BIPLANE) 2D: 94 ML
SL CV PED ECHO LEFT VENTRICLE SYSTOLIC VOLUME (MOD BIPLANE) 2D: 34 ML
TRICUSPID ANNULAR PLANE SYSTOLIC EXCURSION: 2 CM
TRICUSPID VALVE PEAK REGURGITATION VELOCITY: 2.69 M/S

## 2023-09-20 PROCEDURE — 93306 TTE W/DOPPLER COMPLETE: CPT | Performed by: INTERNAL MEDICINE

## 2023-09-20 PROCEDURE — 93306 TTE W/DOPPLER COMPLETE: CPT

## 2023-09-25 ENCOUNTER — TELEPHONE (OUTPATIENT)
Dept: ENDOCRINOLOGY | Facility: CLINIC | Age: 63
End: 2023-09-25

## 2023-09-27 ENCOUNTER — DOCUMENTATION (OUTPATIENT)
Dept: ENDOCRINOLOGY | Facility: CLINIC | Age: 63
End: 2023-09-27

## 2023-10-03 ENCOUNTER — OFFICE VISIT (OUTPATIENT)
Dept: CARDIOLOGY CLINIC | Facility: CLINIC | Age: 63
End: 2023-10-03
Payer: COMMERCIAL

## 2023-10-03 VITALS
OXYGEN SATURATION: 96 % | BODY MASS INDEX: 33.84 KG/M2 | WEIGHT: 191 LBS | HEIGHT: 63 IN | SYSTOLIC BLOOD PRESSURE: 110 MMHG | HEART RATE: 80 BPM | DIASTOLIC BLOOD PRESSURE: 70 MMHG

## 2023-10-03 DIAGNOSIS — I10 HYPERTENSION, UNSPECIFIED TYPE: Primary | ICD-10-CM

## 2023-10-03 PROCEDURE — 99204 OFFICE O/P NEW MOD 45 MIN: CPT | Performed by: INTERNAL MEDICINE

## 2023-10-03 PROCEDURE — 93000 ELECTROCARDIOGRAM COMPLETE: CPT | Performed by: INTERNAL MEDICINE

## 2023-10-03 NOTE — PROGRESS NOTES
Cardiology Follow Up    Jade Hemphill  1960  [id number]  [facility] [address] [id number] [phone]    1. Hypertension, unspecified type  POCT ECG          Interval History:  Jade Hemphill is a 59y.o. year old male, hepatitis c with liver failure status post liver transplant 10 years ago at [facility] currently on azathioprine and tacro is for immunosuppression, diabetes well controlled on current insulin regimen, hypertension, CKD was likely related to diabetic nephropathy, mild-to-moderate aortic stenosis presenting for follow-up. Patient is here for follow-up visit after repeat 1 year transthoracic echo. His echo shows mild to moderate but velocities and gradients are closer to mild aortic stenosis. Patient does not really have any symptoms of chest pain, shortness of breath, presyncopal symptoms, and/or syncopal events. 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 He is planning on retiring at the end of this year in December.   He has no other complaints or concerns at this time.       Patient Active Problem List   Diagnosis   • Hypertension   • Type 2 diabetes mellitus with ophthalmic complication, with long-term current use of insulin (HCC)   • Cirrhosis   • Hyperlipidemia   • Status post liver transplant   • Hyperglycemia   • Preop cardiovascular exam   • Osteopenia   • Male erectile disorder   • Type 2 diabetes mellitus with hyperglycemia, with long-term current use of insulin (HCC)   • Respiratory tract infection   • CKD (chronic kidney disease)   • Gout   • Swelling of left elbow   • Achilles tendinitis of right lower extremity   • Bunion of great toe of right foot   • Encephalopathy, portal systemic (HCC)   • Immunosuppression (HCC)   • Hyperkalemia   • Murmur, cardiac   • Proteinuria   • Right arm pain   • Type 2 diabetes mellitus with chronic kidney disease, with long-term current use of insulin ([address])     Past Medical History:   Diagnosis Date   • Bunion of right foot    • Chronic kidney disease, stage III (moderate) (HCC)     RESOLVED: [id number]   • Cirrhosis ([address])    • Diabetes mellitus ([address])    • Gout 10/6/18   • Hepatic encephalopathy (HCC)    • Hepatitis C    • Hyperkalemia    • Hyperlipidemia    • Hypertension    • Pancytopenia ([address])    • Status post liver transplant ([address])     [id number]   • Umbilical hernia    • Wears glasses      Social History     Socioeconomic History   • Marital status: /Civil Union     Spouse name: Not on file   • Number of children: Not on file   • Years of education: Not on file   • Highest education level: Not on file   Occupational History   • Occupation: WORKS AT [employer]     Comment: [employer]    Tobacco Use   • Smoking status: Never   • Smokeless tobacco: Never   Vaping Use   • Vaping Use: Never used   Substance and Sexual Activity   • Alcohol use: Yes     Alcohol/week: 1.0 standard drink of alcohol     Types: 1 Cans of beer per week     Comment: occassionally   • Drug use: No   • Sexual activity: Yes     Partners: Female   Other Topics Concern   • Not on file   Social History Narrative    Uses safety equipment- seatbelts      Social Determinants of Health     Financial Resource Strain: Low Risk  (9/1/2022)    Overall Financial Resource Strain (CARDIA)    • Difficulty of Paying Living Expenses: Not hard at all   Food Insecurity: No Food Insecurity (9/1/2022)    Hunger Vital Sign    • Worried About Running Out of Food in the Last Year: Never true    • Ran Out of Food in the Last Year: Never true   Transportation Needs: No Transportation Needs (9/1/2022)    PRAPARE - Transportation    • Lack of Transportation (Medical): No    • Lack of Transportation (Non-Medical):  No   Physical Activity: Not on file   Stress: No Stress Concern Present (4/18/2022)    [address] Questionnaire    • Feeling of Stress : Not at all   Social Connections: Not on file   Intimate Partner Violence: Not At Risk (9/1/2022)    Humiliation, Afraid, Rape, and Kick questionnaire    • Fear of Current or Ex-Partner: No    • Emotionally Abused: No    • Physically Abused: No    • Sexually Abused: No   Housing Stability: Low Risk  (4/18/2022)    Housing Stability Vital Sign    • Unable to Pay for Housing in the Last Year: No    • Number of Places Lived in the Last Year: 1    • Unstable Housing in the Last Year: No      Family History   Problem Relation Age of Onset   • Diabetes Mother         TYPE 2   • Hypertension Mother    • Stroke Mother 68        SYNDROME    • Hepatitis Brother         C   • Cirrhosis Brother         LIVER    • Cancer Family      Past Surgical History:   Procedure Laterality Date   • ANKLE SURGERY     • CHOLECYSTECTOMY     • FOOT SURGERY  03/2022    Bunion removal   • FRACTURE SURGERY      left foot   • GALLBLADDER SURGERY     • HIP SURGERY     • LIVER TRANSPLANTATION      LAST ASSESSED: [id number]   • NY COLONOSCOPY FLX DX W/COLLJ SPEC WHEN PFRMD N/A 02/16/2017    Procedure: COLONOSCOPY;  Surgeon: Yesenia Saldaña MD;  Location:  GI LAB;   Service: Gastroenterology   • NY CORRJ HALLUX VALGUS W/SESMDC W/DIST METAR OSTEOT Right 02/04/2022    Procedure: Uzma Amato;  Surgeon: Chris Waldrop DPM;  Location: [facility] MAIN OR;  Service: Podiatry   • TONSILLECTOMY         Current Outpatient Medications:   •  amLODIPine (NORVASC) 10 mg tablet, TAKE 1 TABLET DAILY, Disp: 90 tablet, Rfl: 3  •  atorvastatin (LIPITOR) 40 mg tablet, TAKE 1 TABLET DAILY AT BEDTIME, Disp: 90 tablet, Rfl: 3  •  azaTHIOprine (IMURAN) 50 mg tablet, Take 1 tablet (50 mg total) by mouth daily, Disp: 90 tablet, Rfl: 3  •  Cholecalciferol (Vitamin D3) 50 MCG (2000 UT) capsule, Take 1 capsule by mouth daily Gel caps, Disp: , Rfl:   •  fludrocortisone (FLORINEF) 0.1 mg tablet, Take 0.1 mg by mouth daily  , Disp: , Rfl: •  hydrochlorothiazide (HYDRODIURIL) 12.5 mg tablet, TAKE 1 TABLET DAILY, Disp: 90 tablet, Rfl: 3  •  labetalol (NORMODYNE) 100 mg tablet, TAKE 1 TABLET EVERY 12 HOURS, Disp: 180 tablet, Rfl: 3  •  latanoprost (XALATAN) 0.005 % ophthalmic solution, INSTILL 1 DROP INTO LEFT EYE AT BEDTIME FOR 3 WEEKS, Disp: , Rfl:   •  lisinopril (ZESTRIL) 20 mg tablet, TAKE 1 TABLET DAILY, Disp: 90 tablet, Rfl: 3  •  Magnesium Oxide (Magnesium Oxide 400) 240 MG PACK, Take 400 mg by mouth 2 (two) times a day, Disp: 1 each, Rfl: 1  •  Multiple Vitamin (MULTIVITAMIN ADULT PO), Take by mouth Daily, Disp: , Rfl:   •  semaglutide, 0.25 or 0.5 mg/dose, (Ozempic, 0.25 or 0.5 MG/DOSE,) 2 mg/1.5 mL injection pen, Inject 0.25 mg once weekly x 2 weeks then increase to 0.5 mg weekly. (Patient taking differently: Inject 0.5 mg under the skin every 7 days  0.5 mg weekly.), Disp: 4.5 mL, Rfl: 1  •  tacrolimus (PROGRAF) 1 mg capsule, Takes 4 tabs in am, 3 tabs in pm, Disp: , Rfl:   •  Tresiba FlexTouch 100 units/mL injection pen, Inject 20units subcut once daily (Patient taking differently: Inject 10 units subcut once daily), Disp: 15 mL, Rfl: 1  •  TRUE METRIX BLOOD GLUCOSE TEST test strip, Test twice daily, Disp: , Rfl:   •  amoxicillin (AMOXIL) 500 mg capsule, PRIOR TO DENTAL APPT.  (Patient not taking: Reported on 9/6/2023), Disp: , Rfl:   •  BD Pen Needle Montserrat 2nd Gen 32G X 4 MM MISC, USE 2 (TWO) TIMES A DAY, Disp: 200 each, Rfl: 3  •  Blood Pressure Monitor KIT, Use once a week (Patient not taking: Reported on 9/6/2023), Disp: 1 kit, Rfl: 0  •  ciclopirox (LOPROX) 0.77 % cream, Apply 2x/day to in between toes for 2 weeks (Patient not taking: Reported on 5/31/2023), Disp: 30 g, Rfl: 3  •  Diclofenac Sodium (VOLTAREN) 1 %, Apply 2 g topically 4 (four) times a day (Patient not taking: Reported on 5/31/2023), Disp: 50 g, Rfl: 0  •  ibuprofen (MOTRIN) 600 mg tablet, Take 1 tablet (600 mg total) by mouth every 6 (six) hours for 5 days (Patient not taking: Reported on 9/6/2023), Disp: 20 tablet, Rfl: 0  •  MAGnesium-Oxide 400 (240 Mg) MG TABS, , Disp: , Rfl:   No Known Allergies    Labs:  Hospital Outpatient Visit on 09/20/2023   Component Date Value   • A4C EF 09/20/2023 57    • LVOT stroke volume 09/20/2023 60.54    • LVOT stroke volume index 09/20/2023 32.80    • LVOT Cardiac Index 09/20/2023 2.26    • LVOT Cardiac Output 09/20/2023 4.28    • LVIDd 09/20/2023 4.50    • LVIDS 09/20/2023 2.90    • IVSd 09/20/2023 1.20    • LVPWd 09/20/2023 1.40    • FS 09/20/2023 36    • MV E' Tissue Velocity Se* 09/20/2023 7    • E wave deceleration time 09/20/2023 194    • MV Peak E Adam 09/20/2023 89    • MV Peak A Adam 09/20/2023 1.06    • AV LVOT peak gradient 09/20/2023 3    • LVOT peak VTI 09/20/2023 19.28    • LVOT peak adam 09/20/2023 0.83    • RVID d 09/20/2023 4.1    • Tricuspid annular plane * 09/20/2023 2.00    • LA size 09/20/2023 4.5    • LA length (A2C) 09/20/2023 5.60    • LA volume (BP) 09/20/2023 44    • RAA A4C 09/20/2023 8.6    • LVOT diameter 09/20/2023 2.0    • Aortic valve peak veloci* 09/20/2023 2.09    • Ao VTI 09/20/2023 40.05    • AV mean gradient 09/20/2023 9    • LVOT mn grad 09/20/2023 2.0    • AV peak gradient 09/20/2023 17    • AV area by cont VTI 09/20/2023 1.5    • AV area peak adam 09/20/2023 1.3    • MV stenosis pressure 1/2* 09/20/2023 56    • MV valve area p 1/2 meth* 09/20/2023 3.93    • Ao root 09/20/2023 3.20    • Asc Ao 09/20/2023 3.4    • Aortic valve mean veloci* 09/20/2023 14.00    • Tricuspid valve peak reg* 09/20/2023 2.69    • Left ventricular stroke * 09/20/2023 61.00    • IVS 09/20/2023 1.2    • LEFT VENTRICLE SYSTOLIC * 08/46/0387 34    • LV DIASTOLIC VOLUME (MOD* 35/98/1053 94    • Left Atrium Area-systoli* 09/20/2023 12.5    • Left Atrium Area-systoli* 09/20/2023 20.5    • LVSV, 2D 09/20/2023 61    • LVOT area 09/20/2023 3.14    • DVI 09/20/2023 0.40    • AV valve area 09/20/2023 1.51    • LV EF 09/20/2023 60    Office Visit on 09/06/2023   Component Date Value   • Hemoglobin A1C 09/06/2023 6.0    Appointment on 07/24/2023   Component Date Value   • Sodium 07/24/2023 142    • Potassium 07/24/2023 5.3    • Chloride 07/24/2023 111 (H)    • CO2 07/24/2023 25    • ANION GAP 07/24/2023 6    • BUN 07/24/2023 42 (H)    • Creatinine 07/24/2023 1.90 (H)    • Glucose, Fasting 07/24/2023 95    • Calcium 07/24/2023 9.0    • eGFR 07/24/2023 36    • Creatinine, Ur 07/24/2023 <1.0    • Protein Urine Random 07/24/2023 <4    • Prot/Creat Ratio, Ur 07/24/2023     Telephone on 05/31/2023   Component Date Value   • Right Eye Diabetic Retin* 05/02/2023 Positive    • Left Eye Diabetic Retino* 05/02/2023 Positive    Office Visit on 05/31/2023   Component Date Value   • Hemoglobin A1C 05/31/2023 7.1 (A)    Lab on 05/20/2023   Component Date Value   • Creatinine, Ur 05/20/2023 88.5    • Albumin,U,Random 05/20/2023 1,000.0 (H)    • Albumin Creat Ratio 05/20/2023 1,130 (H)    • Cholesterol 05/20/2023 154    • Triglycerides 05/20/2023 258 (H)    • HDL, Direct 05/20/2023 39 (L)    • LDL Calculated 05/20/2023 63    • Non-HDL-Chol (CHOL-HDL) 05/20/2023 115    • Sodium 05/20/2023 140    • Potassium 05/20/2023 5.0    • Chloride 05/20/2023 108    • CO2 05/20/2023 25    • ANION GAP 05/20/2023 7    • BUN 05/20/2023 46 (H)    • Creatinine 05/20/2023 1.64 (H)    • Glucose, Fasting 05/20/2023 123 (H)    • Calcium 05/20/2023 9.1    • AST 05/20/2023 11 (L)    • ALT 05/20/2023 18    • Alkaline Phosphatase 05/20/2023 102    • Total Protein 05/20/2023 7.1    • Albumin 05/20/2023 4.3    • Total Bilirubin 05/20/2023 0.46    • eGFR 05/20/2023 43    • TACROLIMUS 05/20/2023 3.4    • Magnesium 05/20/2023 2.0    • WBC 05/20/2023 4.52    • RBC 05/20/2023 3.95    • Hemoglobin 05/20/2023 11.4 (L)    • Hematocrit 05/20/2023 35.5 (L)    • MCV 05/20/2023 90    • MCH 05/20/2023 28.9    • MCHC 05/20/2023 32.1    • RDW 05/20/2023 14.0    • MPV 05/20/2023 11.8    • Platelets 43/35/9342 165    • nRBC 05/20/2023 0    • Neutrophils Relative 05/20/2023 67    • Immat GRANS % 05/20/2023 0    • Lymphocytes Relative 05/20/2023 18    • Monocytes Relative 05/20/2023 11    • Eosinophils Relative 05/20/2023 3    • Basophils Relative 05/20/2023 1    • Neutrophils Absolute 05/20/2023 3.04    • Immature Grans Absolute 05/20/2023 0.01    • Lymphocytes Absolute 05/20/2023 0.80    • Monocytes Absolute 05/20/2023 0.50    • Eosinophils Absolute 05/20/2023 0.14    • Basophils Absolute 05/20/2023 0.03    • GGT 05/20/2023 64    • Protime 05/20/2023 14.2    • INR 05/20/2023 1.08      Imaging: Echo complete w/ contrast if indicated    Result Date: 9/20/2023  Narrative: •  Left Ventricle: Left ventricular cavity size is normal. Wall thickness is mildly increased. There is concentric remodeling. The left ventricular ejection fraction is 60%. Systolic function is normal. Wall motion is normal. Diastolic function is mildly abnormal, consistent with grade I (abnormal) relaxation. •  Right Ventricle: Right ventricular cavity size is normal. Systolic function is normal. •  Aortic Valve: The aortic valve is trileaflet. The leaflets are moderately thickened. The leaflets are moderately calcified. There is mild to moderately reduced mobility. There is mild to moderate stenosis. The aortic valve mean gradient is 9 mmHg. The dimensionless velocity index is 0.40. The aortic valve area is 1.51 cm2. •  Mitral Valve: There is mild annular calcification. There is mild regurgitation. Review of Systems:  Review of Systems   All other systems reviewed and are negative. Physical Exam:  Physical Exam  Vitals and nursing note reviewed. Constitutional:       Appearance: Normal appearance. HENT:      Head: Normocephalic and atraumatic. Eyes:      Pupils: Pupils are equal, round, and reactive to light. Cardiovascular:      Rate and Rhythm: Normal rate and regular rhythm. Pulses: Normal pulses. Heart sounds: Normal heart sounds. Pulmonary:      Effort: Pulmonary effort is normal.      Breath sounds: Normal breath sounds. Abdominal:      General: Abdomen is flat. Palpations: Abdomen is soft. Musculoskeletal:         General: Normal range of motion. Cervical back: Normal range of motion. Skin:     General: Skin is warm and dry. Neurological:      General: No focal deficit present. Mental Status: He is alert and oriented to person, place, and time. Psychiatric:         Mood and Affect: Mood normal.         Behavior: Behavior normal.         Discussion/Summary: Ele Morales is a 59y.o. year old male, hepatitis c with liver failure status post liver transplant 10 years ago at [facility] currently on azathioprine and tacro is for immunosuppression, diabetes well controlled on current insulin regimen, hypertension, CKD was likely related to diabetic nephropathy, mild-to-moderate aortic stenosis presenting for follow-up. Aortic stenosis, mild to moderate and honestly appears more mild based on velocities and gradient. Patient is compensated with no physical exam findings concerning for worsening stenosis since last transthoracic echo in the middle of September.  -He is asymptomatic and denies any chest pain, shortness of breath, presyncopal symptoms, and syncope. -Surveillance transthoracic echo in 2 to 3 years is fine  -I educated the patient on symptoms in the future that may prompt earlier evaluation. I did discuss with the patient that timeline in the future about aortic valve replacement if there is significant progression.     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

## 2023-10-09 DIAGNOSIS — I10 HYPERTENSION, UNSPECIFIED TYPE: ICD-10-CM

## 2023-10-09 RX ORDER — AMLODIPINE BESYLATE 10 MG/1
TABLET ORAL
Qty: 90 TABLET | Refills: 3 | Status: SHIPPED | OUTPATIENT
Start: 2023-10-09

## 2023-10-13 ENCOUNTER — TELEPHONE (OUTPATIENT)
Dept: ENDOCRINOLOGY | Facility: CLINIC | Age: 63
End: 2023-10-13

## 2023-10-13 NOTE — TELEPHONE ENCOUNTER
BGs in target range - I would recommend to continue current dose of Tresiba and Ozempic at this time.

## 2023-10-27 ENCOUNTER — TELEPHONE (OUTPATIENT)
Dept: ENDOCRINOLOGY | Facility: CLINIC | Age: 63
End: 2023-10-27

## 2023-11-14 ENCOUNTER — TELEPHONE (OUTPATIENT)
Dept: ENDOCRINOLOGY | Facility: CLINIC | Age: 63
End: 2023-11-14

## 2023-11-14 ENCOUNTER — OFFICE VISIT (OUTPATIENT)
Dept: FAMILY MEDICINE CLINIC | Facility: CLINIC | Age: 63
End: 2023-11-14

## 2023-11-14 VITALS
WEIGHT: 190 LBS | TEMPERATURE: 98.2 F | HEIGHT: 63 IN | OXYGEN SATURATION: 99 % | DIASTOLIC BLOOD PRESSURE: 101 MMHG | RESPIRATION RATE: 18 BRPM | HEART RATE: 87 BPM | BODY MASS INDEX: 33.66 KG/M2 | SYSTOLIC BLOOD PRESSURE: 182 MMHG

## 2023-11-14 DIAGNOSIS — Z23 ENCOUNTER FOR IMMUNIZATION: ICD-10-CM

## 2023-11-14 DIAGNOSIS — G56.92 NEUROPATHY OF LEFT UPPER EXTREMITY: Primary | ICD-10-CM

## 2023-11-14 PROCEDURE — 90686 IIV4 VACC NO PRSV 0.5 ML IM: CPT | Performed by: FAMILY MEDICINE

## 2023-11-14 PROCEDURE — 90471 IMMUNIZATION ADMIN: CPT | Performed by: FAMILY MEDICINE

## 2023-11-14 PROCEDURE — 99214 OFFICE O/P EST MOD 30 MIN: CPT | Performed by: FAMILY MEDICINE

## 2023-11-14 NOTE — PROGRESS NOTES
Name: Jade Hemphill      : 1960      MRN: [id number]  Encounter Provider: Chris Baptiste DO  Encounter Date: 2023   Encounter department: [facility]    Assessment & Plan     1. Neuropathy of left upper extremity  Assessment & Plan:  46 yo male with left UE numbeness, tingling, and 4/5 weakness. Recent blood work negative for hyperglycemia, anemia. Spurlings test negative. Ddx impinged nerve at shoulder. Plan  - PT   - reeval after 4-6 weeks, consider EMG, imaging, and/or repeat blood work if symptoms remain.   - Pt has GFR 36, would like to avoid nephrotoxic agents however, can consider gabapentin 200 BID if too uncomfortable. Orders:  -     Ambulatory Referral to Physical Therapy; Future    2. Encounter for immunization  -     influenza vaccine, quadrivalent, 0.5 mL, preservative-free, for adult and pediatric patients 6 mos+ (AFLURIA, FLUARIX, FLULAVAL, FLUZONE)        Depression Screening and Follow-up Plan: Patient was screened for depression during today's encounter. They screened negative with a PHQ-2 score of 0. Subjective     HPI  56 yo male presents with left upper extremity numbness and tingling. Started 3 weeks ago. No inciting event. Pt's work is on strike for one month so he has been picketing but is right handed and does not think that caused an injury. Pt has been icing and elevating his left arm with temporary relief. Pt feels like left arm is weaker.        Past Medical History:   Diagnosis Date    Bunion of right foot     Chronic kidney disease, stage III (moderate) ([address])     RESOLVED: [id number]    Cirrhosis ([address])     Diabetes mellitus ([address])     Gout 10/6/18    Hepatic encephalopathy (HCC)     Hepatitis C     Hyperkalemia     Hyperlipidemia     Hypertension     Pancytopenia ([address])     Status post liver transplant ([address])     1573    Umbilical hernia     Wears glasses      Past Surgical History:   Procedure Laterality Date    ANKLE SURGERY      CHOLECYSTECTOMY      FOOT SURGERY  03/2022    Bunion removal    FRACTURE SURGERY      left foot    GALLBLADDER SURGERY      HIP SURGERY      LIVER TRANSPLANTATION      LAST ASSESSED: [id number]    NV COLONOSCOPY FLX DX W/COLLJ SPEC WHEN PFRMD N/A 02/16/2017    Procedure: COLONOSCOPY;  Surgeon: Jania Robles MD;  Location:  GI LAB; Service: Gastroenterology    NV CORRJ HALLUX VALGUS W/SESMDC W/DIST [place] Right 02/04/2022    Procedure: [place];  Surgeon: Babita Moore DPM;  Location: [address] [place] [address] MAIN OR;  Service: Podiatry    TONSILLECTOMY       Family History   Problem Relation Age of Onset    Diabetes Mother         TYPE 2    Hypertension Mother     Stroke Mother 68        SYNDROME     Hepatitis Brother         C    Cirrhosis Brother         LIVER     Cancer Family      Social History     Socioeconomic History    Marital status: /Civil Union     Spouse name: None    Number of children: None    Years of education: None    Highest education level: None   Occupational History    Occupation: WORKS AT [employer]     Comment: [employer]    Tobacco Use    Smoking status: Never    Smokeless tobacco: Never   Vaping Use    Vaping Use: Never used   Substance and Sexual Activity    Alcohol use:  Yes     Alcohol/week: 1.0 standard drink of alcohol     Types: 1 Cans of beer per week     Comment: occassionally    Drug use: No    Sexual activity: Yes     Partners: Female   Other Topics Concern    None   Social History Narrative    Uses safety equipment- seatbelts      Social Determinants of Health     Financial Resource Strain: Low Risk  (11/14/2023)    Overall Financial Resource Strain (CARDIA)     Difficulty of Paying Living Expenses: Not hard at all   Food Insecurity: No Food Insecurity (11/14/2023)    Hunger Vital Sign     Worried About Running Out of Food in the Last Year: Never true     Ran Out of Food in the Last Year: Never true   Transportation Needs: No Transportation Needs (11/14/2023)    PRAPARE - Transportation     Lack of Transportation (Medical): No     Lack of Transportation (Non-Medical): No   Physical Activity: Inactive (11/14/2023)    Exercise Vital Sign     Days of Exercise per Week: 0 days     Minutes of Exercise per Session: 0 min   Stress: No Stress Concern Present (11/14/2023)    [facility]     Feeling of Stress : Not at all   Social Connections:  Moderately Integrated (11/14/2023)    Social Connection and Isolation Panel [NHANES]     Frequency of Communication with Friends and Family: More than three times a week     Frequency of Social Gatherings with Friends and Family: More than three times a week     Attends Hindu Services: More than 4 times per year     Active Member of [employer] or Organizations: No     Attends Club or Organization Meetings: Never     Marital Status:    Intimate Partner Violence: Not At Risk (11/14/2023)    Humiliation, Afraid, Rape, and Kick questionnaire     Fear of Current or Ex-Partner: No     Emotionally Abused: No     Physically Abused: No     Sexually Abused: No   Housing Stability: Unknown (11/14/2023)    Housing Stability Vital Sign     Unable to Pay for Housing in the Last Year: No     Number of [address] in the Last Year: Not on file     Unstable Housing in the Last Year: No     Current Outpatient Medications on File Prior to Visit   Medication Sig    amLODIPine (NORVASC) 10 mg tablet TAKE 1 TABLET DAILY    atorvastatin (LIPITOR) 40 mg tablet TAKE 1 TABLET DAILY AT BEDTIME    azaTHIOprine (IMURAN) 50 mg tablet Take 1 tablet (50 mg total) by mouth daily    BD Pen Needle [place]serrat 2nd Gen 32G X 4 MM MISC USE 2 (TWO) TIMES A DAY    Cholecalciferol (Vitamin D3) 50 MCG (2000 UT) capsule Take 1 capsule by mouth daily Gel caps    fludrocortisone (FLORINEF) 0.1 mg tablet Take 0.1 mg by mouth daily      hydrochlorothiazide (HYDRODIURIL) 12.5 mg tablet TAKE 1 TABLET DAILY    labetalol (NORMODYNE) 100 mg tablet TAKE 1 TABLET EVERY 12 HOURS    latanoprost (XALATAN) 0.005 % ophthalmic solution INSTILL 1 DROP INTO LEFT EYE AT BEDTIME FOR 3 WEEKS    lisinopril (ZESTRIL) 20 mg tablet TAKE 1 TABLET DAILY    Magnesium Oxide (Magnesium Oxide 400) 240 MG PACK Take 400 mg by mouth 2 (two) times a day    Multiple Vitamin (MULTIVITAMIN ADULT PO) Take by mouth Daily    tacrolimus (PROGRAF) 1 mg capsule Takes 4 tabs in am, 3 tabs in pm    Tresiba FlexTouch 100 units/mL injection pen Inject 20units subcut once daily (Patient taking differently: Inject 10 units subcut once daily)    TRUE METRIX BLOOD GLUCOSE TEST test strip Test twice daily    [DISCONTINUED] semaglutide, 0.25 or 0.5 mg/dose, (Ozempic, 0.25 or 0.5 MG/DOSE,) 2 mg/1.5 mL injection pen Inject 0.25 mg once weekly x 2 weeks then increase to 0.5 mg weekly. (Patient taking differently: Inject 0.5 mg under the skin every 7 days  0.5 mg weekly.)    amoxicillin (AMOXIL) 500 mg capsule PRIOR TO DENTAL APPT.  (Patient not taking: Reported on 9/6/2023)    Blood Pressure Monitor KIT Use once a week (Patient not taking: Reported on 9/6/2023)    ciclopirox (LOPROX) 0.77 % cream Apply 2x/day to in between toes for 2 weeks (Patient not taking: Reported on 5/31/2023)    Diclofenac Sodium (VOLTAREN) 1 % Apply 2 g topically 4 (four) times a day (Patient not taking: Reported on 5/31/2023)    ibuprofen (MOTRIN) 600 mg tablet Take 1 tablet (600 mg total) by mouth every 6 (six) hours for 5 days (Patient not taking: Reported on 9/6/2023)    MAGnesium-Oxide 400 (240 Mg) MG TABS  (Patient not taking: Reported on 5/31/2023)     No Known Allergies  Immunization History   Administered Date(s) Administered    COVID-19 PFIZER VACCINE 0.3 ML IM 04/25/2021, 05/16/2021, 11/09/2021    COVID-19 Pfizer Vac BIVALENT Renaldo-sucrose 12 Yr+ IM 12/29/2022    Hep A, adult 06/17/2014    Influenza, injectable, quadrivalent, preservative free 0.5 mL 12/14/2018, 11/14/2023    Influenza, recombinant, quadrivalent,injectable, preservative free 11/01/2019, 09/14/2020, 10/14/2021    Influenza, seasonal, injectable 10/22/2014    Pneumococcal Conjugate 13-Valent 12/14/2018    Pneumococcal Polysaccharide PPV23 03/15/2019    Tdap 06/28/2018       Objective     BP (!) 182/101 (BP Location: Right arm, Patient Position: Sitting, Cuff Size: Large)   Pulse 87   Temp 98.2 °F (36.8 °C) (Temporal)   Resp 18   Ht 5' 3" (1.6 m)   Wt 86.2 kg (190 lb)   SpO2 99%   BMI 33.66 kg/m²     Physical Exam  Constitutional:       General: He is not in acute distress. Appearance: He is not ill-appearing or toxic-appearing. HENT:      Head: Normocephalic and atraumatic. Right Ear: External ear normal.      Left Ear: External ear normal.   Eyes:      General: No scleral icterus. Right eye: No discharge. Left eye: No discharge. Extraocular Movements: Extraocular movements intact. Conjunctiva/sclera: Conjunctivae normal.   Cardiovascular:      Rate and Rhythm: Normal rate. Pulmonary:      Effort: Pulmonary effort is normal. No respiratory distress. Musculoskeletal:         General: No tenderness. Skin:     General: Skin is warm and dry. Neurological:      General: No focal deficit present. Mental Status: He is alert. Motor: Weakness (Left arm 4/5 flexion and extension. Right arm 5/5) present.       Gait: Gait normal.   Psychiatric:         Mood and Affect: Mood normal.         Behavior: Behavior normal.       Tania Santos, DO

## 2023-11-15 DIAGNOSIS — Z79.4 TYPE 2 DIABETES MELLITUS WITH CHRONIC KIDNEY DISEASE, WITH LONG-TERM CURRENT USE OF INSULIN, UNSPECIFIED CKD STAGE (HCC): Primary | ICD-10-CM

## 2023-11-15 DIAGNOSIS — E11.22 TYPE 2 DIABETES MELLITUS WITH CHRONIC KIDNEY DISEASE, WITH LONG-TERM CURRENT USE OF INSULIN, UNSPECIFIED CKD STAGE (HCC): Primary | ICD-10-CM

## 2023-11-15 DIAGNOSIS — Z79.4 TYPE 2 DIABETES MELLITUS WITH CHRONIC KIDNEY DISEASE, WITH LONG-TERM CURRENT USE OF INSULIN, UNSPECIFIED CKD STAGE (HCC): ICD-10-CM

## 2023-11-15 DIAGNOSIS — E11.22 TYPE 2 DIABETES MELLITUS WITH CHRONIC KIDNEY DISEASE, WITH LONG-TERM CURRENT USE OF INSULIN, UNSPECIFIED CKD STAGE (HCC): ICD-10-CM

## 2023-11-15 PROBLEM — R03.0 ELEVATED BLOOD PRESSURE READING IN OFFICE WITHOUT DIAGNOSIS OF HYPERTENSION: Status: ACTIVE | Noted: 2023-11-15

## 2023-11-15 PROBLEM — G56.92 NEUROPATHY OF LEFT UPPER EXTREMITY: Status: ACTIVE | Noted: 2023-11-15

## 2023-11-15 NOTE — ASSESSMENT & PLAN NOTE
BP is elevated today, 182/101; similar on manual repeat, 178/100. May be related to left arm discomfort. Discussed ambulatory monitoring and return with BP log in 2 weeks.

## 2023-11-15 NOTE — TELEPHONE ENCOUNTER
BGs look okay - if he would like to increase Ozempic to 1 mg weekly I think BGs will improve and will be able to stop insulin.
Blood sugar log
Detail Level: Zone
Patient has been informed.
Patient returned call. He agrees with increasing his Ozempic.
Please send new prescription.
lmtcb
Add 85210 Cpt? (Important Note: In 2017 The Use Of 56150 Is Being Tracked By Cms To Determine Future Global Period Reimbursement For Global Periods): yes
Attending Attestation (For Attendings USE Only)...

## 2023-11-15 NOTE — ASSESSMENT & PLAN NOTE
44 yo male with left UE numbeness, tingling, and 4/5 weakness. Recent blood work negative for hyperglycemia, anemia. Spurlings test negative. Ddx impinged nerve at shoulder. Plan  - PT   - reeval after 4-6 weeks, consider EMG, imaging, and/or repeat blood work if symptoms remain.   - Pt has GFR 36, would like to avoid nephrotoxic agents however, can consider gabapentin 200 BID if too uncomfortable.

## 2023-11-20 ENCOUNTER — OFFICE VISIT (OUTPATIENT)
Dept: FAMILY MEDICINE CLINIC | Facility: CLINIC | Age: 63
End: 2023-11-20

## 2023-11-20 ENCOUNTER — HOSPITAL ENCOUNTER (OUTPATIENT)
Dept: RADIOLOGY | Facility: HOSPITAL | Age: 63
Discharge: HOME | End: 2023-11-20
Payer: COMMERCIAL

## 2023-11-20 VITALS
BODY MASS INDEX: 33.59 KG/M2 | RESPIRATION RATE: 18 BRPM | DIASTOLIC BLOOD PRESSURE: 105 MMHG | HEART RATE: 93 BPM | SYSTOLIC BLOOD PRESSURE: 174 MMHG | TEMPERATURE: 97.6 F | WEIGHT: 189.6 LBS | OXYGEN SATURATION: 100 %

## 2023-11-20 DIAGNOSIS — M48.02 FORAMINAL STENOSIS OF CERVICAL REGION: ICD-10-CM

## 2023-11-20 DIAGNOSIS — G56.92 NEUROPATHY OF LEFT UPPER EXTREMITY: ICD-10-CM

## 2023-11-20 DIAGNOSIS — G56.92 NEUROPATHY OF LEFT UPPER EXTREMITY: Primary | ICD-10-CM

## 2023-11-20 DIAGNOSIS — R80.9 PROTEINURIA, UNSPECIFIED TYPE: ICD-10-CM

## 2023-11-20 DIAGNOSIS — M25.512 ACUTE PAIN OF LEFT SHOULDER: ICD-10-CM

## 2023-11-20 DIAGNOSIS — I10 PRIMARY HYPERTENSION: Primary | ICD-10-CM

## 2023-11-20 PROBLEM — M54.2 PAIN IN NECK: Status: ACTIVE | Noted: 2023-11-20

## 2023-11-20 PROCEDURE — 72050 X-RAY EXAM NECK SPINE 4/5VWS: CPT

## 2023-11-20 PROCEDURE — 99214 OFFICE O/P EST MOD 30 MIN: CPT | Performed by: FAMILY MEDICINE

## 2023-11-20 RX ORDER — LISINOPRIL 20 MG/1
40 TABLET ORAL DAILY
Qty: 90 TABLET | Refills: 3
Start: 2023-11-20

## 2023-11-20 RX ORDER — METHYLPREDNISOLONE 4 MG/1
TABLET ORAL
Qty: 21 EACH | Refills: 0 | Status: SHIPPED | OUTPATIENT
Start: 2023-11-20

## 2023-11-20 NOTE — ASSESSMENT & PLAN NOTE
Blood pressure 174/105 on arrival, on recheck about the same. He is currently on amlodipine 10 mg daily, labetalol 100 mg twice daily, hydrochlorothiazide 12.5 mg,  and lisinopril 20 mg daily. Reports systolic of 1 92-2 60 at home as well, could be elevated secondary to pain. Patient has CKD stage III, recommend increasing lisinopril to 40 mg daily and repeating BMP. Patient to follow-up in 4 weeks for blood pressure recheck.

## 2023-11-20 NOTE — ASSESSMENT & PLAN NOTE
Started with pain in the back of neck, now complains of pain in left shoulder and numbness and tingling of left arm. Will check x-ray of cervical spine, TSH, B12 levels. Discussed Medrol Dosepak and side effects, patient would like to take steroids for relief. Also gave referral for physical therapy. Patient to follow-up in 4 weeks.

## 2023-11-20 NOTE — PROGRESS NOTES
Name: Vane Arnold      : 1960      MRN: [id number]  Encounter Provider: Deandra Chawla MD  Encounter Date: 2023   Encounter department: [address]     1. Primary hypertension  Assessment & Plan:  Blood pressure 174/105 on arrival, on recheck about the same. He is currently on amlodipine 10 mg daily, labetalol 100 mg twice daily, hydrochlorothiazide 12.5 mg,  and lisinopril 20 mg daily. Reports systolic of 1 00-7 60 at home as well, could be elevated secondary to pain. Patient has CKD stage III, recommend increasing lisinopril to 40 mg daily and repeating BMP. Patient to follow-up in 4 weeks for blood pressure recheck. Orders:  -     Lipid panel; Future    2. Acute pain of left shoulder  Assessment & Plan:  Started with pain in the back of neck, now complains of pain in left shoulder and numbness and tingling of left arm. Will check x-ray of cervical spine, TSH, B12 levels. Discussed Medrol Dosepak and side effects, patient would like to take steroids for relief. Also gave referral for physical therapy. Patient to follow-up in 4 weeks. Orders:  -     XR spine cervical complete 4 or 5 vw non injury; Future; Expected date: 2023  -     Ambulatory Referral to Physical Therapy; Future  -     methylPREDNISolone 4 MG tablet therapy pack; Use as directed on package    3. Neuropathy of left upper extremity  -     Vitamin B12; Future  -     TSH, 3rd generation with Free T4 reflex; Future    4. Proteinuria, unspecified type  -     lisinopril (ZESTRIL) 20 mg tablet; Take 2 tablets (40 mg total) by mouth daily           Subjective      Mr Theresa Medrano resented to office for follow-up of numbness and tingling of left arm and for hypertension.   He reports 3 weeks ago he woke up with pain in the back of his neck, and numbness of left arm, now he has continuous pain in his left shoulder and numbness that goes from his shoulder to the back of the forearm all the way to fingers. He feels decreased strength in fingers because of the numbness and tingling. He has been taking Tylenol every 4 hours. He is very stressed because of the numbness and tingling. He denies any trauma or injury. He reports certain positions makes numbness and tingling better and using 1 pillow instead of to also improve the numbness and tingling. Review of Systems   Constitutional:  Negative for chills and fever. HENT:  Negative for congestion. Respiratory:  Negative for shortness of breath. Cardiovascular:  Negative for chest pain. Gastrointestinal:  Negative for diarrhea, nausea and vomiting. Genitourinary:  Negative for difficulty urinating. Musculoskeletal:  Positive for arthralgias. Neurological:  Positive for numbness.        Current Outpatient Medications on File Prior to Visit   Medication Sig    amLODIPine (NORVASC) 10 mg tablet TAKE 1 TABLET DAILY    atorvastatin (LIPITOR) 40 mg tablet TAKE 1 TABLET DAILY AT BEDTIME    azaTHIOprine (IMURAN) 50 mg tablet Take 1 tablet (50 mg total) by mouth daily    BD Pen Needle [place]serrat 2nd Gen 32G X 4 MM MISC USE 2 (TWO) TIMES A DAY    Blood Pressure Monitor KIT Use once a week    Cholecalciferol (Vitamin D3) 50 MCG (2000 UT) capsule Take 1 capsule by mouth daily Gel caps    fludrocortisone (FLORINEF) 0.1 mg tablet Take 0.1 mg by mouth daily      hydrochlorothiazide (HYDRODIURIL) 12.5 mg tablet TAKE 1 TABLET DAILY    labetalol (NORMODYNE) 100 mg tablet TAKE 1 TABLET EVERY 12 HOURS    latanoprost (XALATAN) 0.005 % ophthalmic solution INSTILL 1 DROP INTO LEFT EYE AT BEDTIME FOR 3 WEEKS    Magnesium Oxide (Magnesium Oxide 400) 240 MG PACK Take 400 mg by mouth 2 (two) times a day    Multiple Vitamin (MULTIVITAMIN ADULT PO) Take by mouth Daily    semaglutide, 1 mg/dose, (Ozempic, 1 MG/DOSE,) 4 mg/3 mL injection pen Inject 0.75 mL (1 mg total) under the skin every 7 days    tacrolimus (PROGRAF) 1 mg capsule Takes 4 tabs in am, 3 tabs in pm    TRUE METRIX BLOOD GLUCOSE TEST test strip Test twice daily    [DISCONTINUED] lisinopril (ZESTRIL) 20 mg tablet TAKE 1 TABLET DAILY    MAGnesium-Oxide 400 (240 Mg) MG TABS  (Patient not taking: Reported on 5/31/2023)    Naomi Kimble FlexTouch 100 units/mL injection pen Inject 20units subcut once daily (Patient not taking: Reported on 11/20/2023)    [DISCONTINUED] amoxicillin (AMOXIL) 500 mg capsule PRIOR TO DENTAL APPT. (Patient not taking: Reported on 9/6/2023)    [DISCONTINUED] ciclopirox (LOPROX) 0.77 % cream Apply 2x/day to in between toes for 2 weeks (Patient not taking: Reported on 5/31/2023)    [DISCONTINUED] Diclofenac Sodium (VOLTAREN) 1 % Apply 2 g topically 4 (four) times a day (Patient not taking: Reported on 5/31/2023)    [DISCONTINUED] ibuprofen (MOTRIN) 600 mg tablet Take 1 tablet (600 mg total) by mouth every 6 (six) hours for 5 days (Patient not taking: Reported on 9/6/2023)       Objective     BP (!) 174/105   Pulse 93   Temp 97.6 °F (36.4 °C)   Resp 18   Wt 86 kg (189 lb 9.6 oz)   SpO2 100%   BMI 33.59 kg/m²     Physical Exam  Constitutional:       Appearance: He is well-developed. HENT:      Head: Normocephalic and atraumatic. Right Ear: External ear normal.      Left Ear: External ear normal.   Eyes:      Conjunctiva/sclera: Conjunctivae normal.   Cardiovascular:      Rate and Rhythm: Normal rate and regular rhythm. Heart sounds: Murmur heard. No friction rub. Pulmonary:      Effort: Pulmonary effort is normal. No respiratory distress. Breath sounds: Normal breath sounds. No wheezing or rales. Musculoskeletal:         General: Normal range of motion. Cervical back: Normal range of motion and neck supple. Skin:     General: Skin is warm and dry. Neurological:      General: No focal deficit present. Mental Status: He is alert and oriented to person, place, and time. Cranial Nerves: No cranial nerve deficit. Motor: No weakness. Comments: Strength 5 /5 throughout       Violet Baez MD

## 2023-11-21 ENCOUNTER — NURSE TRIAGE (OUTPATIENT)
Dept: PHYSICAL THERAPY | Facility: OTHER | Age: 63
End: 2023-11-21

## 2023-11-21 DIAGNOSIS — M54.2 ACUTE NECK PAIN: Primary | ICD-10-CM

## 2023-11-21 NOTE — TELEPHONE ENCOUNTER
Additional Information   Negative: Is this related to a work injury? Negative: Is this related to an MVA? Negative: Are you currently recieving homecare services? Negative: Has the patient had unexplained weight loss? Negative: Does the patient have a fever? Negative: Is the patient experiencing urine retention? Negative: Is the patient experiencing acute drop foot or paralysis? Negative: Has the patient experienced major trauma? (fall from height, high speed collision, direct blow to spine) and is also experiencing nausea, light-headedness, or loss of consciousness? Negative: Is the patient experiencing blood in sputum? Negative: Is this a chronic condition? Background - Initial Assessment  Clinical complaint: posterior neck pain which radiates into the left shoulder and arm to the forearm and hand. States numbness or tingling. Atates this has been present for about 3 weeks with NKI. Date of onset: 3 weeks  Frequency of pain: intermittent  Quality of pain: aching, numb, throbbing, and tingling    Protocols used: [facility] Protocol    This RN did review in detail the [facility] and what we can provide for their neck pain. Patient is agreeable to being triaged by this RN and would like to proceed with Physical Therapy. Referral was placed for Physical Therapy at the [facility] site. Patients information was sent to the  to make evaluation appointment. Patient made aware that the PT office  will be calling to schedule the appointment. Patient was provided with the phone number to the PT office. No further questions and/or concerns were voiced by the patient at this time. Patient states understanding of the referral that was placed. Referral Closed.

## 2023-11-29 ENCOUNTER — TELEPHONE (OUTPATIENT)
Dept: ENDOCRINOLOGY | Facility: CLINIC | Age: 63
End: 2023-11-29

## 2023-11-29 ENCOUNTER — EVALUATION (OUTPATIENT)
Dept: PHYSICAL THERAPY | Facility: CLINIC | Age: 63
End: 2023-11-29
Payer: COMMERCIAL

## 2023-11-29 DIAGNOSIS — M54.2 ACUTE NECK PAIN: Primary | ICD-10-CM

## 2023-11-29 PROCEDURE — 97161 PT EVAL LOW COMPLEX 20 MIN: CPT | Performed by: PHYSICAL THERAPIST

## 2023-11-29 PROCEDURE — 97140 MANUAL THERAPY 1/> REGIONS: CPT | Performed by: PHYSICAL THERAPIST

## 2023-11-29 PROCEDURE — 97112 NEUROMUSCULAR REEDUCATION: CPT | Performed by: PHYSICAL THERAPIST

## 2023-11-29 PROCEDURE — 97535 SELF CARE MNGMENT TRAINING: CPT | Performed by: PHYSICAL THERAPIST

## 2023-11-29 NOTE — PROGRESS NOTES
PT Evaluation     Today's date: 2023  Patient name: Francisco Paul  : 1960  MRN: [id number]  Referring provider: Rizwan Benton PT  Dx:   Encounter Diagnosis     ICD-10-CM    1. Acute neck pain  M54.2 Ambulatory referral to PT spine                     Assessment  Assessment details: Pt presents with s/s consistent with a lower cervical derangement with an extension directional preference. Pt will benefit from PT to address impairments and restore function. Impairments: abnormal or restricted ROM, abnormal movement, activity intolerance, impaired physical strength, lacks appropriate home exercise program, pain with function, poor posture  and poor body mechanics        Subjective Evaluation    History of Present Illness  Mechanism of injury: Pt is a 57 yom c/o and insidious onset of L scapular, L upper arm, L forearm and L 4th and 5th finger numbness and pain that began with an insidious onset 1 month ago. Patient Goals  Patient goals for therapy: decreased edema, decreased pain, increased motion, increased strength, independence with ADLs/IADLs, return to sport/leisure activities and return to work    Pain  Current pain ratin  At best pain ratin  At worst pain ratin  Quality: dull ache  Alleviating factors: sleeping with 1 pillow supine or SL, Tylenol, letting arm hang. Exacerbated by: slouched sitting.   Progression: worsening      Diagnostic Tests  X-ray: abnormal (23 DDD C3/4, C6/7, DJD C5/6, C6/7 mild foraminal narrowing.)        Objective     Postural Observations  Seated posture: poor  Standing posture: poor  Correction of posture: makes symptoms better      Neurological Testing     Sensation   Cervical/Thoracic   Left   Intact: light touch    Right   Intact: light touch    Reflexes   Left   Biceps (C5/C6): normal (2+)    Right   Biceps (C5/C6): normal (2+)    Active Range of Motion   Cervical/Thoracic Spine       Cervical  Subcranial protraction:  WFL   Subcranial retraction:   Restriction level: moderate  Flexion:  WFL  Extension:  Restriction level: minimal  Left lateral flexion:  Restriction level: minimal  Right lateral flexion:  Restriction level minimal  Left rotation:  Restriction level: minimal  Right rotation:  Restriction level: moderate    Thoracic    Flexion:  WFL  Extension:  Restriction level: minimal  Mechanical Assessment    Cervical    Seated retraction: repeated movements   Pain intensity: better  Pain level: decreased  5x10 B, NB, clin OP: B, NB, mobs: B, NB  Seated Extension: repeated movements  Pain location: centralized  Pain intensity: better  Pain level: decreased  5x10 C, B. Thoracic      Lumbar      Strength/Myotome Testing     Left Shoulder     Planes of Motion   Abduction: 5   External rotation at 0°: 5     Right Shoulder     Planes of Motion   Abduction: 5   External rotation at 0°: 4     Left Elbow   Flexion: 5  Extension: 5    Right Elbow   Flexion: 5  Extension: 5    Left Wrist/Hand   Wrist extension: 5  Wrist flexion: 5  Thumb extension: 5    Right Wrist/Hand   Wrist extension: 5  Wrist flexion: 5  Thumb extension: 5             Precautions: none. DX: lower cervical derangement with a ret, ext DP.     Manuals 11/29            Seated ret with clin OP 1x10            Seated ret mobs 1x10                                      Neuro Re-Ed             Posture education 10 min            Posture over-correction 1x10            Seated c/s retraction 5x10            standing c/s retraction with pt OP 2x15            Standing c/s ret, ext 5x10                                                   Ther Ex                                                                                                                     Ther Activity                                       Gait Training                                       Modalities

## 2023-12-01 ENCOUNTER — OFFICE VISIT (OUTPATIENT)
Dept: PHYSICAL THERAPY | Facility: CLINIC | Age: 63
End: 2023-12-01
Payer: COMMERCIAL

## 2023-12-01 DIAGNOSIS — M54.2 ACUTE NECK PAIN: Primary | ICD-10-CM

## 2023-12-01 PROCEDURE — 97140 MANUAL THERAPY 1/> REGIONS: CPT | Performed by: PHYSICAL THERAPIST

## 2023-12-01 PROCEDURE — 97112 NEUROMUSCULAR REEDUCATION: CPT | Performed by: PHYSICAL THERAPIST

## 2023-12-01 NOTE — PROGRESS NOTES
Daily Note     Today's date: 2023  Patient name: Charis Kinney  : 1960  MRN: [id number]  Referring provider: Sameer Joyner PT  Dx:   Encounter Diagnosis     ICD-10-CM    1. Acute neck pain  M54.2                      Subjective: Pt reports good compliance with HEP. Pt reports no scap pain currently, improved intensity of numbness in L UE, but location is still down to the fingers. Objective: See treatment diary below    Assessment: Pt demonstrated improved posture, required verbal cueing to eliminate slight flexion with retraction. Plan: Continue per plan of care. Precautions: none. DX: lower cervical derangement with a ret, ext DP.     Manuals            Seated ret with clin OP 1x10 1x10           Seated ret mobs 1x10 1x10                                     Neuro Re-Ed             Posture education 10 min 5 min           Posture over-correction 1x10 2x15           Seated c/s retraction 5x10 3x10           standing c/s retraction with pt OP 2x15 3x10           Standing c/s ret, ext 5x10 5x10                                                  Ther Ex                                                                                                                     Ther Activity                                       Gait Training                                       Modalities

## 2023-12-02 ENCOUNTER — LAB (OUTPATIENT)
Dept: LAB | Facility: CLINIC | Age: 63
End: 2023-12-02
Payer: COMMERCIAL

## 2023-12-02 DIAGNOSIS — Z79.4 TYPE 2 DIABETES MELLITUS WITH CHRONIC KIDNEY DISEASE, WITH LONG-TERM CURRENT USE OF INSULIN, UNSPECIFIED CKD STAGE (HCC): ICD-10-CM

## 2023-12-02 DIAGNOSIS — Z79.4 TYPE 2 DIABETES MELLITUS WITH HYPERGLYCEMIA, WITH LONG-TERM CURRENT USE OF INSULIN (HCC): ICD-10-CM

## 2023-12-02 DIAGNOSIS — G56.92 NEUROPATHY OF LEFT UPPER EXTREMITY: ICD-10-CM

## 2023-12-02 DIAGNOSIS — E11.22 TYPE 2 DIABETES MELLITUS WITH CHRONIC KIDNEY DISEASE, WITH LONG-TERM CURRENT USE OF INSULIN, UNSPECIFIED CKD STAGE (HCC): ICD-10-CM

## 2023-12-02 DIAGNOSIS — I10 PRIMARY HYPERTENSION: ICD-10-CM

## 2023-12-02 DIAGNOSIS — E78.49 OTHER HYPERLIPIDEMIA: ICD-10-CM

## 2023-12-02 DIAGNOSIS — E11.65 TYPE 2 DIABETES MELLITUS WITH HYPERGLYCEMIA, WITH LONG-TERM CURRENT USE OF INSULIN (HCC): ICD-10-CM

## 2023-12-02 LAB
CHOLEST SERPL-MCNC: 151 MG/DL
EST. AVERAGE GLUCOSE BLD GHB EST-MCNC: 137 MG/DL
HBA1C MFR BLD: 6.4 %
HDLC SERPL-MCNC: 33 MG/DL
LDLC SERPL CALC-MCNC: 46 MG/DL (ref 0–100)
T4 FREE SERPL-MCNC: 0.84 NG/DL (ref 0.61–1.12)
TRIGL SERPL-MCNC: 358 MG/DL
TSH SERPL DL<=0.05 MIU/L-ACNC: 1 UIU/ML (ref 0.45–4.5)
VIT B12 SERPL-MCNC: 563 PG/ML (ref 180–914)

## 2023-12-02 PROCEDURE — 84439 ASSAY OF FREE THYROXINE: CPT

## 2023-12-02 PROCEDURE — 83036 HEMOGLOBIN GLYCOSYLATED A1C: CPT

## 2023-12-02 PROCEDURE — 84443 ASSAY THYROID STIM HORMONE: CPT

## 2023-12-02 PROCEDURE — 80061 LIPID PANEL: CPT

## 2023-12-02 PROCEDURE — 82607 VITAMIN B-12: CPT

## 2023-12-03 NOTE — RESULT ENCOUNTER NOTE
Please call the patient regarding labs - A1C 6.4 AT goal - triglycerides high - focus on dietary modifications

## 2023-12-06 ENCOUNTER — OFFICE VISIT (OUTPATIENT)
Dept: PHYSICAL THERAPY | Facility: CLINIC | Age: 63
End: 2023-12-06
Payer: COMMERCIAL

## 2023-12-06 DIAGNOSIS — M54.2 ACUTE NECK PAIN: Primary | ICD-10-CM

## 2023-12-06 PROCEDURE — 97140 MANUAL THERAPY 1/> REGIONS: CPT | Performed by: PHYSICAL THERAPIST

## 2023-12-06 PROCEDURE — 97112 NEUROMUSCULAR REEDUCATION: CPT | Performed by: PHYSICAL THERAPIST

## 2023-12-08 ENCOUNTER — OFFICE VISIT (OUTPATIENT)
Dept: PHYSICAL THERAPY | Facility: CLINIC | Age: 63
End: 2023-12-08
Payer: COMMERCIAL

## 2023-12-08 DIAGNOSIS — M54.2 ACUTE NECK PAIN: Primary | ICD-10-CM

## 2023-12-08 PROCEDURE — 97140 MANUAL THERAPY 1/> REGIONS: CPT | Performed by: PHYSICAL THERAPIST

## 2023-12-08 PROCEDURE — 97112 NEUROMUSCULAR REEDUCATION: CPT | Performed by: PHYSICAL THERAPIST

## 2023-12-08 NOTE — PROGRESS NOTES
Daily Note     Today's date: 2023  Patient name: Vane Arnold  : 1960  MRN: [id number]  Referring provider: Angelica Antonio PT  Dx:   Encounter Diagnosis     ICD-10-CM    1. Acute neck pain  M54.2                      Subjective: Pt reports a resolution of scap pain, less frequent L arm pain. Pt reports his pain is most consistently in his forearm and occasionally in his fingers when they are in a dependent position. Objective: See treatment diary below    Assessment: Pt demonstrated min improvement in intensity of sx with clin OP and retraction mobs and ret, ext. Attempted supine ret, ext. Plan: Continue per plan of care. Precautions: none. DX: lower cervical derangement with a ret, ext DP.     Manuals          Seated ret with clin OP 1x10 1x10 1x10 2x10         Seated ret mobs 1x10 1x10 1x10 2x10         Supine ret, ext OP   1x10 1x10                      Neuro Re-Ed             Posture education 10 min 5 min 5 min 3 min         Posture over-correction 1x10 2x15 2x15 2x15         Seated c/s retraction 5x10 3x10 3x10 2x10         standing c/s retraction with pt OP 2x15 3x10 3x10 2x10         Standing c/s ret, ext 5x10 5x10 3x10 2x10         Standing c/s ret, ext pt OP   4x10 4x10         Supine c/s ret, ext    2x10                      Ther Ex                                                                                                                     Ther Activity                                       Gait Training                                       Modalities

## 2023-12-13 ENCOUNTER — OFFICE VISIT (OUTPATIENT)
Dept: PHYSICAL THERAPY | Facility: CLINIC | Age: 63
End: 2023-12-13
Payer: COMMERCIAL

## 2023-12-13 DIAGNOSIS — M54.2 ACUTE NECK PAIN: Primary | ICD-10-CM

## 2023-12-13 PROCEDURE — 97112 NEUROMUSCULAR REEDUCATION: CPT | Performed by: PHYSICAL THERAPIST

## 2023-12-13 PROCEDURE — 97140 MANUAL THERAPY 1/> REGIONS: CPT | Performed by: PHYSICAL THERAPIST

## 2023-12-13 NOTE — PROGRESS NOTES
Daily Note     Today's date: 2023  Patient name: Dante Head  : 1960  MRN: [id number]  Referring provider: Kanchan Bustillos PT  Dx:   Encounter Diagnosis     ICD-10-CM    1. Acute neck pain  M54.2                      Subjective: Pt reports a resolution of scap pain, less frequent L arm pain. Pt reports pain is most consistent when his arm is hanging in a dependent position. Objective: See treatment diary below    Assessment: Educated pt on keeping L arm slightly flexed as much as possible to decrease nerve tension. Pt demonstrated improved c/s ext ROM. Plan: Continue per plan of care. Precautions: none. DX: lower cervical derangement with a ret, ext DP.     Manuals         Seated ret with clin OP 1x10 1x10 1x10 2x10 1x10        Seated ret mobs 1x10 1x10 1x10 2x10 1x10        Supine ret, ext OP   1x10 1x10 2x10                     Neuro Re-Ed             Posture education 10 min 5 min 5 min 3 min 2 min        Posture over-correction 1x10 2x15 2x15 2x15 2x15        Seated c/s retraction 5x10 3x10 3x10 2x10 2x10        standing c/s retraction with pt OP 2x15 3x10 3x10 2x10 2x10        Standing c/s ret, ext 5x10 5x10 3x10 2x10 2x10        Standing c/s ret, ext pt OP   4x10 4x10 4x10        Supine c/s ret, ext    2x10 2x10                     Ther Ex                                                                                                                     Ther Activity                                       Gait Training                                       Modalities

## 2023-12-14 ENCOUNTER — OFFICE VISIT (OUTPATIENT)
Dept: PHYSICAL THERAPY | Facility: CLINIC | Age: 63
End: 2023-12-14
Payer: COMMERCIAL

## 2023-12-14 ENCOUNTER — TELEPHONE (OUTPATIENT)
Dept: ENDOCRINOLOGY | Facility: CLINIC | Age: 63
End: 2023-12-14

## 2023-12-14 DIAGNOSIS — M54.2 ACUTE NECK PAIN: Primary | ICD-10-CM

## 2023-12-14 PROCEDURE — 97112 NEUROMUSCULAR REEDUCATION: CPT

## 2023-12-14 NOTE — PROGRESS NOTES
Daily Note     Today's date: 2023  Patient name: Caroline Patterson  : 1960  MRN: [id number]  Referring provider: Stewart Miller PT  Dx:   Encounter Diagnosis     ICD-10-CM    1. Acute neck pain  M54.2           Start Time: 0900  Stop Time: 0940  Total time in clinic (min): 40 minutes    Subjective: Patient reports no cervical pain, occasional scapular pain (mostly when he is tired or at work), and arm symptoms from elbow to finger tip rated at "7/10". Objective: See treatment diary below    Assessment: Slight improvement in symptom following L median nerve glide without head movement. Good improvement noting "5/10" symptoms following supine self cervical retraction, extension and rotation. Plan: Continue per plan of care. Precautions: none. DX: lower cervical derangement with a ret, ext DP.     Manuals        Seated ret with clin OP 1x10 1x10 1x10 2x10 1x10        Seated ret mobs 1x10 1x10 1x10 2x10 1x10        Supine ret, ext OP   1x10 1x10 2x10                     Neuro Re-Ed             Posture education 10 min 5 min 5 min 3 min 2 min 2 min       Posture over-correction 1x10 2x15 2x15 2x15 2x15 2x15       Seated c/s retraction 5x10 3x10 3x10 2x10 2x10 2x10       standing c/s retraction with pt OP 2x15 3x10 3x10 2x10 2x10 2x10       Standing c/s ret, ext 5x10 5x10 3x10 2x10 2x10 2x10       Standing c/s ret, ext pt OP   4x10 4x10 4x10 4x10       Supine c/s ret, ext    2x10 2x10 2x10       Supine c/s ret, ext, rot      3x10       Ther Ex             L median nerve glide w L SB      1x10       L median nerve glide      1x10                                                                                     Ther Activity                                       Gait Training                                       Modalities

## 2023-12-15 NOTE — TELEPHONE ENCOUNTER
BGs look good off [place] - recommend he continue on Ozempic and continue to hold Tresiba.

## 2023-12-20 ENCOUNTER — OFFICE VISIT (OUTPATIENT)
Dept: PHYSICAL THERAPY | Facility: CLINIC | Age: 63
End: 2023-12-20
Payer: COMMERCIAL

## 2023-12-20 DIAGNOSIS — M54.2 ACUTE NECK PAIN: Primary | ICD-10-CM

## 2023-12-20 PROCEDURE — 97112 NEUROMUSCULAR REEDUCATION: CPT

## 2023-12-20 NOTE — PROGRESS NOTES
"Daily Note     Today's date: 2023  Patient name: Toribio Hernández  : 1960  MRN: [id number]  Referring provider: Tony Woods PT  Dx:   Encounter Diagnosis     ICD-10-CM    1. Acute neck pain  M54.2             Start Time: 930  Stop Time: 1005  Total time in clinic (min): 35 minutes    Subjective: Patient continues to have no pain. He notes the radicular symptoms in his L forearm, pinky, ring and middle finger have improved and is rated at \"5/10\" today.     Objective: See treatment diary below    Assessment: Patient continues to respond best to supine c/s ret, ext, rot with a towel roll noting decreased radicular symptoms rated at \"4/10\".     Plan: Continue per plan of care.      Precautions: none.    DX: lower cervical derangement with a ret, ext DP.    Manuals       Seated ret with clin OP 1x10 1x10 1x10 2x10 1x10        Seated ret mobs 1x10 1x10 1x10 2x10 1x10        Supine ret, ext OP   1x10 1x10 2x10                     Neuro Re-Ed             Posture education 10 min 5 min 5 min 3 min 2 min 2 min       Posture over-correction 1x10 2x15 2x15 2x15 2x15 2x15 2x15      Seated c/s retraction 5x10 3x10 3x10 2x10 2x10 2x10 2x10      standing c/s retraction with pt OP 2x15 3x10 3x10 2x10 2x10 2x10 2x10      Standing c/s ret, ext 5x10 5x10 3x10 2x10 2x10 2x10 2x10      Standing c/s ret, ext pt OP   4x10 4x10 4x10 4x10 2x10      Supine c/s ret, ext    2x10 2x10 2x10 2x10      Supine c/s ret, ext, rot   w/ towel      3x10 3x10      Ther Ex             L median nerve glide w L SB      1x10       L median nerve glide      1x10                                                                                     Ther Activity                                       Gait Training                                       Modalities                                          "

## 2023-12-22 ENCOUNTER — OFFICE VISIT (OUTPATIENT)
Dept: PHYSICAL THERAPY | Facility: CLINIC | Age: 63
End: 2023-12-22
Payer: COMMERCIAL

## 2023-12-22 DIAGNOSIS — M54.2 ACUTE NECK PAIN: Primary | ICD-10-CM

## 2023-12-22 PROCEDURE — 97112 NEUROMUSCULAR REEDUCATION: CPT | Performed by: PHYSICAL THERAPIST

## 2023-12-27 ENCOUNTER — OFFICE VISIT (OUTPATIENT)
Dept: PHYSICAL THERAPY | Facility: CLINIC | Age: 63
End: 2023-12-27
Payer: COMMERCIAL

## 2023-12-27 DIAGNOSIS — M54.2 ACUTE NECK PAIN: Primary | ICD-10-CM

## 2023-12-27 PROCEDURE — 97112 NEUROMUSCULAR REEDUCATION: CPT

## 2023-12-27 NOTE — PROGRESS NOTES
"Daily Note     Today's date: 2023  Patient name: Toribio Hernández  : 1960  MRN: [id number]  Referring provider: Tony Woods PT  Dx:   Encounter Diagnosis     ICD-10-CM    1. Acute neck pain  M54.2           Start Time: 1215  Stop Time: 1240  Total time in clinic (min): 25 minutes    Subjective: Patient reports decreased forearm and fingertip numbness rated at \"4/10\" prior to therapy.     Objective: See treatment diary below    Assessment: Partial centralization achieved with self supine retraction, extension, rot noting decreased symptom intensity to \"2/10\" post therapy.     Plan: Continue per plan of care.      Precautions: none.    DX: lower cervical derangement with a ret, ext DP.    Manuals       Seated ret with clin OP 1x10 1x10 1x10 2x10 1x10        Seated ret mobs 1x10 1x10 1x10 2x10 1x10        Supine ret, ext clin OP   1x10 1x10 2x10 2x10                    Neuro Re-Ed             Posture education 10 min 5 min 5 min 3 min 2 min 1 min 1 min      Posture over-correction 1x10 2x15 2x15 2x15 2x15 1x15 1x15      Seated c/s retraction 5x10 3x10 3x10 2x10 2x10 1x15 1x15      standing c/s retraction with pt OP 2x15 3x10 3x10 2x10 2x10 1x15 1x15      Standing c/s ret, ext 5x10 5x10 3x10 2x10 2x10 1x15 1x15      Standing c/s ret, ext pt OP   4x10 4x10 4x10 2x15 2x15      Supine c/s ret, ext    2x10 2x10 2x10 3x10                   Ther Ex                                                                                                                     Ther Activity                                       Gait Training                                       Modalities                                          "

## 2023-12-28 ENCOUNTER — OFFICE VISIT (OUTPATIENT)
Dept: PHYSICAL THERAPY | Facility: CLINIC | Age: 63
End: 2023-12-28
Payer: COMMERCIAL

## 2023-12-28 DIAGNOSIS — M54.2 ACUTE NECK PAIN: Primary | ICD-10-CM

## 2023-12-28 PROCEDURE — 97112 NEUROMUSCULAR REEDUCATION: CPT

## 2023-12-28 PROCEDURE — 97140 MANUAL THERAPY 1/> REGIONS: CPT

## 2023-12-28 NOTE — PROGRESS NOTES
"Daily Note     Today's date: 2023  Patient name: Toribio Hernández  : 1960  MRN: [id number]  Referring provider: Tony Woods PT  Dx:   Encounter Diagnosis     ICD-10-CM    1. Acute neck pain  M54.2           Start Time: 1400  Stop Time: 1440  Total time in clinic (min): 40 minutes    Subjective: Patient reports forearm and fingertip tingling rated at \"5/10\" prior to therapy. Patient reports completing his HEP as assigned with no change in symptoms.    Objective: See treatment diary below    Assessment: Patient demonstrates fair form during exercises. He did require cueing to be sure to go to end range during stretches. Patient responded well to supine cervical retraction, ext and rotation with a towel and median nerve glides. Decreased intensity rated at \"3/10\" and ring/little finger numbness changed to tingling.     Plan: Continue per plan of care.      Precautions: none.    DX: lower cervical derangement with a ret, ext DP.    Manuals      Seated ret with clin OP 1x10 1x10 1x10 2x10 1x10        Seated ret mobs 1x10 1x10 1x10 2x10 1x10        Supine ret, ext clin OP   1x10 1x10 2x10 2x10       Supine median nerve glide        3x10                  Neuro Re-Ed             Posture education 10 min 5 min 5 min 3 min 2 min 1 min 1 min 1 min     Posture over-correction 1x10 2x15 2x15 2x15 2x15 1x15 1x15 1x15     Seated c/s retraction 5x10 3x10 3x10 2x10 2x10 1x15 1x15 1x15     standing c/s retraction with pt OP 2x15 3x10 3x10 2x10 2x10 1x15 1x15 1x15     Standing c/s ret, ext 5x10 5x10 3x10 2x10 2x10 1x15 1x15 1x15     Standing c/s ret, ext pt OP   4x10 4x10 4x10 2x15 2x15 1x15     Supine c/s ret, ext    2x10 2x10 2x10 3x10 3x10                  Ther Ex                                                                                                                     Ther Activity                                       Gait Training                               "         Modalities

## 2023-12-29 ENCOUNTER — TELEPHONE (OUTPATIENT)
Dept: ENDOCRINOLOGY | Facility: CLINIC | Age: 63
End: 2023-12-29

## 2024-01-03 ENCOUNTER — OFFICE VISIT (OUTPATIENT)
Dept: PHYSICAL THERAPY | Facility: CLINIC | Age: 64
End: 2024-01-03
Payer: COMMERCIAL

## 2024-01-03 DIAGNOSIS — M54.2 ACUTE NECK PAIN: Primary | ICD-10-CM

## 2024-01-03 PROCEDURE — 97112 NEUROMUSCULAR REEDUCATION: CPT

## 2024-01-03 NOTE — PROGRESS NOTES
Daily Note     Today's date: 1/3/2024  Patient name: Toribio Hernández  : 1960  MRN: [id number]  Referring provider: Tony Woods PT  Dx:   Encounter Diagnosis     ICD-10-CM    1. Acute neck pain  M54.2                      Subjective: Patient reports continued forearm and fingertip tingling but his symptoms reduced significantly for a short period of time following last treatment session.     Objective: See treatment diary below    Assessment: Patient demonstrated no decrease in UE radicular symptoms since last visit. Pt was unable to centralize symptoms with TE program.     Plan: Continue per plan of care.  Assess response to nerve glides.     Precautions: none.    DX: lower cervical derangement with a ret, ext DP.    Manuals 11/29 12/1 12/6 12/8 12/13 12/22 12/27 12/28 1/3    Seated ret with clin OP 1x10 1x10 1x10 2x10 1x10        Seated ret mobs 1x10 1x10 1x10 2x10 1x10        Supine ret, ext clin OP   1x10 1x10 2x10 2x10       Supine median nerve glide        3x10 3x10                 Neuro Re-Ed             Posture education 10 min 5 min 5 min 3 min 2 min 1 min 1 min 1 min 1 min    Posture over-correction 1x10 2x15 2x15 2x15 2x15 1x15 1x15 1x15 1x15    Seated c/s retraction 5x10 3x10 3x10 2x10 2x10 1x15 1x15 1x15 1x15    standing c/s retraction with pt OP 2x15 3x10 3x10 2x10 2x10 1x15 1x15 1x15 1x15    Standing c/s ret, ext 5x10 5x10 3x10 2x10 2x10 1x15 1x15 1x15 1x15    Standing c/s ret, ext pt OP   4x10 4x10 4x10 2x15 2x15 1x15 1x15    Supine c/s ret, ext    2x10 2x10 2x10 3x10 3x10 3x10                 Ther Ex                                                                                                                     Ther Activity                                       Gait Training                                       Modalities

## 2024-01-05 ENCOUNTER — OFFICE VISIT (OUTPATIENT)
Dept: PHYSICAL THERAPY | Facility: CLINIC | Age: 64
End: 2024-01-05
Payer: COMMERCIAL

## 2024-01-05 DIAGNOSIS — M54.2 ACUTE NECK PAIN: Primary | ICD-10-CM

## 2024-01-05 PROCEDURE — 97140 MANUAL THERAPY 1/> REGIONS: CPT | Performed by: PHYSICAL THERAPIST

## 2024-01-05 PROCEDURE — 97112 NEUROMUSCULAR REEDUCATION: CPT | Performed by: PHYSICAL THERAPIST

## 2024-01-05 NOTE — PROGRESS NOTES
Daily Note     Today's date: 2024  Patient name: Toribio Hernández  : 1960  MRN: [id number]  Referring provider: Tony Woods PT  Dx:   Encounter Diagnosis     ICD-10-CM    1. Acute neck pain  M54.2                      Subjective: Patient reports increased 4th and 5th finger numbness after HEP.    Objective: See treatment diary below    Assessment: Patient demonstrated mild peripheralization if he rushes through his HEP without achieving end-range.       Plan: Continue per plan of care.  Updated HEP, assess response to posture correction, ret with OP, ret, ext 5xD.     Precautions: none.    DX: lower cervical derangement with a ret, ext DP.    Manuals 11/29 12/1 12/6 12/8 12/13 12/22 12/27 12/28 1/3 1/5   Seated ret with clin OP 1x10 1x10 1x10 2x10 1x10     1x10   Seated ret mobs 1x10 1x10 1x10 2x10 1x10     1x10   Supine ret, ext clin OP   1x10 1x10 2x10 2x10    2x6   Supine median nerve glide        3x10 3x10                 Neuro Re-Ed             Posture education 10 min 5 min 5 min 3 min 2 min 1 min 1 min 1 min 1 min    Posture over-correction 1x10 2x15 2x15 2x15 2x15 1x15 1x15 1x15 1x15 2x10   Seated c/s retraction 5x10 3x10 3x10 2x10 2x10 1x15 1x15 1x15 1x15    standing c/s retraction with pt OP 2x15 3x10 3x10 2x10 2x10 1x15 1x15 1x15 1x15 3x10   Standing c/s ret, ext 5x10 5x10 3x10 2x10 2x10 1x15 1x15 1x15 1x15 3x10   Standing c/s ret, ext pt OP   4x10 4x10 4x10 2x15 2x15 1x15 1x15 1x10   Supine c/s ret, ext    2x10 2x10 2x10 3x10 3x10 3x10 2x10                Ther Ex                                                                                                                     Ther Activity                                       Gait Training                                       Modalities

## 2024-01-09 ENCOUNTER — OFFICE VISIT (OUTPATIENT)
Dept: PHYSICAL THERAPY | Facility: CLINIC | Age: 64
End: 2024-01-09
Payer: COMMERCIAL

## 2024-01-09 DIAGNOSIS — M54.2 ACUTE NECK PAIN: Primary | ICD-10-CM

## 2024-01-09 PROCEDURE — 97140 MANUAL THERAPY 1/> REGIONS: CPT | Performed by: PHYSICAL THERAPIST

## 2024-01-09 PROCEDURE — 97112 NEUROMUSCULAR REEDUCATION: CPT | Performed by: PHYSICAL THERAPIST

## 2024-01-09 NOTE — PROGRESS NOTES
Daily Note     Today's date: 2024  Patient name: Toribio Hernández  : 1960  MRN: [id number]  Referring provider: Tony Woods, KAVON  Dx:   Encounter Diagnosis     ICD-10-CM    1. Acute neck pain  M54.2                      Subjective: Patient reports a 90% reduction in L UE numbness and tingling since last visit.  Pt reports that he was really focusing on achieving end-range as discussed.    Objective: See treatment diary below    Assessment: Patient demonstrated centralization after achieving end-range ret,ext.    Plan: Continue per plan of care.  Cont HEP, assess response to posture correction, ret with OP, ret, ext 5xD.     Precautions: none.    DX: lower cervical derangement with a ret, ext DP.    Manuals    Seated ret with clin OP 1x10         1x10   Seated ret mobs 1x10         1x10   Supine ret, ext clin OP 2x6         2x6   Supine median nerve glide                          Neuro Re-Ed             Posture education             Posture over-correction 2x15         2x10   Seated c/s retraction 2x15            standing c/s retraction with pt OP 2x15         3x10   Standing c/s ret, ext 5x10         3x10   Standing c/s ret, ext pt OP          1x10   Supine c/s ret, ext          2x10                Ther Ex                                                                                                                     Ther Activity                                       Gait Training                                       Modalities

## 2024-01-10 ENCOUNTER — OFFICE VISIT (OUTPATIENT)
Dept: ENDOCRINOLOGY | Facility: CLINIC | Age: 64
End: 2024-01-10
Payer: COMMERCIAL

## 2024-01-10 VITALS
OXYGEN SATURATION: 98 % | WEIGHT: 188.8 LBS | SYSTOLIC BLOOD PRESSURE: 130 MMHG | HEART RATE: 96 BPM | BODY MASS INDEX: 33.45 KG/M2 | DIASTOLIC BLOOD PRESSURE: 80 MMHG | HEIGHT: 63 IN

## 2024-01-10 DIAGNOSIS — E11.39 TYPE 2 DIABETES MELLITUS WITH OTHER OPHTHALMIC COMPLICATION, WITH LONG-TERM CURRENT USE OF INSULIN (HCC): ICD-10-CM

## 2024-01-10 DIAGNOSIS — E11.22 TYPE 2 DIABETES MELLITUS WITH CHRONIC KIDNEY DISEASE, WITH LONG-TERM CURRENT USE OF INSULIN, UNSPECIFIED CKD STAGE (HCC): ICD-10-CM

## 2024-01-10 DIAGNOSIS — E78.49 OTHER HYPERLIPIDEMIA: ICD-10-CM

## 2024-01-10 DIAGNOSIS — I10 PRIMARY HYPERTENSION: ICD-10-CM

## 2024-01-10 DIAGNOSIS — Z94.4 STATUS POST LIVER TRANSPLANT (HCC): ICD-10-CM

## 2024-01-10 DIAGNOSIS — E11.65 TYPE 2 DIABETES MELLITUS WITH HYPERGLYCEMIA, WITH LONG-TERM CURRENT USE OF INSULIN (HCC): Primary | ICD-10-CM

## 2024-01-10 DIAGNOSIS — Z79.4 TYPE 2 DIABETES MELLITUS WITH HYPERGLYCEMIA, WITH LONG-TERM CURRENT USE OF INSULIN (HCC): Primary | ICD-10-CM

## 2024-01-10 DIAGNOSIS — Z79.4 TYPE 2 DIABETES MELLITUS WITH OTHER OPHTHALMIC COMPLICATION, WITH LONG-TERM CURRENT USE OF INSULIN (HCC): ICD-10-CM

## 2024-01-10 DIAGNOSIS — Z79.4 TYPE 2 DIABETES MELLITUS WITH CHRONIC KIDNEY DISEASE, WITH LONG-TERM CURRENT USE OF INSULIN, UNSPECIFIED CKD STAGE (HCC): ICD-10-CM

## 2024-01-10 PROCEDURE — 99214 OFFICE O/P EST MOD 30 MIN: CPT | Performed by: INTERNAL MEDICINE

## 2024-01-10 NOTE — PROGRESS NOTES
Toribio Hernández 63 y.o. male MRN: [id number]    Encounter: [id number]      Assessment/Plan   Problem List Items Addressed This Visit          Endocrine    Type 2 diabetes mellitus with chronic kidney disease, with long-term current use of insulin (HCC)    Type 2 diabetes mellitus with hyperglycemia, with long-term current use of insulin (HCC) - Primary       Lab Results   Component Value Date    HGBA1C 6.4 (H) 12/02/2023   Advised to keep monitoring fasting glucose and if fasting glucoses above 130s consistently consider restarting basal insulin at a lower dose         Relevant Orders    Albumin / creatinine urine ratio    Comprehensive metabolic panel    Hemoglobin A1C    Type 2 diabetes mellitus with ophthalmic complication, with long-term current use of insulin (HCC)       Lab Results   Component Value Date    HGBA1C 6.4 (H) 12/02/2023   Continue follow-up with ophthalmologist            Cardiovascular and Mediastinum    Hypertension     Blood pressure at goal-continue current regimen            Other    Hyperlipidemia     Triglycerides are high-suggested MNT that he has currently declined-continue statins         Relevant Orders    Lipid Panel with Direct LDL reflex    Status post liver transplant        CC: Diabetes    History of Present Illness     HPI:  63 y.o. male with hx of liver transplant, HTN, HLD and type 2 diabetes seen in follow-up   Reports complications of retinopathy, microalbumunuria and CKD     Current regimen:   Ozempic 1 mg weekly   Tolerating Ozempic without any GI side effects  No polyuria , polydipsia , no blurry visit   Has been monitoring fingerstick twice a day  Fasting 120-150s   Pre dinner 140s       Review of Systems    Historical Information   Past Medical History:   Diagnosis Date    Bunion of right foot     Chronic kidney disease, stage III (moderate) (HCC)     RESOLVED: [id number]    Cirrhosis (HCC)     Diabetes mellitus (HCC)     Gout 10/6/18    Hepatic encephalopathy (HCC)      Hepatitis C     Hyperkalemia     Hyperlipidemia     Hypertension     Pancytopenia (HCC)     Status post liver transplant (HCC)     2015    Umbilical hernia     Wears glasses      Past Surgical History:   Procedure Laterality Date    ANKLE SURGERY      CHOLECYSTECTOMY      FOOT SURGERY  03/2022    Bunion removal    FRACTURE SURGERY      left foot    GALLBLADDER SURGERY      HIP SURGERY      LIVER TRANSPLANTATION      LAST ASSESSED: [id number]    VT COLONOSCOPY FLX DX W/COLLJ SPEC WHEN PFRMD N/A 02/16/2017    Procedure: COLONOSCOPY;  Surgeon: Brayden Massey MD;  Location: [facility];  Service: Gastroenterology    VT CORRJ HLX VLGS BNCTY SESMDC DSTL METAR OSTEOT Right 02/04/2022    Procedure: BUNIONECTOMY TENZIN;  Surgeon: Darryl Benavides DPM;  Location:  [facility];  Service: Podiatry    TONSILLECTOMY       Social History   Social History     Substance and Sexual Activity   Alcohol Use Yes    Alcohol/week: 1.0 standard drink of alcohol    Types: 1 Cans of beer per week    Comment: occassionally     Social History     Substance and Sexual Activity   Drug Use No     Social History     Tobacco Use   Smoking Status Never   Smokeless Tobacco Never     Family History:   Family History   Problem Relation Age of Onset    Diabetes Mother         TYPE 2    Hypertension Mother     Stroke Mother 76        SYNDROME     Hepatitis Brother         C    Cirrhosis Brother         LIVER     Cancer Family        Meds/Allergies   Current Outpatient Medications   Medication Sig Dispense Refill    amLODIPine (NORVASC) 10 mg tablet TAKE 1 TABLET DAILY 90 tablet 3    atorvastatin (LIPITOR) 40 mg tablet TAKE 1 TABLET DAILY AT BEDTIME 90 tablet 3    azaTHIOprine (IMURAN) 50 mg tablet Take 1 tablet (50 mg total) by mouth daily 90 tablet 3    BD Pen Needle [place] 2nd Gen 32G X 4 MM MISC USE 2 (TWO) TIMES A  each 3    Blood Pressure Monitor KIT Use once a week 1 kit 0    Cholecalciferol (Vitamin D3) 50 MCG (2000 UT) capsule Take 1 capsule  "by mouth daily Gel caps      fludrocortisone (FLORINEF) 0.1 mg tablet Take 0.1 mg by mouth daily        hydrochlorothiazide (HYDRODIURIL) 12.5 mg tablet TAKE 1 TABLET DAILY 90 tablet 3    labetalol (NORMODYNE) 100 mg tablet TAKE 1 TABLET EVERY 12 HOURS 180 tablet 3    latanoprost (XALATAN) 0.005 % ophthalmic solution INSTILL 1 DROP INTO LEFT EYE AT BEDTIME FOR 3 WEEKS      lisinopril (ZESTRIL) 20 mg tablet Take 2 tablets (40 mg total) by mouth daily 90 tablet 3    Magnesium Oxide (Magnesium Oxide 400) 240 MG PACK Take 400 mg by mouth 2 (two) times a day 1 each 1    Multiple Vitamin (MULTIVITAMIN ADULT PO) Take by mouth Daily      semaglutide, 1 mg/dose, (Ozempic, 1 MG/DOSE,) 4 mg/3 mL injection pen Inject 0.75 mL (1 mg total) under the skin every 7 days 9 mL 1    tacrolimus (PROGRAF) 1 mg capsule Takes 4 tabs in am, 3 tabs in pm      TRUE METRIX BLOOD GLUCOSE TEST test strip Test twice daily      MAGnesium-Oxide 400 (240 Mg) MG TABS  (Patient not taking: Reported on 5/31/2023)       No current facility-administered medications for this visit.     No Known Allergies    Objective   Vitals: Blood pressure 130/80, pulse 96, height 5' 3\" (1.6 m), weight 85.6 kg (188 lb 12.8 oz), SpO2 98%.    Physical Exam  Vitals reviewed.   Constitutional:       General: He is not in acute distress.     Appearance: Normal appearance. He is obese. He is not ill-appearing, toxic-appearing or diaphoretic.   HENT:      Head: Normocephalic and atraumatic.   Eyes:      General: No scleral icterus.     Extraocular Movements: Extraocular movements intact.   Cardiovascular:      Rate and Rhythm: Normal rate and regular rhythm.      Heart sounds: Normal heart sounds. No murmur heard.  Pulmonary:      Effort: Pulmonary effort is normal. No respiratory distress.      Breath sounds: Normal breath sounds. No wheezing or rales.   Abdominal:      General: There is no distension.      Palpations: Abdomen is soft.      Tenderness: There is no abdominal " tenderness.   Musculoskeletal:      Cervical back: Neck supple.      Right lower leg: No edema.      Left lower leg: No edema.   Lymphadenopathy:      Cervical: No cervical adenopathy.   Skin:     General: Skin is warm and dry.   Neurological:      General: No focal deficit present.      Mental Status: He is alert and oriented to person, place, and time.      Gait: Gait normal.   Psychiatric:         Mood and Affect: Mood normal.         Behavior: Behavior normal.         Thought Content: Thought content normal.         Judgment: Judgment normal.         The history was obtained from the review of the chart, patient.    Lab Results:   Lab Results   Component Value Date/Time    Hemoglobin A1C 6.4 (H) 12/02/2023 11:11 AM    Hemoglobin A1C 6.0 09/06/2023 10:15 AM    Hemoglobin A1C 7.1 (A) 05/31/2023 09:13 AM    Hemoglobin A1C 7.5 (A) 02/23/2023 09:32 AM    WBC 4.04 (L) 12/02/2023 11:11 AM    WBC 4.52 05/20/2023 10:06 AM    WBC 4.47 02/04/2023 10:06 AM    Hemoglobin 12.3 12/02/2023 11:11 AM    Hemoglobin 11.4 (L) 05/20/2023 10:06 AM    Hemoglobin 11.9 (L) 02/04/2023 10:06 AM    Hematocrit 37.1 12/02/2023 11:11 AM    Hematocrit 35.5 (L) 05/20/2023 10:06 AM    Hematocrit 36.4 (L) 02/04/2023 10:06 AM    MCV 90 12/02/2023 11:11 AM    MCV 90 05/20/2023 10:06 AM    MCV 91 02/04/2023 10:06 AM    Platelets 146 (L) 12/02/2023 11:11 AM    Platelets 165 05/20/2023 10:06 AM    Platelets 149 02/04/2023 10:06 AM    BUN 37 (H) 12/02/2023 11:11 AM    BUN 42 (H) 07/24/2023 08:06 AM    BUN 46 (H) 05/20/2023 10:06 AM    Potassium 4.5 12/02/2023 11:11 AM    Potassium 5.3 07/24/2023 08:06 AM    Potassium 5.0 05/20/2023 10:06 AM    Chloride 105 12/02/2023 11:11 AM    Chloride 111 (H) 07/24/2023 08:06 AM    Chloride 108 05/20/2023 10:06 AM    CO2 25 12/02/2023 11:11 AM    CO2 25 07/24/2023 08:06 AM    CO2 25 05/20/2023 10:06 AM    Creatinine 1.59 (H) 12/02/2023 11:11 AM    Creatinine 1.90 (H) 07/24/2023 08:06 AM    Creatinine 1.64 (H)  "05/20/2023 10:06 AM    AST 15 12/02/2023 11:11 AM    AST 11 (L) 05/20/2023 10:06 AM    AST 15 02/04/2023 10:06 AM    ALT 18 12/02/2023 11:11 AM    ALT 18 05/20/2023 10:06 AM    ALT 20 02/04/2023 10:06 AM    Total Protein 6.9 12/02/2023 11:11 AM    Total Protein 7.1 05/20/2023 10:06 AM    Total Protein 7.0 02/04/2023 10:06 AM    Albumin 4.5 12/02/2023 11:11 AM    Albumin 4.3 05/20/2023 10:06 AM    Albumin 4.3 02/04/2023 10:06 AM    HDL, Direct 33 (L) 12/02/2023 11:11 AM    HDL, Direct 39 (L) 05/20/2023 10:06 AM    Triglycerides 358 (H) 12/02/2023 11:11 AM    Triglycerides 258 (H) 05/20/2023 10:06 AM           Imaging Studies:     Portions of the record may have been created with voice recognition software. Occasional wrong word or \"sound a like\" substitutions may have occurred due to the inherent limitations of voice recognition software. Read the chart carefully and recognize, using context, where substitutions have occurred.    "

## 2024-01-10 NOTE — ASSESSMENT & PLAN NOTE
Lab Results   Component Value Date    HGBA1C 6.4 (H) 12/02/2023   Advised to keep monitoring fasting glucose and if fasting glucoses above 130s consistently consider restarting basal insulin at a lower dose   Department of Anesthesiology  Postprocedure Note    Patient: Bre Ricks  MRN: [id number]  YOB: 1950  Date of evaluation: 4/28/2023      Procedure Summary     Date: 04/28/23 Room / Location: [facility] OR 04 / [facility]    Anesthesia Start: 5651 Anesthesia Stop: 3342    Procedure: REVISION OF LEFT ARM ARTERIOVENOUS FISTULA (Left: Arm Lower) Diagnosis:       End stage renal disease ([address]      (End stage renal disease ([address] [N18.6])    Surgeons: Alla Santamaria MD Responsible Provider: Lani Mcgrath MD    Anesthesia Type: MAC, regional ASA Status: 4          Anesthesia Type: No value filed.     Jt Phase I: Jt Score: 10    Jt Phase II: Jt Score: 9      Anesthesia Post Evaluation    Patient location during evaluation: PACU  Patient participation: complete - patient participated  Level of consciousness: awake and alert  Airway patency: patent  Nausea & Vomiting: no nausea and no vomiting  Complications: no  Cardiovascular status: hemodynamically stable  Respiratory status: acceptable  Hydration status: euvolemic  Multimodal analgesia pain management approach

## 2024-01-10 NOTE — ASSESSMENT & PLAN NOTE
Lab Results   Component Value Date    HGBA1C 6.4 (H) 12/02/2023   Continue follow-up with ophthalmologist

## 2024-01-11 ENCOUNTER — OFFICE VISIT (OUTPATIENT)
Dept: PHYSICAL THERAPY | Facility: CLINIC | Age: 64
End: 2024-01-11
Payer: COMMERCIAL

## 2024-01-11 DIAGNOSIS — M54.2 ACUTE NECK PAIN: Primary | ICD-10-CM

## 2024-01-11 PROCEDURE — 97112 NEUROMUSCULAR REEDUCATION: CPT

## 2024-01-11 NOTE — PROGRESS NOTES
"Daily Note     Today's date: 2024  Patient name: Toribio Hernández  : 1960  MRN: [id number]  Referring provider: Tony Woods PT  Dx:   Encounter Diagnosis     ICD-10-CM    1. Acute neck pain  M54.2           Start Time: 1435  Stop Time: 1505  Total time in clinic (min): 30 minutes    Subjective: Patient states, \"I hardly have any symptoms in my L forearm\". Notes tingling in his ring and pinky finger.    Objective: See treatment diary below    Assessment: Patient requires cueing to be sure to go to end range during TE. Abolished forearm symptoms following therapy.     Plan: Continue per plan of care.  Cont HEP, assess response to posture correction, ret with OP, ret, ext 5xD.     Precautions: none.    DX: lower cervical derangement with a ret, ext DP.    Manuals         1/5   Seated ret with clin OP 1x10 1x10        1x10   Seated ret mobs 1x10         1x10   Supine ret, ext clin OP 2x6         2x6   Supine median nerve glide                          Neuro Re-Ed             Posture education             Posture over-correction 2x15 2x15        2x10   Seated c/s retraction 2x15 2x15           standing c/s retraction with pt OP 2x15 2x15        3x10   Standing c/s ret, ext 5x10 5x10        3x10   Standing c/s ret, ext pt OP          1x10   Supine c/s ret, ext          2x10                Ther Ex                                                                                                                     Ther Activity                                       Gait Training                                       Modalities                                          "

## 2024-01-15 ENCOUNTER — TELEPHONE (OUTPATIENT)
Dept: ENDOCRINOLOGY | Facility: CLINIC | Age: 64
End: 2024-01-15

## 2024-01-17 ENCOUNTER — OFFICE VISIT (OUTPATIENT)
Dept: PHYSICAL THERAPY | Facility: CLINIC | Age: 64
End: 2024-01-17
Payer: COMMERCIAL

## 2024-01-17 DIAGNOSIS — M54.2 ACUTE NECK PAIN: Primary | ICD-10-CM

## 2024-01-17 PROCEDURE — 97112 NEUROMUSCULAR REEDUCATION: CPT

## 2024-01-17 PROCEDURE — 97140 MANUAL THERAPY 1/> REGIONS: CPT

## 2024-01-17 NOTE — PROGRESS NOTES
Daily Note     Today's date: 2024  Patient name: Toribio Hernández  : 1960  MRN: [id number]  Referring provider: Tony Woods PT  Dx:   Encounter Diagnosis     ICD-10-CM    1. Acute neck pain  M54.2                      Subjective: Patient reports no significant change in symptoms but the tingling in his fingers is less frequent and intense.     Objective: See treatment diary below    Assessment: Patient demonstrated improved ROM with HEP. He is demonstrating decreasing symptom intensity but has not increased frequency of HEP at this time. Instructed pt to increase HEP frequency from 3-5 times a day.     Plan: Continue per plan of care.  Cont HEP, assess response to posture correction, ret with OP, ret, ext 5xD.     Precautions: none.    DX: lower cervical derangement with a ret, ext DP.    Manuals        1/5   Seated ret with clin OP 1x10 1x10 1x10       1x10   Seated ret mobs 1x10         1x10   Supine ret, ext clin OP 2x6         2x6   Supine median nerve glide                          Neuro Re-Ed             Posture education             Posture over-correction 2x15 2x15 2x15       2x10   Seated c/s retraction 2x15 2x15 2x15          standing c/s retraction with pt OP 2x15 2x15 2x15       3x10   Standing c/s ret, ext 5x10 5x10 5x10       3x10   Standing c/s ret, ext pt OP          1x10   Supine c/s ret, ext          2x10                Ther Ex                                                                                                                     Ther Activity                                       Gait Training                                       Modalities

## 2024-01-18 ENCOUNTER — TELEPHONE (OUTPATIENT)
Dept: NEPHROLOGY | Facility: CLINIC | Age: 64
End: 2024-01-18

## 2024-01-19 ENCOUNTER — APPOINTMENT (OUTPATIENT)
Dept: LAB | Facility: CLINIC | Age: 64
End: 2024-01-19
Payer: COMMERCIAL

## 2024-01-19 ENCOUNTER — OFFICE VISIT (OUTPATIENT)
Dept: PHYSICAL THERAPY | Facility: CLINIC | Age: 64
End: 2024-01-19
Payer: COMMERCIAL

## 2024-01-19 DIAGNOSIS — N18.9 CHRONIC KIDNEY DISEASE, UNSPECIFIED CKD STAGE: ICD-10-CM

## 2024-01-19 DIAGNOSIS — M54.2 ACUTE NECK PAIN: Primary | ICD-10-CM

## 2024-01-19 LAB
ANION GAP SERPL CALCULATED.3IONS-SCNC: 8 MMOL/L
BUN SERPL-MCNC: 42 MG/DL (ref 5–25)
CALCIUM SERPL-MCNC: 10.1 MG/DL (ref 8.4–10.2)
CHLORIDE SERPL-SCNC: 107 MMOL/L (ref 96–108)
CO2 SERPL-SCNC: 25 MMOL/L (ref 21–32)
CREAT SERPL-MCNC: 1.61 MG/DL (ref 0.6–1.3)
CREAT UR-MCNC: 112.6 MG/DL
GFR SERPL CREATININE-BSD FRML MDRD: 44 ML/MIN/1.73SQ M
GLUCOSE P FAST SERPL-MCNC: 126 MG/DL (ref 65–99)
POTASSIUM SERPL-SCNC: 4.8 MMOL/L (ref 3.5–5.3)
PROT UR-MCNC: 147 MG/DL
PROT/CREAT UR: 1.31 MG/G{CREAT} (ref 0–0.1)
SODIUM SERPL-SCNC: 140 MMOL/L (ref 135–147)

## 2024-01-19 PROCEDURE — 36415 COLL VENOUS BLD VENIPUNCTURE: CPT

## 2024-01-19 PROCEDURE — 82570 ASSAY OF URINE CREATININE: CPT

## 2024-01-19 PROCEDURE — 80048 BASIC METABOLIC PNL TOTAL CA: CPT

## 2024-01-19 PROCEDURE — 84156 ASSAY OF PROTEIN URINE: CPT

## 2024-01-19 PROCEDURE — 97112 NEUROMUSCULAR REEDUCATION: CPT

## 2024-01-19 NOTE — PROGRESS NOTES
Daily Note     Today's date: 2024  Patient name: Toribio Hernández  : 1960  MRN: [id number]  Referring provider: Tony Woods, KAVON  Dx:   Encounter Diagnosis     ICD-10-CM    1. Acute neck pain  M54.2           Start Time: 1100  Stop Time: 1135  Total time in clinic (min): 35 minutes    Subjective: Patient reports minimal L forearm, ring and pinky finger symptoms prior to therapy. Notes for the first time yesterday he had no symptoms.     Objective: See treatment diary below    Assessment: Patient required cueing to be sure to go to end ROM during assigned TE. He is responding really well to the current PT POC.    Plan: Continue per plan of care.  Cont HEP, assess response to posture correction, ret with OP, ret, ext 5xD.     Precautions: none.    DX: lower cervical derangement with a ret, ext DP.    Manuals       1/   Seated ret with clin OP 1x10 1x10 1x10 2x10      1x10   Seated ret mobs 1x10   1x10      1x10   Supine ret, ext clin OP 2x6         2x6   Supine median nerve glide                          Neuro Re-Ed             Posture education             Posture over-correction 2x15 2x15 2x15 2x15      2x10   Seated c/s retraction 2x15 2x15 2x15 2x15         standing c/s retraction with pt OP 2x15 2x15 2x15 2x15      3x10   Standing c/s ret, ext 5x10 5x10 5x10 5x10      3x10   Standing c/s ret, ext pt OP          1x10   Supine c/s ret, ext          2x10                Ther Ex                                                                                                                     Ther Activity                                       Gait Training                                       Modalities

## 2024-01-24 ENCOUNTER — OFFICE VISIT (OUTPATIENT)
Dept: PHYSICAL THERAPY | Facility: CLINIC | Age: 64
End: 2024-01-24
Payer: COMMERCIAL

## 2024-01-24 DIAGNOSIS — M54.2 ACUTE NECK PAIN: Primary | ICD-10-CM

## 2024-01-24 PROCEDURE — 97112 NEUROMUSCULAR REEDUCATION: CPT | Performed by: PHYSICAL THERAPIST

## 2024-01-24 PROCEDURE — 97535 SELF CARE MNGMENT TRAINING: CPT | Performed by: PHYSICAL THERAPIST

## 2024-01-24 PROCEDURE — 97140 MANUAL THERAPY 1/> REGIONS: CPT | Performed by: PHYSICAL THERAPIST

## 2024-01-24 NOTE — PROGRESS NOTES
PT Discharge    Today's date: 2024  Patient name: Toribio Hernández  : 1960  MRN: [id number]  Referring provider: Tony Woods PT  Dx:   Encounter Diagnosis     ICD-10-CM    1. Acute neck pain  M54.2                      Assessment  Assessment details: Pt demonstrated normalized ROM, improved posture and function and a resolution of pain.  Pt is appropriate for d/c to HEP.  Impairments: abnormal or restricted ROM, abnormal movement, activity intolerance, impaired physical strength, lacks appropriate home exercise program, pain with function, poor posture  and poor body mechanics    Goals  ST weeks.  1.  Pt will decrease pain 50%. Met  2.  Pt will centralize sx. Met  LT-8 weeks.  1.  Pt will regain full ROM. met  2.  Pt will decrease pain > 75%. met    Plan  Planned therapy interventions: home exercise program      Subjective Evaluation    History of Present Illness  Mechanism of injury: Pt reports complete resolution of pain and a 95% resolution of numbness.  Pt reports occasional 5th finger numbness.   Patient Goals  Patient goals for therapy: decreased edema, decreased pain, increased motion, increased strength, independence with ADLs/IADLs, return to sport/leisure activities and return to work    Pain  Current pain ratin  At best pain ratin  At worst pain ratin  Alleviating factors: sleeping with 1 pillow supine or SL, Tylenol, letting arm hang.  Exacerbated by: slouched sitting.  Progression: improved      Diagnostic Tests  X-ray: abnormal (23 DDD C3/4, C6/7, DJD C5/6, C6/7 mild foraminal narrowing.)      Objective     Postural Observations  Seated posture: good  Standing posture: good  Correction of posture: makes symptoms better      Neurological Testing     Sensation   Cervical/Thoracic   Left   Intact: light touch    Right   Intact: light touch    Reflexes   Left   Biceps (C5/C6): normal (2+)  Brachioradialis (C6): normal (2+)  Triceps (C7): normal (2+)    Right    Biceps (C5/C6): normal (2+)  Brachioradialis (C6): normal (2+)  Triceps (C7): normal (2+)    Active Range of Motion   Cervical/Thoracic Spine       Cervical  Subcranial protraction:  WFL   Subcranial retraction:   Restriction level: minimal  Flexion:  WFL  Extension:  Restriction level: minimal  Left lateral flexion:  Restriction level: minimal  Right lateral flexion:  Restriction level minimal  Left rotation:  Restriction level: minimal  Right rotation:  Restriction level: minimal    Thoracic    Flexion:  WFL  Extension:  WFL  Mechanical Assessment    Cervical    Seated retraction: repeated movements   Pain intensity: better  Pain level: decreased  5x10 B, NB, clin OP: B, NB, mobs: B, NB  Seated Extension: repeated movements  Pain location: centralized  Pain intensity: better  Pain level: abolished  5x10 C, B.    Thoracic      Lumbar      Strength/Myotome Testing     Left Shoulder     Planes of Motion   Abduction: 5   External rotation at 0°: 5     Right Shoulder     Planes of Motion   Abduction: 5   External rotation at 0°: 5     Left Elbow   Flexion: 5  Extension: 5    Right Elbow   Flexion: 5  Extension: 5    Left Wrist/Hand   Wrist extension: 5  Wrist flexion: 5  Thumb extension: 5    Right Wrist/Hand   Wrist extension: 5  Wrist flexion: 5  Thumb extension: 5

## 2024-01-24 NOTE — PROGRESS NOTES
Daily Note     Today's date: 2024  Patient name: Toribio Hernández  : 1960  MRN: [id number]  Referring provider: Tony Woods PT  Dx:   Encounter Diagnosis     ICD-10-CM    1. Acute neck pain  M54.2                      Subjective: Patient reports a 95% improvement in sx.  Pt reports occasional L 5th finger numbness that resolves after HEP or posture correction.     Objective: See treatment diary below    Assessment: Patient demonstrated centralization, full ROM and improve posture.    Plan: Pt is appropriate for d/c to HEP.  Discussed pillows, gym exercises, driving and computer use and breaks and reviewed HEP.     Precautions: none.    DX: lower cervical derangement with a ret, ext DP.    Manuals    Seated ret with clin OP 1x10 1x10 1x10 2x10 2x10     1x10   Seated ret mobs 1x10   1x10      1x10   Supine ret, ext clin OP 2x6    2x6     2x6   Supine median nerve glide                          Neuro Re-Ed             Posture education             Posture over-correction 2x15 2x15 2x15 2x15 1x15     2x10   Seated c/s retraction 2x15 2x15 2x15 2x15 1x15        standing c/s retraction with pt OP 2x15 2x15 2x15 2x15      3x10   Standing c/s ret, ext 5x10 5x10 5x10 5x10 3x10     3x10   Standing c/s ret, ext pt OP          1x10   Supine c/s ret, ext          2x10                Ther Ex                                                                                                                     Ther Activity                                       Gait Training                                       Modalities

## 2024-01-25 ENCOUNTER — OFFICE VISIT (OUTPATIENT)
Dept: NEPHROLOGY | Facility: CLINIC | Age: 64
End: 2024-01-25
Payer: COMMERCIAL

## 2024-01-25 VITALS
SYSTOLIC BLOOD PRESSURE: 130 MMHG | HEIGHT: 63 IN | HEART RATE: 70 BPM | WEIGHT: 187 LBS | DIASTOLIC BLOOD PRESSURE: 70 MMHG | BODY MASS INDEX: 33.13 KG/M2

## 2024-01-25 DIAGNOSIS — N18.9 CHRONIC KIDNEY DISEASE, UNSPECIFIED CKD STAGE: Primary | ICD-10-CM

## 2024-01-25 DIAGNOSIS — R80.8 OTHER PROTEINURIA: ICD-10-CM

## 2024-01-25 PROCEDURE — 99214 OFFICE O/P EST MOD 30 MIN: CPT | Performed by: INTERNAL MEDICINE

## 2024-01-25 NOTE — LETTER
January 25, 2024     Rosalina Lama MD  [address] [facility] [address]    Patient: Toribio Hernández   YOB: 1960   Date of Visit: 1/25/2024       Dear Dr. Lama:    Thank you for referring Toribio Hernández to me for evaluation. Below are my notes for this consultation.    If you have questions, please do not hesitate to call me. I look forward to following your patient along with you.         Sincerely,        Vaughn Castle MD        CC: No Recipients    Vaughn Castle MD  1/25/2024  5:10 PM  Sign when Signing Visit  NEPHROLOGY PROGRESS NOTE    Toribio Hernández 63 y.o. male MRN: [id number]  Unit/Bed#:  Encounter: [id number]  Reason for Consult: Chronic kidney disease and proteinuria    The patient is here for routine follow-up he just retired from work this month and is enjoying his [employer].  He is looking forward to being able to do some traveling.  Other than that he says his health has been good he is trying continue to lose a little bit of weight has had no hospitalizations or changes that he can recall.    ASSESSMENT/PLAN:  1.  Renal    The patient has chronic kidney disease with proteinuria.  I suspect he has underlying diabetic kidney disease.  His creatinine stable at 1.6 protein estimation is about a gram and that is unchanged.  He also has been on tacrolimus for immunosuppression for his liver transplant for years and that can cause chronic interstitial disease so that could be a potential etiology as well.  At this 1 the more that would be at risk for progression would be the diabetes so we will focus on treating that.  His blood pressure is under good control.  He is on a statin and he is on lisinopril as well.  His last hemoglobin A1c was 6% which is excellent.  I encouraged him to continue to keep it around 7% or lower to avoid diabetic complications and help avoid rapid progression.    Continue current medications as above  Labs and follow-up as scheduled  No changes in medications    I told  "him to call if there is any problems or concerns before next visit.    I have spent a total time of 30 minutes on 01/25/24 in caring for this patient including Diagnostic results, Instructions for management, Risk factor reductions, Impressions, Reviewing / ordering tests, medicine, procedures  , and Obtaining or reviewing history  .       SUBJECTIVE:  Review of Systems   Constitutional: Negative for chills, diaphoresis, fever and malaise/fatigue.   HENT: Negative.     Eyes: Negative.    Cardiovascular:  Negative for chest pain, dyspnea on exertion, leg swelling and orthopnea.   Respiratory: Negative.  Negative for cough, shortness of breath, sputum production and wheezing.    Gastrointestinal:  Negative for abdominal pain, diarrhea, nausea and vomiting.   Genitourinary: Negative.    Neurological:  Negative for dizziness, focal weakness, headaches and weakness.   Psychiatric/Behavioral:  Negative for altered mental status, hallucinations and hypervigilance. The patient is not nervous/anxious.        OBJECTIVE:  Current Weight: Weight - Scale: 84.8 kg (187 lb)  Vitals:@TMAX(24)@Current:     Blood pressure 130/70, pulse 70, height 5' 3\" (1.6 m), weight 84.8 kg (187 lb). , Body mass index is 33.13 kg/m².    [unfilled]    Physical Exam: /70 (BP Location: Left arm, Patient Position: Sitting, Cuff Size: Standard)   Pulse 70   Ht 5' 3\" (1.6 m)   Wt 84.8 kg (187 lb)   BMI 33.13 kg/m²   Physical Exam  Constitutional:       General: He is not in acute distress.     Appearance: He is not toxic-appearing or diaphoretic.   HENT:      Head: Normocephalic and atraumatic.      Nose: Nose normal.      Mouth/Throat:      Mouth: Mucous membranes are moist.   Eyes:      General: No scleral icterus.     Extraocular Movements: Extraocular movements intact.   Cardiovascular:      Rate and Rhythm: Normal rate and regular rhythm.      Heart sounds:      No friction rub. No gallop.      Comments: No edema.  Pulmonary:      Effort: " Pulmonary effort is normal. No respiratory distress.      Breath sounds: No wheezing, rhonchi or rales.   Abdominal:      General: Bowel sounds are normal. There is no distension.      Palpations: Abdomen is soft.      Tenderness: There is no abdominal tenderness. There is no rebound.   Musculoskeletal:      Cervical back: Normal range of motion and neck supple.   Neurological:      General: No focal deficit present.      Mental Status: He is alert. Mental status is at baseline.   Psychiatric:         Mood and Affect: Mood normal.         Behavior: Behavior normal.         Thought Content: Thought content normal.         Medications:    Current Outpatient Medications:   •  amLODIPine (NORVASC) 10 mg tablet, TAKE 1 TABLET DAILY, Disp: 90 tablet, Rfl: 3  •  atorvastatin (LIPITOR) 40 mg tablet, TAKE 1 TABLET DAILY AT BEDTIME, Disp: 90 tablet, Rfl: 3  •  azaTHIOprine (IMURAN) 50 mg tablet, Take 1 tablet (50 mg total) by mouth daily, Disp: 90 tablet, Rfl: 3  •  BD Pen Needle Montserrat 2nd Gen 32G X 4 MM MISC, USE 2 (TWO) TIMES A DAY, Disp: 200 each, Rfl: 3  •  Blood Pressure Monitor KIT, Use once a week, Disp: 1 kit, Rfl: 0  •  Cholecalciferol (Vitamin D3) 50 MCG (2000 UT) capsule, Take 1 capsule by mouth daily Gel caps, Disp: , Rfl:   •  fludrocortisone (FLORINEF) 0.1 mg tablet, Take 0.1 mg by mouth daily  , Disp: , Rfl:   •  hydrochlorothiazide (HYDRODIURIL) 12.5 mg tablet, TAKE 1 TABLET DAILY, Disp: 90 tablet, Rfl: 3  •  labetalol (NORMODYNE) 100 mg tablet, TAKE 1 TABLET EVERY 12 HOURS, Disp: 180 tablet, Rfl: 3  •  latanoprost (XALATAN) 0.005 % ophthalmic solution, INSTILL 1 DROP INTO LEFT EYE AT BEDTIME FOR 3 WEEKS, Disp: , Rfl:   •  lisinopril (ZESTRIL) 20 mg tablet, Take 2 tablets (40 mg total) by mouth daily, Disp: 90 tablet, Rfl: 3  •  Magnesium Oxide (Magnesium Oxide 400) 240 MG PACK, Take 400 mg by mouth 2 (two) times a day, Disp: 1 each, Rfl: 1  •  Multiple Vitamin (MULTIVITAMIN ADULT PO), Take by mouth Daily,  "Disp: , Rfl:   •  semaglutide, 1 mg/dose, (Ozempic, 1 MG/DOSE,) 4 mg/3 mL injection pen, Inject 0.75 mL (1 mg total) under the skin every 7 days, Disp: 9 mL, Rfl: 1  •  tacrolimus (PROGRAF) 1 mg capsule, Takes 4 tabs in am, 3 tabs in pm, Disp: , Rfl:   •  TRUE METRIX BLOOD GLUCOSE TEST test strip, Test twice daily, Disp: , Rfl:   •  MAGnesium-Oxide 400 (240 Mg) MG TABS, , Disp: , Rfl:     Laboratory Results:  Lab Results   Component Value Date    WBC 4.04 (L) 12/02/2023    HGB 12.3 12/02/2023    HCT 37.1 12/02/2023    MCV 90 12/02/2023     (L) 12/02/2023     Lab Results   Component Value Date    SODIUM 140 01/19/2024    K 4.8 01/19/2024     01/19/2024    CO2 25 01/19/2024    BUN 42 (H) 01/19/2024    CREATININE 1.61 (H) 01/19/2024    GLUC 163 (H) 09/07/2021    CALCIUM 10.1 01/19/2024     Lab Results   Component Value Date    CALCIUM 10.1 01/19/2024    PHOS 4.0 10/24/2014     No results found for: \"LABPROT\"      "

## 2024-01-25 NOTE — PROGRESS NOTES
NEPHROLOGY PROGRESS NOTE    Toribio Hernández 63 y.o. 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  He is looking forward to being able to do some traveling.  Other than that he says his health has been good he is trying continue to lose a little bit of weight has had no hospitalizations or changes that he can recall.    ASSESSMENT/PLAN:  1.  Renal    The patient has chronic kidney disease with proteinuria.  I suspect he has underlying diabetic kidney disease.  His creatinine stable at 1.6 protein estimation is about a gram and that is unchanged.  He also has been on tacrolimus for immunosuppression for his liver transplant for years and that can cause chronic interstitial disease so that could be a potential etiology as well.  At this 1 the more that would be at risk for progression would be the diabetes so we will focus on treating that.  His blood pressure is under good control.  He is on a statin and he is on lisinopril as well.  His last hemoglobin A1c was 6% which is excellent.  I encouraged him to continue to keep it around 7% or lower to avoid diabetic complications and help avoid rapid progression.    Continue current medications as above  Labs and follow-up as scheduled  No changes in medications    I told him to call if there is any problems or concerns before next visit.    I have spent a total time of 30 minutes on 01/25/24 in caring for this patient including Diagnostic results, Instructions for management, Risk factor reductions, Impressions, Reviewing / ordering tests, medicine, procedures  , and Obtaining or reviewing history  .       SUBJECTIVE:  Review of Systems   Constitutional: Negative for chills, diaphoresis, fever and malaise/fatigue.   HENT: Negative.     Eyes: Negative.    Cardiovascular:  Negative for chest pain,  "dyspnea on exertion, leg swelling and orthopnea.   Respiratory: Negative.  Negative for cough, shortness of breath, sputum production and wheezing.    Gastrointestinal:  Negative for abdominal pain, diarrhea, nausea and vomiting.   Genitourinary: Negative.    Neurological:  Negative for dizziness, focal weakness, headaches and weakness.   Psychiatric/Behavioral:  Negative for altered mental status, hallucinations and hypervigilance. The patient is not nervous/anxious.        OBJECTIVE:  Current Weight: Weight - Scale: 84.8 kg (187 lb)  Vitals:@TMAX(24)@Current:     Blood pressure 130/70, pulse 70, height 5' 3\" (1.6 m), weight 84.8 kg (187 lb). , Body mass index is 33.13 kg/m².    [unfilled]    Physical Exam: /70 (BP Location: Left arm, Patient Position: Sitting, Cuff Size: Standard)   Pulse 70   Ht 5' 3\" (1.6 m)   Wt 84.8 kg (187 lb)   BMI 33.13 kg/m²   Physical Exam  Constitutional:       General: He is not in acute distress.     Appearance: He is not toxic-appearing or diaphoretic.   HENT:      Head: Normocephalic and atraumatic.      Nose: Nose normal.      Mouth/Throat:      Mouth: Mucous membranes are moist.   Eyes:      General: No scleral icterus.     Extraocular Movements: Extraocular movements intact.   Cardiovascular:      Rate and Rhythm: Normal rate and regular rhythm.      Heart sounds:      No friction rub. No gallop.      Comments: No edema.  Pulmonary:      Effort: Pulmonary effort is normal. No respiratory distress.      Breath sounds: No wheezing, rhonchi or rales.   Abdominal:      General: Bowel sounds are normal. There is no distension.      Palpations: Abdomen is soft.      Tenderness: There is no abdominal tenderness. There is no rebound.   Musculoskeletal:      Cervical back: Normal range of motion and neck supple.   Neurological:      General: No focal deficit present.      Mental Status: He is alert. Mental status is at baseline.   Psychiatric:         Mood and Affect: Mood normal. "         Behavior: Behavior normal.         Thought Content: Thought content normal.         Medications:    Current Outpatient Medications:     amLODIPine (NORVASC) 10 mg tablet, TAKE 1 TABLET DAILY, Disp: 90 tablet, Rfl: 3    atorvastatin (LIPITOR) 40 mg tablet, TAKE 1 TABLET DAILY AT BEDTIME, Disp: 90 tablet, Rfl: 3    azaTHIOprine (IMURAN) 50 mg tablet, Take 1 tablet (50 mg total) by mouth daily, Disp: 90 tablet, Rfl: 3    BD Pen Needle Montserrat 2nd Gen 32G X 4 MM MISC, USE 2 (TWO) TIMES A DAY, Disp: 200 each, Rfl: 3    Blood Pressure Monitor KIT, Use once a week, Disp: 1 kit, Rfl: 0    Cholecalciferol (Vitamin D3) 50 MCG (2000 UT) capsule, Take 1 capsule by mouth daily Gel caps, Disp: , Rfl:     fludrocortisone (FLORINEF) 0.1 mg tablet, Take 0.1 mg by mouth daily  , Disp: , Rfl:     hydrochlorothiazide (HYDRODIURIL) 12.5 mg tablet, TAKE 1 TABLET DAILY, Disp: 90 tablet, Rfl: 3    labetalol (NORMODYNE) 100 mg tablet, TAKE 1 TABLET EVERY 12 HOURS, Disp: 180 tablet, Rfl: 3    latanoprost (XALATAN) 0.005 % ophthalmic solution, INSTILL 1 DROP INTO LEFT EYE AT BEDTIME FOR 3 WEEKS, Disp: , Rfl:     lisinopril (ZESTRIL) 20 mg tablet, Take 2 tablets (40 mg total) by mouth daily, Disp: 90 tablet, Rfl: 3    Magnesium Oxide (Magnesium Oxide 400) 240 MG PACK, Take 400 mg by mouth 2 (two) times a day, Disp: 1 each, Rfl: 1    Multiple Vitamin (MULTIVITAMIN ADULT PO), Take by mouth Daily, Disp: , Rfl:     semaglutide, 1 mg/dose, (Ozempic, 1 MG/DOSE,) 4 mg/3 mL injection pen, Inject 0.75 mL (1 mg total) under the skin every 7 days, Disp: 9 mL, Rfl: 1    tacrolimus (PROGRAF) 1 mg capsule, Takes 4 tabs in am, 3 tabs in pm, Disp: , Rfl:     TRUE METRIX BLOOD GLUCOSE TEST test strip, Test twice daily, Disp: , Rfl:     MAGnesium-Oxide 400 (240 Mg) MG TABS, , Disp: , Rfl:     Laboratory Results:  Lab Results   Component Value Date    WBC 4.04 (L) 12/02/2023    HGB 12.3 12/02/2023    HCT 37.1 12/02/2023    MCV 90 12/02/2023     (L)  "12/02/2023     Lab Results   Component Value Date    SODIUM 140 01/19/2024    K 4.8 01/19/2024     01/19/2024    CO2 25 01/19/2024    BUN 42 (H) 01/19/2024    CREATININE 1.61 (H) 01/19/2024    GLUC 163 (H) 09/07/2021    CALCIUM 10.1 01/19/2024     Lab Results   Component Value Date    CALCIUM 10.1 01/19/2024    PHOS 4.0 10/24/2014     No results found for: \"LABPROT\"      "

## 2024-01-25 NOTE — PATIENT INSTRUCTIONS
You are here for follow-up congratulations on your prison and get to do some things you enjoy like working out and travel hopefully.  Sounds like your health is been doing well.  The creatinine was 1.6 which is stable at your baseline a little bit better than it was a couple months ago so there is been no progression.  With protein in the urine that were treating this and managing it as if it is related to diabetes involving the kidney as I discussed with you.  The way to help delay damage his sugar control try to keep the A1c 7%, blood pressure control cholesterol treatment and you are on lisinopril to help lower protein in the urine.  The amount of protein in your urine is that it is there because of diabetes affecting kidneys but it is relatively low compared to patients who have much more so hopefully the lisinopril is keeping it down and it will translate into very slow progression.    You did tell me that they adjusted some of your blood pressure pills and I got a good 1 today so it seems to be doing better so if there is a problem just give me a call.    Labs and follow-up as scheduled call me if you need me.

## 2024-01-29 ENCOUNTER — TELEPHONE (OUTPATIENT)
Dept: ENDOCRINOLOGY | Facility: CLINIC | Age: 64
End: 2024-01-29

## 2024-01-30 ENCOUNTER — TELEPHONE (OUTPATIENT)
Dept: ENDOCRINOLOGY | Facility: CLINIC | Age: 64
End: 2024-01-30

## 2024-01-30 NOTE — TELEPHONE ENCOUNTER
Fingerstick log reviewed-some morning numbers are high otherwise okay.  Continue current regimen and watch diet

## 2024-02-12 ENCOUNTER — OFFICE VISIT (OUTPATIENT)
Dept: FAMILY MEDICINE CLINIC | Facility: CLINIC | Age: 64
End: 2024-02-12

## 2024-02-12 VITALS
RESPIRATION RATE: 18 BRPM | DIASTOLIC BLOOD PRESSURE: 76 MMHG | HEIGHT: 63 IN | HEART RATE: 89 BPM | SYSTOLIC BLOOD PRESSURE: 122 MMHG | WEIGHT: 185.8 LBS | TEMPERATURE: 98.3 F | OXYGEN SATURATION: 97 % | BODY MASS INDEX: 32.92 KG/M2

## 2024-02-12 DIAGNOSIS — M85.80 OSTEOPENIA, UNSPECIFIED LOCATION: ICD-10-CM

## 2024-02-12 DIAGNOSIS — Z79.52 IMMUNOCOMPROMISED DUE TO CORTICOSTEROIDS: ICD-10-CM

## 2024-02-12 DIAGNOSIS — R80.9 PROTEINURIA, UNSPECIFIED TYPE: ICD-10-CM

## 2024-02-12 DIAGNOSIS — M79.89 SWELLING OF TOE OF LEFT FOOT: ICD-10-CM

## 2024-02-12 DIAGNOSIS — Z23 NEED FOR SHINGLES VACCINE: ICD-10-CM

## 2024-02-12 DIAGNOSIS — Z79.4 TYPE 2 DIABETES MELLITUS WITH CHRONIC KIDNEY DISEASE, WITH LONG-TERM CURRENT USE OF INSULIN, UNSPECIFIED CKD STAGE (HCC): ICD-10-CM

## 2024-02-12 DIAGNOSIS — T38.0X5A IMMUNOCOMPROMISED DUE TO CORTICOSTEROIDS: ICD-10-CM

## 2024-02-12 DIAGNOSIS — Z12.5 SCREENING FOR PROSTATE CANCER: ICD-10-CM

## 2024-02-12 DIAGNOSIS — E11.22 TYPE 2 DIABETES MELLITUS WITH CHRONIC KIDNEY DISEASE, WITH LONG-TERM CURRENT USE OF INSULIN, UNSPECIFIED CKD STAGE (HCC): ICD-10-CM

## 2024-02-12 DIAGNOSIS — D84.821 IMMUNOCOMPROMISED DUE TO CORTICOSTEROIDS: ICD-10-CM

## 2024-02-12 DIAGNOSIS — M21.612 BUNION OF GREAT TOE OF LEFT FOOT: ICD-10-CM

## 2024-02-12 DIAGNOSIS — Z00.00 ANNUAL PHYSICAL EXAM: Primary | ICD-10-CM

## 2024-02-12 PROCEDURE — 90471 IMMUNIZATION ADMIN: CPT | Performed by: FAMILY MEDICINE

## 2024-02-12 PROCEDURE — 99397 PER PM REEVAL EST PAT 65+ YR: CPT | Performed by: FAMILY MEDICINE

## 2024-02-12 PROCEDURE — 90750 HZV VACC RECOMBINANT IM: CPT | Performed by: FAMILY MEDICINE

## 2024-02-12 RX ORDER — METHYLPREDNISOLONE 4 MG/1
TABLET ORAL
Qty: 21 EACH | Refills: 0 | Status: SHIPPED | OUTPATIENT
Start: 2024-02-12

## 2024-02-12 RX ORDER — LISINOPRIL 40 MG/1
40 TABLET ORAL DAILY
Start: 2024-02-12

## 2024-02-12 NOTE — PROGRESS NOTES
ADULT ANNUAL PHYSICAL  [facility] [address] [facility]    NAME: Toribio Hernández  AGE: 63 y.o. SEX: male  : 1960     DATE: 2024     Assessment and Plan:     Problem List Items Addressed This Visit          Endocrine    Type 2 diabetes mellitus with chronic kidney disease, with long-term current use of insulin ([facility])       Lab Results   Component Value Date    HGBA1C 6.4 (H) 2023     Within goal, goal hemoglobin A1c less than 7.  Continue current regimen of Ozempic 1 mg weekly.  Continue lifestyle modification with diet and exercise            Musculoskeletal and Integument    Osteopenia       Patient used to be on Fosamax which was started in  and eventually discontinued in .  DEXA scan was done after patient was of Fosamax for at least a year.  DEXA scan shows significant decrease in bone density in left hip, but T-score of left hip is-0.4 and left femoral neck is -1.  Patient is now off prednisone, but does take Florinef 0.1 mg daily.  Will repeat DEXA scan at this time         Relevant Orders    DXA bone density spine hip and pelvis    Bunion of great toe of left foot     Patient has bunion of left foot, but today presents with red swollen base of first toe.  He does have history of gout, on palpation base of first toe was very tender.  Suspect gout versus inflammation of bunion, will treat with Medrol dose pack.  Also advised patient to schedule appointment with podiatry            Other    Proteinuria    Relevant Medications    lisinopril (ZESTRIL) 40 mg tablet     Other Visit Diagnoses       Annual physical exam    -  Primary    Screening for prostate cancer        Relevant Orders    PSA Total (Reflex To Free)    Immunocompromised due to corticosteroids         Relevant Medications    methylPREDNISolone 4 MG tablet therapy pack    Other Relevant Orders    DXA bone density spine hip and pelvis    Need for shingles vaccine         Relevant Orders    Zoster Vaccine Recombinant IM (Completed)    Swelling of toe of left foot        Relevant Medications    methylPREDNISolone 4 MG tablet therapy pack              Immunizations and preventive care screenings were discussed with patient today. Appropriate education was printed on patient's after visit summary.    Discussed risks and benefits of prostate cancer screening. We discussed the controversial history of PSA screening for prostate cancer in the [place] as well as the risk of over detection and over treatment of prostate cancer by way of PSA screening.  The patient understands that PSA blood testing is an imperfect way to screen for prostate cancer and that elevated PSA levels in the blood may also be caused by infection, inflammation, prostatic trauma or manipulation, urological procedures, or by benign prostatic enlargement.    The role of the digital rectal examination in prostate cancer screening was also discussed and I discussed with him that there is large interobserver variability in the findings of digital rectal examination.    Counseling:  Exercise: the importance of regular exercise/physical activity was discussed. Recommend exercise 3-5 times per week for at least 30 minutes.          Return in about 6 months (around 8/12/2024) for nurse visit for shingles vaccine in 2 months .     Chief Complaint:     Chief Complaint   Patient presents with    Physical Exam     Having problems with the bunion on his left foot. Tender and inflamed       History of Present Illness:     Adult Annual Physical   Patient here for a comprehensive physical exam. The patient reports  pain in left foot for last couple days.  Patient has been taking Tylenol as needed .  Pain left foot   Diet and Physical Activity  Diet/Nutrition: low calorie diet and low carb diet.   Exercise: 3-4 times a week on average.      Depression Screening  PHQ-2/9 Depression Screening    Little interest or pleasure in doing  things: 0 - not at all  Feeling down, depressed, or hopeless: 0 - not at all  PHQ-2 Score: 0  PHQ-2 Interpretation: Negative depression screen       General Health  Sleep: gets 7-8 hours of sleep on average and   .   Hearing: normal - bilateral.  Vision: no vision problems.   Dental: regular dental visits.        Health  Symptoms include: none    Advanced Care Planning  Do you have an advanced directive? no  Do you have a durable medical power of ? no     Review of Systems:     Review of Systems   Constitutional:  Negative for chills and fever.   HENT:  Negative for congestion.    Respiratory:  Negative for shortness of breath.    Cardiovascular:  Negative for chest pain.   Gastrointestinal:  Negative for diarrhea, nausea and vomiting.   Genitourinary:  Negative for difficulty urinating.   Musculoskeletal:  Positive for arthralgias.        Pain in left great toe   Neurological:  Positive for headaches.      Past Medical History:     Past Medical History:   Diagnosis Date    Bunion of right foot     Chronic kidney disease, stage III (moderate) (HCC)     RESOLVED: [id number]    Cirrhosis (HCC)     Diabetes mellitus (HCC)     Gout 10/6/18    Hepatic encephalopathy (HCC)     Hepatitis C     Hyperkalemia     Hyperlipidemia     Hypertension     Pancytopenia (HCC)     Status post liver transplant (HCC)     2015    Umbilical hernia     Wears glasses       Past Surgical History:     Past Surgical History:   Procedure Laterality Date    ANKLE SURGERY      CHOLECYSTECTOMY      FOOT SURGERY  03/2022    Bunion removal    FRACTURE SURGERY      left foot    GALLBLADDER SURGERY      HIP SURGERY      LIVER TRANSPLANTATION      LAST ASSESSED: [id number]    NJ COLONOSCOPY FLX DX W/COLLJ SPEC WHEN PFRMD N/A 02/16/2017    Procedure: COLONOSCOPY;  Surgeon: Brayden Massey MD;  Location: [facility];  Service: Gastroenterology    NJ CORRJ HLX VLGS BNCTY [facility] DSTL METAR OSTEOT Right 02/04/2022    Procedure: BUNIONECTOMY  TENZIN;  Surgeon: Darryl Benavides DPM;  Location:  MAIN OR;  Service: Podiatry    TONSILLECTOMY        Family History:     Family History   Problem Relation Age of Onset    Diabetes Mother         TYPE 2    Hypertension Mother     Stroke Mother 76        SYNDROME     Hepatitis Brother         C    Cirrhosis Brother         LIVER     Cancer Family       Social History:     Social History     Socioeconomic History    Marital status: /Civil Union     Spouse name: None    Number of children: None    Years of education: None    Highest education level: None   Occupational History    Occupation: WORKS AT [employer]     Comment: [employer]    Tobacco Use    Smoking status: Never    Smokeless tobacco: Never   Vaping Use    Vaping status: Never Used   Substance and Sexual Activity    Alcohol use: Yes     Alcohol/week: 1.0 standard drink of alcohol     Types: 1 Cans of beer per week     Comment: occassionally    Drug use: No    Sexual activity: Yes     Partners: Female   Other Topics Concern    None   Social History Narrative    Uses safety equipment- seatbelts      Social Determinants of Health     Financial Resource Strain: Low Risk  (2/12/2024)    Overall Financial Resource Strain (CARDIA)     Difficulty of Paying Living Expenses: Not hard at all   Food Insecurity: No Food Insecurity (2/12/2024)    Hunger Vital Sign     Worried About Running Out of Food in the Last Year: Never true     Ran Out of Food in the Last Year: Never true   Transportation Needs: No Transportation Needs (2/12/2024)    PRAPARE - Transportation     Lack of Transportation (Medical): No     Lack of Transportation (Non-Medical): No   Physical Activity: Inactive (11/14/2023)    Exercise Vital Sign     Days of Exercise per Week: 0 days     Minutes of Exercise per Session: 0 min   Stress: No Stress Concern Present (2/12/2024)    [employer] [place] [employer] - Occupational Stress Questionnaire     Feeling of  Stress : Not at all   Social Connections: Moderately Integrated (11/14/2023)    Social Connection and Isolation Panel [NHANES]     Frequency of Communication with Friends and Family: More than three times a week     Frequency of Social Gatherings with Friends and Family: More than three times a week     Attends Latter day Services: More than 4 times per year     Active Member of Clubs or Organizations: No     Attends Club or Organization Meetings: Never     Marital Status:    Intimate Partner Violence: Not At Risk (2/12/2024)    Humiliation, Afraid, Rape, and Kick questionnaire     Fear of Current or Ex-Partner: No     Emotionally Abused: No     Physically Abused: No     Sexually Abused: No   Housing Stability: Low Risk  (2/12/2024)    Housing Stability Vital Sign     Unable to Pay for Housing in the Last Year: No     Number of Places Lived in the Last Year: 1     Unstable Housing in the Last Year: No      Current Medications:     Current Outpatient Medications   Medication Sig Dispense Refill    amLODIPine (NORVASC) 10 mg tablet TAKE 1 TABLET DAILY 90 tablet 3    atorvastatin (LIPITOR) 40 mg tablet TAKE 1 TABLET DAILY AT BEDTIME 90 tablet 3    azaTHIOprine (IMURAN) 50 mg tablet Take 1 tablet (50 mg total) by mouth daily 90 tablet 3    BD Pen Needle [place] 2nd Gen 32G X 4 MM MISC USE 2 (TWO) TIMES A  each 3    Blood Pressure Monitor KIT Use once a week 1 kit 0    Cholecalciferol (Vitamin D3) 50 MCG (2000 UT) capsule Take 1 capsule by mouth daily Gel caps      fludrocortisone (FLORINEF) 0.1 mg tablet Take 0.1 mg by mouth daily        hydrochlorothiazide (HYDRODIURIL) 12.5 mg tablet TAKE 1 TABLET DAILY 90 tablet 3    labetalol (NORMODYNE) 100 mg tablet TAKE 1 TABLET EVERY 12 HOURS 180 tablet 3    latanoprost (XALATAN) 0.005 % ophthalmic solution INSTILL 1 DROP INTO LEFT EYE AT BEDTIME FOR 3 WEEKS      lisinopril (ZESTRIL) 40 mg tablet Take 1 tablet (40 mg total) by mouth daily      Magnesium Oxide  "(Magnesium Oxide 400) 240 MG PACK Take 400 mg by mouth 2 (two) times a day 1 each 1    methylPREDNISolone 4 MG tablet therapy pack Use as directed on package 21 each 0    Multiple Vitamin (MULTIVITAMIN ADULT PO) Take by mouth Daily      semaglutide, 1 mg/dose, (Ozempic, 1 MG/DOSE,) 4 mg/3 mL injection pen Inject 0.75 mL (1 mg total) under the skin every 7 days 9 mL 1    tacrolimus (PROGRAF) 1 mg capsule Takes 4 tabs in am, 3 tabs in pm      TRUE METRIX BLOOD GLUCOSE TEST test strip Test twice daily      MAGnesium-Oxide 400 (240 Mg) MG TABS  (Patient not taking: Reported on 5/31/2023)       No current facility-administered medications for this visit.      Allergies:     No Known Allergies   Physical Exam:     /76 (BP Location: Right arm, Patient Position: Sitting, Cuff Size: Standard)   Pulse 89   Temp 98.3 °F (36.8 °C) (Temporal)   Resp 18   Ht 5' 3\" (1.6 m)   Wt 84.3 kg (185 lb 12.8 oz)   SpO2 97%   BMI 32.91 kg/m²     Physical Exam  Vitals and nursing note reviewed.   Constitutional:       General: He is not in acute distress.     Appearance: He is well-developed.   HENT:      Head: Normocephalic and atraumatic.   Eyes:      Conjunctiva/sclera: Conjunctivae normal.   Cardiovascular:      Rate and Rhythm: Normal rate and regular rhythm.      Heart sounds: Murmur heard.   Pulmonary:      Effort: Pulmonary effort is normal. No respiratory distress.      Breath sounds: Normal breath sounds.   Abdominal:      Palpations: Abdomen is soft.      Tenderness: There is no abdominal tenderness.   Musculoskeletal:         General: No swelling.      Cervical back: Neck supple.      Comments: Tenderness around first metatarpophalangeal joint, red, swollen   Skin:     General: Skin is warm and dry.      Capillary Refill: Capillary refill takes less than 2 seconds.   Neurological:      Mental Status: He is alert.   Psychiatric:         Mood and Affect: Mood normal.          Rosalina Lama MD  [facility] " [facility] [place]

## 2024-02-13 NOTE — ASSESSMENT & PLAN NOTE
Lab Results   Component Value Date    HGBA1C 6.4 (H) 12/02/2023     Within goal, goal hemoglobin A1c less than 7.  Continue current regimen of Ozempic 1 mg weekly.  Continue lifestyle modification with diet and exercise

## 2024-02-13 NOTE — ASSESSMENT & PLAN NOTE
Patient has bunion of left foot, but today presents with red swollen base of first toe.  He does have history of gout, on palpation base of first toe was very tender.  Suspect gout versus inflammation of bunion, will treat with Medrol dose pack.  Also advised patient to schedule appointment with podiatry

## 2024-02-13 NOTE — ASSESSMENT & PLAN NOTE
Patient used to be on Fosamax which was started in 2015 and eventually discontinued in 2021.  DEXA scan was done after patient was of Fosamax for at least a year.  DEXA scan shows significant decrease in bone density in left hip, but T-score of left hip is-0.4 and left femoral neck is -1.  Patient is now off prednisone, but does take Florinef 0.1 mg daily.  Will repeat DEXA scan at this time

## 2024-02-14 ENCOUNTER — TELEPHONE (OUTPATIENT)
Dept: ENDOCRINOLOGY | Facility: CLINIC | Age: 64
End: 2024-02-14

## 2024-02-16 NOTE — TELEPHONE ENCOUNTER
Sugars look good-continue Ozempic and dietary modifications-bring in log to the follow-up visit, no need to send in logs  unless anything has changed

## 2024-03-01 ENCOUNTER — APPOINTMENT (OUTPATIENT)
Dept: LAB | Facility: CLINIC | Age: 64
End: 2024-03-01
Payer: COMMERCIAL

## 2024-03-01 DIAGNOSIS — Z12.5 SCREENING FOR PROSTATE CANCER: ICD-10-CM

## 2024-03-01 PROCEDURE — 84153 ASSAY OF PSA TOTAL: CPT

## 2024-03-01 PROCEDURE — 84154 ASSAY OF PSA FREE: CPT

## 2024-03-02 LAB
PSA FREE MFR SERPL: 25 %
PSA FREE SERPL-MCNC: 0.25 NG/ML
PSA SERPL-MCNC: 1 NG/ML (ref 0–4)

## 2024-03-18 LAB
LEFT EYE DIABETIC RETINOPATHY: POSITIVE
RIGHT EYE DIABETIC RETINOPATHY: POSITIVE

## 2024-03-21 ENCOUNTER — OFFICE VISIT (OUTPATIENT)
Dept: PODIATRY | Facility: CLINIC | Age: 64
End: 2024-03-21
Payer: COMMERCIAL

## 2024-03-21 VITALS
HEIGHT: 63 IN | DIASTOLIC BLOOD PRESSURE: 82 MMHG | SYSTOLIC BLOOD PRESSURE: 148 MMHG | BODY MASS INDEX: 32.43 KG/M2 | WEIGHT: 183 LBS | HEART RATE: 90 BPM

## 2024-03-21 DIAGNOSIS — M20.12 HALLUX VALGUS OF LEFT FOOT: Primary | ICD-10-CM

## 2024-03-21 DIAGNOSIS — M10.9 GOUT OF LEFT FOOT, UNSPECIFIED CAUSE, UNSPECIFIED CHRONICITY: ICD-10-CM

## 2024-03-21 PROCEDURE — 99213 OFFICE O/P EST LOW 20 MIN: CPT | Performed by: PODIATRIST

## 2024-03-21 NOTE — PROGRESS NOTES
Assessment/Plan:    Discussed principles of diabetic footcare.  Vascular status is within normal limits.  Sensorium is diminished in the left foot.  Explained to patient that his left foot pain was due to a gout attack.  This attack has resolved.  As he infrequently gets these attacks, uric acid level not ordered nor is allopurinol at current consideration.  No treatment needed today.  No need to treat the relatively asymptomatic left foot hallux valgus deformity.  Reappoint as needed    No problem-specific Assessment & Plan notes found for this encounter.       Diagnoses and all orders for this visit:    Hallux valgus of left foot    Gout of left foot, unspecified cause, unspecified chronicity          Subjective:      Patient ID: Toribio Hernández is a 63 y.o. male.    HPI    Patient, a 63-year-old type II diabetic male male presents for assessment of the left foot.  Patient has known hallux valgus deformities of each foot.  The right foot was treated surgically in 2022 for the disorder.  The patient notes that approximately 1 month ago he had severe pain and swelling in the left foot bunion.  Over the past few days, the pain and swelling has receded and patient is now comfortable.    On questioning, patient notes that he does have a history of gout.  He states that his attacks are rare.  He attributes hyperuricemia to liver transplant that he has had years back.  He denies significant numbness or tingling in his feet.    I personally reviewed an A1c dated 12/2/2023.  It was 6.4.    I personally reviewed an A1c dated 9/6/2023.  It was 6.0.    The following portions of the patient's history were reviewed and updated as appropriate: allergies, current medications, past family history, past medical history, past social history, past surgical history, and problem list.    Review of Systems   Constitutional:         History of liver transplant   Musculoskeletal:  Positive for gait problem.   Neurological:  Negative for  "numbness.                 Objective:      /82   Pulse 90   Ht 5' 3\" (1.6 m)   Wt 83 kg (183 lb)   BMI 32.42 kg/m²          Physical Exam  Cardiovascular:      Pulses: no weak pulses.           Dorsalis pedis pulses are 2+ on the right side and 2+ on the left side.        Posterior tibial pulses are 2+ on the right side and 2+ on the left side.   Feet:      Right foot:      Skin integrity: No ulcer, skin breakdown, erythema, warmth, callus or dry skin.      Left foot:      Skin integrity: No ulcer, skin breakdown, erythema, warmth, callus or dry skin.           Diabetic Foot Exam    Patient's shoes and socks removed.    Right Foot/Ankle   Right Foot Inspection  Skin Exam: skin normal and skin intact. No dry skin, no warmth, no callus, no erythema, no maceration, no abnormal color, no pre-ulcer, no ulcer and no callus.     Toe Exam: ROM and strength within normal limits.     Sensory   Vibration: diminished  Proprioception: intact  Monofilament testing: intact    Vascular  Capillary refills: < 3 seconds  The right DP pulse is 2+. The right PT pulse is 2+.     Right Toe  - Comprehensive Exam  Ecchymosis: none  Arch: normal  Hammertoes: absent  Claw Toes: absent  Swelling: none   Tenderness: none       Left Foot/Ankle  Left Foot Inspection  Skin Exam: skin normal and skin intact. No dry skin, no warmth, no erythema, no maceration, normal color, no pre-ulcer, no ulcer and no callus.     Toe Exam: left toe deformity.     Sensory   Vibration: diminished  Proprioception: intact  Monofilament testing: diminished    Vascular  Capillary refills: < 3 seconds  The left DP pulse is 2+. The left PT pulse is 2+.     Left Toe  - Comprehensive Exam  Ecchymosis: none  Arch: normal  Hammertoes: absent  Claw toes: absent  Swelling: none   Tenderness: none       Assign Risk Category  Deformity present  No loss of protective sensation  No weak pulses  Risk: 0      "

## 2024-03-25 DIAGNOSIS — R80.9 PROTEINURIA, UNSPECIFIED TYPE: ICD-10-CM

## 2024-03-26 RX ORDER — LISINOPRIL 20 MG/1
TABLET ORAL
Qty: 90 TABLET | Refills: 1 | Status: SHIPPED | OUTPATIENT
Start: 2024-03-26

## 2024-04-02 ENCOUNTER — LAB (OUTPATIENT)
Dept: LAB | Facility: CLINIC | Age: 64
End: 2024-04-02
Payer: COMMERCIAL

## 2024-04-02 DIAGNOSIS — E11.65 TYPE 2 DIABETES MELLITUS WITH HYPERGLYCEMIA, WITH LONG-TERM CURRENT USE OF INSULIN (HCC): ICD-10-CM

## 2024-04-02 DIAGNOSIS — Z79.4 TYPE 2 DIABETES MELLITUS WITH HYPERGLYCEMIA, WITH LONG-TERM CURRENT USE OF INSULIN (HCC): ICD-10-CM

## 2024-04-02 DIAGNOSIS — E78.49 OTHER HYPERLIPIDEMIA: ICD-10-CM

## 2024-04-02 LAB
ALBUMIN SERPL BCP-MCNC: 4.5 G/DL (ref 3.5–5)
ALP SERPL-CCNC: 67 U/L (ref 34–104)
ALT SERPL W P-5'-P-CCNC: 15 U/L (ref 7–52)
ANION GAP SERPL CALCULATED.3IONS-SCNC: 8 MMOL/L (ref 4–13)
AST SERPL W P-5'-P-CCNC: 11 U/L (ref 13–39)
BILIRUB SERPL-MCNC: 0.39 MG/DL (ref 0.2–1)
BUN SERPL-MCNC: 58 MG/DL (ref 5–25)
CALCIUM SERPL-MCNC: 9.4 MG/DL (ref 8.4–10.2)
CHLORIDE SERPL-SCNC: 107 MMOL/L (ref 96–108)
CHOLEST SERPL-MCNC: 211 MG/DL
CO2 SERPL-SCNC: 24 MMOL/L (ref 21–32)
CREAT SERPL-MCNC: 2.05 MG/DL (ref 0.6–1.3)
CREAT UR-MCNC: 145.6 MG/DL
EST. AVERAGE GLUCOSE BLD GHB EST-MCNC: 137 MG/DL
GFR SERPL CREATININE-BSD FRML MDRD: 33 ML/MIN/1.73SQ M
GLUCOSE P FAST SERPL-MCNC: 123 MG/DL (ref 65–99)
HBA1C MFR BLD: 6.4 %
HDLC SERPL-MCNC: 31 MG/DL
LDLC SERPL DIRECT ASSAY-MCNC: 75 MG/DL (ref 0–100)
MICROALBUMIN UR-MCNC: 511.4 MG/L
MICROALBUMIN/CREAT 24H UR: 351 MG/G CREATININE (ref 0–30)
POTASSIUM SERPL-SCNC: 5.1 MMOL/L (ref 3.5–5.3)
PROT SERPL-MCNC: 6.9 G/DL (ref 6.4–8.4)
SODIUM SERPL-SCNC: 139 MMOL/L (ref 135–147)
TRIGL SERPL-MCNC: 697 MG/DL

## 2024-04-02 PROCEDURE — 83036 HEMOGLOBIN GLYCOSYLATED A1C: CPT

## 2024-04-02 PROCEDURE — 82043 UR ALBUMIN QUANTITATIVE: CPT

## 2024-04-02 PROCEDURE — 80053 COMPREHEN METABOLIC PANEL: CPT

## 2024-04-02 PROCEDURE — 82570 ASSAY OF URINE CREATININE: CPT

## 2024-04-02 PROCEDURE — 36415 COLL VENOUS BLD VENIPUNCTURE: CPT

## 2024-04-02 PROCEDURE — 80061 LIPID PANEL: CPT

## 2024-04-02 PROCEDURE — 83721 ASSAY OF BLOOD LIPOPROTEIN: CPT

## 2024-04-08 DIAGNOSIS — E11.22 TYPE 2 DIABETES MELLITUS WITH CHRONIC KIDNEY DISEASE, WITH LONG-TERM CURRENT USE OF INSULIN, UNSPECIFIED CKD STAGE (HCC): ICD-10-CM

## 2024-04-08 DIAGNOSIS — Z79.4 TYPE 2 DIABETES MELLITUS WITH CHRONIC KIDNEY DISEASE, WITH LONG-TERM CURRENT USE OF INSULIN, UNSPECIFIED CKD STAGE (HCC): ICD-10-CM

## 2024-04-08 NOTE — TELEPHONE ENCOUNTER
Reason for call:   [x] Refill   [] Prior Auth  [] Other:     Office:   [] PCP/Provider -   [x] Specialty/Provider - LETICIA Manjarrez     Medication:     Quantity: 9 mL    Pharmacy: [facility]    Does the patient have enough for 3 days?   [] Yes   [x] No - Send as HP to POD

## 2024-04-16 ENCOUNTER — CLINICAL SUPPORT (OUTPATIENT)
Dept: FAMILY MEDICINE CLINIC | Facility: CLINIC | Age: 64
End: 2024-04-16

## 2024-04-16 DIAGNOSIS — Z23 ENCOUNTER FOR IMMUNIZATION: Primary | ICD-10-CM

## 2024-04-16 PROCEDURE — 90750 HZV VACC RECOMBINANT IM: CPT

## 2024-04-16 PROCEDURE — 90471 IMMUNIZATION ADMIN: CPT

## 2024-04-19 ENCOUNTER — OFFICE VISIT (OUTPATIENT)
Dept: ENDOCRINOLOGY | Facility: CLINIC | Age: 64
End: 2024-04-19
Payer: COMMERCIAL

## 2024-04-19 VITALS
DIASTOLIC BLOOD PRESSURE: 80 MMHG | HEART RATE: 81 BPM | SYSTOLIC BLOOD PRESSURE: 150 MMHG | OXYGEN SATURATION: 97 % | WEIGHT: 182.8 LBS | HEIGHT: 63 IN | BODY MASS INDEX: 32.39 KG/M2

## 2024-04-19 DIAGNOSIS — E78.49 OTHER HYPERLIPIDEMIA: ICD-10-CM

## 2024-04-19 DIAGNOSIS — N18.32 TYPE 2 DIABETES MELLITUS WITH STAGE 3B CHRONIC KIDNEY DISEASE, WITHOUT LONG-TERM CURRENT USE OF INSULIN (HCC): Primary | ICD-10-CM

## 2024-04-19 DIAGNOSIS — N18.32 STAGE 3B CHRONIC KIDNEY DISEASE (HCC): ICD-10-CM

## 2024-04-19 DIAGNOSIS — I10 PRIMARY HYPERTENSION: ICD-10-CM

## 2024-04-19 DIAGNOSIS — E11.22 TYPE 2 DIABETES MELLITUS WITH STAGE 3B CHRONIC KIDNEY DISEASE, WITHOUT LONG-TERM CURRENT USE OF INSULIN (HCC): Primary | ICD-10-CM

## 2024-04-19 PROBLEM — N18.31 STAGE 3A CHRONIC KIDNEY DISEASE (HCC): Status: ACTIVE | Noted: 2024-04-19

## 2024-04-19 PROCEDURE — 99214 OFFICE O/P EST MOD 30 MIN: CPT

## 2024-04-19 NOTE — ASSESSMENT & PLAN NOTE
HGA1C at goal. BGs well controlled, majority of fasting BGs below 150.   Treatment regimen:   - Continue current medication regimen, he is tolerating Ozempic well without any issues.   - Continue to monitor BGs twice daily and notify if fasting BGs are above 150.   Discussed risks/complications associated with uncontrolled diabetes.    Advised to adhere to diabetic diet, and recommended staying active/exercising routinely.  Keep carbohydrates consistent to limit blood glucose fluctuations.  Advised to call if blood sugars less than 70 mg/dl or over 300 mg/dl.   Discussed symptoms and treatment of hypoglycemia.    Recommended routine follow-up with podiatry and ophthalmology.   Ordered blood work to complete prior to next visit.   Lab Results   Component Value Date    HGBA1C 6.4 (H) 04/02/2024

## 2024-04-19 NOTE — PROGRESS NOTES
Established Patient Progress Note      Chief Complaint   Patient presents with    Diabetes Type 2        Impression & Plan:    Problem List Items Addressed This Visit          Cardiovascular and Mediastinum    Hypertension     BP is elevated at visit today. He recently increases dose of lisinopril and BP is being managed by nephrology. Continue current medication regimen at this time and continue follow up with nephrology.             Endocrine    Type 2 diabetes mellitus with stage 3b chronic kidney disease, without long-term current use of insulin ([facility]) - Primary     HGA1C at goal. BGs well controlled, majority of fasting BGs below 150.   Treatment regimen:   - Continue current medication regimen, he is tolerating Ozempic well without any issues.   - Continue to monitor BGs twice daily and notify if fasting BGs are above 150.   Discussed risks/complications associated with uncontrolled diabetes.    Advised to adhere to diabetic diet, and recommended staying active/exercising routinely.  Keep carbohydrates consistent to limit blood glucose fluctuations.  Advised to call if blood sugars less than 70 mg/dl or over 300 mg/dl.   Discussed symptoms and treatment of hypoglycemia.    Recommended routine follow-up with podiatry and ophthalmology.   Ordered blood work to complete prior to next visit.   Lab Results   Component Value Date    HGBA1C 6.4 (H) 04/02/2024            Relevant Orders    Hemoglobin A1C    Comprehensive metabolic panel       Genitourinary    Stage 3b chronic kidney disease (HCC)     Continue follow up with nephrology.             Other    Hyperlipidemia     LDL well controlled, however triglycerides are very elevated. He admits to missing frequent doses of statin. Educated on importance of taking daily and dangers of elevated triglycerides. Continue statin and repeat lipid panel. Also encouraged to focus on lifestyle modifications.          Relevant Orders    Lipid panel       History of Present  Illness:   Toribio Hernández is a 63 y.o. male with hx of liver transplant, HTN, HLD and type 2 diabetes seen in follow up. Reports complications of retinopathy, microalbumunuria and CKD. Denies recent severe hypoglycemic or severe hyperglycemic episodes. Denies any issues with his current regimen. Last A1C was 6.4. Home glucose monitoring: are performed regularly.    He follows with GI at [facility] for hx of liver transplant. He also follows with nephrology and cardiology.      Home blood glucose readings:   Before breakfast: 120-150  Before dinner: 110-130     Current regimen:   Ozempic 1 mg weekly      Last Eye Exam: UTD  Last Foot Exam: UTD     Has hypertension: Taking amlodipine, HCTZ, lisinopril, and labetalol  Has hyperlipidemia: Taking atorvastatin        Vitamin D Deficiency: Taking 1,000 units daily    Patient Active Problem List   Diagnosis    Hypertension    Type 2 diabetes mellitus with ophthalmic complication, with long-term current use of insulin (HCC)    Cirrhosis    Hyperlipidemia    Status post liver transplant    Hyperglycemia    Preop cardiovascular exam    Osteopenia    Male erectile disorder    Type 2 diabetes mellitus with hyperglycemia, with long-term current use of insulin (HCC)    Respiratory tract infection    CKD (chronic kidney disease)    Gout    Swelling of left elbow    Achilles tendinitis of right lower extremity    Bunion of great toe of left foot    Encephalopathy, portal systemic (HCC)    Immunosuppression (HCC)    Hyperkalemia    Murmur, cardiac    Proteinuria    Right arm pain    Type 2 diabetes mellitus with stage 3b chronic kidney disease, without long-term current use of insulin (HCC)    Elevated blood pressure reading in office without diagnosis of hypertension    Neuropathy of left upper extremity    Pain in neck    Acute pain of left shoulder    Stage 3b chronic kidney disease (HCC)    Stage 3a chronic kidney disease (HCC)      Past Medical History:   Diagnosis Date     Bunion of right foot     Chronic kidney disease, stage III (moderate) (HCC)     RESOLVED: [id number]    Cirrhosis (HCC)     Diabetes mellitus (HCC)     Gout 10/6/18    Hepatic encephalopathy (HCC)     Hepatitis C     Hyperkalemia     Hyperlipidemia     Hypertension     Pancytopenia (HCC)     Status post liver transplant (HCC)     2015    Umbilical hernia     Wears glasses       Past Surgical History:   Procedure Laterality Date    ANKLE SURGERY      CHOLECYSTECTOMY      FOOT SURGERY  03/2022    Bunion removal    FRACTURE SURGERY      left foot    GALLBLADDER SURGERY      HIP SURGERY      LIVER TRANSPLANTATION      LAST ASSESSED: [id number]    CA COLONOSCOPY FLX DX W/COLLJ SPEC WHEN PFRMD N/A 02/16/2017    Procedure: COLONOSCOPY;  Surgeon: Brayden Massey MD;  Location: [facility];  Service: Gastroenterology    CA CORRJ HLX VLGS BNCTY SESMDC DSTL METAR OSTEOT Right 02/04/2022    Procedure: BUNIONECTOMY TENZIN;  Surgeon: Draryl Benavides DPM;  Location:  MAIN OR;  Service: Podiatry    TONSILLECTOMY        Social History     Tobacco Use    Smoking status: Never    Smokeless tobacco: Never   Substance Use Topics    Alcohol use: Yes     Alcohol/week: 1.0 standard drink of alcohol     Types: 1 Cans of beer per week     Comment: occassionally     No Known Allergies      Current Outpatient Medications:     amLODIPine (NORVASC) 10 mg tablet, TAKE 1 TABLET DAILY, Disp: 90 tablet, Rfl: 3    atorvastatin (LIPITOR) 40 mg tablet, TAKE 1 TABLET DAILY AT BEDTIME, Disp: 90 tablet, Rfl: 3    azaTHIOprine (IMURAN) 50 mg tablet, Take 1 tablet (50 mg total) by mouth daily, Disp: 90 tablet, Rfl: 3    BD Pen Needle Montserrat 2nd Gen 32G X 4 MM MISC, USE 2 (TWO) TIMES A DAY, Disp: 200 each, Rfl: 3    Blood Pressure Monitor KIT, Use once a week, Disp: 1 kit, Rfl: 0    Cholecalciferol (Vitamin D3) 50 MCG (2000 UT) capsule, Take 1 capsule by mouth daily Gel caps, Disp: , Rfl:     fludrocortisone (FLORINEF) 0.1 mg tablet, Take 0.1 mg by mouth  "daily  , Disp: , Rfl:     hydrochlorothiazide (HYDRODIURIL) 12.5 mg tablet, TAKE 1 TABLET DAILY, Disp: 90 tablet, Rfl: 3    labetalol (NORMODYNE) 100 mg tablet, TAKE 1 TABLET EVERY 12 HOURS, Disp: 180 tablet, Rfl: 3    lisinopril (ZESTRIL) 40 mg tablet, Take 1 tablet (40 mg total) by mouth daily, Disp: , Rfl:     Magnesium Oxide (Magnesium Oxide 400) 240 MG PACK, Take 400 mg by mouth 2 (two) times a day, Disp: 1 each, Rfl: 1    methylPREDNISolone 4 MG tablet therapy pack, Use as directed on package, Disp: 21 each, Rfl: 0    Multiple Vitamin (MULTIVITAMIN ADULT PO), Take by mouth Daily, Disp: , Rfl:     semaglutide, 1 mg/dose, (Ozempic, 1 MG/DOSE,) 4 mg/3 mL injection pen, Inject 0.75 mL (1 mg total) under the skin every 7 days, Disp: 9 mL, Rfl: 1    tacrolimus (PROGRAF) 1 mg capsule, Takes 4 tabs in am, 3 tabs in pm, Disp: , Rfl:     TRUE METRIX BLOOD GLUCOSE TEST test strip, Test twice daily, Disp: , Rfl:     latanoprost (XALATAN) 0.005 % ophthalmic solution, INSTILL 1 DROP INTO LEFT EYE AT BEDTIME FOR 3 WEEKS (Patient not taking: Reported on 4/19/2024), Disp: , Rfl:     MAGnesium-Oxide 400 (240 Mg) MG TABS, , Disp: , Rfl:     Review of Systems   Constitutional:  Negative for activity change, appetite change, chills, diaphoresis, fatigue, fever and unexpected weight change.   Eyes:  Negative for visual disturbance.   Respiratory:  Negative for chest tightness and shortness of breath.    Cardiovascular:  Negative for chest pain, palpitations and leg swelling.   Gastrointestinal:  Negative for abdominal pain, constipation, diarrhea, nausea and vomiting.   Endocrine: Negative for polydipsia, polyphagia and polyuria.   Skin:  Negative for color change, pallor, rash and wound.   Neurological:  Negative for dizziness, tremors, weakness, light-headedness and numbness.   All other systems reviewed and are negative.      Physical Exam:  Body mass index is 32.38 kg/m².  /80   Pulse 81   Ht 5' 3\" (1.6 m)   Wt 82.9 kg " (182 lb 12.8 oz)   SpO2 97%   BMI 32.38 kg/m²    Wt Readings from Last 3 Encounters:   04/19/24 82.9 kg (182 lb 12.8 oz)   03/21/24 83 kg (183 lb)   02/12/24 84.3 kg (185 lb 12.8 oz)       Physical Exam  Vitals reviewed.   Constitutional:       Appearance: Normal appearance. He is obese.   Pulmonary:      Effort: Pulmonary effort is normal.   Neurological:      Mental Status: He is alert and oriented to person, place, and time.   Psychiatric:         Mood and Affect: Mood normal.         Thought Content: Thought content normal.       Labs:   Lab Results   Component Value Date    HGBA1C 6.4 (H) 04/02/2024    HGBA1C 6.4 (H) 12/02/2023    HGBA1C 6.0 09/06/2023     Lab Results   Component Value Date    CREATININE 2.05 (H) 04/02/2024    CREATININE 1.82 (H) 03/01/2024    CREATININE 1.61 (H) 01/19/2024    BUN 58 (H) 04/02/2024     01/08/2016    K 5.1 04/02/2024     04/02/2024    CO2 24 04/02/2024     eGFR   Date Value Ref Range Status   04/02/2024 33 ml/min/1.73sq m Final     Lab Results   Component Value Date    CHOL 174 03/10/2015    HDL 31 (L) 04/02/2024    TRIG 697 (H) 04/02/2024     Lab Results   Component Value Date    ALT 15 04/02/2024    AST 11 (L) 04/02/2024    GGT 46 03/01/2024    ALKPHOS 67 04/02/2024    BILITOT 0.29 01/08/2016     Lab Results   Component Value Date    TTG7GDNCZLHS 1.002 12/02/2023    BPZ1HMPRCDMS 1.091 05/05/2022    HAB6FKKQNSIA 1.432 06/01/2019       There are no Patient Instructions on file for this visit.    Discussed with the patient and all questioned fully answered. He will call me if any problems arise.    LETICIA Andrea

## 2024-04-19 NOTE — ASSESSMENT & PLAN NOTE
BP is elevated at visit today. He recently increases dose of lisinopril and BP is being managed by nephrology. Continue current medication regimen at this time and continue follow up with nephrology.

## 2024-04-23 DIAGNOSIS — I10 PRIMARY HYPERTENSION: ICD-10-CM

## 2024-04-23 RX ORDER — HYDROCHLOROTHIAZIDE 12.5 MG/1
TABLET ORAL
Qty: 90 TABLET | Refills: 1 | Status: SHIPPED | OUTPATIENT
Start: 2024-04-23

## 2024-06-03 ENCOUNTER — APPOINTMENT (OUTPATIENT)
Dept: LAB | Facility: CLINIC | Age: 64
End: 2024-06-03
Payer: COMMERCIAL

## 2024-06-03 DIAGNOSIS — R80.8 OTHER PROTEINURIA: ICD-10-CM

## 2024-06-03 DIAGNOSIS — N18.9 CHRONIC KIDNEY DISEASE, UNSPECIFIED CKD STAGE: ICD-10-CM

## 2024-06-03 DIAGNOSIS — Z94.4 LIVER REPLACED BY TRANSPLANT (HCC): ICD-10-CM

## 2024-06-03 DIAGNOSIS — E83.42 HYPOMAGNESEMIA: ICD-10-CM

## 2024-06-03 DIAGNOSIS — E87.5 HYPERPOTASSEMIA: ICD-10-CM

## 2024-06-03 LAB
BILIRUB DIRECT SERPL-MCNC: 0.03 MG/DL (ref 0–0.2)
CREAT UR-MCNC: 124.6 MG/DL
PROT UR-MCNC: 65 MG/DL
PROT/CREAT UR: 0.52 MG/G{CREAT} (ref 0–0.1)

## 2024-06-03 PROCEDURE — 84156 ASSAY OF PROTEIN URINE: CPT

## 2024-06-03 PROCEDURE — 82570 ASSAY OF URINE CREATININE: CPT

## 2024-06-03 PROCEDURE — 82248 BILIRUBIN DIRECT: CPT

## 2024-06-25 ENCOUNTER — OFFICE VISIT (OUTPATIENT)
Dept: NEPHROLOGY | Facility: CLINIC | Age: 64
End: 2024-06-25
Payer: COMMERCIAL

## 2024-06-25 VITALS
BODY MASS INDEX: 31.71 KG/M2 | DIASTOLIC BLOOD PRESSURE: 76 MMHG | HEART RATE: 70 BPM | OXYGEN SATURATION: 94 % | WEIGHT: 179 LBS | HEIGHT: 63 IN | SYSTOLIC BLOOD PRESSURE: 130 MMHG

## 2024-06-25 DIAGNOSIS — R80.8 OTHER PROTEINURIA: Primary | ICD-10-CM

## 2024-06-25 DIAGNOSIS — N18.9 CHRONIC KIDNEY DISEASE, UNSPECIFIED CKD STAGE: ICD-10-CM

## 2024-06-25 PROBLEM — N18.32 TYPE 2 DIABETES MELLITUS WITH STAGE 3B CHRONIC KIDNEY DISEASE, WITHOUT LONG-TERM CURRENT USE OF INSULIN (HCC): Status: RESOLVED | Noted: 2023-05-31 | Resolved: 2024-06-25

## 2024-06-25 PROBLEM — E11.22 TYPE 2 DIABETES MELLITUS WITH STAGE 3B CHRONIC KIDNEY DISEASE, WITHOUT LONG-TERM CURRENT USE OF INSULIN (HCC): Status: RESOLVED | Noted: 2023-05-31 | Resolved: 2024-06-25

## 2024-06-25 PROCEDURE — 99214 OFFICE O/P EST MOD 30 MIN: CPT | Performed by: INTERNAL MEDICINE

## 2024-06-25 NOTE — PROGRESS NOTES
NEPHROLOGY PROGRESS NOTE    Toribio Hernández 63 y.o. male MRN: [id number]  Unit/Bed#:  Encounter: [id number]  Reason for Consult: Chronic kidney disease with proteinuria    Patient is here for routine follow-up he looks great says he was just in [place] and enjoyed his vacation.  He is also been placed on Ozempic no longer takes insulin and has lost weight with good sugar control.    ASSESSMENT/PLAN:  1.  Renal    Patient is chronic kidney disease with proteinuria potentially from diabetic nephropathy.  His creatinine is stable at 1.7 and has been that way over the last year.  I explained to him there is been no significant progression.  His protein estimations come down to around 500 mg which is better as well.  The only significant change is that he is lost some weight so potentially with reduction of hyperfiltration injury this may have helped reduce proteinuria.    At this point I told him to continue to help delay progression with glycemic control A1c less than 7% which she does.  Lipid control blood pressure control and he is on an ACE inhibitor as well.    No other changes continue current medications    Labs and follow-up as scheduled and I told him to call if there is any problems or concerns before next visit.    2.  History of liver transplantation    Patient is on azathioprine and tacrolimus and is followed by his transplant center.  Recently celebrated the 10-year anniversary.    I have spent a total time of 30 minutes on 06/25/24 in caring for this patient including Diagnostic results, Risks and benefits of tx options, Importance of tx compliance, Reviewing / ordering tests, medicine, procedures  , and Obtaining or reviewing history  .     SUBJECTIVE:  Review of Systems   Constitutional: Positive for weight loss. Negative for chills, decreased appetite and fever.   HENT: Negative.     Eyes: Negative.    Cardiovascular:  Negative for chest pain, dyspnea on exertion, leg swelling and orthopnea.  "  Respiratory:  Negative for cough, shortness of breath, sputum production and wheezing.    Gastrointestinal:  Negative for abdominal pain, diarrhea, nausea and vomiting.   Genitourinary: Negative.    Neurological:  Negative for dizziness, focal weakness, headaches and weakness.   Psychiatric/Behavioral:  Negative for altered mental status, depression, hallucinations and hypervigilance.        OBJECTIVE:  Current Weight: Weight - Scale: 81.2 kg (179 lb)  Vitals:@TMAX(24)@Current:     Blood pressure 130/76, pulse 70, height 5' 3\" (1.6 m), weight 81.2 kg (179 lb), SpO2 94%. , Body mass index is 31.71 kg/m².    [unfilled]    Physical Exam: /76 (BP Location: Left arm, Patient Position: Sitting, Cuff Size: Standard)   Pulse 70   Ht 5' 3\" (1.6 m)   Wt 81.2 kg (179 lb)   SpO2 94%   BMI 31.71 kg/m²   Physical Exam  Constitutional:       General: He is not in acute distress.     Appearance: Normal appearance. He is not ill-appearing or toxic-appearing.   HENT:      Head: Normocephalic and atraumatic.      Nose: Nose normal.      Mouth/Throat:      Mouth: Mucous membranes are moist.   Eyes:      General: No scleral icterus.     Extraocular Movements: Extraocular movements intact.   Cardiovascular:      Rate and Rhythm: Normal rate and regular rhythm.      Heart sounds: Murmur heard.      No friction rub. No gallop.   Pulmonary:      Effort: Pulmonary effort is normal. No respiratory distress.      Breath sounds: No wheezing or rales.   Abdominal:      General: Bowel sounds are normal. There is no distension.      Tenderness: There is no abdominal tenderness. There is no rebound.   Musculoskeletal:      Cervical back: Normal range of motion and neck supple.   Neurological:      Mental Status: He is alert and oriented to person, place, and time. Mental status is at baseline.   Psychiatric:         Mood and Affect: Mood normal.         Behavior: Behavior normal.         Thought Content: Thought content normal. "         Medications:    Current Outpatient Medications:     amLODIPine (NORVASC) 10 mg tablet, TAKE 1 TABLET DAILY, Disp: 90 tablet, Rfl: 3    atorvastatin (LIPITOR) 40 mg tablet, TAKE 1 TABLET DAILY AT BEDTIME, Disp: 90 tablet, Rfl: 3    azaTHIOprine (IMURAN) 50 mg tablet, Take 1 tablet (50 mg total) by mouth daily, Disp: 90 tablet, Rfl: 3    BD Pen Needle Montserrat 2nd Gen 32G X 4 MM MISC, USE 2 (TWO) TIMES A DAY, Disp: 200 each, Rfl: 3    Blood Pressure Monitor KIT, Use once a week, Disp: 1 kit, Rfl: 0    Cholecalciferol (Vitamin D3) 50 MCG (2000 UT) capsule, Take 1 capsule by mouth daily Gel caps, Disp: , Rfl:     fludrocortisone (FLORINEF) 0.1 mg tablet, Take 0.1 mg by mouth daily  , Disp: , Rfl:     hydroCHLOROthiazide 12.5 mg tablet, TAKE 1 TABLET DAILY, Disp: 90 tablet, Rfl: 1    labetalol (NORMODYNE) 100 mg tablet, TAKE 1 TABLET EVERY 12 HOURS, Disp: 180 tablet, Rfl: 3    lisinopril (ZESTRIL) 40 mg tablet, Take 1 tablet (40 mg total) by mouth daily, Disp: , Rfl:     Magnesium Oxide (Magnesium Oxide 400) 240 MG PACK, Take 400 mg by mouth 2 (two) times a day, Disp: 1 each, Rfl: 1    methylPREDNISolone 4 MG tablet therapy pack, Use as directed on package, Disp: 21 each, Rfl: 0    Multiple Vitamin (MULTIVITAMIN ADULT PO), Take by mouth Daily, Disp: , Rfl:     semaglutide, 1 mg/dose, (Ozempic, 1 MG/DOSE,) 4 mg/3 mL injection pen, Inject 0.75 mL (1 mg total) under the skin every 7 days, Disp: 9 mL, Rfl: 1    tacrolimus (PROGRAF) 1 mg capsule, Takes 4 tabs in am, 3 tabs in pm, Disp: , Rfl:     TRUE METRIX BLOOD GLUCOSE TEST test strip, Test twice daily, Disp: , Rfl:     latanoprost (XALATAN) 0.005 % ophthalmic solution, INSTILL 1 DROP INTO LEFT EYE AT BEDTIME FOR 3 WEEKS (Patient not taking: Reported on 4/19/2024), Disp: , Rfl:     MAGnesium-Oxide 400 (240 Mg) MG TABS, , Disp: , Rfl:     Laboratory Results:  Lab Results   Component Value Date    WBC 6.01 06/03/2024    HGB 12.4 06/03/2024    HCT 37.9 06/03/2024    MCV  "90 06/03/2024     06/03/2024     Lab Results   Component Value Date    SODIUM 138 06/03/2024    K 5.2 06/03/2024     06/03/2024    CO2 24 06/03/2024    BUN 45 (H) 06/03/2024    CREATININE 1.75 (H) 06/03/2024    GLUC 163 (H) 09/07/2021    CALCIUM 9.6 06/03/2024     Lab Results   Component Value Date    CALCIUM 9.6 06/03/2024    PHOS 4.0 10/24/2014     No results found for: \"LABPROT\"      "

## 2024-06-25 NOTE — LETTER
June 25, 2024     Rosalina Lama MD  [address] [facility] [address]    Patient: Toribio Hernández   YOB: 1960   Date of Visit: 6/25/2024       Dear Dr. Lama:    Thank you for referring Toribio Hernández to me for evaluation. Below are my notes for this consultation.    If you have questions, please do not hesitate to call me. I look forward to following your patient along with you.         Sincerely,        Vaughn Castle MD        CC: No Recipients    Vaughn Castle MD  6/25/2024 12:43 PM  Sign when Signing Visit  NEPHROLOGY PROGRESS NOTE    Toribio Hernández 63 y.o. male MRN: [id number]  Unit/Bed#:  Encounter: [id number]  Reason for Consult: Chronic kidney disease with proteinuria    Patient is here for routine follow-up he looks great says he was just in [place] and enjoyed his vacation.  He is also been placed on Ozempic no longer takes insulin and has lost weight with good sugar control.    ASSESSMENT/PLAN:  1.  Renal    Patient is chronic kidney disease with proteinuria potentially from diabetic nephropathy.  His creatinine is stable at 1.7 and has been that way over the last year.  I explained to him there is been no significant progression.  His protein estimations come down to around 500 mg which is better as well.  The only significant change is that he is lost some weight so potentially with reduction of hyperfiltration injury this may have helped reduce proteinuria.    At this point I told him to continue to help delay progression with glycemic control A1c less than 7% which she does.  Lipid control blood pressure control and he is on an ACE inhibitor as well.    No other changes continue current medications    Labs and follow-up as scheduled and I told him to call if there is any problems or concerns before next visit.    2.  History of liver transplantation    Patient is on azathioprine and tacrolimus and is followed by his transplant center.  Recently celebrated the 10-year anniversary.    I  "have spent a total time of 30 minutes on 06/25/24 in caring for this patient including Diagnostic results, Risks and benefits of tx options, Importance of tx compliance, Reviewing / ordering tests, medicine, procedures  , and Obtaining or reviewing history  .     SUBJECTIVE:  Review of Systems   Constitutional: Positive for weight loss. Negative for chills, decreased appetite and fever.   HENT: Negative.     Eyes: Negative.    Cardiovascular:  Negative for chest pain, dyspnea on exertion, leg swelling and orthopnea.   Respiratory:  Negative for cough, shortness of breath, sputum production and wheezing.    Gastrointestinal:  Negative for abdominal pain, diarrhea, nausea and vomiting.   Genitourinary: Negative.    Neurological:  Negative for dizziness, focal weakness, headaches and weakness.   Psychiatric/Behavioral:  Negative for altered mental status, depression, hallucinations and hypervigilance.        OBJECTIVE:  Current Weight: Weight - Scale: 81.2 kg (179 lb)  Vitals:@TMAX(24)@Current:     Blood pressure 130/76, pulse 70, height 5' 3\" (1.6 m), weight 81.2 kg (179 lb), SpO2 94%. , Body mass index is 31.71 kg/m².    [unfilled]    Physical Exam: /76 (BP Location: Left arm, Patient Position: Sitting, Cuff Size: Standard)   Pulse 70   Ht 5' 3\" (1.6 m)   Wt 81.2 kg (179 lb)   SpO2 94%   BMI 31.71 kg/m²   Physical Exam  Constitutional:       General: He is not in acute distress.     Appearance: Normal appearance. He is not ill-appearing or toxic-appearing.   HENT:      Head: Normocephalic and atraumatic.      Nose: Nose normal.      Mouth/Throat:      Mouth: Mucous membranes are moist.   Eyes:      General: No scleral icterus.     Extraocular Movements: Extraocular movements intact.   Cardiovascular:      Rate and Rhythm: Normal rate and regular rhythm.      Heart sounds: Murmur heard.      No friction rub. No gallop.   Pulmonary:      Effort: Pulmonary effort is normal. No respiratory distress.      Breath " sounds: No wheezing or rales.   Abdominal:      General: Bowel sounds are normal. There is no distension.      Tenderness: There is no abdominal tenderness. There is no rebound.   Musculoskeletal:      Cervical back: Normal range of motion and neck supple.   Neurological:      Mental Status: He is alert and oriented to person, place, and time. Mental status is at baseline.   Psychiatric:         Mood and Affect: Mood normal.         Behavior: Behavior normal.         Thought Content: Thought content normal.         Medications:    Current Outpatient Medications:   •  amLODIPine (NORVASC) 10 mg tablet, TAKE 1 TABLET DAILY, Disp: 90 tablet, Rfl: 3  •  atorvastatin (LIPITOR) 40 mg tablet, TAKE 1 TABLET DAILY AT BEDTIME, Disp: 90 tablet, Rfl: 3  •  azaTHIOprine (IMURAN) 50 mg tablet, Take 1 tablet (50 mg total) by mouth daily, Disp: 90 tablet, Rfl: 3  •  BD Pen Needle Montserrat 2nd Gen 32G X 4 MM MISC, USE 2 (TWO) TIMES A DAY, Disp: 200 each, Rfl: 3  •  Blood Pressure Monitor KIT, Use once a week, Disp: 1 kit, Rfl: 0  •  Cholecalciferol (Vitamin D3) 50 MCG (2000 UT) capsule, Take 1 capsule by mouth daily Gel caps, Disp: , Rfl:   •  fludrocortisone (FLORINEF) 0.1 mg tablet, Take 0.1 mg by mouth daily  , Disp: , Rfl:   •  hydroCHLOROthiazide 12.5 mg tablet, TAKE 1 TABLET DAILY, Disp: 90 tablet, Rfl: 1  •  labetalol (NORMODYNE) 100 mg tablet, TAKE 1 TABLET EVERY 12 HOURS, Disp: 180 tablet, Rfl: 3  •  lisinopril (ZESTRIL) 40 mg tablet, Take 1 tablet (40 mg total) by mouth daily, Disp: , Rfl:   •  Magnesium Oxide (Magnesium Oxide 400) 240 MG PACK, Take 400 mg by mouth 2 (two) times a day, Disp: 1 each, Rfl: 1  •  methylPREDNISolone 4 MG tablet therapy pack, Use as directed on package, Disp: 21 each, Rfl: 0  •  Multiple Vitamin (MULTIVITAMIN ADULT PO), Take by mouth Daily, Disp: , Rfl:   •  semaglutide, 1 mg/dose, (Ozempic, 1 MG/DOSE,) 4 mg/3 mL injection pen, Inject 0.75 mL (1 mg total) under the skin every 7 days, Disp: 9 mL,  "Rfl: 1  •  tacrolimus (PROGRAF) 1 mg capsule, Takes 4 tabs in am, 3 tabs in pm, Disp: , Rfl:   •  TRUE METRIX BLOOD GLUCOSE TEST test strip, Test twice daily, Disp: , Rfl:   •  latanoprost (XALATAN) 0.005 % ophthalmic solution, INSTILL 1 DROP INTO LEFT EYE AT BEDTIME FOR 3 WEEKS (Patient not taking: Reported on 4/19/2024), Disp: , Rfl:   •  MAGnesium-Oxide 400 (240 Mg) MG TABS, , Disp: , Rfl:     Laboratory Results:  Lab Results   Component Value Date    WBC 6.01 06/03/2024    HGB 12.4 06/03/2024    HCT 37.9 06/03/2024    MCV 90 06/03/2024     06/03/2024     Lab Results   Component Value Date    SODIUM 138 06/03/2024    K 5.2 06/03/2024     06/03/2024    CO2 24 06/03/2024    BUN 45 (H) 06/03/2024    CREATININE 1.75 (H) 06/03/2024    GLUC 163 (H) 09/07/2021    CALCIUM 9.6 06/03/2024     Lab Results   Component Value Date    CALCIUM 9.6 06/03/2024    PHOS 4.0 10/24/2014     No results found for: \"LABPROT\"      "

## 2024-06-25 NOTE — PATIENT INSTRUCTIONS
You are here for follow-up you look great I am glad you had a nice vacation.  Since I have seen you you have lost weight your blood sugars doing well off of insulin on Ozempic it sounds like you are really feeling good having had any illnesses.    With respect to your kidneys your creatinine is 1.7 and I read values to you over the last year there is been no worsening and also little good news the protein in the urine is lower so that hopefully will help delay damage so everything looks great.    The treatment really involves blood sugar control keeping the A1c at 7% or under which you are doing.  Cholesterol treatment lisinopril weight loss helps lower protein as well which you are doing    So for now no changes continue your current medications and please call if there is any problems or concerns before next visit.

## 2024-08-02 ENCOUNTER — RA CDI HCC (OUTPATIENT)
Dept: OTHER | Facility: HOSPITAL | Age: 64
End: 2024-08-02

## 2024-08-07 ENCOUNTER — TELEPHONE (OUTPATIENT)
Age: 64
End: 2024-08-07

## 2024-08-07 DIAGNOSIS — R80.9 PROTEINURIA, UNSPECIFIED TYPE: ICD-10-CM

## 2024-08-07 RX ORDER — LISINOPRIL 40 MG/1
40 TABLET ORAL DAILY
Qty: 30 TABLET | Refills: 5 | Status: SHIPPED | OUTPATIENT
Start: 2024-08-07

## 2024-08-07 NOTE — TELEPHONE ENCOUNTER
Patient called the RX Refill Line. Message is being forwarded to the office.     Patient is requesting a refill for lisinopril (ZESTRIL) 40 mg tablet Patient stated that he is currently taking 2 tablets daily. The directions on the current medication list states 1 tablet daily. Please review and send a prescription with correct dosage to patient pharmacy. Please inform patient on status of this request.  [facility] - [place], [place] - [address]    Please contact patient at [phone]

## 2024-08-07 NOTE — TELEPHONE ENCOUNTER
Called patient and went over the following from Dr. Castle:    Dr. Castle looked back in in the past there was some prescriptions that he was taking 2 tablets of 20 mg a day but the most recent prescription was a 40 mg tablets only take 1 of those.

## 2024-08-15 ENCOUNTER — OFFICE VISIT (OUTPATIENT)
Dept: FAMILY MEDICINE CLINIC | Facility: CLINIC | Age: 64
End: 2024-08-15

## 2024-08-15 VITALS
TEMPERATURE: 98.1 F | RESPIRATION RATE: 18 BRPM | HEIGHT: 63 IN | HEART RATE: 78 BPM | DIASTOLIC BLOOD PRESSURE: 88 MMHG | OXYGEN SATURATION: 98 % | WEIGHT: 181.2 LBS | BODY MASS INDEX: 32.11 KG/M2 | SYSTOLIC BLOOD PRESSURE: 146 MMHG

## 2024-08-15 DIAGNOSIS — M85.80 OSTEOPENIA, UNSPECIFIED LOCATION: ICD-10-CM

## 2024-08-15 DIAGNOSIS — Z79.4 TYPE 2 DIABETES MELLITUS WITH HYPERGLYCEMIA, WITH LONG-TERM CURRENT USE OF INSULIN (HCC): Primary | ICD-10-CM

## 2024-08-15 DIAGNOSIS — I10 PRIMARY HYPERTENSION: ICD-10-CM

## 2024-08-15 DIAGNOSIS — E11.65 TYPE 2 DIABETES MELLITUS WITH HYPERGLYCEMIA, WITH LONG-TERM CURRENT USE OF INSULIN (HCC): Primary | ICD-10-CM

## 2024-08-15 LAB — SL AMB POCT HEMOGLOBIN AIC: 6.8 (ref ?–6.5)

## 2024-08-15 PROCEDURE — 83036 HEMOGLOBIN GLYCOSYLATED A1C: CPT | Performed by: FAMILY MEDICINE

## 2024-08-15 PROCEDURE — 99214 OFFICE O/P EST MOD 30 MIN: CPT | Performed by: FAMILY MEDICINE

## 2024-08-15 NOTE — ASSESSMENT & PLAN NOTE
Blood pressure 146/88, slightly above goal of less than 140/90  Currently on amlodipine 10 mg, hydrochlorothiazide 12.5 mg, labetalol 100 mg twice daily, lisinopril 40 mg  Will continue current regimen at this time and follow-up in 3 to 6 months.  Lifestyle modification with diet and exercise

## 2024-08-15 NOTE — ASSESSMENT & PLAN NOTE
DEXA scan next week  Patient will be called when the results are in  Discussed potentially using Prolia if osteopenia is not resolved

## 2024-08-15 NOTE — ASSESSMENT & PLAN NOTE
Well controlled type 2 DM no longer using insulin  Managed with ozempic and lifestyle modifications   POCT A1C is 6.8%  Continue current regimen   Follow up in 6 months  Lab Results   Component Value Date    HGBA1C 6.4 (H) 04/02/2024

## 2024-08-15 NOTE — PROGRESS NOTES
Ambulatory Visit  Name: Toribio Hernández      : 1960      MRN: [id number]  Encounter Provider: Rosalina Lama MD  Encounter Date: 8/15/2024   Encounter department: [facility]    Assessment & Plan   1. Type 2 diabetes mellitus with hyperglycemia, with long-term current use of insulin ([facility])  Assessment & Plan:  Well controlled type 2 DM no longer using insulin  Managed with ozempic and lifestyle modifications   POCT A1C is 6.8%  Continue current regimen   Follow up in 6 months  Lab Results   Component Value Date    HGBA1C 6.4 (H) 2024     Orders:  -     POCT hemoglobin A1c  2. Osteopenia, unspecified location  Assessment & Plan:  DEXA scan next week  Patient will be called when the results are in  Discussed potentially using Prolia if osteopenia is not resolved   3. Primary hypertension  Assessment & Plan:  Blood pressure 146/88, slightly above goal of less than 140/90  Currently on amlodipine 10 mg, hydrochlorothiazide 12.5 mg, labetalol 100 mg twice daily, lisinopril 40 mg  Will continue current regimen at this time and follow-up in 3 to 6 months.  Lifestyle modification with diet and exercise    BMI Counseling: Body mass index is 32.1 kg/m². The BMI is above normal. Nutrition recommendations include decreasing portion sizes, encouraging healthy choices of fruits and vegetables and decreasing fast food intake. Exercise recommendations include moderate physical activity 150 minutes/week. Rationale for BMI follow-up plan is due to patient being overweight or obese.     Depression Screening and Follow-up Plan: Patient was screened for depression during today's encounter. They screened negative with a PHQ-2 score of 0.      History of Present Illness     Patient here for follow up. His diabetes is stable and well controled on Ozempic. He has lost some weight and is A1C has been consistently under 7%. He feels well with no complaints today.         Review of Systems  "  Constitutional:  Negative for chills, fatigue and fever.   Eyes:  Negative for visual disturbance.   Respiratory:  Negative for chest tightness and shortness of breath.    Cardiovascular:  Negative for chest pain, palpitations and leg swelling.   Gastrointestinal:  Negative for abdominal pain, constipation, diarrhea, nausea and vomiting.   Skin:  Negative for rash.   Neurological:  Negative for dizziness and headaches.       Objective     /88 (BP Location: Left arm, Patient Position: Sitting, Cuff Size: Standard)   Pulse 78   Temp 98.1 °F (36.7 °C) (Temporal)   Resp 18   Ht 5' 3\" (1.6 m)   Wt 82.2 kg (181 lb 3.2 oz)   SpO2 98%   BMI 32.10 kg/m²     Physical Exam  Constitutional:       General: He is not in acute distress.     Appearance: Normal appearance.   HENT:      Head: Normocephalic and atraumatic.   Cardiovascular:      Rate and Rhythm: Normal rate and regular rhythm.      Heart sounds: Murmur heard.   Pulmonary:      Effort: Pulmonary effort is normal. No respiratory distress.      Breath sounds: Normal breath sounds.   Abdominal:      General: Bowel sounds are normal. There is no distension.      Palpations: Abdomen is soft.      Tenderness: There is no abdominal tenderness.   Musculoskeletal:      Right lower leg: No edema.      Left lower leg: No edema.   Neurological:      Mental Status: He is alert.   Psychiatric:         Mood and Affect: Mood normal.         Behavior: Behavior normal.       Administrative Statements     "

## 2024-08-20 ENCOUNTER — HOSPITAL ENCOUNTER (OUTPATIENT)
Dept: RADIOLOGY | Age: 64
Discharge: HOME | End: 2024-08-20
Payer: COMMERCIAL

## 2024-08-20 DIAGNOSIS — T38.0X5A IMMUNOCOMPROMISED DUE TO CORTICOSTEROIDS (HCC): ICD-10-CM

## 2024-08-20 DIAGNOSIS — M85.80 OSTEOPENIA, UNSPECIFIED LOCATION: ICD-10-CM

## 2024-08-20 DIAGNOSIS — D84.821 IMMUNOCOMPROMISED DUE TO CORTICOSTEROIDS (HCC): ICD-10-CM

## 2024-08-20 DIAGNOSIS — Z79.52 IMMUNOCOMPROMISED DUE TO CORTICOSTEROIDS (HCC): ICD-10-CM

## 2024-08-20 PROCEDURE — 77080 DXA BONE DENSITY AXIAL: CPT

## 2024-08-31 DIAGNOSIS — R80.9 PROTEINURIA, UNSPECIFIED TYPE: ICD-10-CM

## 2024-09-01 RX ORDER — LISINOPRIL 40 MG/1
40 TABLET ORAL DAILY
Qty: 90 TABLET | Refills: 1 | Status: SHIPPED | OUTPATIENT
Start: 2024-09-01

## 2024-09-02 DIAGNOSIS — Z79.4 TYPE 2 DIABETES MELLITUS WITH CHRONIC KIDNEY DISEASE, WITH LONG-TERM CURRENT USE OF INSULIN, UNSPECIFIED CKD STAGE (HCC): ICD-10-CM

## 2024-09-02 DIAGNOSIS — E11.22 TYPE 2 DIABETES MELLITUS WITH CHRONIC KIDNEY DISEASE, WITH LONG-TERM CURRENT USE OF INSULIN, UNSPECIFIED CKD STAGE (HCC): ICD-10-CM

## 2024-09-03 RX ORDER — SEMAGLUTIDE 1.34 MG/ML
INJECTION, SOLUTION SUBCUTANEOUS
Qty: 9 ML | Refills: 1 | Status: SHIPPED | OUTPATIENT
Start: 2024-09-03

## 2024-09-17 ENCOUNTER — TELEPHONE (OUTPATIENT)
Dept: NEPHROLOGY | Facility: CLINIC | Age: 64
End: 2024-09-17

## 2024-09-17 NOTE — TELEPHONE ENCOUNTER
Called and spoke with patient about scheduling his December follow up from recall  on 12/12 at 9 am. With Dr. Castle.

## 2024-10-03 DIAGNOSIS — I10 HYPERTENSION, UNSPECIFIED TYPE: ICD-10-CM

## 2024-10-03 RX ORDER — AMLODIPINE BESYLATE 10 MG/1
TABLET ORAL
Qty: 90 TABLET | Refills: 1 | Status: SHIPPED | OUTPATIENT
Start: 2024-10-03

## 2024-10-07 ENCOUNTER — APPOINTMENT (OUTPATIENT)
Dept: LAB | Facility: CLINIC | Age: 64
End: 2024-10-07
Payer: COMMERCIAL

## 2024-10-07 DIAGNOSIS — Z94.4 LIVER REPLACED BY TRANSPLANT (HCC): ICD-10-CM

## 2024-10-07 DIAGNOSIS — E87.5 HYPERPOTASSEMIA: ICD-10-CM

## 2024-10-07 DIAGNOSIS — N18.32 TYPE 2 DIABETES MELLITUS WITH STAGE 3B CHRONIC KIDNEY DISEASE, WITHOUT LONG-TERM CURRENT USE OF INSULIN (HCC): ICD-10-CM

## 2024-10-07 DIAGNOSIS — E78.49 OTHER HYPERLIPIDEMIA: ICD-10-CM

## 2024-10-07 DIAGNOSIS — E11.22 TYPE 2 DIABETES MELLITUS WITH STAGE 3B CHRONIC KIDNEY DISEASE, WITHOUT LONG-TERM CURRENT USE OF INSULIN (HCC): ICD-10-CM

## 2024-10-07 DIAGNOSIS — E83.42 HYPOMAGNESEMIA: ICD-10-CM

## 2024-10-07 LAB
ALBUMIN SERPL BCG-MCNC: 4.6 G/DL (ref 3.5–5)
ALP SERPL-CCNC: 77 U/L (ref 34–104)
ALT SERPL W P-5'-P-CCNC: 17 U/L (ref 7–52)
ANION GAP SERPL CALCULATED.3IONS-SCNC: 5 MMOL/L (ref 4–13)
AST SERPL W P-5'-P-CCNC: 12 U/L (ref 13–39)
BASOPHILS # BLD AUTO: 0.03 THOUSANDS/ΜL (ref 0–0.1)
BASOPHILS NFR BLD AUTO: 1 % (ref 0–1)
BILIRUB DIRECT SERPL-MCNC: 0.1 MG/DL (ref 0–0.2)
BILIRUB SERPL-MCNC: 0.48 MG/DL (ref 0.2–1)
BUN SERPL-MCNC: 48 MG/DL (ref 5–25)
CALCIUM SERPL-MCNC: 10.1 MG/DL (ref 8.4–10.2)
CHLORIDE SERPL-SCNC: 108 MMOL/L (ref 96–108)
CHOLEST SERPL-MCNC: 142 MG/DL
CO2 SERPL-SCNC: 25 MMOL/L (ref 21–32)
CREAT SERPL-MCNC: 1.74 MG/DL (ref 0.6–1.3)
EOSINOPHIL # BLD AUTO: 0.15 THOUSAND/ΜL (ref 0–0.61)
EOSINOPHIL NFR BLD AUTO: 3 % (ref 0–6)
ERYTHROCYTE [DISTWIDTH] IN BLOOD BY AUTOMATED COUNT: 13.9 % (ref 11.6–15.1)
EST. AVERAGE GLUCOSE BLD GHB EST-MCNC: 154 MG/DL
GFR SERPL CREATININE-BSD FRML MDRD: 40 ML/MIN/1.73SQ M
GLUCOSE P FAST SERPL-MCNC: 134 MG/DL (ref 65–99)
HBA1C MFR BLD: 7 %
HCT VFR BLD AUTO: 38.1 % (ref 36.5–49.3)
HDLC SERPL-MCNC: 34 MG/DL
HGB BLD-MCNC: 12.3 G/DL (ref 12–17)
IMM GRANULOCYTES # BLD AUTO: 0.02 THOUSAND/UL (ref 0–0.2)
IMM GRANULOCYTES NFR BLD AUTO: 0 % (ref 0–2)
LDLC SERPL CALC-MCNC: 44 MG/DL (ref 0–100)
LYMPHOCYTES # BLD AUTO: 0.93 THOUSANDS/ΜL (ref 0.6–4.47)
LYMPHOCYTES NFR BLD AUTO: 17 % (ref 14–44)
MAGNESIUM SERPL-MCNC: 1.6 MG/DL (ref 1.9–2.7)
MCH RBC QN AUTO: 29.1 PG (ref 26.8–34.3)
MCHC RBC AUTO-ENTMCNC: 32.3 G/DL (ref 31.4–37.4)
MCV RBC AUTO: 90 FL (ref 82–98)
MONOCYTES # BLD AUTO: 0.55 THOUSAND/ΜL (ref 0.17–1.22)
MONOCYTES NFR BLD AUTO: 10 % (ref 4–12)
NEUTROPHILS # BLD AUTO: 3.95 THOUSANDS/ΜL (ref 1.85–7.62)
NEUTS SEG NFR BLD AUTO: 69 % (ref 43–75)
NONHDLC SERPL-MCNC: 108 MG/DL
NRBC BLD AUTO-RTO: 0 /100 WBCS
PLATELET # BLD AUTO: 178 THOUSANDS/UL (ref 149–390)
PMV BLD AUTO: 11.9 FL (ref 8.9–12.7)
POTASSIUM SERPL-SCNC: 5 MMOL/L (ref 3.5–5.3)
PROT SERPL-MCNC: 7.2 G/DL (ref 6.4–8.4)
RBC # BLD AUTO: 4.22 MILLION/UL (ref 3.88–5.62)
SODIUM SERPL-SCNC: 138 MMOL/L (ref 135–147)
TRIGL SERPL-MCNC: 321 MG/DL
WBC # BLD AUTO: 5.63 THOUSAND/UL (ref 4.31–10.16)

## 2024-10-07 PROCEDURE — 82248 BILIRUBIN DIRECT: CPT

## 2024-10-07 PROCEDURE — 80197 ASSAY OF TACROLIMUS: CPT

## 2024-10-07 PROCEDURE — 80053 COMPREHEN METABOLIC PANEL: CPT

## 2024-10-07 PROCEDURE — 80061 LIPID PANEL: CPT

## 2024-10-07 PROCEDURE — 83036 HEMOGLOBIN GLYCOSYLATED A1C: CPT

## 2024-10-07 PROCEDURE — 85025 COMPLETE CBC W/AUTO DIFF WBC: CPT

## 2024-10-07 PROCEDURE — 36415 COLL VENOUS BLD VENIPUNCTURE: CPT

## 2024-10-07 PROCEDURE — 83735 ASSAY OF MAGNESIUM: CPT

## 2024-10-08 LAB — TACROLIMUS BLD-MCNC: 3.8 NG/ML (ref 3–15)

## 2024-10-16 DIAGNOSIS — R80.9 PROTEINURIA, UNSPECIFIED TYPE: ICD-10-CM

## 2024-10-16 RX ORDER — LISINOPRIL 40 MG/1
40 TABLET ORAL DAILY
Qty: 90 TABLET | Refills: 1 | Status: SHIPPED | OUTPATIENT
Start: 2024-10-16

## 2024-10-21 ENCOUNTER — OFFICE VISIT (OUTPATIENT)
Dept: ENDOCRINOLOGY | Facility: CLINIC | Age: 64
End: 2024-10-21
Payer: COMMERCIAL

## 2024-10-21 VITALS
HEIGHT: 63 IN | BODY MASS INDEX: 31.01 KG/M2 | OXYGEN SATURATION: 98 % | WEIGHT: 175 LBS | DIASTOLIC BLOOD PRESSURE: 80 MMHG | HEART RATE: 80 BPM | SYSTOLIC BLOOD PRESSURE: 122 MMHG

## 2024-10-21 DIAGNOSIS — Z79.4 TYPE 2 DIABETES MELLITUS WITH OTHER OPHTHALMIC COMPLICATION, WITH LONG-TERM CURRENT USE OF INSULIN (HCC): Primary | ICD-10-CM

## 2024-10-21 DIAGNOSIS — E11.65 TYPE 2 DIABETES MELLITUS WITH HYPERGLYCEMIA, WITH LONG-TERM CURRENT USE OF INSULIN (HCC): ICD-10-CM

## 2024-10-21 DIAGNOSIS — Z79.4 TYPE 2 DIABETES MELLITUS WITH HYPERGLYCEMIA, WITH LONG-TERM CURRENT USE OF INSULIN (HCC): ICD-10-CM

## 2024-10-21 DIAGNOSIS — E78.49 OTHER HYPERLIPIDEMIA: ICD-10-CM

## 2024-10-21 DIAGNOSIS — I10 PRIMARY HYPERTENSION: ICD-10-CM

## 2024-10-21 DIAGNOSIS — E11.22 TYPE 2 DIABETES MELLITUS WITH CHRONIC KIDNEY DISEASE, WITH LONG-TERM CURRENT USE OF INSULIN, UNSPECIFIED CKD STAGE (HCC): ICD-10-CM

## 2024-10-21 DIAGNOSIS — E11.39 TYPE 2 DIABETES MELLITUS WITH OTHER OPHTHALMIC COMPLICATION, WITH LONG-TERM CURRENT USE OF INSULIN (HCC): Primary | ICD-10-CM

## 2024-10-21 DIAGNOSIS — Z94.4 STATUS POST LIVER TRANSPLANT (HCC): ICD-10-CM

## 2024-10-21 DIAGNOSIS — Z79.4 TYPE 2 DIABETES MELLITUS WITH CHRONIC KIDNEY DISEASE, WITH LONG-TERM CURRENT USE OF INSULIN, UNSPECIFIED CKD STAGE (HCC): ICD-10-CM

## 2024-10-21 PROCEDURE — 99214 OFFICE O/P EST MOD 30 MIN: CPT | Performed by: INTERNAL MEDICINE

## 2024-10-21 NOTE — PROGRESS NOTES
Toribio Hernández 63 y.o. male MRN: [id number]    Encounter: [id number]      Assessment & Plan     Problem List Items Addressed This Visit          Cardiovascular and Mediastinum    Hypertension     Blood pressure at goal-continue current regimen            Endocrine    Type 2 diabetes mellitus with hyperglycemia, with long-term current use of insulin ([facility])       Lab Results   Component Value Date    HGBA1C 7.0 (H) 10/07/2024   Continue Ozempic and dietary modifications         Relevant Medications    semaglutide, 1 mg/dose, (Ozempic, 1 MG/DOSE,) 4 mg/3 mL injection pen    Other Relevant Orders    Hemoglobin A1C    Type 2 diabetes mellitus with ophthalmic complication, with long-term current use of insulin ([facility]) - Primary       Lab Results   Component Value Date    HGBA1C 7.0 (H) 10/07/2024   Follow-up with ophthalmologist         Relevant Medications    semaglutide, 1 mg/dose, (Ozempic, 1 MG/DOSE,) 4 mg/3 mL injection pen    Other Relevant Orders    Comprehensive metabolic panel    Albumin / creatinine urine ratio    Hemoglobin A1C       Surgery/Wound/Pain    Status post liver transplant     Follow-up with liver transplant team            Other    Hyperlipidemia    Relevant Orders    Triglycerides     Other Visit Diagnoses       Type 2 diabetes mellitus with chronic kidney disease, with long-term current use of insulin, unspecified CKD stage ([facility])        Relevant Medications    semaglutide, 1 mg/dose, (Ozempic, 1 MG/DOSE,) 4 mg/3 mL injection pen           CC: Diabetes    History of Present Illness     HPI:  63 y.o. male with hx of liver transplant, HTN, HLD and type 2 diabetes seen in follow up. Reports complications of retinopathy, microalbumunuria and CKD.     He follows with GI at [facility] for hx of liver transplant. He also follows with nephrology and cardiology   Current regimen   Ozempic     Lost about 20 lbs in the past year     Fasting 130-150s with some  excursions    No polyuria , polydipsia , no  blurry vision , no numbness and tingling in feet     Last eye exam- last week     Review of Systems    Historical Information   Past Medical History:   Diagnosis Date    Bunion of right foot     Chronic kidney disease, stage III (moderate) (HCC)     RESOLVED: [id number]    Cirrhosis (HCC)     Diabetes mellitus (HCC)     Gout 10/6/18    Hepatic encephalopathy (HCC)     Hepatitis C     Hyperkalemia     Hyperlipidemia     Hypertension     Pancytopenia (HCC)     Status post liver transplant (HCC)     2015    Umbilical hernia     Wears glasses      Past Surgical History:   Procedure Laterality Date    ANKLE SURGERY      CHOLECYSTECTOMY      FOOT SURGERY  03/2022    Bunion removal    FRACTURE SURGERY      left foot    GALLBLADDER SURGERY      HIP SURGERY      LIVER TRANSPLANTATION      LAST ASSESSED: [id number]    VT COLONOSCOPY FLX DX W/COLLJ SPEC WHEN PFRMD N/A 02/16/2017    Procedure: COLONOSCOPY;  Surgeon: Brayden Massey MD;  Location:  GI LAB;  Service: Gastroenterology    VT CORRJ HLX VLGS BNCTY SESMDC DSTL METAR OSTEOT Right 02/04/2022    Procedure: BUNIONECTOMY TENZIN;  Surgeon: Darryl Benavides DPM;  Location:  MAIN OR;  Service: Podiatry    TONSILLECTOMY       Social History   Social History     Substance and Sexual Activity   Alcohol Use Yes    Alcohol/week: 1.0 standard drink of alcohol    Types: 1 Cans of beer per week    Comment: occassionally     Social History     Substance and Sexual Activity   Drug Use No     Social History     Tobacco Use   Smoking Status Never   Smokeless Tobacco Never     Family History:   Family History   Problem Relation Age of Onset    Diabetes Mother         TYPE 2    Hypertension Mother     Stroke Mother 76        SYNDROME     Hepatitis Brother         C    Cirrhosis Brother         LIVER     Cancer Family        Meds/Allergies   Current Outpatient Medications   Medication Sig Dispense Refill    amLODIPine (NORVASC) 10 mg tablet TAKE 1 TABLET DAILY 90 tablet 1     "atorvastatin (LIPITOR) 40 mg tablet TAKE 1 TABLET DAILY AT BEDTIME 90 tablet 3    azaTHIOprine (IMURAN) 50 mg tablet Take 1 tablet (50 mg total) by mouth daily 90 tablet 3    BD Pen Needle Montserrat 2nd Gen 32G X 4 MM MISC USE 2 (TWO) TIMES A  each 3    Cholecalciferol (Vitamin D3) 50 MCG (2000 UT) capsule Take 1 capsule by mouth daily Gel caps      fludrocortisone (FLORINEF) 0.1 mg tablet Take 0.1 mg by mouth daily        hydroCHLOROthiazide 12.5 mg tablet TAKE 1 TABLET DAILY 90 tablet 1    labetalol (NORMODYNE) 100 mg tablet TAKE 1 TABLET EVERY 12 HOURS 180 tablet 3    lisinopril (ZESTRIL) 40 mg tablet Take 1 tablet (40 mg total) by mouth daily 90 tablet 1    Magnesium Oxide (Magnesium Oxide 400) 240 MG PACK Take 400 mg by mouth 2 (two) times a day 1 each 1    methylPREDNISolone 4 MG tablet therapy pack Use as directed on package 21 each 0    Multiple Vitamin (MULTIVITAMIN ADULT PO) Take by mouth Daily      semaglutide, 1 mg/dose, (Ozempic, 1 MG/DOSE,) 4 mg/3 mL injection pen Inject 0.75 mL (1 mg total) under the skin every 7 days 9 mL 3    tacrolimus (PROGRAF) 1 mg capsule Takes 4 tabs in am, 3 tabs in pm      Blood Pressure Monitor KIT Use once a week (Patient not taking: Reported on 8/15/2024) 1 kit 0    latanoprost (XALATAN) 0.005 % ophthalmic solution INSTILL 1 DROP INTO LEFT EYE AT BEDTIME FOR 3 WEEKS (Patient not taking: Reported on 10/21/2024)      MAGnesium-Oxide 400 (240 Mg) MG TABS  (Patient not taking: Reported on 5/31/2023)      TRUE METRIX BLOOD GLUCOSE TEST test strip Test twice daily (Patient not taking: Reported on 10/21/2024)       No current facility-administered medications for this visit.     No Known Allergies    Objective   Vitals: Blood pressure 122/80, pulse 80, height 5' 3\" (1.6 m), weight 79.4 kg (175 lb), SpO2 98%.    Physical Exam  Vitals reviewed.   Constitutional:       General: He is not in acute distress.     Appearance: Normal appearance. He is obese. He is not ill-appearing, " toxic-appearing or diaphoretic.   HENT:      Head: Normocephalic and atraumatic.   Eyes:      General: No scleral icterus.     Extraocular Movements: Extraocular movements intact.   Cardiovascular:      Rate and Rhythm: Normal rate and regular rhythm.      Heart sounds: Normal heart sounds. No murmur heard.  Pulmonary:      Effort: Pulmonary effort is normal. No respiratory distress.      Breath sounds: Normal breath sounds. No wheezing or rales.   Musculoskeletal:      Cervical back: Neck supple.      Right lower leg: No edema.      Left lower leg: No edema.   Lymphadenopathy:      Cervical: No cervical adenopathy.   Skin:     General: Skin is warm and dry.   Neurological:      General: No focal deficit present.      Mental Status: He is alert and oriented to person, place, and time.      Gait: Gait normal.   Psychiatric:         Mood and Affect: Mood normal.         Behavior: Behavior normal.         Thought Content: Thought content normal.         Judgment: Judgment normal.         The history was obtained from the review of the chart, patient.    Lab Results:   Lab Results   Component Value Date/Time    Hemoglobin A1C 7.0 (H) 10/07/2024 11:03 AM    Hemoglobin A1C 6.8 (A) 08/15/2024 10:57 AM    Hemoglobin A1C 6.4 (H) 04/02/2024 11:05 AM    Hemoglobin A1C 6.4 (H) 12/02/2023 11:11 AM    WBC 5.63 10/07/2024 11:03 AM    WBC 6.01 06/03/2024 10:39 AM    WBC 5.08 03/01/2024 11:03 AM    Hemoglobin 12.3 10/07/2024 11:03 AM    Hemoglobin 12.4 06/03/2024 10:39 AM    Hemoglobin 12.4 03/01/2024 11:03 AM    Hematocrit 38.1 10/07/2024 11:03 AM    Hematocrit 37.9 06/03/2024 10:39 AM    Hematocrit 37.8 03/01/2024 11:03 AM    MCV 90 10/07/2024 11:03 AM    MCV 90 06/03/2024 10:39 AM    MCV 91 03/01/2024 11:03 AM    Platelets 178 10/07/2024 11:03 AM    Platelets 154 06/03/2024 10:39 AM    Platelets 166 03/01/2024 11:03 AM    BUN 48 (H) 10/07/2024 11:03 AM    BUN 45 (H) 06/03/2024 10:39 AM    BUN 58 (H) 04/02/2024 11:05 AM     "Potassium 5.0 10/07/2024 11:03 AM    Potassium 5.2 06/03/2024 10:39 AM    Potassium 5.1 04/02/2024 11:05 AM    Chloride 108 10/07/2024 11:03 AM    Chloride 107 06/03/2024 10:39 AM    Chloride 107 04/02/2024 11:05 AM    CO2 25 10/07/2024 11:03 AM    CO2 24 06/03/2024 10:39 AM    CO2 24 04/02/2024 11:05 AM    Creatinine 1.74 (H) 10/07/2024 11:03 AM    Creatinine 1.75 (H) 06/03/2024 10:39 AM    Creatinine 2.05 (H) 04/02/2024 11:05 AM    AST 12 (L) 10/07/2024 11:03 AM    AST 14 06/03/2024 10:39 AM    AST 11 (L) 04/02/2024 11:05 AM    ALT 17 10/07/2024 11:03 AM    ALT 19 06/03/2024 10:39 AM    ALT 15 04/02/2024 11:05 AM    Total Protein 7.2 10/07/2024 11:03 AM    Total Protein 7.4 06/03/2024 10:39 AM    Total Protein 6.9 04/02/2024 11:05 AM    Albumin 4.6 10/07/2024 11:03 AM    Albumin 4.7 06/03/2024 10:39 AM    Albumin 4.5 04/02/2024 11:05 AM    HDL, Direct 34 (L) 10/07/2024 11:03 AM    HDL, Direct 31 (L) 04/02/2024 11:05 AM    HDL, Direct 33 (L) 12/02/2023 11:11 AM    Triglycerides 321 (H) 10/07/2024 11:03 AM    Triglycerides 697 (H) 04/02/2024 11:05 AM    Triglycerides 358 (H) 12/02/2023 11:11 AM           Imaging Studies: Results Review Statement: No pertinent imaging studies reviewed.    Portions of the record may have been created with voice recognition software. Occasional wrong word or \"sound a like\" substitutions may have occurred due to the inherent limitations of voice recognition software. Read the chart carefully and recognize, using context, where substitutions have occurred.    "

## 2024-10-22 RX ORDER — HYDROCHLOROTHIAZIDE 12.5 MG/1
TABLET ORAL
Qty: 90 TABLET | Refills: 1 | Status: SHIPPED | OUTPATIENT
Start: 2024-10-22

## 2024-10-22 NOTE — ASSESSMENT & PLAN NOTE
Lab Results   Component Value Date    HGBA1C 7.0 (H) 10/07/2024   Follow-up with ophthalmologist

## 2024-10-22 NOTE — ASSESSMENT & PLAN NOTE
Lab Results   Component Value Date    HGBA1C 7.0 (H) 10/07/2024   Continue Ozempic and dietary modifications

## 2024-11-20 ENCOUNTER — IMMUNIZATIONS (OUTPATIENT)
Dept: FAMILY MEDICINE CLINIC | Facility: CLINIC | Age: 64
End: 2024-11-20

## 2024-11-20 DIAGNOSIS — Z23 ENCOUNTER FOR IMMUNIZATION: Primary | ICD-10-CM

## 2024-11-20 PROCEDURE — G0008 ADMIN INFLUENZA VIRUS VAC: HCPCS

## 2024-11-20 PROCEDURE — 90662 IIV NO PRSV INCREASED AG IM: CPT

## 2024-11-20 NOTE — PROGRESS NOTES
Patient presented to office today for influenza vaccine administration. Received 0.5 single dose vial influenza vaccine (Fluzone High dose Quadrivalent) Patient received vaccine in left deltoid. Vaccine information statement given. Patient tolerated well.

## 2024-12-05 ENCOUNTER — APPOINTMENT (OUTPATIENT)
Dept: LAB | Facility: CLINIC | Age: 64
End: 2024-12-05
Payer: COMMERCIAL

## 2024-12-05 DIAGNOSIS — R80.8 OTHER PROTEINURIA: ICD-10-CM

## 2024-12-05 DIAGNOSIS — N18.9 CHRONIC KIDNEY DISEASE, UNSPECIFIED CKD STAGE: ICD-10-CM

## 2024-12-05 LAB
ANION GAP SERPL CALCULATED.3IONS-SCNC: 4 MMOL/L (ref 4–13)
BUN SERPL-MCNC: 63 MG/DL (ref 5–25)
CALCIUM SERPL-MCNC: 9.1 MG/DL (ref 8.4–10.2)
CHLORIDE SERPL-SCNC: 112 MMOL/L (ref 96–108)
CO2 SERPL-SCNC: 22 MMOL/L (ref 21–32)
CREAT SERPL-MCNC: 1.99 MG/DL (ref 0.6–1.3)
CREAT UR-MCNC: 127 MG/DL
GFR SERPL CREATININE-BSD FRML MDRD: 34 ML/MIN/1.73SQ M
GLUCOSE P FAST SERPL-MCNC: 118 MG/DL (ref 65–99)
POTASSIUM SERPL-SCNC: 5.9 MMOL/L (ref 3.5–5.3)
PROT UR-MCNC: 41.5 MG/DL
PROT/CREAT UR: 0.3 MG/G{CREAT} (ref 0–0.1)
SODIUM SERPL-SCNC: 138 MMOL/L (ref 135–147)

## 2024-12-05 PROCEDURE — 80048 BASIC METABOLIC PNL TOTAL CA: CPT

## 2024-12-05 PROCEDURE — 84156 ASSAY OF PROTEIN URINE: CPT

## 2024-12-05 PROCEDURE — 82570 ASSAY OF URINE CREATININE: CPT

## 2024-12-05 PROCEDURE — 36415 COLL VENOUS BLD VENIPUNCTURE: CPT

## 2024-12-12 ENCOUNTER — OFFICE VISIT (OUTPATIENT)
Dept: NEPHROLOGY | Facility: CLINIC | Age: 64
End: 2024-12-12
Payer: COMMERCIAL

## 2024-12-12 VITALS
BODY MASS INDEX: 31.54 KG/M2 | HEIGHT: 63 IN | DIASTOLIC BLOOD PRESSURE: 70 MMHG | WEIGHT: 178 LBS | SYSTOLIC BLOOD PRESSURE: 126 MMHG | HEART RATE: 80 BPM

## 2024-12-12 DIAGNOSIS — N18.9 CHRONIC KIDNEY DISEASE, UNSPECIFIED CKD STAGE: Primary | ICD-10-CM

## 2024-12-12 DIAGNOSIS — R80.8 OTHER PROTEINURIA: ICD-10-CM

## 2024-12-12 PROBLEM — N18.32 STAGE 3B CHRONIC KIDNEY DISEASE (HCC): Status: RESOLVED | Noted: 2024-04-19 | Resolved: 2024-12-12

## 2024-12-12 PROBLEM — N18.31 STAGE 3A CHRONIC KIDNEY DISEASE (HCC): Status: RESOLVED | Noted: 2024-04-19 | Resolved: 2024-12-12

## 2024-12-12 PROCEDURE — 99214 OFFICE O/P EST MOD 30 MIN: CPT | Performed by: INTERNAL MEDICINE

## 2024-12-12 NOTE — LETTER
December 12, 2024     Rosalina Lama MD  [address] [facility] [address]    Patient: Toribio Hernández   YOB: 1960   Date of Visit: 12/12/2024       Dear Dr. Lama:    Thank you for referring Toribio Hernández to me for evaluation. Below are my notes for this consultation.    If you have questions, please do not hesitate to call me. I look forward to following your patient along with you.         Sincerely,        Vaughn Castle MD        CC: No Recipients    Vaughn Castle MD  12/12/2024  9:14 AM  Sign when Signing Visit  NEPHROLOGY PROGRESS NOTE    Toribio Hernández 63 y.o. male MRN: [id number]  Unit/Bed#:  Encounter: [id number]  Reason for Consult: Chronic kidney disease with proteinuria    The patient is here for routine follow-up he looks great says that he has no complaints for me.  He is lost about 25 pounds she is still off of insulin blood sugars are under good control.  He is getting ready for his wife to retire and looking forward to spending time with her.    ASSESSMENT/PLAN:    1.  Renal    The patient is chronic kidney disease and also history of liver transplantation on tacrolimus for many years.  He does have proteinuria and in the past it was around 1.3 g attributed to diabetes.  He is lost weight and protein estimations down to 300 mg and his creatinine is 1.9.  It will fluctuate around that level so it is relatively stable over the last year without progression.    The patient takes fludrocortisone for type IV RTA and has mild hyperkalemia he was fasting the day of his last blood work and it will be repeated as ordered by his primary physician.    I told him the way to help delay progression his blood pressure control which is excellent, lipid control which is excellent and glycemic control target A1c of 7% or less.  Also the amount of protein in the urine is lower he is on lisinopril and also weight loss helps.    Continue current medications  Labs and follow-up as scheduled  Glycemic control,  Patient called to speak to the nurse to discuss ordering lab tests, no further details given, please call, thank you    lipid treatment, blood pressure control    Call if there is any questions or concerns before next visit.    2.  Liver transplantation    Patient doing well with his liver transplant.  He is on immunosuppression and managed by his transplant team.    3.  Cardiac    Monitored by cardiology has history of mild to moderate aortic stenosis.  No symptoms elicited on history.      I have spent a total time of 30 minutes in caring for this patient on the day of the visit/encounter including Diagnostic results, Impressions, Reviewing / ordering tests, medicine, procedures  , and Obtaining or reviewing history  .         SUBJECTIVE:  Review of Systems   Constitutional: Negative for chills, diaphoresis and fever.   HENT: Negative.     Eyes: Negative.    Cardiovascular:  Negative for chest pain, dyspnea on exertion and orthopnea.   Respiratory:  Negative for cough, shortness of breath and sputum production.    Gastrointestinal:  Negative for abdominal pain, diarrhea, nausea and vomiting.   Genitourinary: Negative.    Neurological:  Negative for dizziness and headaches.   Psychiatric/Behavioral:  Negative for altered mental status.        OBJECTIVE:  Current Weight:    Vitals:@TMAX(24)@Current:     There were no vitals taken for this visit. , There is no height or weight on file to calculate BMI.    [unfilled]    Physical Exam: There were no vitals taken for this visit.  Physical Exam  Constitutional:       General: He is not in acute distress.     Appearance: He is not ill-appearing or toxic-appearing.   HENT:      Head: Normocephalic and atraumatic.      Nose: Nose normal.      Mouth/Throat:      Mouth: Mucous membranes are moist.   Eyes:      General: No scleral icterus.     Extraocular Movements: Extraocular movements intact.   Cardiovascular:      Rate and Rhythm: Normal rate and regular rhythm.      Heart sounds: Murmur heard.      No gallop.      Comments: No edema.  Pulmonary:      Effort: Pulmonary effort is normal. No  respiratory distress.      Breath sounds: No wheezing or rales.   Abdominal:      General: Bowel sounds are normal. There is no distension.      Palpations: Abdomen is soft.      Tenderness: There is no abdominal tenderness.   Neurological:      Mental Status: He is alert and oriented to person, place, and time. Mental status is at baseline.   Psychiatric:         Mood and Affect: Mood normal.         Behavior: Behavior normal.         Medications:    Current Outpatient Medications:   •  amLODIPine (NORVASC) 10 mg tablet, TAKE 1 TABLET DAILY, Disp: 90 tablet, Rfl: 1  •  atorvastatin (LIPITOR) 40 mg tablet, TAKE 1 TABLET DAILY AT BEDTIME, Disp: 90 tablet, Rfl: 3  •  azaTHIOprine (IMURAN) 50 mg tablet, Take 1 tablet (50 mg total) by mouth daily, Disp: 90 tablet, Rfl: 3  •  BD Pen Needle Montserrat 2nd Gen 32G X 4 MM MISC, USE 2 (TWO) TIMES A DAY, Disp: 200 each, Rfl: 3  •  Blood Pressure Monitor KIT, Use once a week (Patient not taking: Reported on 8/15/2024), Disp: 1 kit, Rfl: 0  •  Cholecalciferol (Vitamin D3) 50 MCG (2000 UT) capsule, Take 1 capsule by mouth daily Gel caps, Disp: , Rfl:   •  fludrocortisone (FLORINEF) 0.1 mg tablet, Take 0.1 mg by mouth daily  , Disp: , Rfl:   •  hydroCHLOROthiazide 12.5 mg tablet, TAKE 1 TABLET DAILY, Disp: 90 tablet, Rfl: 1  •  labetalol (NORMODYNE) 100 mg tablet, TAKE 1 TABLET EVERY 12 HOURS, Disp: 180 tablet, Rfl: 3  •  latanoprost (XALATAN) 0.005 % ophthalmic solution, INSTILL 1 DROP INTO LEFT EYE AT BEDTIME FOR 3 WEEKS (Patient not taking: Reported on 10/21/2024), Disp: , Rfl:   •  lisinopril (ZESTRIL) 40 mg tablet, Take 1 tablet (40 mg total) by mouth daily, Disp: 90 tablet, Rfl: 1  •  Magnesium Oxide (Magnesium Oxide 400) 240 MG PACK, Take 400 mg by mouth 2 (two) times a day, Disp: 1 each, Rfl: 1  •  MAGnesium-Oxide 400 (240 Mg) MG TABS, , Disp: , Rfl:   •  methylPREDNISolone 4 MG tablet therapy pack, Use as directed on package, Disp: 21 each, Rfl: 0  •  Multiple Vitamin  "(MULTIVITAMIN ADULT PO), Take by mouth Daily, Disp: , Rfl:   •  semaglutide, 1 mg/dose, (Ozempic, 1 MG/DOSE,) 4 mg/3 mL injection pen, Inject 0.75 mL (1 mg total) under the skin every 7 days, Disp: 9 mL, Rfl: 3  •  tacrolimus (PROGRAF) 1 mg capsule, Takes 4 tabs in am, 3 tabs in pm, Disp: , Rfl:   •  TRUE METRIX BLOOD GLUCOSE TEST test strip, Test twice daily (Patient not taking: Reported on 10/21/2024), Disp: , Rfl:     Laboratory Results:  Lab Results   Component Value Date    WBC 5.63 10/07/2024    HGB 12.3 10/07/2024    HCT 38.1 10/07/2024    MCV 90 10/07/2024     10/07/2024     Lab Results   Component Value Date    SODIUM 138 12/05/2024    K 5.9 (H) 12/05/2024     (H) 12/05/2024    CO2 22 12/05/2024    BUN 63 (H) 12/05/2024    CREATININE 1.99 (H) 12/05/2024    GLUC 163 (H) 09/07/2021    CALCIUM 9.1 12/05/2024     Lab Results   Component Value Date    CALCIUM 9.1 12/05/2024    PHOS 4.0 10/24/2014     No results found for: \"LABPROT\"      "

## 2024-12-12 NOTE — PATIENT INSTRUCTIONS
You are here for follow-up you look great you have lost weight your sugars are great you are not on insulin anymore and he said you are feeling well and your health is been good.    With respect to the kidneys the creatinine is 1.9 and I showed you over the year how it fluctuates slightly so it is really stable at this level without progression.  Also the amount of protein in the urine is lower and that send good thing to help point to slow her progression and I think the weight loss had something to do with it and the better sugar control.    Blood pressure is excellent sugar control try to keep the A1c at 7% or lower doing good and your cholesterol is excellent.    So for now I would not change anything call me if there is any problems.

## 2024-12-12 NOTE — LETTER
December 12, 2024     Rosalina Lama MD  [address] [facility] [address]    Patient: Toribio Hernández   YOB: 1960   Date of Visit: 12/12/2024       Dear Dr. Lama:    Thank you for referring Toribio Hernández to me for evaluation. Below are my notes for this consultation.    If you have questions, please do not hesitate to call me. I look forward to following your patient along with you.         Sincerely,        Vaughn Castle MD        CC: No Recipients    Vaughn Castle MD  12/12/2024  9:13 AM  Sign when Signing Visit  NEPHROLOGY PROGRESS NOTE    Toribio Hernández 63 y.o. male MRN: [id number]  Unit/Bed#:  Encounter: [id number]  Reason for Consult: Chronic kidney disease with proteinuria    The patient is here for routine follow-up he looks great says that he has no complaints for me.  He is lost about 25 pounds she is still off of insulin blood sugars are under good control.  He is getting ready for his wife to retire and looking forward to spending time with her.    ASSESSMENT/PLAN:    1.  Renal    The patient is chronic kidney disease and also history of liver transplantation on tacrolimus for many years.  He does have proteinuria and in the past it was around 1.3 g attributed to diabetes.  He is lost weight and protein estimations down to 300 mg and his creatinine is 1.9.  It will fluctuate around that level so it is relatively stable over the last year without progression.    The patient takes fludrocortisone for type IV RTA and has mild hyperkalemia he was fasting the day of his last blood work and it will be repeated as ordered by his primary physician.    I told him the way to help delay progression his blood pressure control which is excellent, lipid control which is excellent and glycemic control target A1c of 7% or less.  Also the amount of protein in the urine is lower he is on lisinopril and also weight loss helps.    Continue current medications  Labs and follow-up as scheduled  Glycemic control,  lipid treatment, blood pressure control    Call if there is any questions or concerns before next visit.    2.  Liver transplantation    Patient doing well with his liver transplant.  He is on immunosuppression and managed by his transplant team.    3.  Cardiac    Monitored by cardiology has history of mild to moderate aortic stenosis.  No symptoms elicited on history.      I have spent a total time of 30 minutes in caring for this patient on the day of the visit/encounter including Diagnostic results, Impressions, Reviewing / ordering tests, medicine, procedures  , and Obtaining or reviewing history  .     PLAN:  ***    SUBJECTIVE:  Review of Systems   Constitutional: Negative for chills, diaphoresis and fever.   HENT: Negative.     Eyes: Negative.    Cardiovascular:  Negative for chest pain, dyspnea on exertion and orthopnea.   Respiratory:  Negative for cough, shortness of breath and sputum production.    Gastrointestinal:  Negative for abdominal pain, diarrhea, nausea and vomiting.   Genitourinary: Negative.    Neurological:  Negative for dizziness and headaches.   Psychiatric/Behavioral:  Negative for altered mental status.        OBJECTIVE:  Current Weight:    Vitals:@TMAX(24)@Current:     There were no vitals taken for this visit. , There is no height or weight on file to calculate BMI.    [unfilled]    Physical Exam: There were no vitals taken for this visit.  Physical Exam  Constitutional:       General: He is not in acute distress.     Appearance: He is not ill-appearing or toxic-appearing.   HENT:      Head: Normocephalic and atraumatic.      Nose: Nose normal.      Mouth/Throat:      Mouth: Mucous membranes are moist.   Eyes:      General: No scleral icterus.     Extraocular Movements: Extraocular movements intact.   Cardiovascular:      Rate and Rhythm: Normal rate and regular rhythm.      Heart sounds: Murmur heard.      No gallop.      Comments: No edema.  Pulmonary:      Effort: Pulmonary effort is  normal. No respiratory distress.      Breath sounds: No wheezing or rales.   Abdominal:      General: Bowel sounds are normal. There is no distension.      Palpations: Abdomen is soft.      Tenderness: There is no abdominal tenderness.   Neurological:      Mental Status: He is alert and oriented to person, place, and time. Mental status is at baseline.   Psychiatric:         Mood and Affect: Mood normal.         Behavior: Behavior normal.         Medications:    Current Outpatient Medications:     amLODIPine (NORVASC) 10 mg tablet, TAKE 1 TABLET DAILY, Disp: 90 tablet, Rfl: 1    atorvastatin (LIPITOR) 40 mg tablet, TAKE 1 TABLET DAILY AT BEDTIME, Disp: 90 tablet, Rfl: 3    azaTHIOprine (IMURAN) 50 mg tablet, Take 1 tablet (50 mg total) by mouth daily, Disp: 90 tablet, Rfl: 3    BD Pen Needle Montserrat 2nd Gen 32G X 4 MM MISC, USE 2 (TWO) TIMES A DAY, Disp: 200 each, Rfl: 3    Blood Pressure Monitor KIT, Use once a week (Patient not taking: Reported on 8/15/2024), Disp: 1 kit, Rfl: 0    Cholecalciferol (Vitamin D3) 50 MCG (2000 UT) capsule, Take 1 capsule by mouth daily Gel caps, Disp: , Rfl:     fludrocortisone (FLORINEF) 0.1 mg tablet, Take 0.1 mg by mouth daily  , Disp: , Rfl:     hydroCHLOROthiazide 12.5 mg tablet, TAKE 1 TABLET DAILY, Disp: 90 tablet, Rfl: 1    labetalol (NORMODYNE) 100 mg tablet, TAKE 1 TABLET EVERY 12 HOURS, Disp: 180 tablet, Rfl: 3    latanoprost (XALATAN) 0.005 % ophthalmic solution, INSTILL 1 DROP INTO LEFT EYE AT BEDTIME FOR 3 WEEKS (Patient not taking: Reported on 10/21/2024), Disp: , Rfl:     lisinopril (ZESTRIL) 40 mg tablet, Take 1 tablet (40 mg total) by mouth daily, Disp: 90 tablet, Rfl: 1    Magnesium Oxide (Magnesium Oxide 400) 240 MG PACK, Take 400 mg by mouth 2 (two) times a day, Disp: 1 each, Rfl: 1    MAGnesium-Oxide 400 (240 Mg) MG TABS, , Disp: , Rfl:     methylPREDNISolone 4 MG tablet therapy pack, Use as directed on package, Disp: 21 each, Rfl: 0    Multiple Vitamin  "(MULTIVITAMIN ADULT PO), Take by mouth Daily, Disp: , Rfl:     semaglutide, 1 mg/dose, (Ozempic, 1 MG/DOSE,) 4 mg/3 mL injection pen, Inject 0.75 mL (1 mg total) under the skin every 7 days, Disp: 9 mL, Rfl: 3    tacrolimus (PROGRAF) 1 mg capsule, Takes 4 tabs in am, 3 tabs in pm, Disp: , Rfl:     TRUE METRIX BLOOD GLUCOSE TEST test strip, Test twice daily (Patient not taking: Reported on 10/21/2024), Disp: , Rfl:     Laboratory Results:  Lab Results   Component Value Date    WBC 5.63 10/07/2024    HGB 12.3 10/07/2024    HCT 38.1 10/07/2024    MCV 90 10/07/2024     10/07/2024     Lab Results   Component Value Date    SODIUM 138 12/05/2024    K 5.9 (H) 12/05/2024     (H) 12/05/2024    CO2 22 12/05/2024    BUN 63 (H) 12/05/2024    CREATININE 1.99 (H) 12/05/2024    GLUC 163 (H) 09/07/2021    CALCIUM 9.1 12/05/2024     Lab Results   Component Value Date    CALCIUM 9.1 12/05/2024    PHOS 4.0 10/24/2014     No results found for: \"LABPROT\"      "

## 2024-12-12 NOTE — PROGRESS NOTES
NEPHROLOGY PROGRESS NOTE    Toriibo Hernández 63 y.o. male MRN: [id number]  Unit/Bed#:  Encounter: [id number]  Reason for Consult: Chronic kidney disease with proteinuria    The patient is here for routine follow-up he looks great says that he has no complaints for me.  He is lost about 25 pounds she is still off of insulin blood sugars are under good control.  He is getting ready for his wife to retire and looking forward to spending time with her.    ASSESSMENT/PLAN:    1.  Renal    The patient is chronic kidney disease and also history of liver transplantation on tacrolimus for many years.  He does have proteinuria and in the past it was around 1.3 g attributed to diabetes.  He is lost weight and protein estimations down to 300 mg and his creatinine is 1.9.  It will fluctuate around that level so it is relatively stable over the last year without progression.    The patient takes fludrocortisone for type IV RTA and has mild hyperkalemia he was fasting the day of his last blood work and it will be repeated as ordered by his primary physician.    I told him the way to help delay progression his blood pressure control which is excellent, lipid control which is excellent and glycemic control target A1c of 7% or less.  Also the amount of protein in the urine is lower he is on lisinopril and also weight loss helps.    Continue current medications  Labs and follow-up as scheduled  Glycemic control, lipid treatment, blood pressure control    Call if there is any questions or concerns before next visit.    2.  Liver transplantation    Patient doing well with his liver transplant.  He is on immunosuppression and managed by his transplant team.    3.  Cardiac    Monitored by cardiology has history of mild to moderate aortic stenosis.  No symptoms elicited on history.      I have spent a total time of 30 minutes in caring for this patient on the day of the visit/encounter including Diagnostic results, Impressions, Reviewing /  ordering tests, medicine, procedures  , and Obtaining or reviewing history  .         SUBJECTIVE:  Review of Systems   Constitutional: Negative for chills, diaphoresis and fever.   HENT: Negative.     Eyes: Negative.    Cardiovascular:  Negative for chest pain, dyspnea on exertion and orthopnea.   Respiratory:  Negative for cough, shortness of breath and sputum production.    Gastrointestinal:  Negative for abdominal pain, diarrhea, nausea and vomiting.   Genitourinary: Negative.    Neurological:  Negative for dizziness and headaches.   Psychiatric/Behavioral:  Negative for altered mental status.        OBJECTIVE:  Current Weight:    Vitals:@TMAX(24)@Current:     There were no vitals taken for this visit. , There is no height or weight on file to calculate BMI.    [unfilled]    Physical Exam: There were no vitals taken for this visit.  Physical Exam  Constitutional:       General: He is not in acute distress.     Appearance: He is not ill-appearing or toxic-appearing.   HENT:      Head: Normocephalic and atraumatic.      Nose: Nose normal.      Mouth/Throat:      Mouth: Mucous membranes are moist.   Eyes:      General: No scleral icterus.     Extraocular Movements: Extraocular movements intact.   Cardiovascular:      Rate and Rhythm: Normal rate and regular rhythm.      Heart sounds: Murmur heard.      No gallop.      Comments: No edema.  Pulmonary:      Effort: Pulmonary effort is normal. No respiratory distress.      Breath sounds: No wheezing or rales.   Abdominal:      General: Bowel sounds are normal. There is no distension.      Palpations: Abdomen is soft.      Tenderness: There is no abdominal tenderness.   Neurological:      Mental Status: He is alert and oriented to person, place, and time. Mental status is at baseline.   Psychiatric:         Mood and Affect: Mood normal.         Behavior: Behavior normal.         Medications:    Current Outpatient Medications:     amLODIPine (NORVASC) 10 mg tablet, TAKE 1  TABLET DAILY, Disp: 90 tablet, Rfl: 1    atorvastatin (LIPITOR) 40 mg tablet, TAKE 1 TABLET DAILY AT BEDTIME, Disp: 90 tablet, Rfl: 3    azaTHIOprine (IMURAN) 50 mg tablet, Take 1 tablet (50 mg total) by mouth daily, Disp: 90 tablet, Rfl: 3    BD Pen Needle Montserrat 2nd Gen 32G X 4 MM MISC, USE 2 (TWO) TIMES A DAY, Disp: 200 each, Rfl: 3    Blood Pressure Monitor KIT, Use once a week (Patient not taking: Reported on 8/15/2024), Disp: 1 kit, Rfl: 0    Cholecalciferol (Vitamin D3) 50 MCG (2000 UT) capsule, Take 1 capsule by mouth daily Gel caps, Disp: , Rfl:     fludrocortisone (FLORINEF) 0.1 mg tablet, Take 0.1 mg by mouth daily  , Disp: , Rfl:     hydroCHLOROthiazide 12.5 mg tablet, TAKE 1 TABLET DAILY, Disp: 90 tablet, Rfl: 1    labetalol (NORMODYNE) 100 mg tablet, TAKE 1 TABLET EVERY 12 HOURS, Disp: 180 tablet, Rfl: 3    latanoprost (XALATAN) 0.005 % ophthalmic solution, INSTILL 1 DROP INTO LEFT EYE AT BEDTIME FOR 3 WEEKS (Patient not taking: Reported on 10/21/2024), Disp: , Rfl:     lisinopril (ZESTRIL) 40 mg tablet, Take 1 tablet (40 mg total) by mouth daily, Disp: 90 tablet, Rfl: 1    Magnesium Oxide (Magnesium Oxide 400) 240 MG PACK, Take 400 mg by mouth 2 (two) times a day, Disp: 1 each, Rfl: 1    MAGnesium-Oxide 400 (240 Mg) MG TABS, , Disp: , Rfl:     methylPREDNISolone 4 MG tablet therapy pack, Use as directed on package, Disp: 21 each, Rfl: 0    Multiple Vitamin (MULTIVITAMIN ADULT PO), Take by mouth Daily, Disp: , Rfl:     semaglutide, 1 mg/dose, (Ozempic, 1 MG/DOSE,) 4 mg/3 mL injection pen, Inject 0.75 mL (1 mg total) under the skin every 7 days, Disp: 9 mL, Rfl: 3    tacrolimus (PROGRAF) 1 mg capsule, Takes 4 tabs in am, 3 tabs in pm, Disp: , Rfl:     TRUE METRIX BLOOD GLUCOSE TEST test strip, Test twice daily (Patient not taking: Reported on 10/21/2024), Disp: , Rfl:     Laboratory Results:  Lab Results   Component Value Date    WBC 5.63 10/07/2024    HGB 12.3 10/07/2024    HCT 38.1 10/07/2024    MCV 90  "10/07/2024     10/07/2024     Lab Results   Component Value Date    SODIUM 138 12/05/2024    K 5.9 (H) 12/05/2024     (H) 12/05/2024    CO2 22 12/05/2024    BUN 63 (H) 12/05/2024    CREATININE 1.99 (H) 12/05/2024    GLUC 163 (H) 09/07/2021    CALCIUM 9.1 12/05/2024     Lab Results   Component Value Date    CALCIUM 9.1 12/05/2024    PHOS 4.0 10/24/2014     No results found for: \"LABPROT\"      "

## 2024-12-18 ENCOUNTER — CLINICAL SUPPORT (OUTPATIENT)
Dept: FAMILY MEDICINE CLINIC | Facility: CLINIC | Age: 64
End: 2024-12-18

## 2024-12-18 VITALS — TEMPERATURE: 97.6 F

## 2024-12-18 DIAGNOSIS — Z23 NEED FOR COVID-19 VACCINE: Primary | ICD-10-CM

## 2024-12-18 PROCEDURE — 90480 ADMN SARSCOV2 VAC 1/ONLY CMP: CPT

## 2024-12-18 PROCEDURE — 91320 SARSCV2 VAC 30MCG TRS-SUC IM: CPT

## 2024-12-18 NOTE — PROGRESS NOTES
The patient is present today for Covid-19 vaccine visit. We discussed the pros and cons of the vaccine(s). They have no prior reaction to vaccines. We discussed possible common reactions. The patient not experiencing fever or illness recently.     Vitals:    12/18/24 0923   Temp: 97.6 °F (36.4 °C)     Prefilled syringe, of Covid-19 Vaccine Mrna (Comirnaty), dose 0.3 ml, lot number [id number], NDC number 7060-0721-43, expiration date 04/26/2025.   Skin - Injection administered on left deltoid with no erythema, normal appearing skin.     The patient tolerate the vaccine administration well. We discussed follow up precautions including but not limited to injection site redness and swelling, tongue or throat swelling, breathing problems and fever. We discussed thermometer use and over the counter medications for symptoms. Patient express and verbalized understanding. Vaccine information sheet handed to patient.

## 2025-01-03 ENCOUNTER — APPOINTMENT (OUTPATIENT)
Dept: LAB | Facility: CLINIC | Age: 65
End: 2025-01-03
Payer: COMMERCIAL

## 2025-01-03 DIAGNOSIS — Z79.4 TYPE 2 DIABETES MELLITUS WITH HYPERGLYCEMIA, WITH LONG-TERM CURRENT USE OF INSULIN (HCC): ICD-10-CM

## 2025-01-03 DIAGNOSIS — Z94.4 LIVER REPLACED BY TRANSPLANT (HCC): ICD-10-CM

## 2025-01-03 DIAGNOSIS — E11.39 TYPE 2 DIABETES MELLITUS WITH OTHER OPHTHALMIC COMPLICATION, WITH LONG-TERM CURRENT USE OF INSULIN (HCC): ICD-10-CM

## 2025-01-03 DIAGNOSIS — E83.42 HYPOMAGNESEMIA: ICD-10-CM

## 2025-01-03 DIAGNOSIS — E11.65 TYPE 2 DIABETES MELLITUS WITH HYPERGLYCEMIA, WITH LONG-TERM CURRENT USE OF INSULIN (HCC): ICD-10-CM

## 2025-01-03 DIAGNOSIS — Z79.4 TYPE 2 DIABETES MELLITUS WITH OTHER OPHTHALMIC COMPLICATION, WITH LONG-TERM CURRENT USE OF INSULIN (HCC): ICD-10-CM

## 2025-01-03 DIAGNOSIS — E87.5 HYPERPOTASSEMIA: ICD-10-CM

## 2025-01-03 LAB
ALBUMIN SERPL BCG-MCNC: 4.7 G/DL (ref 3.5–5)
ALP SERPL-CCNC: 79 U/L (ref 34–104)
ALT SERPL W P-5'-P-CCNC: 22 U/L (ref 7–52)
ANION GAP SERPL CALCULATED.3IONS-SCNC: 6 MMOL/L (ref 4–13)
AST SERPL W P-5'-P-CCNC: 13 U/L (ref 13–39)
BASOPHILS # BLD AUTO: 0.02 THOUSANDS/ΜL (ref 0–0.1)
BASOPHILS NFR BLD AUTO: 0 % (ref 0–1)
BILIRUB DIRECT SERPL-MCNC: 0.05 MG/DL (ref 0–0.2)
BILIRUB SERPL-MCNC: 0.37 MG/DL (ref 0.2–1)
BUN SERPL-MCNC: 47 MG/DL (ref 5–25)
CALCIUM SERPL-MCNC: 9.6 MG/DL (ref 8.4–10.2)
CHLORIDE SERPL-SCNC: 112 MMOL/L (ref 96–108)
CO2 SERPL-SCNC: 23 MMOL/L (ref 21–32)
CREAT SERPL-MCNC: 1.98 MG/DL (ref 0.6–1.3)
CREAT UR-MCNC: 141.8 MG/DL
EOSINOPHIL # BLD AUTO: 0.14 THOUSAND/ΜL (ref 0–0.61)
EOSINOPHIL NFR BLD AUTO: 3 % (ref 0–6)
ERYTHROCYTE [DISTWIDTH] IN BLOOD BY AUTOMATED COUNT: 13.8 % (ref 11.6–15.1)
EST. AVERAGE GLUCOSE BLD GHB EST-MCNC: 151 MG/DL
GFR SERPL CREATININE-BSD FRML MDRD: 34 ML/MIN/1.73SQ M
GLUCOSE P FAST SERPL-MCNC: 138 MG/DL (ref 65–99)
HBA1C MFR BLD: 6.9 %
HCT VFR BLD AUTO: 37.1 % (ref 36.5–49.3)
HGB BLD-MCNC: 11.8 G/DL (ref 12–17)
IMM GRANULOCYTES # BLD AUTO: 0.02 THOUSAND/UL (ref 0–0.2)
IMM GRANULOCYTES NFR BLD AUTO: 0 % (ref 0–2)
LYMPHOCYTES # BLD AUTO: 0.8 THOUSANDS/ΜL (ref 0.6–4.47)
LYMPHOCYTES NFR BLD AUTO: 16 % (ref 14–44)
MAGNESIUM SERPL-MCNC: 1.7 MG/DL (ref 1.9–2.7)
MCH RBC QN AUTO: 29.4 PG (ref 26.8–34.3)
MCHC RBC AUTO-ENTMCNC: 31.8 G/DL (ref 31.4–37.4)
MCV RBC AUTO: 92 FL (ref 82–98)
MICROALBUMIN UR-MCNC: 346.4 MG/L
MICROALBUMIN/CREAT 24H UR: 244 MG/G CREATININE (ref 0–30)
MONOCYTES # BLD AUTO: 0.42 THOUSAND/ΜL (ref 0.17–1.22)
MONOCYTES NFR BLD AUTO: 9 % (ref 4–12)
NEUTROPHILS # BLD AUTO: 3.56 THOUSANDS/ΜL (ref 1.85–7.62)
NEUTS SEG NFR BLD AUTO: 72 % (ref 43–75)
NRBC BLD AUTO-RTO: 0 /100 WBCS
PLATELET # BLD AUTO: 142 THOUSANDS/UL (ref 149–390)
PMV BLD AUTO: 11.8 FL (ref 8.9–12.7)
POTASSIUM SERPL-SCNC: 5.9 MMOL/L (ref 3.5–5.3)
PROT SERPL-MCNC: 7.2 G/DL (ref 6.4–8.4)
RBC # BLD AUTO: 4.02 MILLION/UL (ref 3.88–5.62)
SODIUM SERPL-SCNC: 141 MMOL/L (ref 135–147)
TACROLIMUS BLD-MCNC: 2.5 NG/ML (ref 3–15)
WBC # BLD AUTO: 4.96 THOUSAND/UL (ref 4.31–10.16)

## 2025-01-03 PROCEDURE — 82248 BILIRUBIN DIRECT: CPT

## 2025-01-03 PROCEDURE — 80197 ASSAY OF TACROLIMUS: CPT

## 2025-01-03 PROCEDURE — 36415 COLL VENOUS BLD VENIPUNCTURE: CPT

## 2025-01-03 PROCEDURE — 82570 ASSAY OF URINE CREATININE: CPT

## 2025-01-03 PROCEDURE — 82043 UR ALBUMIN QUANTITATIVE: CPT

## 2025-01-03 PROCEDURE — 83036 HEMOGLOBIN GLYCOSYLATED A1C: CPT

## 2025-01-03 PROCEDURE — 85025 COMPLETE CBC W/AUTO DIFF WBC: CPT

## 2025-01-03 PROCEDURE — 83735 ASSAY OF MAGNESIUM: CPT

## 2025-01-03 PROCEDURE — 80053 COMPREHEN METABOLIC PANEL: CPT

## 2025-01-08 ENCOUNTER — RESULTS FOLLOW-UP (OUTPATIENT)
Dept: ENDOCRINOLOGY | Facility: CLINIC | Age: 65
End: 2025-01-08

## 2025-01-13 DIAGNOSIS — E78.5 DYSLIPIDEMIA: ICD-10-CM

## 2025-01-13 NOTE — TELEPHONE ENCOUNTER
Pt is requesting a medication refill. Per chart reviewed lipitor was written or prescribed on 9/14/2022. Please advise and thank you

## 2025-01-14 RX ORDER — ATORVASTATIN CALCIUM 40 MG/1
40 TABLET, FILM COATED ORAL
Qty: 90 TABLET | Refills: 3 | Status: SHIPPED | OUTPATIENT
Start: 2025-01-14

## 2025-01-14 NOTE — TELEPHONE ENCOUNTER
Chart reviewed  Seen by numerous physicians and documentation reflects he's taking this medication, outside of network may have been last to refill     Medication refill

## 2025-02-20 ENCOUNTER — OFFICE VISIT (OUTPATIENT)
Dept: FAMILY MEDICINE CLINIC | Facility: CLINIC | Age: 65
End: 2025-02-20

## 2025-02-20 VITALS
BODY MASS INDEX: 32.14 KG/M2 | DIASTOLIC BLOOD PRESSURE: 80 MMHG | HEART RATE: 80 BPM | TEMPERATURE: 98.3 F | RESPIRATION RATE: 16 BRPM | WEIGHT: 181.4 LBS | HEIGHT: 63 IN | SYSTOLIC BLOOD PRESSURE: 149 MMHG | OXYGEN SATURATION: 99 %

## 2025-02-20 DIAGNOSIS — E11.39 TYPE 2 DIABETES MELLITUS WITH OTHER OPHTHALMIC COMPLICATION, WITH LONG-TERM CURRENT USE OF INSULIN (HCC): ICD-10-CM

## 2025-02-20 DIAGNOSIS — I10 PRIMARY HYPERTENSION: ICD-10-CM

## 2025-02-20 DIAGNOSIS — Z79.4 TYPE 2 DIABETES MELLITUS WITH OTHER OPHTHALMIC COMPLICATION, WITH LONG-TERM CURRENT USE OF INSULIN (HCC): ICD-10-CM

## 2025-02-20 DIAGNOSIS — Z12.5 SCREENING FOR PROSTATE CANCER: ICD-10-CM

## 2025-02-20 DIAGNOSIS — Z00.00 ANNUAL PHYSICAL EXAM: Primary | ICD-10-CM

## 2025-02-20 NOTE — PATIENT INSTRUCTIONS
"Patient Education     Routine physical for adults   The Basics   Written by the doctors and editors at [facility]   What is a physical? -- A physical is a routine visit, or \"check-up,\" with your doctor. You might also hear it called a \"wellness visit\" or \"preventive visit.\"  During each visit, the doctor will:   Ask about your physical and mental health   Ask about your habits, behaviors, and lifestyle   Do an exam   Give you vaccines if needed   Talk to you about any medicines you take   Give advice about your health   Answer your questions  Getting regular check-ups is an important part of taking care of your health. It can help your doctor find and treat any problems you have. But it's also important for preventing health problems.  A routine physical is different from a \"sick visit.\" A sick visit is when you see a doctor because of a health concern or problem. Since physicals are scheduled ahead of time, you can think about what you want to ask the doctor.  How often should I get a physical? -- It depends on your age and health. In general, for people age 21 years and older:   If you are younger than 50 years, you might be able to get a physical every 3 years.   If you are 50 years or older, your doctor might recommend a physical every year.  If you have an ongoing health condition, like diabetes or high blood pressure, your doctor will probably want to see you more often.  What happens during a physical? -- In general, each visit will include:   Physical exam - The doctor or nurse will check your height, weight, heart rate, and blood pressure. They will also look at your eyes and ears. They will ask about how you are feeling and whether you have any symptoms that bother you.   Medicines - It's a good idea to bring a list of all the medicines you take to each doctor visit. Your doctor will talk to you about your medicines and answer any questions. Tell them if you are having any side effects that bother you. You " "should also tell them if you are having trouble paying for any of your medicines.   Habits and behaviors - This includes:   Your diet   Your exercise habits   Whether you smoke, drink alcohol, or use drugs   Whether you are sexually active   Whether you feel safe at home  Your doctor will talk to you about things you can do to improve your health and lower your risk of health problems. They will also offer help and support. For example, if you want to quit smoking, they can give you advice and might prescribe medicines. If you want to improve your diet or get more physical activity, they can help you with this, too.   Lab tests, if needed - The tests you get will depend on your age and situation. For example, your doctor might want to check your:   Cholesterol   Blood sugar   Iron level   Vaccines - The recommended vaccines will depend on your age, health, and what vaccines you already had. Vaccines are very important because they can prevent certain serious or deadly infections.   Discussion of screening - \"Screening\" means checking for diseases or other health problems before they cause symptoms. Your doctor can recommend screening based on your age, risk, and preferences. This might include tests to check for:   Cancer, such as breast, prostate, cervical, ovarian, colorectal, prostate, lung, or skin cancer   Sexually transmitted infections, such as chlamydia and gonorrhea   Mental health conditions like depression and anxiety  Your doctor will talk to you about the different types of screening tests. They can help you decide which screenings to have. They can also explain what the results might mean.   Answering questions - The physical is a good time to ask the doctor or nurse questions about your health. If needed, they can refer you to other doctors or specialists, too.  Adults older than 65 years often need other care, too. As you get older, your doctor will talk to you about:   How to prevent falling at " home   Hearing or vision tests   Memory testing   How to take your medicines safely   Making sure that you have the help and support you need at home  All topics are updated as new evidence becomes available and our peer review process is complete.  This topic retrieved from [employer] on: May 02, 2024.  Topic 611928 Version 1.0  Release: 32.4.3 - C32.122  © 2024 UpToDate, Inc. and/or its affiliates. All rights reserved.  Consumer Information Use and Disclaimer   Disclaimer: This generalized information is a limited summary of diagnosis, treatment, and/or medication information. It is not meant to be comprehensive and should be used as a tool to help the user understand and/or assess potential diagnostic and treatment options. It does NOT include all information about conditions, treatments, medications, side effects, or risks that may apply to a specific patient. It is not intended to be medical advice or a substitute for the medical advice, diagnosis, or treatment of a health care provider based on the health care provider's examination and assessment of a patient's specific and unique circumstances. Patients must speak with a health care provider for complete information about their health, medical questions, and treatment options, including any risks or benefits regarding use of medications. This information does not endorse any treatments or medications as safe, effective, or approved for treating a specific patient. UpToDate, Inc. and its affiliates disclaim any warranty or liability relating to this information or the use thereof.The use of this information is governed by the Terms of Use, available at https://www.woltersSet.fmuwer.com/en/know/clinical-effectiveness-terms. 2024© UpToDate, Inc. and its affiliates and/or licensors. All rights reserved.  Copyright   © 2024 UpToDate, Inc. and/or its affiliates. All rights reserved.    Patient Education     Routine physical for adults   The Basics   Written by the  "doctors and editors at [facility]   What is a physical? -- A physical is a routine visit, or \"check-up,\" with your doctor. You might also hear it called a \"wellness visit\" or \"preventive visit.\"  During each visit, the doctor will:   Ask about your physical and mental health   Ask about your habits, behaviors, and lifestyle   Do an exam   Give you vaccines if needed   Talk to you about any medicines you take   Give advice about your health   Answer your questions  Getting regular check-ups is an important part of taking care of your health. It can help your doctor find and treat any problems you have. But it's also important for preventing health problems.  A routine physical is different from a \"sick visit.\" A sick visit is when you see a doctor because of a health concern or problem. Since physicals are scheduled ahead of time, you can think about what you want to ask the doctor.  How often should I get a physical? -- It depends on your age and health. In general, for people age 21 years and older:   If you are younger than 50 years, you might be able to get a physical every 3 years.   If you are 50 years or older, your doctor might recommend a physical every year.  If you have an ongoing health condition, like diabetes or high blood pressure, your doctor will probably want to see you more often.  What happens during a physical? -- In general, each visit will include:   Physical exam - The doctor or nurse will check your height, weight, heart rate, and blood pressure. They will also look at your eyes and ears. They will ask about how you are feeling and whether you have any symptoms that bother you.   Medicines - It's a good idea to bring a list of all the medicines you take to each doctor visit. Your doctor will talk to you about your medicines and answer any questions. Tell them if you are having any side effects that bother you. You should also tell them if you are having trouble paying for any of your " "medicines.   Habits and behaviors - This includes:   Your diet   Your exercise habits   Whether you smoke, drink alcohol, or use drugs   Whether you are sexually active   Whether you feel safe at home  Your doctor will talk to you about things you can do to improve your health and lower your risk of health problems. They will also offer help and support. For example, if you want to quit smoking, they can give you advice and might prescribe medicines. If you want to improve your diet or get more physical activity, they can help you with this, too.   Lab tests, if needed - The tests you get will depend on your age and situation. For example, your doctor might want to check your:   Cholesterol   Blood sugar   Iron level   Vaccines - The recommended vaccines will depend on your age, health, and what vaccines you already had. Vaccines are very important because they can prevent certain serious or deadly infections.   Discussion of screening - \"Screening\" means checking for diseases or other health problems before they cause symptoms. Your doctor can recommend screening based on your age, risk, and preferences. This might include tests to check for:   Cancer, such as breast, prostate, cervical, ovarian, colorectal, prostate, lung, or skin cancer   Sexually transmitted infections, such as chlamydia and gonorrhea   Mental health conditions like depression and anxiety  Your doctor will talk to you about the different types of screening tests. They can help you decide which screenings to have. They can also explain what the results might mean.   Answering questions - The physical is a good time to ask the doctor or nurse questions about your health. If needed, they can refer you to other doctors or specialists, too.  Adults older than 65 years often need other care, too. As you get older, your doctor will talk to you about:   How to prevent falling at home   Hearing or vision tests   Memory testing   How to take your " medicines safely   Making sure that you have the help and support you need at home  All topics are updated as new evidence becomes available and our peer review process is complete.  This topic retrieved from [employer] on: May 02, 2024.  Topic 407858 Version 1.0  Release: 32.4.3 - C32.122  © 2024 UpToDate, Inc. and/or its affiliates. All rights reserved.  Consumer Information Use and Disclaimer   Disclaimer: This generalized information is a limited summary of diagnosis, treatment, and/or medication information. It is not meant to be comprehensive and should be used as a tool to help the user understand and/or assess potential diagnostic and treatment options. It does NOT include all information about conditions, treatments, medications, side effects, or risks that may apply to a specific patient. It is not intended to be medical advice or a substitute for the medical advice, diagnosis, or treatment of a health care provider based on the health care provider's examination and assessment of a patient's specific and unique circumstances. Patients must speak with a health care provider for complete information about their health, medical questions, and treatment options, including any risks or benefits regarding use of medications. This information does not endorse any treatments or medications as safe, effective, or approved for treating a specific patient. UpToDate, Inc. and its affiliates disclaim any warranty or liability relating to this information or the use thereof.The use of this information is governed by the Terms of Use, available at https://www.woltersG2B Pharmauwer.com/en/know/clinical-effectiveness-terms. 2024© UpToDate, Inc. and its affiliates and/or licensors. All rights reserved.  Copyright   © 2024 UpToDate, Inc. and/or its affiliates. All rights reserved.    Patient Education     Routine physical for adults   The Basics   Written by the doctors and editors at [employer]   What is a physical? -- A physical is a  "routine visit, or \"check-up,\" with your doctor. You might also hear it called a \"wellness visit\" or \"preventive visit.\"  During each visit, the doctor will:   Ask about your physical and mental health   Ask about your habits, behaviors, and lifestyle   Do an exam   Give you vaccines if needed   Talk to you about any medicines you take   Give advice about your health   Answer your questions  Getting regular check-ups is an important part of taking care of your health. It can help your doctor find and treat any problems you have. But it's also important for preventing health problems.  A routine physical is different from a \"sick visit.\" A sick visit is when you see a doctor because of a health concern or problem. Since physicals are scheduled ahead of time, you can think about what you want to ask the doctor.  How often should I get a physical? -- It depends on your age and health. In general, for people age 21 years and older:   If you are younger than 50 years, you might be able to get a physical every 3 years.   If you are 50 years or older, your doctor might recommend a physical every year.  If you have an ongoing health condition, like diabetes or high blood pressure, your doctor will probably want to see you more often.  What happens during a physical? -- In general, each visit will include:   Physical exam - The doctor or nurse will check your height, weight, heart rate, and blood pressure. They will also look at your eyes and ears. They will ask about how you are feeling and whether you have any symptoms that bother you.   Medicines - It's a good idea to bring a list of all the medicines you take to each doctor visit. Your doctor will talk to you about your medicines and answer any questions. Tell them if you are having any side effects that bother you. You should also tell them if you are having trouble paying for any of your medicines.   Habits and behaviors - This includes:   Your diet   Your exercise " "habits   Whether you smoke, drink alcohol, or use drugs   Whether you are sexually active   Whether you feel safe at home  Your doctor will talk to you about things you can do to improve your health and lower your risk of health problems. They will also offer help and support. For example, if you want to quit smoking, they can give you advice and might prescribe medicines. If you want to improve your diet or get more physical activity, they can help you with this, too.   Lab tests, if needed - The tests you get will depend on your age and situation. For example, your doctor might want to check your:   Cholesterol   Blood sugar   Iron level   Vaccines - The recommended vaccines will depend on your age, health, and what vaccines you already had. Vaccines are very important because they can prevent certain serious or deadly infections.   Discussion of screening - \"Screening\" means checking for diseases or other health problems before they cause symptoms. Your doctor can recommend screening based on your age, risk, and preferences. This might include tests to check for:   Cancer, such as breast, prostate, cervical, ovarian, colorectal, prostate, lung, or skin cancer   Sexually transmitted infections, such as chlamydia and gonorrhea   Mental health conditions like depression and anxiety  Your doctor will talk to you about the different types of screening tests. They can help you decide which screenings to have. They can also explain what the results might mean.   Answering questions - The physical is a good time to ask the doctor or nurse questions about your health. If needed, they can refer you to other doctors or specialists, too.  Adults older than 65 years often need other care, too. As you get older, your doctor will talk to you about:   How to prevent falling at home   Hearing or vision tests   Memory testing   How to take your medicines safely   Making sure that you have the help and support you need at home  All " topics are updated as new evidence becomes available and our peer review process is complete.  This topic retrieved from [employer] on: May 02, 2024.  Topic 837178 Version 1.0  Release: 32.4.3 - C32.122  © 2024 UpToDate, Inc. and/or its affiliates. All rights reserved.  Consumer Information Use and Disclaimer   Disclaimer: This generalized information is a limited summary of diagnosis, treatment, and/or medication information. It is not meant to be comprehensive and should be used as a tool to help the user understand and/or assess potential diagnostic and treatment options. It does NOT include all information about conditions, treatments, medications, side effects, or risks that may apply to a specific patient. It is not intended to be medical advice or a substitute for the medical advice, diagnosis, or treatment of a health care provider based on the health care provider's examination and assessment of a patient's specific and unique circumstances. Patients must speak with a health care provider for complete information about their health, medical questions, and treatment options, including any risks or benefits regarding use of medications. This information does not endorse any treatments or medications as safe, effective, or approved for treating a specific patient. UpToDate, Inc. and its affiliates disclaim any warranty or liability relating to this information or the use thereof.The use of this information is governed by the Terms of Use, available at https://www.woltersAwarepointuwer.com/en/know/clinical-effectiveness-terms. 2024© UpToDate, Inc. and its affiliates and/or licensors. All rights reserved.  Copyright   © 2024 UpToDate, Inc. and/or its affiliates. All rights reserved.

## 2025-02-20 NOTE — PROGRESS NOTES
Adult Annual Physical  Name: Toribio Hernández      : 1960      MRN: [id number]  Encounter Provider: Rosalina Lama MD  Encounter Date: 2025   Encounter department: [facility]    Assessment & Plan  Annual physical exam  Colorectal cancer screening; last completed 23, normal with history of colon polyps, repeat 2028  Prostate cancer screening; last completed 3/1/24, PSA normal  Lung cancer screening;  AAA screening;  Osteoporosis screening associated with chronic corticosteroid use; last DEXA scan completed 24, significant for osteopenia with a statistically significant decrease in bone mineral density compared to DEXA scan completed 22, repeat 2026  Immunizations; TdaP, Zoster up to date; PCV20 due 3/2024, Hepatitis B?, RSV?, Influenza?, COVID?           Immunizations and preventive care screenings were discussed with patient today. Appropriate education was printed on patient's after visit summary.    {Prostate cancer screening - consider in males between age of 40-75 depending on age, race, and risk factors. There is difference in the guidelines in regards to the optimal age for screening.  USPSTF states to consider periodic screening in males between the age of 50 to 69. This text is informational and does not need to be selected but if you wish, you can insert standard documentation in your progress note by using F2 (Optional):85957}        Counseling:  {Annual Physical; Counselin}         History of Present Illness   {?Quick Links Encounters * My Last Note * Last Note in Specialty * Snapshot * Since Last Visit * History :02977}  Adult Annual Physical:  Patient presents for annual physical. Type 2 diabetes mellitus without current use of insulin  - Well controlled with ozempic and lifestyle modifications, no longer using insulin  - POCT A1C was 6.9% 1/3/25, within goal of A1C less than 7.0%  - A1C was 7.0% 10/7/24  - Continue current  medication regimen  - Follow up in 6 months  - Diabetic foot exam, eye exam due 3/2025    Osteopenia  - Patient takes Florinef 0.1 mg daily  - DEXA scan completed 8/23/24, significant for osteopenia with a statistically significant decrease in bone mineral density compared to DEXA scan completed 8/19/22, lumbar spine T-score -1.6, left total hip T-score -0.6, left femoral neck T-score -0.8  - Repeat DEXA scan 2/2026  - Discussed using Denosumab (Prolia) at last annual appointment***     Primary hypertension  - Blood pressure ***, compared to goal of less than 140/90  - Current medication regimen: amlodipine 10 mg, hydrochlorothiazide 12.5 mg, labetalol 100 mg BID, lisinopril 40 mg  - Encourage lifestyle modification with healthy diet and exercise    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 >20 year pack history?, ever CT lung? Screen for lung cancer (age 50-80, smoked 20 years, quit less than 15 years ago), screen for AAA (men age 65-75 who ever smoked).     Review of Systems  {Select to Display PM (Optional):30549}    Objective {?Quick Links Trend Vitals * Enter New Vitals * Results Review * Timeline (Adult) * Labs * Imaging * Cardiology * Procedures * Lung Cancer Screening * Surgical eConsent :39859}  There were no vitals taken for this visit.    Physical Exam  {Administrative / Billing Section (Optional):22328}

## 2025-02-20 NOTE — ASSESSMENT & PLAN NOTE
Blood pressure 149/80, goal blood pressure less than 130/80.  Patient reports that he sometimes skips evening dose of labetalol.  He has also not taken medications today morning.  Encouraged him to take medications as advised.  Will continue current regimen at this time of labetalol 100 mg twice daily, amlodipine 10 mg daily, hydrochlorothiazide 12.5 mg daily and follow-up in 3 months for blood pressure recheck.

## 2025-02-20 NOTE — ASSESSMENT & PLAN NOTE
Lab Results   Component Value Date    HGBA1C 6.9 (H) 01/03/2025     Within goal, continue current regimen of Ozempic 1 mg subcutaneously weekly.  Continue follow-up with endocrine  Orders:    Lipid panel; Future

## 2025-02-20 NOTE — PROGRESS NOTES
Adult Annual Physical  Name: Toribio Hernández      : 1960      MRN: [id number]  Encounter Provider: Rosalnia Lama MD  Encounter Date: 2025   Encounter department: [facility]    Assessment & Plan  Annual physical exam         Primary hypertension  Blood pressure 149/80, goal blood pressure less than 130/80.  Patient reports that he sometimes skips evening dose of labetalol.  He has also not taken medications today morning.  Encouraged him to take medications as advised.  Will continue current regimen at this time of labetalol 100 mg twice daily, amlodipine 10 mg daily, hydrochlorothiazide 12.5 mg daily and follow-up in 3 months for blood pressure recheck.       Type 2 diabetes mellitus with other ophthalmic complication, with long-term current use of insulin ([facility])    Lab Results   Component Value Date    HGBA1C 6.9 (H) 2025     Within goal, continue current regimen of Ozempic 1 mg subcutaneously weekly.  Continue follow-up with endocrine  Orders:    Lipid panel; Future    Screening for prostate cancer    Orders:    PSA, Total Screen; Future      Immunizations and preventive care screenings were discussed with patient today. Appropriate education was printed on patient's after visit summary.        Counseling:  Exercise: the importance of regular exercise/physical activity was discussed. Recommend exercise 3-5 times per week for at least 30 minutes.     BMI Counseling: Body mass index is 32.13 kg/m². The BMI is above normal. Nutrition recommendations include decreasing portion sizes, encouraging healthy choices of fruits and vegetables, decreasing fast food intake, consuming healthier snacks and limiting drinks that contain sugar. Exercise recommendations include moderate physical activity 150 minutes/week. Rationale for BMI follow-up plan is due to patient being overweight or obese.     Depression Screening and Follow-up Plan: Patient was screened for depression  "during today's encounter. They screened negative with a PHQ-2 score of 0.        History of Present Illness     Adult Annual Physical:  Patient presents for annual physical. Toribio presented to office for annual physical.  He has no acute concerns today.  He has been following up with specialists regularly.  Reports that he has noticed high blood pressure sometimes, but he forgets to take evening dose of labetalol.  He has not taken morning medications today.  Does complain of occasional right shoulder pain, but is not interested in physical therapy at this time..     Diet and Physical Activity:  - Diet/Nutrition: well balanced diet and limited junk food.  - Exercise: walking and 3-4 times a week on average.    Depression Screening:  - PHQ-2 Score: 0  - PHQ-9 Score: 0    General Health:  - Sleep: sleeps well.  - Hearing: normal hearing bilateral ears.  - Vision: no vision problems and goes for regular eye exams.  - Dental: regular dental visits.     Health:  - History of STDs: no.   - Urinary symptoms: none.     Advanced Care Planning:  - Has an advanced directive?: no    - Has a durable medical POA?: no    - ACP document given to patient?: no      Review of Systems   Constitutional:  Negative for chills and fever.   HENT:  Negative for congestion.    Respiratory:  Negative for shortness of breath.    Cardiovascular:  Negative for chest pain.   Gastrointestinal:  Negative for diarrhea, nausea and vomiting.   Genitourinary:  Negative for difficulty urinating.   Neurological:  Negative for headaches.         Objective   /80   Pulse 80   Temp 98.3 °F (36.8 °C) (Temporal)   Resp 16   Ht 5' 3\" (1.6 m)   Wt 82.3 kg (181 lb 6.4 oz)   SpO2 99%   BMI 32.13 kg/m²     Physical Exam  Constitutional:       Appearance: He is well-developed.   HENT:      Head: Normocephalic and atraumatic.      Right Ear: External ear normal.      Left Ear: External ear normal.   Eyes:      Conjunctiva/sclera: Conjunctivae normal.    "   Pupils: Pupils are equal, round, and reactive to light.   Cardiovascular:      Rate and Rhythm: Normal rate and regular rhythm.      Heart sounds: Murmur heard.      No friction rub.   Pulmonary:      Effort: Pulmonary effort is normal. No respiratory distress.      Breath sounds: Normal breath sounds. No wheezing or rales.   Abdominal:      General: Bowel sounds are normal. There is no distension.      Palpations: Abdomen is soft.      Tenderness: There is no abdominal tenderness.   Musculoskeletal:      Right shoulder: Decreased range of motion.      Cervical back: Normal range of motion and neck supple.      Comments: Right arm range of motion limited secondary to pain.   Skin:     General: Skin is warm and dry.   Neurological:      Mental Status: He is alert and oriented to person, place, and time.

## 2025-02-26 DIAGNOSIS — I10 ESSENTIAL HYPERTENSION: ICD-10-CM

## 2025-02-26 RX ORDER — LABETALOL 100 MG/1
100 TABLET, FILM COATED ORAL 2 TIMES DAILY
Qty: 180 TABLET | Refills: 1 | Status: SHIPPED | OUTPATIENT
Start: 2025-02-26

## 2025-02-26 NOTE — TELEPHONE ENCOUNTER
Reason for call:   [x] Refill   [] Prior Auth  [] Other:     Office:   [x] PCP/Provider - [facility]   [] Specialty/Provider -     Medication: labetalol     Dose/Frequency: 100 mg take 1 tablet every 12 hours     Quantity: 180    Pharmacy: [facility] mail order     Does the patient have enough for 3 days?   [x] Yes but said he only has a weeks worth left so he needs script sent asap so he does not run out while waiting for it to come in the mail   [] No - Send as HP to POD

## 2025-04-10 ENCOUNTER — APPOINTMENT (OUTPATIENT)
Dept: LAB | Facility: CLINIC | Age: 65
End: 2025-04-10
Payer: COMMERCIAL

## 2025-04-10 DIAGNOSIS — Z12.5 SCREENING FOR PROSTATE CANCER: ICD-10-CM

## 2025-04-10 DIAGNOSIS — E11.39 TYPE 2 DIABETES MELLITUS WITH OTHER OPHTHALMIC COMPLICATION, WITH LONG-TERM CURRENT USE OF INSULIN (HCC): ICD-10-CM

## 2025-04-10 DIAGNOSIS — Z94.4 LIVER REPLACED BY TRANSPLANT (HCC): ICD-10-CM

## 2025-04-10 DIAGNOSIS — E78.49 OTHER HYPERLIPIDEMIA: ICD-10-CM

## 2025-04-10 DIAGNOSIS — R80.8 OTHER PROTEINURIA: ICD-10-CM

## 2025-04-10 DIAGNOSIS — Z79.4 TYPE 2 DIABETES MELLITUS WITH OTHER OPHTHALMIC COMPLICATION, WITH LONG-TERM CURRENT USE OF INSULIN (HCC): ICD-10-CM

## 2025-04-10 LAB
ALBUMIN SERPL BCG-MCNC: 4.7 G/DL (ref 3.5–5)
ALP SERPL-CCNC: 84 U/L (ref 34–104)
ALT SERPL W P-5'-P-CCNC: 28 U/L (ref 7–52)
ANION GAP SERPL CALCULATED.3IONS-SCNC: 6 MMOL/L (ref 4–13)
AST SERPL W P-5'-P-CCNC: 15 U/L (ref 13–39)
BASOPHILS # BLD AUTO: 0.02 THOUSANDS/ÂΜL (ref 0–0.1)
BASOPHILS NFR BLD AUTO: 0 % (ref 0–1)
BILIRUB SERPL-MCNC: 0.68 MG/DL (ref 0.2–1)
BUN SERPL-MCNC: 52 MG/DL (ref 5–25)
CALCIUM SERPL-MCNC: 9.9 MG/DL (ref 8.4–10.2)
CHLORIDE SERPL-SCNC: 108 MMOL/L (ref 96–108)
CHOLEST SERPL-MCNC: 158 MG/DL (ref ?–200)
CO2 SERPL-SCNC: 25 MMOL/L (ref 21–32)
CREAT SERPL-MCNC: 1.96 MG/DL (ref 0.6–1.3)
EOSINOPHIL # BLD AUTO: 0.12 THOUSAND/ÂΜL (ref 0–0.61)
EOSINOPHIL NFR BLD AUTO: 2 % (ref 0–6)
ERYTHROCYTE [DISTWIDTH] IN BLOOD BY AUTOMATED COUNT: 13 % (ref 11.6–15.1)
GFR SERPL CREATININE-BSD FRML MDRD: 34 ML/MIN/1.73SQ M
GLUCOSE P FAST SERPL-MCNC: 163 MG/DL (ref 65–99)
HCT VFR BLD AUTO: 39.6 % (ref 36.5–49.3)
HDLC SERPL-MCNC: 35 MG/DL
HGB BLD-MCNC: 12.7 G/DL (ref 12–17)
IMM GRANULOCYTES # BLD AUTO: 0.02 THOUSAND/UL (ref 0–0.2)
IMM GRANULOCYTES NFR BLD AUTO: 0 % (ref 0–2)
LDLC SERPL CALC-MCNC: 46 MG/DL (ref 0–100)
LYMPHOCYTES # BLD AUTO: 0.91 THOUSANDS/ÂΜL (ref 0.6–4.47)
LYMPHOCYTES NFR BLD AUTO: 16 % (ref 14–44)
MAGNESIUM SERPL-MCNC: 2 MG/DL (ref 1.9–2.7)
MCH RBC QN AUTO: 29.5 PG (ref 26.8–34.3)
MCHC RBC AUTO-ENTMCNC: 32.1 G/DL (ref 31.4–37.4)
MCV RBC AUTO: 92 FL (ref 82–98)
MONOCYTES # BLD AUTO: 0.53 THOUSAND/ÂΜL (ref 0.17–1.22)
MONOCYTES NFR BLD AUTO: 9 % (ref 4–12)
NEUTROPHILS # BLD AUTO: 4.07 THOUSANDS/ÂΜL (ref 1.85–7.62)
NEUTS SEG NFR BLD AUTO: 73 % (ref 43–75)
NONHDLC SERPL-MCNC: 123 MG/DL
NRBC BLD AUTO-RTO: 0 /100 WBCS
PLATELET # BLD AUTO: 135 THOUSANDS/UL (ref 149–390)
PMV BLD AUTO: 11.7 FL (ref 8.9–12.7)
POTASSIUM SERPL-SCNC: 5.4 MMOL/L (ref 3.5–5.3)
PROT SERPL-MCNC: 7.2 G/DL (ref 6.4–8.4)
PSA SERPL-MCNC: 0.78 NG/ML (ref 0–4)
RBC # BLD AUTO: 4.3 MILLION/UL (ref 3.88–5.62)
SODIUM SERPL-SCNC: 139 MMOL/L (ref 135–147)
TRIGL SERPL-MCNC: 383 MG/DL (ref ?–150)
TRIGL SERPL-MCNC: 386 MG/DL (ref ?–150)
WBC # BLD AUTO: 5.67 THOUSAND/UL (ref 4.31–10.16)

## 2025-04-10 PROCEDURE — 84478 ASSAY OF TRIGLYCERIDES: CPT

## 2025-04-10 PROCEDURE — 85025 COMPLETE CBC W/AUTO DIFF WBC: CPT

## 2025-04-10 PROCEDURE — G0103 PSA SCREENING: HCPCS

## 2025-04-10 PROCEDURE — 80197 ASSAY OF TACROLIMUS: CPT

## 2025-04-10 PROCEDURE — 83735 ASSAY OF MAGNESIUM: CPT

## 2025-04-10 PROCEDURE — 80053 COMPREHEN METABOLIC PANEL: CPT

## 2025-04-10 PROCEDURE — 80061 LIPID PANEL: CPT

## 2025-04-10 PROCEDURE — 36415 COLL VENOUS BLD VENIPUNCTURE: CPT

## 2025-04-11 LAB — TACROLIMUS BLD-MCNC: 4.7 NG/ML (ref 3–15)

## 2025-04-17 ENCOUNTER — RESULTS FOLLOW-UP (OUTPATIENT)
Dept: FAMILY MEDICINE CLINIC | Facility: CLINIC | Age: 65
End: 2025-04-17

## 2025-04-21 DIAGNOSIS — I10 PRIMARY HYPERTENSION: ICD-10-CM

## 2025-04-21 DIAGNOSIS — E87.5 HYPERKALEMIA: Primary | ICD-10-CM

## 2025-04-21 RX ORDER — FLUDROCORTISONE ACETATE 0.1 MG/1
0.1 TABLET ORAL DAILY
Qty: 90 TABLET | Refills: 3 | Status: SHIPPED | OUTPATIENT
Start: 2025-04-21

## 2025-04-21 RX ORDER — HYDROCHLOROTHIAZIDE 12.5 MG/1
12.5 TABLET ORAL DAILY
Qty: 90 TABLET | Refills: 1 | Status: SHIPPED | OUTPATIENT
Start: 2025-04-21

## 2025-04-21 NOTE — TELEPHONE ENCOUNTER
Historic provider stated DR Castle to refill     Reason for call:   [x] Refill   [] Prior Auth  [] Other:     Office:   [] PCP/Provider -   [x] Specialty/Provider - Vaughn Gates    Medication: Fludrocortisone     Dose/Frequency: 0.1 mg Daily     Quantity: 90    Pharmacy: [facility]   Does the patient have enough for 3 days?   [] Yes   [] No - Send as HP to POD    Mail Away Pharmacy   Does the patient have enough for 10 days?   [x] Yes   [] No - Send as HP to POD

## 2025-04-27 DIAGNOSIS — R80.9 PROTEINURIA, UNSPECIFIED TYPE: ICD-10-CM

## 2025-04-29 RX ORDER — LISINOPRIL 40 MG/1
40 TABLET ORAL DAILY
Qty: 90 TABLET | Refills: 1 | Status: SHIPPED | OUTPATIENT
Start: 2025-04-29

## 2025-04-30 ENCOUNTER — TELEPHONE (OUTPATIENT)
Dept: FAMILY MEDICINE CLINIC | Facility: CLINIC | Age: 65
End: 2025-04-30

## 2025-05-06 ENCOUNTER — OFFICE VISIT (OUTPATIENT)
Dept: ENDOCRINOLOGY | Facility: CLINIC | Age: 65
End: 2025-05-06
Payer: COMMERCIAL

## 2025-05-06 VITALS
SYSTOLIC BLOOD PRESSURE: 136 MMHG | HEART RATE: 81 BPM | DIASTOLIC BLOOD PRESSURE: 78 MMHG | WEIGHT: 181.6 LBS | HEIGHT: 63 IN | OXYGEN SATURATION: 97 % | BODY MASS INDEX: 32.18 KG/M2

## 2025-05-06 DIAGNOSIS — Z79.4 TYPE 2 DIABETES MELLITUS WITH OTHER OPHTHALMIC COMPLICATION, WITH LONG-TERM CURRENT USE OF INSULIN (HCC): Primary | ICD-10-CM

## 2025-05-06 DIAGNOSIS — E11.39 TYPE 2 DIABETES MELLITUS WITH OTHER OPHTHALMIC COMPLICATION, WITH LONG-TERM CURRENT USE OF INSULIN (HCC): Primary | ICD-10-CM

## 2025-05-06 DIAGNOSIS — I10 PRIMARY HYPERTENSION: ICD-10-CM

## 2025-05-06 LAB — SL AMB POCT HEMOGLOBIN AIC: 7.6 (ref ?–6.5)

## 2025-05-06 PROCEDURE — 99214 OFFICE O/P EST MOD 30 MIN: CPT

## 2025-05-06 PROCEDURE — 83036 HEMOGLOBIN GLYCOSYLATED A1C: CPT

## 2025-05-06 NOTE — PROGRESS NOTES
Established Patient Progress Note      Chief Complaint   Patient presents with    Diabetes Type 2        Impression & Plan:    Assessment & Plan  Type 2 diabetes mellitus with other ophthalmic complication, with long-term current use of insulin (HCC)  Diabetes control:  HGA1C is up   BGs higher in the morning   Treatment regimen:   He would like to focus on lifestyle modifications - watch snacking and increase exercise  If BGs remain elevated consider increasing Ozempic to 2 mg  Discussed risks/complications associated with uncontrolled diabetes.    Advised to adhere to diabetic diet, and recommended staying active/exercising routinely.  Keep carbohydrates consistent to limit blood glucose fluctuations.  Advised to call if blood sugars less than 70 mg/dl or over 300 mg/dl.   Discussed symptoms and treatment of hypoglycemia.    Recommended routine follow-up with podiatry and ophthalmology.   Ordered blood work to complete prior to next visit.   Lab Results   Component Value Date    HGBA1C 7.6 (A) 05/06/2025       Orders:    POCT hemoglobin A1c    Hemoglobin A1C; Future    Comprehensive metabolic panel; Future    Primary hypertension  BP at goal  Continue current medication regimen          History of Present Illness:   Toribio Hernández is a 64 y.o. male with hx of liver transplant, HTN, HLD and type 2 diabetes seen in follow up. Reports complications of retinopathy, microalbumunuria and CKD. Denies recent severe hypoglycemic or severe hyperglycemic episodes. Denies any issues with his current regimen. POCT A1C was 7.6. Home glucose monitoring: are performed regularly.     Home blood glucose readings:   Before breakfast: 140-180  Before dinner: 100-150     Current regimen:   Ozempic 1 mg weekly     Last Eye Exam: [facility]   Last Foot Exam: done today     Has hypertension: Taking amlodipine, HCTZ and lisinopril   Has hyperlipidemia: Taking atorvastatin       Patient Active Problem List   Diagnosis     Hypertension    Type 2 diabetes mellitus with ophthalmic complication, with long-term current use of insulin (HCC)    Cirrhosis    Hyperlipidemia    Status post liver transplant    Hyperglycemia    Preop cardiovascular exam    Osteopenia    Male erectile disorder    Type 2 diabetes mellitus with hyperglycemia, with long-term current use of insulin (HCC)    Respiratory tract infection    CKD (chronic kidney disease)    Gout    Swelling of left elbow    Achilles tendinitis of right lower extremity    Bunion of great toe of left foot    Encephalopathy, portal systemic (HCC)    Immunosuppression (HCC)    Hyperkalemia    Murmur, cardiac    Proteinuria    Right arm pain    Elevated blood pressure reading in office without diagnosis of hypertension    Neuropathy of left upper extremity    Pain in neck    Acute pain of left shoulder      Past Medical History:   Diagnosis Date    Bunion of right foot     Chronic kidney disease, stage III (moderate) (HCC)     RESOLVED: [id number]    Cirrhosis (HCC)     Diabetes mellitus (HCC)     Gout 10/6/18    Hepatic encephalopathy (HCC)     Hepatitis C     Hyperkalemia     Hyperlipidemia     Hypertension     Pancytopenia (HCC)     Status post liver transplant (HCC)     2015    Umbilical hernia     Wears glasses       Past Surgical History:   Procedure Laterality Date    ANKLE SURGERY      CHOLECYSTECTOMY      FOOT SURGERY  03/2022    Bunion removal    FRACTURE SURGERY      left foot    GALLBLADDER SURGERY      HIP SURGERY      LIVER TRANSPLANTATION      LAST ASSESSED: [id number]    RI COLONOSCOPY FLX DX W/COLLJ SPEC WHEN PFRMD N/A 02/16/2017    Procedure: COLONOSCOPY;  Surgeon: Brayden Massey MD;  Location: [facility];  Service: Gastroenterology    RI CORRJ HLX VLGS BNCTY SESMDC DSTL METAR OSTEOT Right 02/04/2022    Procedure: BUNIONECTOMY TENZIN;  Surgeon: Darryl Benavides DPM;  Location:  [facility];  Service: Podiatry    TONSILLECTOMY        Social History     Tobacco Use    Smoking  status: Never    Smokeless tobacco: Never   Substance Use Topics    Alcohol use: Yes     Alcohol/week: 1.0 standard drink of alcohol     Types: 1 Cans of beer per week     Comment: occassionally     No Known Allergies      Current Outpatient Medications:     amLODIPine (NORVASC) 10 mg tablet, TAKE 1 TABLET DAILY, Disp: 90 tablet, Rfl: 1    atorvastatin (LIPITOR) 40 mg tablet, Take 1 tablet (40 mg total) by mouth daily at bedtime, Disp: 90 tablet, Rfl: 3    azaTHIOprine (IMURAN) 50 mg tablet, Take 1 tablet (50 mg total) by mouth daily, Disp: 90 tablet, Rfl: 3    BD Pen Needle Montserrat 2nd Gen 32G X 4 MM MISC, USE 2 (TWO) TIMES A DAY, Disp: 200 each, Rfl: 3    Cholecalciferol (Vitamin D3) 50 MCG (2000 UT) capsule, Take 1 capsule by mouth daily Gel caps, Disp: , Rfl:     fludrocortisone (FLORINEF) 0.1 mg tablet, Take 1 tablet (0.1 mg total) by mouth daily, Disp: 90 tablet, Rfl: 3    hydroCHLOROthiazide 12.5 mg tablet, TAKE 1 TABLET DAILY, Disp: 90 tablet, Rfl: 1    labetalol (NORMODYNE) 100 mg tablet, Take 1 tablet (100 mg total) by mouth 2 (two) times a day, Disp: 180 tablet, Rfl: 1    latanoprost (XALATAN) 0.005 % ophthalmic solution, , Disp: , Rfl:     lisinopril (ZESTRIL) 40 mg tablet, TAKE 1 TABLET BY MOUTH EVERY DAY, Disp: 90 tablet, Rfl: 1    Magnesium Oxide (Magnesium Oxide 400) 240 MG PACK, Take 400 mg by mouth 2 (two) times a day, Disp: 1 each, Rfl: 1    Multiple Vitamin (MULTIVITAMIN ADULT PO), Take by mouth Daily, Disp: , Rfl:     semaglutide, 1 mg/dose, (Ozempic, 1 MG/DOSE,) 4 mg/3 mL injection pen, Inject 0.75 mL (1 mg total) under the skin every 7 days, Disp: 9 mL, Rfl: 3    tacrolimus (PROGRAF) 1 mg capsule, Takes 4 tabs in am, 3 tabs in pm, Disp: , Rfl:     Blood Pressure Monitor KIT, Use once a week (Patient not taking: Reported on 8/15/2024), Disp: 1 kit, Rfl: 0    MAGnesium-Oxide 400 (240 Mg) MG TABS, , Disp: , Rfl:     methylPREDNISolone 4 MG tablet therapy pack, Use as directed on package (Patient not  "taking: Reported on 2/20/2025), Disp: 21 each, Rfl: 0    TRUE METRIX BLOOD GLUCOSE TEST test strip, Test twice daily (Patient not taking: Reported on 10/21/2024), Disp: , Rfl:     Review of Systems   Constitutional:  Negative for fatigue, fever and unexpected weight change.   Eyes:  Negative for visual disturbance.   Respiratory:  Negative for shortness of breath.    Cardiovascular:  Negative for chest pain.   Gastrointestinal:  Negative for abdominal pain, constipation, diarrhea, nausea and vomiting.   Endocrine: Negative for polydipsia and polyuria.   Skin:  Negative for wound.   Neurological:  Negative for dizziness, syncope and numbness.   All other systems reviewed and are negative.      Physical Exam:  Body mass index is 32.17 kg/m².  /78   Pulse 81   Ht 5' 3\" (1.6 m)   Wt 82.4 kg (181 lb 9.6 oz)   SpO2 97%   BMI 32.17 kg/m²    Wt Readings from Last 3 Encounters:   05/06/25 82.4 kg (181 lb 9.6 oz)   02/20/25 82.3 kg (181 lb 6.4 oz)   12/12/24 80.7 kg (178 lb)       Physical Exam  Vitals reviewed.   Constitutional:       Appearance: Normal appearance. He is obese.   Cardiovascular:      Rate and Rhythm: Normal rate.      Pulses: no weak pulses.           Dorsalis pedis pulses are 2+ on the right side and 2+ on the left side.   Pulmonary:      Effort: Pulmonary effort is normal.   Feet:      Right foot:      Skin integrity: No ulcer, skin breakdown, erythema, warmth, callus or dry skin.      Left foot:      Skin integrity: No ulcer, skin breakdown, erythema, warmth, callus or dry skin.   Neurological:      Mental Status: He is alert and oriented to person, place, and time.   Psychiatric:         Mood and Affect: Mood normal.       Patient's shoes and socks removed.    Right Foot/Ankle   Right Foot Inspection  Skin Exam: skin normal and skin intact. No dry skin, no warmth, no callus, no erythema, no maceration, no abnormal color, no pre-ulcer, no ulcer and no callus.     Toe Exam: ROM and strength " "within normal limits. No swelling, no tenderness, erythema and  no right toe deformity    Sensory   Vibration: intact  Monofilament testing: intact    Vascular  Capillary refills: < 3 seconds  The right DP pulse is 2+.     Left Foot/Ankle  Left Foot Inspection  Skin Exam: skin normal and skin intact. No dry skin, no warmth, no erythema, no maceration, normal color, no pre-ulcer, no ulcer and no callus.     Toe Exam: ROM and strength within normal limits. No swelling, no tenderness and no left toe deformity.     Sensory   Vibration: intact  Monofilament testing: intact    Vascular  Capillary refills: < 3 seconds  The left DP pulse is 2+.     Assign Risk Category  No deformity present  No loss of protective sensation  No weak pulses  Risk: 0      Labs:   Lab Results   Component Value Date    HGBA1C 7.6 (A) 05/06/2025    HGBA1C 6.9 (H) 01/03/2025    HGBA1C 7.0 (H) 10/07/2024     Lab Results   Component Value Date    CREATININE 1.96 (H) 04/10/2025    CREATININE 1.78 (H) 01/07/2025    CREATININE 1.98 (H) 01/03/2025    BUN 52 (H) 04/10/2025     01/08/2016    K 5.4 (H) 04/10/2025     04/10/2025    CO2 25 04/10/2025     eGFRcr   Date Value Ref Range Status   01/07/2025 42 (L) >=60 mL/min/1.73 m2 Final     Comment:     Estimated glomerular filtration rate (eGFR) is calculated without a race  coefficient. Values should be interpreted in the context of the patient's full  clinical presentation. Reference: Zeenat Dexter et al. \"A unifying approach  for GFR estimation: recommendations of the NKF-ASN task force on reassessing the  inclusion of race in diagnosing kidney disease.\" American Journal of Kidney  Diseases (2021)     eGFR   Date Value Ref Range Status   04/10/2025 34 ml/min/1.73sq m Final     Lab Results   Component Value Date    CHOL 174 03/10/2015    HDL 35 (L) 04/10/2025    TRIG 383 (H) 04/10/2025     Lab Results   Component Value Date    ALT 28 04/10/2025    AST 15 04/10/2025    GGT 23 06/03/2024    " ALKPHOS 84 04/10/2025    BILITOT 0.29 01/08/2016     Lab Results   Component Value Date    GFJ9MOWIOOYK 1.002 12/02/2023    ZJA3ZSISEDBU 1.091 05/05/2022    OSK8XIOYOVSK 1.432 06/01/2019       There are no Patient Instructions on file for this visit.    Discussed with the patient and all questioned fully answered. He will call me if any problems arise.    LETICIA Andrea

## 2025-05-06 NOTE — ASSESSMENT & PLAN NOTE
Diabetes control:  HGA1C is up   BGs higher in the morning   Treatment regimen:   He would like to focus on lifestyle modifications - watch snacking and increase exercise  If BGs remain elevated consider increasing Ozempic to 2 mg  Discussed risks/complications associated with uncontrolled diabetes.    Advised to adhere to diabetic diet, and recommended staying active/exercising routinely.  Keep carbohydrates consistent to limit blood glucose fluctuations.  Advised to call if blood sugars less than 70 mg/dl or over 300 mg/dl.   Discussed symptoms and treatment of hypoglycemia.    Recommended routine follow-up with podiatry and ophthalmology.   Ordered blood work to complete prior to next visit.   Lab Results   Component Value Date    HGBA1C 7.6 (A) 05/06/2025       Orders:    POCT hemoglobin A1c    Hemoglobin A1C; Future    Comprehensive metabolic panel; Future

## 2025-06-06 ENCOUNTER — TRANSCRIBE ORDERS (OUTPATIENT)
Dept: LAB | Facility: CLINIC | Age: 65
End: 2025-06-06

## 2025-06-06 ENCOUNTER — APPOINTMENT (OUTPATIENT)
Dept: LAB | Facility: CLINIC | Age: 65
End: 2025-06-06
Payer: COMMERCIAL

## 2025-06-06 DIAGNOSIS — N18.9 CHRONIC KIDNEY DISEASE, UNSPECIFIED CKD STAGE: ICD-10-CM

## 2025-06-06 DIAGNOSIS — R80.8 OTHER PROTEINURIA: ICD-10-CM

## 2025-06-06 LAB
ANION GAP SERPL CALCULATED.3IONS-SCNC: 9 MMOL/L (ref 4–13)
BUN SERPL-MCNC: 59 MG/DL (ref 5–25)
CALCIUM SERPL-MCNC: 9.6 MG/DL (ref 8.4–10.2)
CHLORIDE SERPL-SCNC: 110 MMOL/L (ref 96–108)
CO2 SERPL-SCNC: 19 MMOL/L (ref 21–32)
CREAT SERPL-MCNC: 2.07 MG/DL (ref 0.6–1.3)
CREAT UR-MCNC: 220.4 MG/DL
GFR SERPL CREATININE-BSD FRML MDRD: 32 ML/MIN/1.73SQ M
GLUCOSE P FAST SERPL-MCNC: 139 MG/DL (ref 65–99)
POTASSIUM SERPL-SCNC: 5.5 MMOL/L (ref 3.5–5.3)
PROT UR-MCNC: 30.7 MG/DL
PROT/CREAT UR: 0.1 MG/G{CREAT}
SODIUM SERPL-SCNC: 138 MMOL/L (ref 135–147)

## 2025-06-06 PROCEDURE — 80048 BASIC METABOLIC PNL TOTAL CA: CPT

## 2025-06-06 PROCEDURE — 36415 COLL VENOUS BLD VENIPUNCTURE: CPT

## 2025-06-16 ENCOUNTER — OFFICE VISIT (OUTPATIENT)
Dept: NEPHROLOGY | Facility: CLINIC | Age: 65
End: 2025-06-16
Payer: COMMERCIAL

## 2025-06-16 VITALS
SYSTOLIC BLOOD PRESSURE: 128 MMHG | WEIGHT: 180 LBS | HEIGHT: 63 IN | HEART RATE: 68 BPM | DIASTOLIC BLOOD PRESSURE: 78 MMHG | BODY MASS INDEX: 31.89 KG/M2

## 2025-06-16 DIAGNOSIS — E87.5 HYPERKALEMIA: ICD-10-CM

## 2025-06-16 DIAGNOSIS — R80.8 OTHER PROTEINURIA: Primary | ICD-10-CM

## 2025-06-16 DIAGNOSIS — N18.9 CHRONIC KIDNEY DISEASE, UNSPECIFIED CKD STAGE: ICD-10-CM

## 2025-06-16 PROCEDURE — 99214 OFFICE O/P EST MOD 30 MIN: CPT | Performed by: INTERNAL MEDICINE

## 2025-06-16 RX ORDER — FLUDROCORTISONE ACETATE 0.1 MG/1
0.1 TABLET ORAL DAILY
Qty: 90 TABLET | Refills: 3 | Status: SHIPPED | OUTPATIENT
Start: 2025-06-16

## 2025-06-16 NOTE — PATIENT INSTRUCTIONS
You are here for follow-up you look great you have lost weight and I got very excited because the amount of protein in your urine has stayed very low in fact it is close to negative so saying that the weight loss definitely helped.  Generally if diabetes causes protein in the urine it does not go this low so it makes me think it may be was related to tacrolimus and weight gain and by going away it will help delay damage.    The creatinine test was 2 and that seems to fluctuate and this was towards the higher end of your baseline but we will continue to monitor it is not that far off from 5 years ago at times.  When I see you go up and down it usually subtle hydration changes in you do not eat as much because of the medications you are on but we will just continue to follow and hopefully does not represent progression and is just related to hydration factors.    Call me if there is any problems before next visit

## 2025-06-16 NOTE — PROGRESS NOTES
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  He has lost weight about 25 pounds and protein estimations have dramatically improved in the last 2 have been excellent in fact the most recent was 100 mg.  That tells me that he did not have diabetic nephropathy as the proteinuria should not disappear from that.  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  With weight loss proteinuria is reduced significantly and hopefully that will equate to a slow progression.    His latest creatinine happens to be 2 which is slightly at the higher end of his baseline range.  He is not eating as much due to the use of Ozempic he is on a mild diuretic and an ACE inhibitor so he can see fluctuations in creatinine related to changes in effective volume status.  For now we will continue current medications and continue to monitor with blood pressure control and glycemic control.         Chronic kidney disease, unspecified CKD stage  Lab Results   Component Value Date    EGFR 32 2025    EGFR 34 04/10/2025    EGFR 42 (L) 2025    CREATININE 2.07 (H) 2025    CREATININE 1.96 (H) 04/10/2025    CREATININE 1.78 (H) 2025     As above patient has chronic kidney disease creatinine is 2 slightly at higher end of baseline range proteinuria has gone down to 100 mg.  Continue with blood pressure control weight loss routine health management and will continue to monitor.  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    I have spent a total time of 30  minutes in caring for this patient on the day of the visit/encounter including Diagnostic results, Instructions for management, Patient and family education, Reviewing/placing orders in the medical record (including tests, medications, and/or procedures), and Obtaining or reviewing history  .            History of Present Illness   HPI  Toribio Hernández is a 64 y.o. male who presents for routine follow-up.  The patient is doing well looks great he says his weight seems to have plateaued.  Other than that no illnesses no hospitalizations tolerating his medications.  No complaints today for me.  History obtained from: patient    Review of Systems   Constitutional:  Negative for chills, diaphoresis and fever.   HENT: Negative.     Eyes: Negative.    Respiratory:  Negative for cough, chest tightness, shortness of breath and wheezing.    Cardiovascular:  Negative for chest pain and leg swelling.   Gastrointestinal:  Negative for abdominal pain, diarrhea, nausea and vomiting.   Genitourinary: Negative.    Neurological:  Negative for dizziness and headaches.   Psychiatric/Behavioral:  Negative for agitation, behavioral problems and confusion.           Objective   There were no vitals taken for this visit.     Physical Exam  Constitutional:       General: He is not in acute distress.     Appearance: He is not toxic-appearing.   HENT:      Head: Normocephalic and atraumatic.      Nose: Nose normal.      Mouth/Throat:      Mouth: Mucous membranes are moist.     Eyes:      Extraocular Movements: Extraocular movements intact.       Cardiovascular:      Rate and Rhythm: Normal rate and regular rhythm.      Heart sounds:      No gallop.   Pulmonary:      Effort: Pulmonary effort is normal. No respiratory distress.      Breath sounds: No wheezing or rales.   Abdominal:      General: There is no distension.      Palpations: Abdomen is soft.      Tenderness: There is no abdominal tenderness.     Neurological:      Mental Status: He is  alert and oriented to person, place, and time.     Psychiatric:         Mood and Affect: Mood normal.         Behavior: Behavior normal.

## 2025-06-16 NOTE — ASSESSMENT & PLAN NOTE
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  He has lost weight about 25 pounds and protein estimations have dramatically improved in the last 2 have been excellent in fact the most recent was 100 mg.  That tells me that he did not have diabetic nephropathy as the proteinuria should not disappear from that.  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  With weight loss proteinuria is reduced significantly and hopefully that will equate to a slow progression.    His latest creatinine happens to be 2 which is slightly at the higher end of his baseline range.  He is not eating as much due to the use of Ozempic he is on a mild diuretic and an ACE inhibitor so he can see fluctuations in creatinine related to changes in effective volume status.  For now we will continue current medications and continue to monitor with blood pressure control and glycemic control.

## 2025-06-16 NOTE — ASSESSMENT & PLAN NOTE
Lab Results   Component Value Date    EGFR 32 06/06/2025    EGFR 34 04/10/2025    EGFR 42 (L) 01/07/2025    CREATININE 2.07 (H) 06/06/2025    CREATININE 1.96 (H) 04/10/2025    CREATININE 1.78 (H) 01/07/2025     As above patient has chronic kidney disease creatinine is 2 slightly at higher end of baseline range proteinuria has gone down to 100 mg.  Continue with blood pressure control weight loss routine health management and will continue to monitor.  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    I have spent a total time of 30 minutes in caring for this patient on the day of the visit/encounter including Diagnostic results, Instructions for management, Patient and family education, Reviewing/placing orders in the medical record (including tests, medications, and/or procedures), and Obtaining or reviewing history  .

## 2025-06-16 NOTE — LETTER
2025     Rosalina Lama MD  [address] [facility] [address]    Patient: Toribio Hernández   YOB: 1960   Date of Visit: 2025       Dear Dr. Rosalina Lama MD:    Thank you for referring Toribio Hernández to me for evaluation. Below are my notes for this consultation.    If you have questions, please do not hesitate to call me. I look forward to following your patient along with you.         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  He has lost weight about 25 pounds and protein estimations have dramatically improved in the last 2 have been excellent in fact the most recent was 100 mg.  That tells me that he did not have diabetic nephropathy as the proteinuria should not disappear from that.  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  With weight loss proteinuria is reduced significantly and hopefully that will equate to a slow progression.    His latest creatinine happens to be 2 which is slightly at the higher end of his baseline range.  He is not eating as much due to the use of Ozempic he is on a mild diuretic and an ACE inhibitor so he can see fluctuations in creatinine related to changes in effective volume status.  For now we will continue current medications and continue to monitor with blood pressure control and glycemic control.         Chronic kidney disease, unspecified CKD stage  Lab Results   Component Value Date    EGFR 32 2025     EGFR 34 04/10/2025    EGFR 42 (L) 01/07/2025    CREATININE 2.07 (H) 06/06/2025    CREATININE 1.96 (H) 04/10/2025    CREATININE 1.78 (H) 01/07/2025     As above patient has chronic kidney disease creatinine is 2 slightly at higher end of baseline range proteinuria has gone down to 100 mg.  Continue with blood pressure control weight loss routine health management and will continue to monitor.  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    I have spent a total time of 30 minutes in caring for this patient on the day of the visit/encounter including Diagnostic results, Instructions for management, Patient and family education, Reviewing/placing orders in the medical record (including tests, medications, and/or procedures), and Obtaining or reviewing history  .            History of Present Illness  HPI  Toribio Hernández is a 64 y.o. male who presents for routine follow-up.  The patient is doing well looks great he says his weight seems to have plateaued.  Other than that no illnesses no hospitalizations tolerating his medications.  No complaints today for me.  History obtained from: patient    Review of Systems   Constitutional:  Negative for chills, diaphoresis and fever.   HENT: Negative.     Eyes: Negative.    Respiratory:  Negative for cough, chest tightness, shortness of breath and wheezing.    Cardiovascular:  Negative for chest pain and leg swelling.   Gastrointestinal:  Negative for abdominal pain, diarrhea, nausea and vomiting.   Genitourinary: Negative.    Neurological:  Negative for dizziness and headaches.   Psychiatric/Behavioral:  Negative for agitation, behavioral problems and confusion.           Objective  There were no vitals taken for this visit.     Physical Exam  Constitutional:       General: He is not in acute distress.     Appearance: He is not toxic-appearing.   HENT:      Head: Normocephalic and atraumatic.      Nose: Nose normal.       Mouth/Throat:      Mouth: Mucous membranes are moist.     Eyes:      Extraocular Movements: Extraocular movements intact.       Cardiovascular:      Rate and Rhythm: Normal rate and regular rhythm.      Heart sounds:      No gallop.   Pulmonary:      Effort: Pulmonary effort is normal. No respiratory distress.      Breath sounds: No wheezing or rales.   Abdominal:      General: There is no distension.      Palpations: Abdomen is soft.      Tenderness: There is no abdominal tenderness.     Neurological:      Mental Status: He is alert and oriented to person, place, and time.     Psychiatric:         Mood and Affect: Mood normal.         Behavior: Behavior normal.

## 2025-07-01 ENCOUNTER — APPOINTMENT (OUTPATIENT)
Dept: LAB | Facility: CLINIC | Age: 65
End: 2025-07-01
Attending: NURSE PRACTITIONER
Payer: COMMERCIAL

## 2025-07-01 DIAGNOSIS — Z94.4 LIVER REPLACED BY TRANSPLANT (HCC): ICD-10-CM

## 2025-07-01 LAB
ALBUMIN SERPL BCG-MCNC: 4.7 G/DL (ref 3.5–5)
ALP SERPL-CCNC: 79 U/L (ref 34–104)
ALT SERPL W P-5'-P-CCNC: 21 U/L (ref 7–52)
ANION GAP SERPL CALCULATED.3IONS-SCNC: 8 MMOL/L (ref 4–13)
AST SERPL W P-5'-P-CCNC: 13 U/L (ref 13–39)
BASOPHILS # BLD AUTO: 0.03 THOUSANDS/ÂΜL (ref 0–0.1)
BASOPHILS NFR BLD AUTO: 1 % (ref 0–1)
BILIRUB SERPL-MCNC: 0.44 MG/DL (ref 0.2–1)
BUN SERPL-MCNC: 58 MG/DL (ref 5–25)
CALCIUM SERPL-MCNC: 9.9 MG/DL (ref 8.4–10.2)
CHLORIDE SERPL-SCNC: 110 MMOL/L (ref 96–108)
CO2 SERPL-SCNC: 21 MMOL/L (ref 21–32)
CREAT SERPL-MCNC: 1.96 MG/DL (ref 0.6–1.3)
EOSINOPHIL # BLD AUTO: 0.1 THOUSAND/ÂΜL (ref 0–0.61)
EOSINOPHIL NFR BLD AUTO: 2 % (ref 0–6)
ERYTHROCYTE [DISTWIDTH] IN BLOOD BY AUTOMATED COUNT: 13.7 % (ref 11.6–15.1)
GFR SERPL CREATININE-BSD FRML MDRD: 34 ML/MIN/1.73SQ M
GLUCOSE P FAST SERPL-MCNC: 163 MG/DL (ref 65–99)
HCT VFR BLD AUTO: 34.8 % (ref 36.5–49.3)
HGB BLD-MCNC: 11.8 G/DL (ref 12–17)
IMM GRANULOCYTES # BLD AUTO: 0.04 THOUSAND/UL (ref 0–0.2)
IMM GRANULOCYTES NFR BLD AUTO: 1 % (ref 0–2)
LYMPHOCYTES # BLD AUTO: 0.91 THOUSANDS/ÂΜL (ref 0.6–4.47)
LYMPHOCYTES NFR BLD AUTO: 16 % (ref 14–44)
MAGNESIUM SERPL-MCNC: 1.5 MG/DL (ref 1.9–2.7)
MCH RBC QN AUTO: 30.5 PG (ref 26.8–34.3)
MCHC RBC AUTO-ENTMCNC: 33.9 G/DL (ref 31.4–37.4)
MCV RBC AUTO: 90 FL (ref 82–98)
MONOCYTES # BLD AUTO: 0.52 THOUSAND/ÂΜL (ref 0.17–1.22)
MONOCYTES NFR BLD AUTO: 9 % (ref 4–12)
NEUTROPHILS # BLD AUTO: 3.94 THOUSANDS/ÂΜL (ref 1.85–7.62)
NEUTS SEG NFR BLD AUTO: 71 % (ref 43–75)
NRBC BLD AUTO-RTO: 0 /100 WBCS
PLATELET # BLD AUTO: 141 THOUSANDS/UL (ref 149–390)
PMV BLD AUTO: 11.2 FL (ref 8.9–12.7)
POTASSIUM SERPL-SCNC: 5 MMOL/L (ref 3.5–5.3)
PROT SERPL-MCNC: 7.1 G/DL (ref 6.4–8.4)
RBC # BLD AUTO: 3.87 MILLION/UL (ref 3.88–5.62)
SODIUM SERPL-SCNC: 139 MMOL/L (ref 135–147)
WBC # BLD AUTO: 5.54 THOUSAND/UL (ref 4.31–10.16)

## 2025-07-01 PROCEDURE — 85025 COMPLETE CBC W/AUTO DIFF WBC: CPT

## 2025-07-01 PROCEDURE — 36415 COLL VENOUS BLD VENIPUNCTURE: CPT

## 2025-07-01 PROCEDURE — 83735 ASSAY OF MAGNESIUM: CPT

## 2025-07-01 PROCEDURE — 80197 ASSAY OF TACROLIMUS: CPT

## 2025-07-01 PROCEDURE — 80053 COMPREHEN METABOLIC PANEL: CPT

## 2025-07-02 LAB — TACROLIMUS BLD-MCNC: 7.6 NG/ML (ref 3–15)

## 2025-08-04 ENCOUNTER — APPOINTMENT (OUTPATIENT)
Dept: LAB | Facility: CLINIC | Age: 65
End: 2025-08-04
Attending: NURSE PRACTITIONER
Payer: COMMERCIAL

## 2025-08-04 DIAGNOSIS — Z94.4 LIVER REPLACED BY TRANSPLANT (HCC): ICD-10-CM

## 2025-08-04 LAB
BASOPHILS # BLD AUTO: 0.03 THOUSANDS/ÂΜL (ref 0–0.1)
BASOPHILS NFR BLD AUTO: 1 % (ref 0–1)
EOSINOPHIL # BLD AUTO: 0.09 THOUSAND/ÂΜL (ref 0–0.61)
EOSINOPHIL NFR BLD AUTO: 2 % (ref 0–6)
ERYTHROCYTE [DISTWIDTH] IN BLOOD BY AUTOMATED COUNT: 13.8 % (ref 11.6–15.1)
HCT VFR BLD AUTO: 37 % (ref 36.5–49.3)
HGB BLD-MCNC: 11.9 G/DL (ref 12–17)
IMM GRANULOCYTES # BLD AUTO: 0.01 THOUSAND/UL (ref 0–0.2)
IMM GRANULOCYTES NFR BLD AUTO: 0 % (ref 0–2)
LYMPHOCYTES # BLD AUTO: 0.77 THOUSANDS/ÂΜL (ref 0.6–4.47)
LYMPHOCYTES NFR BLD AUTO: 18 % (ref 14–44)
MAGNESIUM SERPL-MCNC: 1.6 MG/DL (ref 1.9–2.7)
MCH RBC QN AUTO: 29.4 PG (ref 26.8–34.3)
MCHC RBC AUTO-ENTMCNC: 32.2 G/DL (ref 31.4–37.4)
MCV RBC AUTO: 91 FL (ref 82–98)
MONOCYTES # BLD AUTO: 0.5 THOUSAND/ÂΜL (ref 0.17–1.22)
MONOCYTES NFR BLD AUTO: 12 % (ref 4–12)
NEUTROPHILS # BLD AUTO: 2.96 THOUSANDS/ÂΜL (ref 1.85–7.62)
NEUTS SEG NFR BLD AUTO: 67 % (ref 43–75)
NRBC BLD AUTO-RTO: 0 /100 WBCS
PLATELET # BLD AUTO: 139 THOUSANDS/UL (ref 149–390)
PMV BLD AUTO: 12.4 FL (ref 8.9–12.7)
RBC # BLD AUTO: 4.05 MILLION/UL (ref 3.88–5.62)
TACROLIMUS BLD-MCNC: 2.8 NG/ML (ref 3–15)
WBC # BLD AUTO: 4.36 THOUSAND/UL (ref 4.31–10.16)

## 2025-08-04 PROCEDURE — 36415 COLL VENOUS BLD VENIPUNCTURE: CPT

## 2025-08-04 PROCEDURE — 83735 ASSAY OF MAGNESIUM: CPT

## 2025-08-04 PROCEDURE — 80197 ASSAY OF TACROLIMUS: CPT

## 2025-08-04 PROCEDURE — 85025 COMPLETE CBC W/AUTO DIFF WBC: CPT

## (undated) DEVICE — STANDARD SURGICAL GOWN, L: Brand: CONVERTORS

## (undated) DEVICE — SUT VICRYL 3-0 SH 27 IN J416H

## (undated) DEVICE — STRETCH BANDAGE: Brand: CURITY

## (undated) DEVICE — NEEDLE BLUNT 18 G X 1 1/2IN

## (undated) DEVICE — STERILE POLYISOPRENE POWDER-FREE SURGICAL GLOVES: Brand: PROTEXIS

## (undated) DEVICE — SYRINGE 5ML LL

## (undated) DEVICE — SUT ETHILON 4-0 PS-2 18 IN 1667H

## (undated) DEVICE — STERILE POLYISOPRENE POWDER-FREE SURGICAL GLOVES WITH EMOLLIENT COATING: Brand: PROTEXIS

## (undated) DEVICE — PENCIL ELECTROSURG E-Z CLEAN -0035H

## (undated) DEVICE — CURITY NON-ADHERENT STRIPS: Brand: CURITY

## (undated) DEVICE — SCD SEQUENTIAL COMPRESSION COMFORT SLEEVE MEDIUM KNEE LENGTH: Brand: KENDALL SCD

## (undated) DEVICE — 22.5 MM X 6.9 MM X 0.53 MM SAGITTAL BLADE

## (undated) DEVICE — GAUZE SPONGES,16 PLY: Brand: CURITY

## (undated) DEVICE — WIRE 0.86MM TROCAR TIP
Type: IMPLANTABLE DEVICE | Site: FOOT | Status: NON-FUNCTIONAL
Removed: 2022-02-04

## (undated) DEVICE — STOCKINETTE 2P PREROLLD 6X60

## (undated) DEVICE — POV-IOD SOLUTION 4OZ BT

## (undated) DEVICE — SUT VICRYL 4-0 PS-2 18 IN J496G

## (undated) DEVICE — NEEDLE 25G X 1 1/2

## (undated) DEVICE — DRILL BIT 2MM CALIBRATED

## (undated) DEVICE — GUIDEWIRE 0.045 IN TROCAR TIP

## (undated) DEVICE — 33.5 MM X 6.9 MM X 0.53 MM SAGITTAL BLADE

## (undated) DEVICE — CHLORAPREP HI-LITE 26ML ORANGE

## (undated) DEVICE — BETHLEHEM UNIVERSAL  MIONR EXT: Brand: CARDINAL HEALTH

## (undated) DEVICE — ASTOUND STANDARD SURGICAL GOWN, XXL: Brand: CONVERTORS

## (undated) DEVICE — SINGLE-USE POLYPECTOMY SNARE: Brand: SENSATION SHORT THROW

## (undated) DEVICE — SINGLE PORT MANIFOLD: Brand: NEPTUNE 2

## (undated) DEVICE — FORCEP BIOPSY PEDIATRIC RADIAL JAW 4 160CM

## (undated) DEVICE — INTENDED FOR TISSUE SEPARATION, AND OTHER PROCEDURES THAT REQUIRE A SHARP SURGICAL BLADE TO PUNCTURE OR CUT.: Brand: BARD-PARKER ® SAFETYLOCK CARBON RIB-BACK BLADES

## (undated) DEVICE — CUFF TOURNIQUET 18 X 4 IN QUICK CONNECT DISP 1 BLADDER